# Patient Record
Sex: MALE | Race: BLACK OR AFRICAN AMERICAN | NOT HISPANIC OR LATINO | Employment: OTHER | URBAN - NONMETROPOLITAN AREA
[De-identification: names, ages, dates, MRNs, and addresses within clinical notes are randomized per-mention and may not be internally consistent; named-entity substitution may affect disease eponyms.]

---

## 2017-11-20 ENCOUNTER — HOSPITAL ENCOUNTER (OUTPATIENT)
Dept: CT IMAGING | Facility: HOSPITAL | Age: 67
Discharge: HOME | End: 2017-11-20
Admitting: INTERNAL MEDICINE

## 2017-11-20 ENCOUNTER — TRANSCRIBE ORDERS (OUTPATIENT)
Dept: ADMINISTRATIVE | Facility: HOSPITAL | Age: 67
End: 2017-11-20

## 2017-11-20 DIAGNOSIS — Z13.9 SCREENING PROCEDURE: Primary | ICD-10-CM

## 2017-11-20 DIAGNOSIS — Z13.9 SCREENING PROCEDURE: ICD-10-CM

## 2017-11-20 PROCEDURE — G0297 LDCT FOR LUNG CA SCREEN: HCPCS

## 2018-11-19 ENCOUNTER — HOSPITAL ENCOUNTER (OUTPATIENT)
Dept: CT IMAGING | Facility: HOSPITAL | Age: 68
Discharge: HOME | End: 2018-11-19

## 2018-11-19 ENCOUNTER — TRANSCRIBE ORDERS (OUTPATIENT)
Dept: ADMINISTRATIVE | Facility: HOSPITAL | Age: 68
End: 2018-11-19

## 2018-11-19 DIAGNOSIS — Z12.9 CANCER SCREENING: ICD-10-CM

## 2018-11-19 DIAGNOSIS — Z12.9 CANCER SCREENING: Primary | ICD-10-CM

## 2018-11-19 PROCEDURE — G0297 LDCT FOR LUNG CA SCREEN: HCPCS

## 2019-05-12 ENCOUNTER — OFFICE VISIT (OUTPATIENT)
Dept: URGENT CARE | Age: 69
End: 2019-05-12
Payer: COMMERCIAL

## 2019-05-12 VITALS
DIASTOLIC BLOOD PRESSURE: 72 MMHG | TEMPERATURE: 98.4 F | RESPIRATION RATE: 18 BRPM | OXYGEN SATURATION: 98 % | HEART RATE: 80 BPM | SYSTOLIC BLOOD PRESSURE: 124 MMHG | WEIGHT: 172 LBS

## 2019-05-12 DIAGNOSIS — L08.9 INFECTION OF FINGER: Primary | ICD-10-CM

## 2019-05-12 DIAGNOSIS — L03.011 PARONYCHIA OF FINGER, RIGHT: ICD-10-CM

## 2019-05-12 PROCEDURE — 99203 OFFICE O/P NEW LOW 30 MIN: CPT | Performed by: FAMILY MEDICINE

## 2019-05-12 RX ORDER — GLIMEPIRIDE 2 MG/1
2 TABLET ORAL
COMMUNITY
End: 2020-11-24

## 2019-05-12 RX ORDER — VALSARTAN AND HYDROCHLOROTHIAZIDE 320; 25 MG/1; MG/1
1 TABLET, FILM COATED ORAL DAILY
COMMUNITY
End: 2021-04-16 | Stop reason: SDUPTHER

## 2019-05-12 RX ORDER — ATORVASTATIN CALCIUM 20 MG/1
20 TABLET, FILM COATED ORAL DAILY
COMMUNITY
End: 2021-07-07 | Stop reason: SDUPTHER

## 2019-05-12 RX ORDER — AMOXICILLIN AND CLAVULANATE POTASSIUM 875; 125 MG/1; MG/1
1 TABLET, FILM COATED ORAL 2 TIMES DAILY
Qty: 20 TABLET | Refills: 0 | Status: SHIPPED | OUTPATIENT
Start: 2019-05-12 | End: 2019-05-22

## 2019-05-12 RX ORDER — TAMSULOSIN HYDROCHLORIDE 0.4 MG/1
0.4 CAPSULE ORAL DAILY
COMMUNITY
End: 2021-02-25 | Stop reason: SDUPTHER

## 2019-05-12 RX ORDER — TRAZODONE HYDROCHLORIDE 50 MG/1
50 TABLET ORAL NIGHTLY
COMMUNITY
End: 2021-04-16 | Stop reason: SDUPTHER

## 2019-05-12 RX ORDER — GABAPENTIN 300 MG/1
300 CAPSULE ORAL NIGHTLY
COMMUNITY
End: 2021-01-07 | Stop reason: SDUPTHER

## 2019-05-12 RX ORDER — CARVEDILOL 3.12 MG/1
3.12 TABLET ORAL 2 TIMES DAILY WITH MEALS
COMMUNITY
End: 2020-11-24

## 2019-05-12 RX ORDER — AMLODIPINE BESYLATE 5 MG/1
5 TABLET ORAL DAILY
COMMUNITY
End: 2021-02-09 | Stop reason: SDUPTHER

## 2019-05-12 RX ORDER — BUPROPION HYDROCHLORIDE 150 MG/1
150 TABLET, EXTENDED RELEASE ORAL 2 TIMES DAILY
COMMUNITY
End: 2019-07-03

## 2019-05-12 ASSESSMENT — ENCOUNTER SYMPTOMS
SHORTNESS OF BREATH: 0
COUGH: 0
COLOR CHANGE: 0

## 2019-05-12 NOTE — PROGRESS NOTES
for 10 days 5/12/19 5/22/19 Yes Kalpana Lopez MD       Past Medical History:   Diagnosis Date    DM (diabetes mellitus) ([facility] [address]     HTN (hypertension)        No past surgical history on file. Social History     Socioeconomic History    Marital status:      Spouse name: Not on file    Number of children: Not on file    Years of education: Not on file    Highest education level: Not on file   Occupational History    Not on file   Social Needs    Financial resource strain: Not on file    Food insecurity:     Worry: Not on file     Inability: Not on file    Transportation needs:     Medical: Not on file     Non-medical: Not on file   Tobacco Use    Smoking status: Not on file   Substance and Sexual Activity    Alcohol use: Not on file    Drug use: Not on file    Sexual activity: Not on file   Lifestyle    Physical activity:     Days per week: Not on file     Minutes per session: Not on file    Stress: Not on file   Relationships    Social connections:     Talks on phone: Not on file     Gets together: Not on file     Attends Nondenominational service: Not on file     Active member of club or organization: Not on file     Attends meetings of clubs or organizations: Not on file     Relationship status: Not on file    Intimate partner violence:     Fear of current or ex partner: Not on file     Emotionally abused: Not on file     Physically abused: Not on file     Forced sexual activity: Not on file   Other Topics Concern    Not on file   Social History Narrative    Not on file       Physical Exam   Constitutional: He is oriented to person, place, and time. He appears well-developed and well-nourished. HENT:   Head: Normocephalic and atraumatic. Right Ear: External ear normal.   Left Ear: External ear normal.   Eyes: Conjunctivae are normal. Right eye exhibits no discharge. Left eye exhibits no discharge. Neck: No JVD present. Carotid bruit is not present.    Cardiovascular: Normal rate, also a good idea to know your test results and keep a list of the medicines you take. How can you care for yourself at home? · If your doctor told you how to care for your infected nail, follow the doctor's instructions. If you did not get instructions, follow this general advice:  ? Wash the area with clean water 2 times a day. Don't use hydrogen peroxide or alcohol, which can slow healing. ? You may cover the area with a thin layer of petroleum jelly, such as Vaseline, and a nonstick bandage. ? Apply more petroleum jelly and replace the bandage as needed. · If your doctor prescribed antibiotics, take them as directed. Do not stop taking them just because you feel better. You need to take the full course of antibiotics. · Take an over-the-counter pain medicine, such as acetaminophen (Tylenol), ibuprofen (Advil, Motrin), or naproxen (Aleve). Read and follow all instructions on the label. · Do not take two or more pain medicines at the same time unless the doctor told you to. Many pain medicines have acetaminophen, which is Tylenol. Too much acetaminophen (Tylenol) can be harmful. · Prop up the toe or finger so that it is higher than the level of your heart. This will help with pain and swelling. · Apply heat. Put a warm water bottle, heating pad set on low, or warm cloth on your finger or toe. Do not go to sleep with a heating pad on your skin. · Soak the area in warm water twice a day for 15 minutes each time. After soaking, dry the area well and apply a thin layer of petroleum jelly, such as Vaseline. Put on a new bandage. When should you call for help? Call your doctor now or seek immediate medical care if:    · You have signs of new or worsening infection, such as:  ? Increased pain, swelling, warmth, or redness. ? Red streaks leading from the infected skin. ? Pus draining from the area.   ? A fever.    Watch closely for changes in your health, and be sure to contact your doctor if:    · You do not

## 2019-05-15 LAB
GRAM STAIN RESULT: ABNORMAL
ORGANISM: ABNORMAL
ORGANISM: ABNORMAL
WOUND/ABSCESS: ABNORMAL

## 2019-05-15 NOTE — RESULT ENCOUNTER NOTE
Please inform patient of abnormal test results. Antibiotics should treat the infection. How is he feeling?

## 2019-07-01 RX ORDER — ALBUTEROL SULFATE 90 UG/1
2 AEROSOL, METERED RESPIRATORY (INHALATION) EVERY 6 HOURS PRN
COMMUNITY
End: 2022-10-17 | Stop reason: SDUPTHER

## 2019-07-01 RX ORDER — CHOLECALCIFEROL (VITAMIN D3) 1250 MCG
1 CAPSULE ORAL WEEKLY
COMMUNITY
End: 2022-01-28

## 2019-07-01 RX ORDER — TESTOSTERONE CYPIONATE 200 MG/ML
2.5 VIAL (ML) INTRAMUSCULAR
COMMUNITY
End: 2022-01-28

## 2019-07-01 RX ORDER — SILDENAFIL 50 MG/1
50 TABLET, FILM COATED ORAL PRN
COMMUNITY
End: 2020-11-24

## 2019-07-01 RX ORDER — FLUTICASONE PROPIONATE 50 MCG
2 SPRAY, SUSPENSION (ML) NASAL DAILY
COMMUNITY
End: 2019-07-03

## 2019-07-03 ENCOUNTER — OFFICE VISIT (OUTPATIENT)
Dept: GASTROENTEROLOGY | Age: 69
End: 2019-07-03
Payer: COMMERCIAL

## 2019-07-03 VITALS
DIASTOLIC BLOOD PRESSURE: 62 MMHG | BODY MASS INDEX: 23.38 KG/M2 | HEIGHT: 71 IN | OXYGEN SATURATION: 99 % | HEART RATE: 97 BPM | WEIGHT: 167 LBS | SYSTOLIC BLOOD PRESSURE: 118 MMHG

## 2019-07-03 DIAGNOSIS — R13.19 ESOPHAGEAL DYSPHAGIA: Primary | ICD-10-CM

## 2019-07-03 DIAGNOSIS — Z80.0 FAMILY HISTORY OF GASTRIC CANCER: ICD-10-CM

## 2019-07-03 DIAGNOSIS — Z86.010 HISTORY OF COLON POLYPS: ICD-10-CM

## 2019-07-03 DIAGNOSIS — Z80.0 FAMILY HISTORY OF COLON CANCER IN MOTHER: ICD-10-CM

## 2019-07-03 DIAGNOSIS — R63.4 UNEXPLAINED WEIGHT LOSS: ICD-10-CM

## 2019-07-03 PROCEDURE — 99204 OFFICE O/P NEW MOD 45 MIN: CPT | Performed by: INTERNAL MEDICINE

## 2019-07-03 ASSESSMENT — ENCOUNTER SYMPTOMS
SHORTNESS OF BREATH: 1
CHOKING: 1
EYES NEGATIVE: 1
COUGH: 1

## 2019-07-09 ENCOUNTER — TELEPHONE (OUTPATIENT)
Dept: GASTROENTEROLOGY | Age: 69
End: 2019-07-09

## 2019-07-09 NOTE — TELEPHONE ENCOUNTER
Called the patient back and had to leave a message. Advised him to check his last page of his paperwork for his RX. If he does not have it then call me back and I will call it in.

## 2019-07-11 ENCOUNTER — ANESTHESIA EVENT (OUTPATIENT)
Dept: OPERATING ROOM | Age: 69
End: 2019-07-11

## 2019-07-12 ENCOUNTER — APPOINTMENT (OUTPATIENT)
Dept: OPERATING ROOM | Age: 69
End: 2019-07-12

## 2019-07-12 ENCOUNTER — HOSPITAL ENCOUNTER (OUTPATIENT)
Age: 69
Setting detail: OUTPATIENT SURGERY
Discharge: HOME | End: 2019-07-12
Attending: INTERNAL MEDICINE | Admitting: INTERNAL MEDICINE
Payer: MEDICARE

## 2019-07-12 ENCOUNTER — HOSPITAL ENCOUNTER (OUTPATIENT)
Age: 69
Setting detail: SPECIMEN
Discharge: HOME | End: 2019-07-12
Payer: MEDICARE

## 2019-07-12 ENCOUNTER — ANESTHESIA (OUTPATIENT)
Dept: OPERATING ROOM | Age: 69
End: 2019-07-12

## 2019-07-12 VITALS — DIASTOLIC BLOOD PRESSURE: 71 MMHG | SYSTOLIC BLOOD PRESSURE: 128 MMHG | OXYGEN SATURATION: 98 %

## 2019-07-12 VITALS
OXYGEN SATURATION: 98 % | SYSTOLIC BLOOD PRESSURE: 128 MMHG | HEART RATE: 71 BPM | DIASTOLIC BLOOD PRESSURE: 74 MMHG | RESPIRATION RATE: 18 BRPM

## 2019-07-12 PROCEDURE — 45385 COLONOSCOPY W/LESION REMOVAL: CPT | Performed by: INTERNAL MEDICINE

## 2019-07-12 PROCEDURE — G8907 PT DOC NO EVENTS ON DISCHARG: HCPCS

## 2019-07-12 PROCEDURE — 45388 COLONOSCOPY W/ABLATION: CPT | Performed by: INTERNAL MEDICINE

## 2019-07-12 PROCEDURE — 45384 COLONOSCOPY W/LESION REMOVAL: CPT

## 2019-07-12 PROCEDURE — 43239 EGD BIOPSY SINGLE/MULTIPLE: CPT | Performed by: INTERNAL MEDICINE

## 2019-07-12 PROCEDURE — 88312 SPECIAL STAINS GROUP 1: CPT

## 2019-07-12 PROCEDURE — 88305 TISSUE EXAM BY PATHOLOGIST: CPT

## 2019-07-12 PROCEDURE — G8918 PT W/O PREOP ORDER IV AB PRO: HCPCS

## 2019-07-12 PROCEDURE — 45385 COLONOSCOPY W/LESION REMOVAL: CPT

## 2019-07-12 PROCEDURE — 43239 EGD BIOPSY SINGLE/MULTIPLE: CPT

## 2019-07-12 RX ORDER — LIDOCAINE HYDROCHLORIDE 10 MG/ML
INJECTION, SOLUTION INFILTRATION; PERINEURAL PRN
Status: DISCONTINUED | OUTPATIENT
Start: 2019-07-12 | End: 2019-07-12 | Stop reason: SDUPTHER

## 2019-07-12 RX ORDER — GLYCOPYRROLATE 0.6MG/3ML
SYRINGE (ML) INTRAVENOUS PRN
Status: DISCONTINUED | OUTPATIENT
Start: 2019-07-12 | End: 2019-07-12 | Stop reason: SDUPTHER

## 2019-07-12 RX ORDER — SODIUM CHLORIDE 9 MG/ML
INJECTION, SOLUTION INTRAVENOUS CONTINUOUS
Status: DISCONTINUED | OUTPATIENT
Start: 2019-07-12 | End: 2019-07-12 | Stop reason: HOSPADM

## 2019-07-12 RX ORDER — SODIUM CHLORIDE 9 MG/ML
INJECTION, SOLUTION INTRAVENOUS CONTINUOUS PRN
Status: DISCONTINUED | OUTPATIENT
Start: 2019-07-12 | End: 2019-07-12 | Stop reason: SDUPTHER

## 2019-07-12 RX ORDER — PROPOFOL 10 MG/ML
INJECTION, EMULSION INTRAVENOUS PRN
Status: DISCONTINUED | OUTPATIENT
Start: 2019-07-12 | End: 2019-07-12 | Stop reason: SDUPTHER

## 2019-07-12 RX ADMIN — PROPOFOL 400 MG: 10 INJECTION, EMULSION INTRAVENOUS at 09:55

## 2019-07-12 RX ADMIN — LIDOCAINE HYDROCHLORIDE 40 MG: 10 INJECTION, SOLUTION INFILTRATION; PERINEURAL at 09:55

## 2019-07-12 RX ADMIN — Medication 0.2 MG: at 09:54

## 2019-07-12 RX ADMIN — SODIUM CHLORIDE: 9 INJECTION, SOLUTION INTRAVENOUS at 09:51

## 2019-07-12 ASSESSMENT — LIFESTYLE VARIABLES: SMOKING_STATUS: 1

## 2019-07-12 NOTE — OP NOTE
Endoscopic Procedure Note    Patient: Jett Choi :   [facility] Rec#: [id number] Acc#: [id number]     Primary Care Provider No primary care provider on file. Endoscopist: Lori Dejesus MD    Date of Procedure:  2019    Procedure:   1. EGD with cold biopsies    Indications:   1. Intermittent dysphagia to liquids and pills mostly  2. Unexplained weight loss until recently  3. Abnormal UGI x-rays    Anesthesia:  Sedation was administered by anesthesia who monitored the patient during the procedure. Estimated Blood Loss: minimal.    Procedure:   After reviewing the patient's chart and obtaining informed consent, the patient was placed in the left lateral decubitus position. A forward-viewing Olympus endoscope was lubricated and inserted through the mouth into the oropharynx. Under direct visualization, the upper esophagus was intubated. The scope was advanced to the level of the third portion of duodenum. Scope was slowly withdrawn with careful inspection of the mucosal surfaces. The scope was retroflexed for inspection of the gastric fundus and incisura. Findings and maneuvers are listed in impression below. The patient tolerated the procedure well. The scope was removed. There were no immediate complications. Findings:   Esophagus: normal; no obvious masses or strictures or erosions or ulcers or extrinsic compression  There is NO hiatal hernia present. Stomach:  abnormal:  mucosal changes with linear patchy erythema and small erosions suggestive of gastritis noted in the distal body and antrum of stomach- Gastric biopsies were taken from the antrum and body to rule out Helicobacter pylori infection. Duodenum: abnormal: small erosions and patchy erythema in the bulb-biopsies were taken; the Major papilla appeared somewhat prominent but could not be visualized adequately with this end-on scope.  Distal to the papilla, a 1.5 cm in diameter submucosal appearing lesion with a

## 2019-07-12 NOTE — ANESTHESIA PRE PROCEDURE
medications for this encounter. Allergies:  No Known Allergies    Problem List:  There is no problem list on file for this patient. Past Medical History:        Diagnosis Date    BPH (benign prostatic hyperplasia)     Dizziness     DM (diabetes mellitus) ([address]     type 2    Dysphagia     HTN (hypertension)     Hyperlipidemia     Hypogonadism male     Peripheral neuropathy     Tobacco dependence        Past Surgical History:        Procedure Laterality Date    BACK SURGERY      COLON SURGERY  2014    Done in [facility], hx of colon polyps with a 5 yr recall    EYE SURGERY Right 07/2017       Social History:    Social History     Tobacco Use    Smoking status: Current Every Day Smoker     Packs/day: 1.00    Smokeless tobacco: Never Used   Substance Use Topics    Alcohol use: Yes     Comment: rare                                Ready to quit: Not Answered  Counseling given: Not Answered      Vital Signs (Current): There were no vitals filed for this visit. BP Readings from Last 3 Encounters:   07/03/19 118/62   05/12/19 124/72       NPO Status:                                                                                 BMI:   Wt Readings from Last 3 Encounters:   07/03/19 167 lb (75.8 kg)   05/12/19 172 lb (78 kg)     There is no height or weight on file to calculate BMI.    CBC: No results found for: WBC, RBC, HGB, HCT, MCV, RDW, PLT    CMP: No results found for: NA, K, CL, CO2, BUN, CREATININE, GFRAA, AGRATIO, LABGLOM, GLUCOSE, PROT, CALCIUM, BILITOT, ALKPHOS, AST, ALT    POC Tests: No results for input(s): POCGLU, POCNA, POCK, POCCL, POCBUN, POCHEMO, POCHCT in the last 72 hours.     Coags: No results found for: PROTIME, INR, APTT    HCG (If Applicable): No results found for: PREGTESTUR, PREGSERUM, HCG, HCGQUANT     ABGs: No results found for: PHART, PO2ART, NWF1PZH, TGD1JZN, BEART, L6TTHTBO     Type & Screen (If Applicable):  No results found

## 2019-07-12 NOTE — H&P
units CAPS Take 1 capsule by mouth once a week    Historical Provider, MD   atorvastatin (LIPITOR) 20 MG tablet Take 20 mg by mouth daily    Historical Provider, MD   traZODone (DESYREL) 50 MG tablet Take 50 mg by mouth nightly    Historical Provider, MD   gabapentin (NEURONTIN) 300 MG capsule Take 300 mg by mouth nightly. Historical Provider, MD   carvedilol (COREG) 3.125 MG tablet Take 3.125 mg by mouth 2 times daily (with meals)    Historical Provider, MD   glimepiride (AMARYL) 2 MG tablet Take 2 mg by mouth every morning (before breakfast)    Historical Provider, MD   amLODIPine (NORVASC) 5 MG tablet Take 5 mg by mouth daily     Historical Provider, MD   tamsulosin (FLOMAX) 0.4 MG capsule Take 0.4 mg by mouth daily    Historical Provider, MD   valsartan-hydrochlorothiazide (DIOVAN-HCT) 320-25 MG per tablet Take 1 tablet by mouth daily     Historical Provider, MD   Dulaglutide (TRULICITY) 6.31 ZK/7.3FM SOPN Inject into the skin once a week     Historical Provider, MD       Past Medical History:  Past Medical History:   Diagnosis Date    BPH (benign prostatic hyperplasia)     Dizziness     DM (diabetes mellitus) ([facility] [address]     type 2    Dysphagia     HTN (hypertension)     Hyperlipidemia     Hypogonadism male     Peripheral neuropathy     Tobacco dependence        Past Surgical History:  Past Surgical History:   Procedure Laterality Date    BACK SURGERY      COLON SURGERY  2014    Done in [facility] of colon polyps with a 5 yr recall    EYE SURGERY Right 07/2017       Social History:  Social History     Tobacco Use    Smoking status: Current Every Day Smoker     Packs/day: 1.00    Smokeless tobacco: Never Used   Substance Use Topics    Alcohol use: Yes     Comment: rare    Drug use: Yes     Types: Marijuana     Comment: occ       Vital Signs: There were no vitals filed for this visit.      Physical Exam:  Cardiac:  [x]WNL  []Comments:  Pulmonary:  [x]WNL   []Comments:  Neuro/Mental Status:

## 2019-07-12 NOTE — ANESTHESIA POSTPROCEDURE EVALUATION
Department of Anesthesiology  Postprocedure Note    Patient: Bruce Hopkins  MRN: [id number]  YOB: 1950  Date of evaluation: 7/12/2019  Time:  10:40 AM     Procedure Summary     Date:  07/12/19 Room / Location:  [facility] ENDO 01 / [facility] OR    Anesthesia Start:  8694 Anesthesia Stop:      Procedures:       COLONOSCOPY DIAGNOSTIC (N/A Abdomen)      EGD ESOPHAGOGASTRODUODENOSCOPY (N/A Esophagus) Diagnosis:  (DYSPHAGIA, WEIGHT LOSS, HX POLYPS, FH COLON CA,)    Surgeon:  Abhishek Pinedo MD Responsible Provider:  ADI Santos CRNA    Anesthesia Type:  general, TIVA ASA Status:  3          Anesthesia Type: general, TIVA    Lenore Phase I:      Lenore Phase II:      Last vitals: Reviewed and per EMR flowsheets.        Anesthesia Post Evaluation    Patient location during evaluation: bedside  Patient participation: complete - patient participated  Level of consciousness: sleepy but conscious  Pain score: 0  Airway patency: patent  Nausea & Vomiting: no nausea and no vomiting  Complications: no  Cardiovascular status: hemodynamically stable and blood pressure returned to baseline  Respiratory status: acceptable and nasal cannula  Hydration status: stable

## 2019-07-15 ENCOUNTER — TELEPHONE (OUTPATIENT)
Dept: GASTROENTEROLOGY | Age: 69
End: 2019-07-15

## 2019-07-30 DIAGNOSIS — A04.8 H. PYLORI INFECTION: Primary | ICD-10-CM

## 2019-07-30 RX ORDER — AMOXICILLIN 500 MG/1
CAPSULE ORAL
Qty: 56 CAPSULE | Refills: 0 | Status: SHIPPED | OUTPATIENT
Start: 2019-07-30 | End: 2020-11-24

## 2019-07-30 RX ORDER — METRONIDAZOLE 500 MG/1
500 TABLET ORAL 2 TIMES DAILY
Qty: 28 TABLET | Refills: 0 | Status: SHIPPED | OUTPATIENT
Start: 2019-07-30 | End: 2019-08-13

## 2019-07-30 RX ORDER — OMEPRAZOLE 20 MG/1
CAPSULE, DELAYED RELEASE ORAL
Qty: 28 CAPSULE | Refills: 0 | Status: SHIPPED | OUTPATIENT
Start: 2019-07-30

## 2019-08-12 ENCOUNTER — TELEPHONE (OUTPATIENT)
Dept: GASTROENTEROLOGY | Age: 69
End: 2019-08-12

## 2019-11-05 NOTE — TELEPHONE ENCOUNTER
DK,     Just verifying you got my message back in July for EUS. Please let me know of any changes for the apt.  Thanks

## 2019-11-15 ENCOUNTER — HOSPITAL ENCOUNTER (OUTPATIENT)
Dept: CT IMAGING | Facility: HOSPITAL | Age: 69
Discharge: HOME | End: 2019-11-15

## 2019-11-15 ENCOUNTER — TRANSCRIBE ORDERS (OUTPATIENT)
Dept: ADMINISTRATIVE | Facility: HOSPITAL | Age: 69
End: 2019-11-15

## 2019-11-15 DIAGNOSIS — Z13.9 SCREENING PROCEDURE: ICD-10-CM

## 2019-11-15 DIAGNOSIS — Z13.9 SCREENING PROCEDURE: Primary | ICD-10-CM

## 2019-11-15 PROCEDURE — G0297 LDCT FOR LUNG CA SCREEN: HCPCS

## 2019-12-04 ENCOUNTER — TRANSCRIBE ORDERS (OUTPATIENT)
Dept: ADMINISTRATIVE | Facility: HOSPITAL | Age: 69
End: 2019-12-04

## 2019-12-04 ENCOUNTER — APPOINTMENT (OUTPATIENT)
Dept: LAB | Facility: HOSPITAL | Age: 69
End: 2019-12-04

## 2019-12-04 DIAGNOSIS — N40.0 BENIGN LOCALIZED PROSTATIC HYPERPLASIA WITHOUT LOWER URINARY TRACT SYMPTOMS (LUTS): ICD-10-CM

## 2019-12-04 DIAGNOSIS — R06.09 OTHER FORMS OF DYSPNEA: Primary | ICD-10-CM

## 2019-12-04 DIAGNOSIS — I10 ESSENTIAL HYPERTENSION: ICD-10-CM

## 2019-12-04 DIAGNOSIS — E11.40 TYPE 2 DIABETES MELLITUS WITH DIABETIC NEUROPATHY, UNSPECIFIED WHETHER LONG TERM INSULIN USE (HCC): Primary | ICD-10-CM

## 2019-12-04 DIAGNOSIS — E78.00 HYPERCHOLESTEREMIA: ICD-10-CM

## 2019-12-04 LAB
ALBUMIN SERPL-MCNC: 4.1 G/DL (ref 3.5–5)
ALBUMIN/GLOB SERPL: 1.1 G/DL (ref 1.1–2.5)
ALP SERPL-CCNC: 48 U/L (ref 24–120)
ALT SERPL W P-5'-P-CCNC: 30 U/L (ref 0–54)
ANION GAP SERPL CALCULATED.3IONS-SCNC: 7 MMOL/L (ref 4–13)
AST SERPL-CCNC: 24 U/L (ref 7–45)
AUTO MIXED CELLS #: 0.9 10*3/MM3 (ref 0.1–2.6)
AUTO MIXED CELLS %: 10.1 % (ref 0.1–24)
BILIRUB SERPL-MCNC: 0.6 MG/DL (ref 0.1–1)
BILIRUB UR QL STRIP: NEGATIVE
BUN BLD-MCNC: 18 MG/DL (ref 5–21)
BUN/CREAT SERPL: 18.6
CALCIUM SPEC-SCNC: 9.5 MG/DL (ref 8.4–10.4)
CHLORIDE SERPL-SCNC: 100 MMOL/L (ref 98–110)
CHOLEST SERPL-MCNC: 154 MG/DL (ref 130–200)
CLARITY UR: CLEAR
CO2 SERPL-SCNC: 34 MMOL/L (ref 24–31)
COLOR UR: YELLOW
CREAT BLD-MCNC: 0.97 MG/DL (ref 0.5–1.4)
ERYTHROCYTE [DISTWIDTH] IN BLOOD BY AUTOMATED COUNT: 13.1 % (ref 12.3–15.4)
GFR SERPL CREATININE-BSD FRML MDRD: 92 ML/MIN/1.73
GLOBULIN UR ELPH-MCNC: 3.9 GM/DL
GLUCOSE BLD-MCNC: 113 MG/DL (ref 70–100)
GLUCOSE UR STRIP-MCNC: ABNORMAL MG/DL
HBA1C MFR BLD: 6.1 % (ref 4.8–5.9)
HCT VFR BLD AUTO: 42 % (ref 37.5–51)
HDLC SERPL-MCNC: 46 MG/DL
HGB BLD-MCNC: 13.7 G/DL (ref 13–17.7)
HGB UR QL STRIP.AUTO: NEGATIVE
KETONES UR QL STRIP: NEGATIVE
LDLC SERPL CALC-MCNC: 96 MG/DL (ref 0–99)
LDLC/HDLC SERPL: 2.08 {RATIO}
LEUKOCYTE ESTERASE UR QL STRIP.AUTO: NEGATIVE
LYMPHOCYTES # BLD AUTO: 2.7 10*3/MM3 (ref 0.7–3.1)
LYMPHOCYTES NFR BLD AUTO: 31.2 % (ref 19.6–45.3)
MCH RBC QN AUTO: 29.7 PG (ref 26.6–33)
MCHC RBC AUTO-ENTMCNC: 32.6 G/DL (ref 31.5–35.7)
MCV RBC AUTO: 91.1 FL (ref 79–97)
NEUTROPHILS # BLD AUTO: 5.2 10*3/MM3 (ref 1.7–7)
NEUTROPHILS NFR BLD AUTO: 58.7 % (ref 42.7–76)
NITRITE UR QL STRIP: NEGATIVE
PH UR STRIP.AUTO: 6 [PH] (ref 5–8)
PLATELET # BLD AUTO: 410 10*3/MM3 (ref 140–450)
PMV BLD AUTO: 8 FL (ref 6–12)
POTASSIUM BLD-SCNC: 4.4 MMOL/L (ref 3.5–5.3)
PROT SERPL-MCNC: 8 G/DL (ref 6.3–8.7)
PROT UR QL STRIP: NEGATIVE
PSA SERPL-MCNC: 1.5 NG/ML (ref 0–4)
RBC # BLD AUTO: 4.61 10*6/MM3 (ref 4.14–5.8)
SODIUM BLD-SCNC: 141 MMOL/L (ref 135–145)
SP GR UR STRIP: 1.02 (ref 1–1.03)
TRIGL SERPL-MCNC: 62 MG/DL (ref 0–149)
UROBILINOGEN UR QL STRIP: ABNORMAL
VLDLC SERPL-MCNC: 12.4 MG/DL
WBC NRBC COR # BLD: 8.8 10*3/MM3 (ref 3.4–10.8)

## 2019-12-04 PROCEDURE — 84153 ASSAY OF PSA TOTAL: CPT | Performed by: PEDIATRICS

## 2019-12-04 PROCEDURE — 83036 HEMOGLOBIN GLYCOSYLATED A1C: CPT | Performed by: PEDIATRICS

## 2019-12-04 PROCEDURE — 80053 COMPREHEN METABOLIC PANEL: CPT | Performed by: PEDIATRICS

## 2019-12-04 PROCEDURE — 80061 LIPID PANEL: CPT | Performed by: PEDIATRICS

## 2019-12-04 PROCEDURE — 36415 COLL VENOUS BLD VENIPUNCTURE: CPT | Performed by: PEDIATRICS

## 2019-12-04 PROCEDURE — 81003 URINALYSIS AUTO W/O SCOPE: CPT | Performed by: PEDIATRICS

## 2019-12-04 PROCEDURE — 85025 COMPLETE CBC W/AUTO DIFF WBC: CPT | Performed by: PEDIATRICS

## 2020-04-18 ENCOUNTER — NURSE TRIAGE (OUTPATIENT)
Dept: CALL CENTER | Facility: HOSPITAL | Age: 70
End: 2020-04-18

## 2020-04-18 NOTE — TELEPHONE ENCOUNTER
States he called earlier about his medication. Accessed chart and see that he spoke with Josephine. Appears that she checked with pharmacy and it was at the pharmacy and ready but caller did not answer when she tried to call him back. Notified him that medication is at the pharmacy. He verbalized understanding. Denies any further questions/concerns at this time.

## 2020-04-18 NOTE — TELEPHONE ENCOUNTER
"Called the pharmacy, spoke with Miriam, medication is at the pharm. Attempted to call the caller back and no answer, message left to called the nurse call line.  Reason for Disposition  • Caller has medication question only, adult not sick, and triager answers question    Additional Information  • Negative: Drug overdose and nurse unable to answer question  • Negative: Caller requesting information not related to medicine  • Negative: Caller requesting a prescription for Strep throat and has a positive culture result  • Negative: Rash while taking a medication or within 3 days of stopping it  • Negative: Immunization reaction suspected  • Negative: [1] Asthma and [2] having symptoms of asthma (cough, wheezing, etc)  • Negative: MORE THAN A DOUBLE DOSE of a prescription or over-the-counter (OTC) drug  • Negative: [1] DOUBLE DOSE (an extra dose or lesser amount) of over-the-counter (OTC) drug AND [2] any symptoms (e.g., dizziness, nausea, pain, sleepiness)  • Negative: [1] DOUBLE DOSE (an extra dose or lesser amount) of prescription drug AND [2] any symptoms (e.g., dizziness, nausea, pain, sleepiness)  • Negative: Took another person's prescription drug  • Negative: [1] DOUBLE DOSE (an extra dose or lesser amount) of prescription drug AND [2] NO symptoms (Exception: a double dose of antibiotics)  • Negative: Diabetes drug error or overdose (e.g., insulin or extra dose)  • Negative: [1] Request for URGENT new prescription or refill of \"essential\" medication (i.e., likelihood of harm to patient if not taken) AND [2] triager unable to fill per unit policy  • Negative: [1] Prescription not at pharmacy AND [2] was prescribed today by PCP  • Negative: Pharmacy calling with prescription questions and triager unable to answer question  • Negative: Caller has URGENT medication question about med that PCP prescribed and triager unable to answer question  • Negative: Caller has NON-URGENT medication question about med that PCP " "prescribed and triager unable to answer question  • Negative: Caller requesting a NON-URGENT new prescription or refill and triager unable to refill per unit policy  • Negative: Caller has medication question about med not prescribed by PCP and triager unable to answer question (e.g., compatibility with other med, storage)  • Negative: [1] DOUBLE DOSE (an extra dose or lesser amount) of over-the-counter (OTC) drug AND [2] NO symptoms  • Negative: [1] DOUBLE DOSE (an extra dose or lesser amount) of antibiotic drug AND [2] NO symptoms    Answer Assessment - Initial Assessment Questions  1. SYMPTOMS: \"Do you have any symptoms?\"      Upper resp  2. SEVERITY: If symptoms are present, ask \"Are they mild, moderate or severe?\"      moderate    Protocols used: MEDICATION QUESTION CALL-ADULT-    "

## 2020-04-24 ENCOUNTER — NURSE TRIAGE (OUTPATIENT)
Dept: CALL CENTER | Facility: HOSPITAL | Age: 70
End: 2020-04-24

## 2020-04-24 NOTE — TELEPHONE ENCOUNTER
"States he ate peanut butter/jelly and milk and states it came up to 69. Now is 112.     States Dr. Anderson is his PCP. States \"my blood sugar keeps dropping\". States he called yesterday AM because it was 67, then 61 after lunch.     States today it is 60 now. States he had some coffee with sugar and then had vegetable soup. States he takes trulicity weekly but didn't take it yesterday b/c of low blood sugar.    Also takes trazodone, gabapentin, atorvastatin, tamulosin, carvedilol, valsartan, and amlodipine.     Encouraged him to eat something with protein and carbs (peanut butter sandwich, peanut butter and crackers, etc), drink cup of milk, recheck blood sugar in 15 minutes then again in 1 hour. If you become symptomatic you have to be seen in ER. Requested that someone be with him.     Reason for Disposition  • [1] Low blood glucose (< 70 mg/dL  or 3.9 mmol/L) with no other adult present AND [2] hasn't tried Care Advice    Additional Information  • Negative: Unconscious or difficult to awaken  • Negative: Seizure occurs  • Negative: Acting confused (e.g., disoriented, slurred speech)  • Negative: Very weak (e.g., can't stand)  • Negative: Sounds like a life-threatening emergency to the triager  • Negative: [1] Vomiting AND [2] signs of dehydration (e.g., very dry mouth, lightheaded, dark urine)  • Negative: [1] Low blood sugar symptoms persist > 30 minutes AND [2] using low blood sugar Care Advice  • Negative: [1] Low blood glucose (< 70 mg/dL  or 3.9 mmol/L) persists > 30 minutes AND [2] using low blood sugar Care Advice  • Negative: Patient sounds very sick or weak to the triager  • Negative: [1] Low blood sugar symptoms with no other adult present AND [2] hasn't tried Care Advice  • Negative: Diabetes drug error or overdose (e.g., insulin error or extra dose)  • Negative: [1] Caller has URGENT medication or insulin pump question AND [2] triager unable to answer question  • Negative: [1] Blood glucose < 70  mg/dL " "(3.9 mmol/L) or symptomatic, now improved with Care Advice AND [2] cause unknown  • Negative: [1] Caller has NON-URGENT medication or insulin pump question AND [2] triager unable to answer question  • Negative: [1] Morning (before breakfast) blood glucose < 80 mg/dL (4.4 mmol/L) AND [2] more than once in past week  • Negative: [1] Evening (after bedtime snack) blood glucose < 100 mg/dL (5.6 mmol/L) AND [2] more than once in past week    Answer Assessment - Initial Assessment Questions  1. SYMPTOMS: \"What symptoms are you concerned about?\"      Low blood sugar  2. ONSET:  \"When did the symptoms start?\"      n/a  3. BLOOD GLUCOSE: \"What is your blood glucose level?\"       60  4. USUAL RANGE: \"What is your blood glucose level usually?\" (e.g., usual fasting morning value, usual evening value)        5. TYPE 1 or 2:  \"Do you know what type of diabetes you have?\"  (e.g., Type 1, Type 2, Gestational; doesn't know)       2  6. INSULIN: \"Do you take insulin?\" \"What type of insulin(s) do you use? What is the mode of delivery? (syringe, pen (e.g., injection or  pump)       n/a  7. DIABETES PILLS: \"Do you take any pills for your diabetes?\"      no  8. OTHER SYMPTOMS: \"Do you have any symptoms?\" (e.g., fever, frequent urination, difficulty breathing, vomiting)      no  9. LOW BLOOD GLUCOSE TREATMENT: \"What have you done so far to treat the low blood glucose level?\"      nothing  10. FOOD: \"When did you last eat or drink?\"        lunch  11. ALONE: \"Are you alone right now or is someone with you?\"         alone  12. PREGNANCY: \"Is there any chance you are pregnant?\" \"When was your last menstrual period?\"        n/a    Protocols used: DIABETES - LOW BLOOD SUGAR-ADULT-AH      "

## 2020-04-25 NOTE — TELEPHONE ENCOUNTER
"States BS was 112, he ate dinner and it was 96. He checked it again and he is down to 64 now.     He has eaten dinner and two snacks and it just keeps dropping back down. He has eaten peanut butter and jelly with milk, chicken pot pie, and tuna and crackers in the last 5 hours and it will come up for a bit and then drops back down to 60s.     Now blood sugar is 78. Patient is afraid to go to sleep since it keeps dropping. Lives alone. Discussed going to ER for evaluation, if so take glucometer with him to check that it is the same as ER to ensure it is working correctly.    Reason for Disposition  • Patient sounds very sick or weak to the triager    Additional Information  • Negative: Unconscious or difficult to awaken  • Negative: Seizure occurs  • Negative: Acting confused (e.g., disoriented, slurred speech)  • Negative: Very weak (e.g., can't stand)  • Negative: Sounds like a life-threatening emergency to the triager  • Negative: [1] Vomiting AND [2] signs of dehydration (e.g., very dry mouth, lightheaded, dark urine)  • Negative: [1] Low blood sugar symptoms persist > 30 minutes AND [2] using low blood sugar Care Advice  • Negative: [1] Low blood glucose (< 70 mg/dL  or 3.9 mmol/L) persists > 30 minutes AND [2] using low blood sugar Care Advice    Answer Assessment - Initial Assessment Questions  1. SYMPTOMS: \"What symptoms are you concerned about?\"      hypoglycemia  2. ONSET:  \"When did the symptoms start?\"      n/a  3. BLOOD GLUCOSE: \"What is your blood glucose level?\"       See note  4. USUAL RANGE: \"What is your blood glucose level usually?\" (e.g., usual fasting morning value, usual evening value)      n/a  5. TYPE 1 or 2:  \"Do you know what type of diabetes you have?\"  (e.g., Type 1, Type 2, Gestational; doesn't know)       n/a  6. INSULIN: \"Do you take insulin?\" \"What type of insulin(s) do you use? What is the mode of delivery? (syringe, pen (e.g., injection or  pump)       n/a  7. DIABETES PILLS: \"Do you " "take any pills for your diabetes?\"      n/a  8. OTHER SYMPTOMS: \"Do you have any symptoms?\" (e.g., fever, frequent urination, difficulty breathing, vomiting)      n/a  9. LOW BLOOD GLUCOSE TREATMENT: \"What have you done so far to treat the low blood glucose level?\"      n/a  10. FOOD: \"When did you last eat or drink?\"        n/a  11. ALONE: \"Are you alone right now or is someone with you?\"         n/a  12. PREGNANCY: \"Is there any chance you are pregnant?\" \"When was your last menstrual period?\"        N/a    Protocols used: DIABETES - LOW BLOOD SUGAR-ADULT-AH      "

## 2020-04-26 ENCOUNTER — NURSE TRIAGE (OUTPATIENT)
Dept: CALL CENTER | Facility: HOSPITAL | Age: 70
End: 2020-04-26

## 2020-04-26 NOTE — TELEPHONE ENCOUNTER
"States he doesn't think his blood sugar machine is working. States he compared it to his sisters today and hers read in the 150s and then his said it was 90. States when he got home a few minutes later it was showing 52. States he is not having any symptoms. Thinks his machine is the cause. States he can tell when his blood sugar gets low as he gets shaky, clammy, weak, etc.      Discussed if concerned it is low, can always be checked in ER. Instructed to call his PCP in the AM regardless. Need to get a meter that is working. He verbalized understanding and agreement.     Reason for Disposition  • [1] Caller requesting NON-URGENT health information AND [2] PCP's office is the best resource    Additional Information  • Negative: [1] Caller is not with the adult (patient) AND [2] reporting urgent symptoms  • Negative: Lab result questions  • Negative: Medication questions  • Negative: Caller can't be reached by phone  • Negative: Caller has already spoken to PCP or another triager  • Negative: RN needs further essential information from caller in order to complete triage  • Negative: Requesting regular office appointment    Answer Assessment - Initial Assessment Questions  1. REASON FOR CALL or QUESTION: \"What is your reason for calling today?\" or \"How can I best help you?\" or \"What question do you have that I can help answer?\"      See note    Protocols used: INFORMATION ONLY CALL-ADULT-      "

## 2020-05-05 ENCOUNTER — NURSE TRIAGE (OUTPATIENT)
Dept: CALL CENTER | Facility: HOSPITAL | Age: 70
End: 2020-05-05

## 2020-05-05 NOTE — TELEPHONE ENCOUNTER
"Symptoms for 3 weeks, runny nose, productive cough of clear phelm, and afebrile. Suggested cough drops, humdiifeir and  Warm liquids. Has taken antibiotics with an inhlaer    Reason for Disposition  • [1] Nasal discharge AND [2] present > 10 days    Additional Information  • Negative: Difficulty breathing, severe  • Negative: [1] Wheezing (high pitched whistling sound) AND [2] previous asthma attacks or use of asthma medicines  • Negative: Earache  • Sore throat  • Negative: Severe difficulty breathing (e.g., struggling for each breath, speaks in single words, stridor)  • Negative: Sounds like a life-threatening emergency to the triager  • Negative: [1] Diagnosed strep throat AND [2] taking antibiotic AND [3] symptoms continue  • Negative: Throat culture results, call about  • Negative: Productive cough is main symptom  • Negative: Non-productive cough is main symptom  • Negative: Hoarseness is main symptom  • Negative: Runny nose is main symptom  • Negative: [1] Drooling or spitting out saliva (because can't swallow) AND [2] normal breathing  • Negative: Unable to open mouth completely  • Negative: [1] Difficulty breathing AND [2] not severe  • Negative: Fever > 104 F (40 C)  • Negative: [1] Refuses to drink anything AND [2] for > 12 hours  • Negative: [1] Drinking very little AND [2] dehydration suspected (e.g., no urine > 12 hours, very dry mouth, very lightheaded)  • Negative: Patient sounds very sick or weak to the triager  • Negative: SEVERE (e.g., excruciating) throat pain  • Negative: [1] Pus on tonsils (back of throat) AND [2]  fever AND [3] swollen neck lymph nodes (\"glands\")  • Negative: [1] Rash AND [2] widespread (especially chest and abdomen)  • Negative: Earache also present  • Negative: Fever present > 3 days (72 hours)  • Negative: Diabetes mellitus or weak immune system (e.g., HIV positive, cancer chemo, splenectomy, organ transplant, chronic steroids)  • Negative: History of rheumatic fever  • " Negative: [1] Adult is leaving on a trip AND [2] requests an antibiotic NOW  • Negative: [1] Positive throat culture or rapid strep test (according to lab, PCP, caller, etc.) AND [2] NO  standing order to call in prescription for antibiotic  • Negative: [1] Exposure to family member (or spouse or boyfriend/girlfriend) with test-proven strep AND [2] within last 10 days  • Negative: [1] Sore throat is the only symptom AND [2] present > 48 hours  • Sinus pain or pressure  • Negative: Severe difficulty breathing (e.g., struggling for each breath, speaks in single words)  • Negative: Sounds like a life-threatening emergency to the triager  • Negative: [1] Sinus infection AND [2] taking an antibiotic AND [3] symptoms continue  • Negative: [1] Difficulty breathing AND [2] not from stuffy nose (e.g., not relieved by cleaning out the nose)  • Negative: [1] SEVERE headache AND [2] fever  • Negative: [1] Redness or swelling on the cheek, forehead or around the eye AND [2] fever  • Negative: Fever > 104 F (40 C)  • Negative: Patient sounds very sick or weak to the triager  • Negative: [1] SEVERE pain AND [2] not improved 2 hours after pain medicine  • Negative: [1] Redness or swelling on the cheek, forehead or around the eye AND [2] no fever  • Negative: [1] Fever > 101 F (38.3 C) AND [2] age > 60  • Negative: [1] Fever > 100.0 F (37.8 C) AND [2] bedridden (e.g., nursing home patient, CVA, chronic illness, recovering from surgery)  • Negative: [1] Fever > 100.0 F (37.8 C) AND [2] diabetes mellitus or weak immune system (e.g., HIV positive, cancer chemo, splenectomy, organ transplant, chronic steroids)  • Negative: Fever present > 3 days (72 hours)  • Negative: [1] Fever returns after gone for over 24 hours AND [2] symptoms worse or not improved  • Negative: [1] Sinus pain (not just congestion) AND [2] fever  • Negative: Earache  • Negative: [1] Sinus congestion (pressure, fullness) AND [2] present > 10 days    Answer Assessment  "- Initial Assessment Questions  1. ONSET: \"When did the throat start hurting?\" (Hours or days ago)       Has been sick for 3 weeks  2. SEVERITY: \"How bad is the sore throat?\" (Scale 1-10; mild, moderate or severe)    - MILD (1-3):  doesn't interfere with eating or normal activities    - MODERATE (4-7): interferes with eating some solids and normal activities    - SEVERE (8-10):  excruciating pain, interferes with most normal activities    - SEVERE DYSPHAGIA: can't swallow liquids, drooling      moderate  3. STREP EXPOSURE: \"Has there been any exposure to strep within the past week?\" If so, ask: \"What type of contact occurred?\"       no  4.  VIRAL SYMPTOMS: \"Are there any symptoms of a cold, such as a runny nose, cough, hoarse voice or red eyes?\"       Runny nose,   5. FEVER: \"Do you have a fever?\" If so, ask: \"What is your temperature, how was it measured, and when did it start?\"      no  6. PUS ON THE TONSILS: \"Is there pus on the tonsils in the back of your throat?\"      no  7. OTHER SYMPTOMS: \"Do you have any other symptoms?\" (e.g., difficulty breathing, headache, rash)      Cough, feels congested  8. PREGNANCY: \"Is there any chance you are pregnant?\" \"When was your last menstrual period?\"      no    Answer Assessment - Initial Assessment Questions  1. LOCATION: \"Where does it hurt?\"       none  2. ONSET: \"When did the sinus pain start?\"  (e.g., hours, days)       3 weeks  3. SEVERITY: \"How bad is the pain?\"   (Scale 1-10; mild, moderate or severe)    - MILD (1-3): doesn't interfere with normal activities     - MODERATE (4-7): interferes with normal activities (e.g., work or school) or awakens from sleep    - SEVERE (8-10): excruciating pain and patient unable to do any normal activities         moderate  4. RECURRENT SYMPTOM: \"Have you ever had sinus problems before?\" If so, ask: \"When was the last time?\" and \"What happened that time?\"       Yes but has not lasted this long  5. NASAL CONGESTION: \"Is the nose " "blocked?\" If so, ask, \"Can you open it or must you breathe through the mouth?\"      yes  6. NASAL DISCHARGE: \"Do you have discharge from your nose?\" If so ask, \"What color?\"      clear  7. FEVER: \"Do you have a fever?\" If so, ask: \"What is it, how was it measured, and when did it start?\"       no  8. OTHER SYMPTOMS: \"Do you have any other symptoms?\" (e.g., sore throat, cough, earache, difficulty breathing)      cough  9. PREGNANCY: \"Is there any chance you are pregnant?\" \"When was your last menstrual period?\"      na    Protocols used: SINUS PAIN OR CONGESTION-ADULT-AH, RESPIRATORY MULTIPLE SYMPTOMS - GUIDELINE SELECTION-ADULT-AH, SORE THROAT-ADULT-AH      "

## 2020-05-07 ENCOUNTER — HOSPITAL ENCOUNTER (EMERGENCY)
Facility: HOSPITAL | Age: 70
Discharge: HOME | End: 2020-05-07
Admitting: EMERGENCY MEDICINE

## 2020-05-07 ENCOUNTER — APPOINTMENT (OUTPATIENT)
Dept: GENERAL RADIOLOGY | Facility: HOSPITAL | Age: 70
End: 2020-05-07

## 2020-05-07 VITALS
RESPIRATION RATE: 16 BRPM | WEIGHT: 174 LBS | OXYGEN SATURATION: 96 % | HEIGHT: 71 IN | TEMPERATURE: 97.9 F | BODY MASS INDEX: 24.36 KG/M2 | DIASTOLIC BLOOD PRESSURE: 84 MMHG | HEART RATE: 71 BPM | SYSTOLIC BLOOD PRESSURE: 156 MMHG

## 2020-05-07 DIAGNOSIS — J44.1 COPD EXACERBATION (HCC): Primary | ICD-10-CM

## 2020-05-07 DIAGNOSIS — Z72.0 TOBACCO USE: ICD-10-CM

## 2020-05-07 DIAGNOSIS — R73.9 HYPERGLYCEMIA: ICD-10-CM

## 2020-05-07 LAB
ALBUMIN SERPL-MCNC: 4.4 G/DL (ref 3.5–5.2)
ALBUMIN/GLOB SERPL: 1.3 G/DL
ALP SERPL-CCNC: 52 U/L (ref 39–117)
ALT SERPL W P-5'-P-CCNC: 33 U/L (ref 1–41)
ANION GAP SERPL CALCULATED.3IONS-SCNC: 10 MMOL/L (ref 5–15)
AST SERPL-CCNC: 19 U/L (ref 1–40)
B PARAPERT DNA SPEC QL NAA+PROBE: NOT DETECTED
B PERT DNA SPEC QL NAA+PROBE: NOT DETECTED
BASOPHILS # BLD AUTO: 0.08 10*3/MM3 (ref 0–0.2)
BASOPHILS NFR BLD AUTO: 1 % (ref 0–1.5)
BILIRUB SERPL-MCNC: 0.4 MG/DL (ref 0.2–1.2)
BUN BLD-MCNC: 15 MG/DL (ref 8–23)
BUN/CREAT SERPL: 15.3 (ref 7–25)
C PNEUM DNA NPH QL NAA+NON-PROBE: NOT DETECTED
CALCIUM SPEC-SCNC: 9.6 MG/DL (ref 8.6–10.5)
CHLORIDE SERPL-SCNC: 91 MMOL/L (ref 98–107)
CO2 SERPL-SCNC: 33 MMOL/L (ref 22–29)
CREAT BLD-MCNC: 0.98 MG/DL (ref 0.76–1.27)
DEPRECATED RDW RBC AUTO: 39.2 FL (ref 37–54)
EOSINOPHIL # BLD AUTO: 0.69 10*3/MM3 (ref 0–0.4)
EOSINOPHIL NFR BLD AUTO: 8.7 % (ref 0.3–6.2)
ERYTHROCYTE [DISTWIDTH] IN BLOOD BY AUTOMATED COUNT: 11.9 % (ref 12.3–15.4)
FLUAV H1 2009 PAND RNA NPH QL NAA+PROBE: NOT DETECTED
FLUAV H1 HA GENE NPH QL NAA+PROBE: NOT DETECTED
FLUAV H3 RNA NPH QL NAA+PROBE: NOT DETECTED
FLUAV SUBTYP SPEC NAA+PROBE: NOT DETECTED
FLUBV RNA ISLT QL NAA+PROBE: NOT DETECTED
GFR SERPL CREATININE-BSD FRML MDRD: 91 ML/MIN/1.73
GLOBULIN UR ELPH-MCNC: 3.3 GM/DL
GLUCOSE BLD-MCNC: 268 MG/DL (ref 65–99)
HADV DNA SPEC NAA+PROBE: NOT DETECTED
HCOV 229E RNA SPEC QL NAA+PROBE: NOT DETECTED
HCOV HKU1 RNA SPEC QL NAA+PROBE: NOT DETECTED
HCOV NL63 RNA SPEC QL NAA+PROBE: NOT DETECTED
HCOV OC43 RNA SPEC QL NAA+PROBE: NOT DETECTED
HCT VFR BLD AUTO: 42.1 % (ref 37.5–51)
HGB BLD-MCNC: 14 G/DL (ref 13–17.7)
HMPV RNA NPH QL NAA+NON-PROBE: NOT DETECTED
HOLD SPECIMEN: NORMAL
HOLD SPECIMEN: NORMAL
HPIV1 RNA SPEC QL NAA+PROBE: NOT DETECTED
HPIV2 RNA SPEC QL NAA+PROBE: NOT DETECTED
HPIV3 RNA NPH QL NAA+PROBE: NOT DETECTED
HPIV4 P GENE NPH QL NAA+PROBE: NOT DETECTED
IMM GRANULOCYTES # BLD AUTO: 0.02 10*3/MM3 (ref 0–0.05)
IMM GRANULOCYTES NFR BLD AUTO: 0.3 % (ref 0–0.5)
LYMPHOCYTES # BLD AUTO: 2.36 10*3/MM3 (ref 0.7–3.1)
LYMPHOCYTES NFR BLD AUTO: 29.7 % (ref 19.6–45.3)
M PNEUMO IGG SER IA-ACNC: NOT DETECTED
MCH RBC QN AUTO: 29.9 PG (ref 26.6–33)
MCHC RBC AUTO-ENTMCNC: 33.3 G/DL (ref 31.5–35.7)
MCV RBC AUTO: 90 FL (ref 79–97)
MONOCYTES # BLD AUTO: 0.66 10*3/MM3 (ref 0.1–0.9)
MONOCYTES NFR BLD AUTO: 8.3 % (ref 5–12)
NEUTROPHILS # BLD AUTO: 4.13 10*3/MM3 (ref 1.7–7)
NEUTROPHILS NFR BLD AUTO: 52 % (ref 42.7–76)
NRBC BLD AUTO-RTO: 0 /100 WBC (ref 0–0.2)
NT-PROBNP SERPL-MCNC: 51 PG/ML (ref 5–900)
PLATELET # BLD AUTO: 406 10*3/MM3 (ref 140–450)
PMV BLD AUTO: 9 FL (ref 6–12)
POTASSIUM BLD-SCNC: 4.3 MMOL/L (ref 3.5–5.2)
PROT SERPL-MCNC: 7.7 G/DL (ref 6–8.5)
RBC # BLD AUTO: 4.68 10*6/MM3 (ref 4.14–5.8)
RHINOVIRUS RNA SPEC NAA+PROBE: NOT DETECTED
RSV RNA NPH QL NAA+NON-PROBE: NOT DETECTED
SODIUM BLD-SCNC: 134 MMOL/L (ref 136–145)
WBC NRBC COR # BLD: 7.94 10*3/MM3 (ref 3.4–10.8)
WHOLE BLOOD HOLD SPECIMEN: NORMAL
WHOLE BLOOD HOLD SPECIMEN: NORMAL

## 2020-05-07 PROCEDURE — 83880 ASSAY OF NATRIURETIC PEPTIDE: CPT | Performed by: NURSE PRACTITIONER

## 2020-05-07 PROCEDURE — 25010000002 METHYLPREDNISOLONE PER 125 MG: Performed by: NURSE PRACTITIONER

## 2020-05-07 PROCEDURE — 85025 COMPLETE CBC W/AUTO DIFF WBC: CPT | Performed by: NURSE PRACTITIONER

## 2020-05-07 PROCEDURE — 96374 THER/PROPH/DIAG INJ IV PUSH: CPT

## 2020-05-07 PROCEDURE — 80053 COMPREHEN METABOLIC PANEL: CPT | Performed by: NURSE PRACTITIONER

## 2020-05-07 PROCEDURE — 0100U HC BIOFIRE FILMARRAY RESP PANEL 2: CPT | Performed by: NURSE PRACTITIONER

## 2020-05-07 PROCEDURE — 99284 EMERGENCY DEPT VISIT MOD MDM: CPT

## 2020-05-07 PROCEDURE — 71045 X-RAY EXAM CHEST 1 VIEW: CPT

## 2020-05-07 PROCEDURE — 94640 AIRWAY INHALATION TREATMENT: CPT

## 2020-05-07 RX ORDER — MONTELUKAST SODIUM 10 MG/1
10 TABLET ORAL NIGHTLY
COMMUNITY
End: 2020-06-16

## 2020-05-07 RX ORDER — GABAPENTIN 300 MG/1
300 CAPSULE ORAL 3 TIMES DAILY
COMMUNITY
End: 2022-10-12 | Stop reason: SDUPTHER

## 2020-05-07 RX ORDER — TAMSULOSIN HYDROCHLORIDE 0.4 MG/1
1 CAPSULE ORAL DAILY
COMMUNITY

## 2020-05-07 RX ORDER — GLIMEPIRIDE 2 MG/1
2 TABLET ORAL
Status: ON HOLD | COMMUNITY
End: 2020-07-07

## 2020-05-07 RX ORDER — ALBUTEROL SULFATE 2.5 MG/3ML
2.5 SOLUTION RESPIRATORY (INHALATION) EVERY 4 HOURS PRN
Qty: 25 VIAL | Refills: 0 | Status: SHIPPED | OUTPATIENT
Start: 2020-05-07 | End: 2020-09-15 | Stop reason: HOSPADM

## 2020-05-07 RX ORDER — AMLODIPINE BESYLATE 5 MG/1
5 TABLET ORAL DAILY
COMMUNITY

## 2020-05-07 RX ORDER — METHYLPREDNISOLONE 4 MG/1
TABLET ORAL
Qty: 21 TABLET | Refills: 0 | Status: SHIPPED | OUTPATIENT
Start: 2020-05-07 | End: 2020-06-16

## 2020-05-07 RX ORDER — AZITHROMYCIN 250 MG/1
TABLET, FILM COATED ORAL
Qty: 6 TABLET | Refills: 0 | Status: SHIPPED | OUTPATIENT
Start: 2020-05-07 | End: 2020-06-16

## 2020-05-07 RX ORDER — IPRATROPIUM BROMIDE AND ALBUTEROL SULFATE 2.5; .5 MG/3ML; MG/3ML
3 SOLUTION RESPIRATORY (INHALATION) ONCE
Status: COMPLETED | OUTPATIENT
Start: 2020-05-07 | End: 2020-05-07

## 2020-05-07 RX ORDER — METHYLPREDNISOLONE SODIUM SUCCINATE 125 MG/2ML
125 INJECTION, POWDER, LYOPHILIZED, FOR SOLUTION INTRAMUSCULAR; INTRAVENOUS ONCE
Status: COMPLETED | OUTPATIENT
Start: 2020-05-07 | End: 2020-05-07

## 2020-05-07 RX ORDER — ATORVASTATIN CALCIUM 20 MG/1
20 TABLET, FILM COATED ORAL DAILY
COMMUNITY

## 2020-05-07 RX ORDER — SODIUM CHLORIDE 0.9 % (FLUSH) 0.9 %
10 SYRINGE (ML) INJECTION AS NEEDED
Status: DISCONTINUED | OUTPATIENT
Start: 2020-05-07 | End: 2020-05-07 | Stop reason: HOSPADM

## 2020-05-07 RX ORDER — CARVEDILOL 3.12 MG/1
3.12 TABLET ORAL 2 TIMES DAILY WITH MEALS
Status: ON HOLD | COMMUNITY
End: 2023-02-20

## 2020-05-07 RX ORDER — BENZONATATE 100 MG/1
100 CAPSULE ORAL 3 TIMES DAILY PRN
COMMUNITY
End: 2020-06-16

## 2020-05-07 RX ADMIN — IPRATROPIUM BROMIDE AND ALBUTEROL SULFATE 3 ML: 2.5; .5 SOLUTION RESPIRATORY (INHALATION) at 10:39

## 2020-05-07 RX ADMIN — METHYLPREDNISOLONE SODIUM SUCCINATE 125 MG: 125 INJECTION, POWDER, FOR SOLUTION INTRAMUSCULAR; INTRAVENOUS at 10:17

## 2020-05-07 NOTE — ED PROVIDER NOTES
Subjective   Patient is a 69-year-old male presents emergency department with cough, congestion, wheezing, shortness of breath for the past 3 to 4 weeks.  Patient states that he had a negative covid-19 test about a week and a half ago.  He states he has been on antibiotics about a week ago and inhaler without relief of symptoms.  Patient does smoke about a pack to a pack and half a day.  He does have a history of COPD.  He denies any fever.  No nausea, vomiting, or diarrhea.  He denies any chest pain.  He states he does become short of breath when coughing.  He states his cough has been nonproductive.  He states that he called the nurse line today and they advised him to come the emergency department for further evaluation and treatment.      History provided by:  Patient   used: No        Review of Systems   Constitutional: Negative.    HENT: Negative.    Eyes: Negative.    Respiratory:        Patient is a 69-year-old male presents emergency department with cough, congestion, wheezing, shortness of breath for the past 3 to 4 weeks.  Patient states that he had a negative covid-19 test about a week and a half ago.  He states he has been on antibiotics about a week ago and inhaler without relief of symptoms.  Patient does smoke about a pack to a pack and half a day.  He does have a history of COPD.  He denies any fever.  No nausea, vomiting, or diarrhea.  He denies any chest pain.  He states he does become short of breath when coughing.  He states his cough has been nonproductive.  He states that he called the nurse line today and they advised him to come the emergency department for further evaluation and treatment.     Cardiovascular: Negative.    Gastrointestinal: Negative.    Endocrine: Negative.    Genitourinary: Negative.    Musculoskeletal: Negative.    Skin: Negative.    Allergic/Immunologic: Negative.    Neurological: Negative.    Hematological: Negative.    Psychiatric/Behavioral:  "Negative.    All other systems reviewed and are negative.      Past Medical History:   Diagnosis Date   • Diabetes mellitus (CMS/HCC)    • Hx of colonic polyp    • Hx of TIA (transient ischemic attack) and stroke    • Hyperlipidemia    • Hypertension        No Known Allergies    Past Surgical History:   Procedure Laterality Date   • BACK SURGERY     • COLONOSCOPY  08/15/2012    Poor prep repeat exam in 3 years   • COLONOSCOPY W/ POLYPECTOMY  07/16/2009    Tubular adenoma at 60 cm, Hyperplastic polyp rectum suboptimal prep repeat in 3 months   • SHOULDER SURGERY     • VERTEBROPLASTY      neck       History reviewed. No pertinent family history.    Social History     Socioeconomic History   • Marital status: Legally      Spouse name: Not on file   • Number of children: Not on file   • Years of education: Not on file   • Highest education level: Not on file   Tobacco Use   • Smoking status: Current Every Day Smoker     Packs/day: 1.00     Years: 50.00     Pack years: 50.00   Substance and Sexual Activity   • Alcohol use: No   • Drug use: No       Prior to Admission medications    Not on File       /84   Pulse 71   Temp 97.9 °F (36.6 °C) (Oral)   Resp 16   Ht 180.3 cm (71\")   Wt 78.9 kg (174 lb)   SpO2 96%   BMI 24.27 kg/m²     Objective   Physical Exam   Constitutional: He is oriented to person, place, and time. He appears well-developed and well-nourished.   Nontoxic-appearing   HENT:   Head: Normocephalic and atraumatic.   Eyes: Pupils are equal, round, and reactive to light. Conjunctivae and EOM are normal.   Neck: Normal range of motion. Neck supple.   Cardiovascular: Normal rate, regular rhythm, normal heart sounds and intact distal pulses.   Pulmonary/Chest: Effort normal.   Expiratory wheezing noted throughout.  Slightly diminished sounds in the lower lobes bilaterally.   Abdominal: Soft. Bowel sounds are normal.   Musculoskeletal: Normal range of motion.   Neurological: He is alert and " oriented to person, place, and time. He has normal reflexes.   Skin: Skin is warm and dry.   Psychiatric: He has a normal mood and affect. His behavior is normal. Judgment and thought content normal.   Nursing note and vitals reviewed.      Procedures         Lab Results (last 24 hours)     Procedure Component Value Units Date/Time    CBC & Differential [[id number]] Collected:  05/07/20 1016    Specimen:  Blood Updated:  05/07/20 1034    Narrative:       The following orders were created for panel order CBC & Differential.  Procedure                               Abnormality         Status                     ---------                               -----------         ------                     CBC Auto Differential[[id number]]        Abnormal            Final result                 Please view results for these tests on the individual orders.    Comprehensive Metabolic Panel [[id number]]  (Abnormal) Collected:  05/07/20 1016    Specimen:  Blood Updated:  05/07/20 1053     Glucose 268 mg/dL      BUN 15 mg/dL      Creatinine 0.98 mg/dL      Sodium 134 mmol/L      Potassium 4.3 mmol/L      Chloride 91 mmol/L      CO2 33.0 mmol/L      Calcium 9.6 mg/dL      Total Protein 7.7 g/dL      Albumin 4.40 g/dL      ALT (SGPT) 33 U/L      AST (SGOT) 19 U/L      Alkaline Phosphatase 52 U/L      Total Bilirubin 0.4 mg/dL      eGFR  African Amer 91 mL/min/1.73      Globulin 3.3 gm/dL      A/G Ratio 1.3 g/dL      BUN/Creatinine Ratio 15.3     Anion Gap 10.0 mmol/L     Narrative:       GFR Normal >60  Chronic Kidney Disease <60  Kidney Failure <15      BNP [[id number]]  (Normal) Collected:  05/07/20 1016    Specimen:  Blood Updated:  05/07/20 1050     proBNP 51.0 pg/mL     Narrative:       Among patients with dyspnea, NT-proBNP is highly sensitive for the detection of acute congestive heart failure. In addition NT-proBNP of <300 pg/ml effectively rules out acute congestive heart failure with 99% negative predictive value.    Results may be  falsely decreased if patient taking Biotin.      CBC Auto Differential [[id number]]  (Abnormal) Collected:  05/07/20 1016    Specimen:  Blood Updated:  05/07/20 1034     WBC 7.94 10*3/mm3      RBC 4.68 10*6/mm3      Hemoglobin 14.0 g/dL      Hematocrit 42.1 %      MCV 90.0 fL      MCH 29.9 pg      MCHC 33.3 g/dL      RDW 11.9 %      RDW-SD 39.2 fl      MPV 9.0 fL      Platelets 406 10*3/mm3      Neutrophil % 52.0 %      Lymphocyte % 29.7 %      Monocyte % 8.3 %      Eosinophil % 8.7 %      Basophil % 1.0 %      Immature Grans % 0.3 %      Neutrophils, Absolute 4.13 10*3/mm3      Lymphocytes, Absolute 2.36 10*3/mm3      Monocytes, Absolute 0.66 10*3/mm3      Eosinophils, Absolute 0.69 10*3/mm3      Basophils, Absolute 0.08 10*3/mm3      Immature Grans, Absolute 0.02 10*3/mm3      nRBC 0.0 /100 WBC     Respiratory Panel, PCR - Swab, Nasopharynx [[id number]]  (Normal) Collected:  05/07/20 1020    Specimen:  Swab from Nasopharynx Updated:  05/07/20 1147     ADENOVIRUS, PCR Not Detected     Coronavirus 229E Not Detected     Coronavirus HKU1 Not Detected     Coronavirus NL63 Not Detected     Coronavirus OC43 Not Detected     Human Metapneumovirus Not Detected     Human Rhinovirus/Enterovirus Not Detected     Influenza B PCR Not Detected     Parainfluenza Virus 1 Not Detected     Parainfluenza Virus 2 Not Detected     Parainfluenza Virus 3 Not Detected     Parainfluenza Virus 4 Not Detected     Bordetella pertussis pcr Not Detected     Influenza A H1 2009 PCR Not Detected     Chlamydophila pneumoniae PCR Not Detected     Mycoplasma pneumo by PCR Not Detected     Influenza A PCR Not Detected     Influenza A H3 Not Detected     Influenza A H1 Not Detected     RSV, PCR Not Detected     Bordetella parapertussis PCR Not Detected    Narrative:       The coronavirus on the RVP is NOT COVID-19 and is NOT indicative of infection with COVID-19.           XR Chest 1 View   Final Result   1. No acute radiographic cardiopulmonary process.    This report was finalized on 05/07/2020 10:44 by Dr. Syeda Herrera MD.          ED Course  ED Course as of May 07 1341   Thu May 07, 2020   1033 Although pt has tested negative for covid 19 about 1.5 weeks ago, he continues to be on droplet precautions     [CW]   1145 Pending resp panel at this time     [CW]   1229 Reeval pt- states that he is feeling better at this time. Again pt has tested negative for covid19 about 1 week ago. Feel that symptoms are most likely from copd exacerbation. Pt blood sugar sl elevated- 268- he states that he has not used trulicity within the last 2 weeks because his glucometer was reading his blood sugar in the 60s. He is getting a new monitor.  Advised patient would placed on utilizer treatments every 4-6 hours as needed.  Advised we will also prescribe steroid Dosepak.  Advised while he is on the steroid pack to monitor his blood sugar closely.  Will prescribe azithromycin as well.  Advised to follow-up with his primary care doctor this week.  Advised to return before symptoms worsen.    [CW]      ED Course User Index  [CW] Vee Gaines, TAMAR          [facility]  Number of Diagnoses or Management Options  COPD exacerbation (CMS/HCC): minor  Hyperglycemia: minor  Tobacco use: minor     Amount and/or Complexity of Data Reviewed  Clinical lab tests: ordered and reviewed  Tests in the radiology section of CPT®: ordered and reviewed  Decide to obtain previous medical records or to obtain history from someone other than the patient: yes    Patient Progress  Patient progress: stable      Final diagnoses:   COPD exacerbation (CMS/HCC)   Tobacco use   Hyperglycemia          Vee Gaines, TAMAR  05/07/20 1341

## 2020-05-07 NOTE — DISCHARGE INSTRUCTIONS
Return to ER if symptoms worsen   Nebulizer treatments every 4-6 hours as needed  Increase oral fluids     Chronic Obstructive Pulmonary Disease    Chronic obstructive pulmonary disease (COPD) is a long-term (chronic) condition that affects the lungs. COPD is a general term that can be used to describe many different lung problems that cause lung swelling (inflammation) and limit airflow, including chronic bronchitis and emphysema. If you have COPD, your lung function will probably never return to normal. In most cases, it gets worse over time. However, there are steps you can take to slow the progression of the disease and improve your quality of life.  What are the causes?  This condition may be caused by:  · Smoking. This is the most common cause.  · Certain genes passed down through families.  What increases the risk?  The following factors may make you more likely to develop this condition:  · Secondhand smoke from cigarettes, pipes, or cigars.  · Exposure to chemicals and other irritants such as fumes and dust in the work environment.  · Chronic lung conditions or infections.  What are the signs or symptoms?  Symptoms of this condition include:  · Shortness of breath, especially during physical activity.  · Chronic cough with a large amount of thick mucus. Sometimes the cough may not have any mucus (dry cough).  · Wheezing.  · Rapid breaths.  · Gray or bluish discoloration (cyanosis) of the skin, especially in your fingers, toes, or lips.  · Feeling tired (fatigue).  · Weight loss.  · Chest tightness.  · Frequent infections.  · Episodes when breathing symptoms become much worse (exacerbations).  · Swelling in the ankles, feet, or legs. This may occur in later stages of the disease.  How is this diagnosed?  This condition is diagnosed based on:  · Your medical history.  · A physical exam.  You may also have tests, including:  · Lung (pulmonary) function tests. This may include a spirometry test, which measures  your ability to exhale properly.  · Chest X-ray.  · CT scan.  · Blood tests.  How is this treated?  This condition may be treated with:  · Medicines. These may include inhaled rescue medicines to treat acute exacerbations as well as long-term, or maintenance, medicines to prevent flare-ups of COPD.  ? Bronchodilators help treat COPD by dilating the airways to allow increased airflow and make your breathing more comfortable.  ? Steroids can reduce airway inflammation and help prevent exacerbations.  · Smoking cessation. If you smoke, your health care provider may ask you to quit, and may also recommend therapy or replacement products to help you quit.  · Pulmonary rehabilitation. This may involve working with a team of health care providers and specialists, such as respiratory, occupational, and physical therapists.  · Exercise and physical activity. These are beneficial for nearly all people with COPD.  · Nutrition therapy to gain weight, if you are underweight.  · Oxygen. Supplemental oxygen therapy is only helpful if you have a low oxygen level in your blood (hypoxemia).  · Lung surgery or transplant.  · Palliative care. This is to help people with COPD feel comfortable when treatment is no longer working.  Follow these instructions at home:  Medicines  · Take over-the-counter and prescription medicines (inhaled or pills) only as told by your health care provider.  · Talk to your health care provider before taking any cough or allergy medicines. You may need to avoid certain medicines that dry out your airways.  Lifestyle  · If you are a smoker, the most important thing that you can do is to stop smoking. Do not use any products that contain nicotine or tobacco, such as cigarettes and e-cigarettes. If you need help quitting, ask your health care provider. Continuing to smoke will cause the disease to progress faster.  · Avoid exposure to things that irritate your lungs, such as smoke, chemicals, and fumes.  · Stay  active, but balance activity with periods of rest. Exercise and physical activity will help you maintain your ability to do things you want to do.  · Learn and use relaxation techniques to manage stress and to control your breathing.  · Get the right amount of sleep and get quality sleep. Most adults need 7 or more hours per night.  · Eat healthy foods. Eating smaller, more frequent meals and resting before meals may help you maintain your strength.  Controlled breathing  Learn and use controlled breathing techniques as directed by your health care provider. Controlled breathing techniques include:  · Pursed lip breathing. Start by breathing in (inhaling) through your nose for 1 second. Then, purse your lips as if you were going to whistle and breathe out (exhale) through the pursed lips for 2 seconds.  · Diaphragmatic breathing. Start by putting one hand on your abdomen just above your waist. Inhale slowly through your nose. The hand on your abdomen should move out. Then purse your lips and exhale slowly. You should be able to feel the hand on your abdomen moving in as you exhale.  Controlled coughing  Learn and use controlled coughing to clear mucus from your lungs. Controlled coughing is a series of short, progressive coughs. The steps of controlled coughing are:  1. Lean your head slightly forward.  2. Breathe in deeply using diaphragmatic breathing.  3. Try to hold your breath for 3 seconds.  4. Keep your mouth slightly open while coughing twice.  5. Spit any mucus out into a tissue.  6. Rest and repeat the steps once or twice as needed.  General instructions  · Make sure you receive all the vaccines that your health care provider recommends, especially the pneumococcal and influenza vaccines. Preventing infection and hospitalization is very important when you have COPD.  · Use oxygen therapy and pulmonary rehabilitation if directed to by your health care provider. If you require home oxygen therapy, ask your  health care provider whether you should purchase a pulse oximeter to measure your oxygen level at home.  · Work with your health care provider to develop a COPD action plan. This will help you know what steps to take if your condition gets worse.  · Keep other chronic health conditions under control as told by your health care provider.  · Avoid extreme temperature and humidity changes.  · Avoid contact with people who have an illness that spreads from person to person (is contagious), such as viral infections or pneumonia.  · Keep all follow-up visits as told by your health care provider. This is important.  Contact a health care provider if:  · You are coughing up more mucus than usual.  · There is a change in the color or thickness of your mucus.  · Your breathing is more labored than usual.  · Your breathing is faster than usual.  · You have difficulty sleeping.  · You need to use your rescue medicines or inhalers more often than expected.  · You have trouble doing routine activities such as getting dressed or walking around the house.  Get help right away if:  · You have shortness of breath while you are resting.  · You have shortness of breath that prevents you from:  ? Being able to talk.  ? Performing your usual physical activities.  · You have chest pain lasting longer than 5 minutes.  · Your skin color is more blue (cyanotic) than usual.  · You measure low oxygen saturations for longer than 5 minutes with a pulse oximeter.  · You have a fever.  · You feel too tired to breathe normally.  Summary  · Chronic obstructive pulmonary disease (COPD) is a long-term (chronic) condition that affects the lungs.  · Your lung function will probably never return to normal. In most cases, it gets worse over time. However, there are steps you can take to slow the progression of the disease and improve your quality of life.  · Treatment for COPD may include taking medicines, quitting smoking, pulmonary rehabilitation, and  changes to diet and exercise. As the disease progresses, you may need oxygen therapy, a lung transplant, or palliative care.  · To help manage your condition, do not smoke, avoid exposure to things that irritate your lungs, stay up to date on all vaccines, and follow your health care provider's instructions for taking medicines.  This information is not intended to replace advice given to you by your health care provider. Make sure you discuss any questions you have with your health care provider.  Document Released: 09/27/2006 Document Revised: 06/13/2018 Document Reviewed: 01/22/2018  [employer] Interactive Patient Education © 2020 [employer]      Chronic Obstructive Pulmonary Disease Exacerbation    Chronic obstructive pulmonary disease (COPD) is a long-term (chronic) condition that affects the lungs. COPD is a general term that can be used to describe many different lung problems that cause lung swelling (inflammation) and limit airflow, including chronic bronchitis and emphysema. COPD exacerbations are episodes when breathing symptoms become much worse and require extra treatment.  COPD exacerbations are usually caused by infections. Without treatment, COPD exacerbations can be severe and even life threatening. Frequent COPD exacerbations can cause further damage to the lungs.  What are the causes?  This condition may be caused by:  · Respiratory infections, including viral and bacterial infections.  · Exposure to smoke.  · Exposure to air pollution, chemical fumes, or dust.  · Things that give you an allergic reaction (allergens).  · Not taking your usual COPD medicines as directed.  · Underlying medical problems, such as congestive heart failure or infections not involving the lungs.  In many cases, the cause (trigger) of this condition is not known.  What increases the risk?  The following factors may make you more likely to develop this condition:  · Smoking cigarettes.  · Old age.  · Frequent prior COPD  exacerbations.  What are the signs or symptoms?  Symptoms of this condition include:  · Increased coughing.  · Increased production of mucus from your lungs (sputum).  · Increased wheezing.  · Increased shortness of breath.  · Rapid or labored breathing.  · Chest tightness.  · Less energy than usual.  · Sleep disruption from symptoms.  · Confusion or increased sleepiness.  Often these symptoms happen or get worse even with the use of medicines.  How is this diagnosed?  This condition is diagnosed based on:  · Your medical history.  · A physical exam.  You may also have tests, including:  · A chest X-ray.  · Blood tests.  · Lung (pulmonary) function tests.  How is this treated?  Treatment for this condition depends on the severity and cause of the symptoms. You may need to be admitted to a hospital for treatment. Some of the treatments commonly used to treat COPD exacerbations are:  · Antibiotic medicines. These may be used for severe exacerbations caused by a lung infection, such as pneumonia.  · Bronchodilators. These are inhaled medicines that expand the air passages and allow increased airflow.  · Steroid medicines. These act to reduce inflammation in the airways. They may be given with an inhaler, taken by mouth, or given through an IV tube inserted into one of your veins.  · Supplemental oxygen therapy.  · Airway clearing techniques, such as noninvasive ventilation (NIV) and positive expiratory pressure (PEP). These provide respiratory support through a mask or other noninvasive device. An example of this would be using a continuous positive airway pressure (CPAP) machine to improve delivery of oxygen into your lungs.  Follow these instructions at home:  Medicines  · Take over-the-counter and prescription medicines only as told by your health care provider. It is important to use correct technique with inhaled medicines.  · If you were prescribed an antibiotic medicine or oral steroid, take it as told by your  health care provider. Do not stop taking the medicine even if you start to feel better.  Lifestyle  · Eat a healthy diet.  · Exercise regularly.  · Get plenty of sleep.  · Avoid exposure to all substances that irritate the airway, especially to tobacco smoke.  · Wash your hands often with soap and water to reduce the risk of infection. If soap and water are not available, use hand .  · During flu season, avoid enclosed spaces that are crowded with people.  General instructions  · Drink enough fluid to keep your urine clear or pale yellow (unless you have a medical condition that requires fluid restriction).  · Use a cool mist vaporizer. This humidifies the air and makes it easier for you to clear your chest when you cough.  · If you have a home nebulizer and oxygen, continue to use them as told by your health care provider.  · Keep all follow-up visits as told by your health care provider. This is important.  How is this prevented?  · Stay up-to-date on pneumococcal and influenza (flu) vaccines. A flu shot is recommended every year to help prevent exacerbations.  · Do not use any products that contain nicotine or tobacco, such as cigarettes and e-cigarettes. Quitting smoking is very important in preventing COPD from getting worse and in preventing exacerbations from happening as often. If you need help quitting, ask your health care provider.  · Follow all instructions for pulmonary rehabilitation after a recent exacerbation. This can help prevent future exacerbations.  · Work with your health care provider to develop and follow an action plan. This tells you what steps to take when you experience certain symptoms.  Contact a health care provider if:  · You have a worsening of your regular COPD symptoms.  Get help right away if:  · You have worsening shortness of breath, even when resting.  · You have trouble talking.  · You have severe chest pain.  · You cough up blood.  · You have a fever.  · You have  weakness, vomit repeatedly, or faint.  · You feel confused.  · You are not able to sleep because of your symptoms.  · You have trouble doing daily activities.  Summary  · COPD exacerbations are episodes when breathing symptoms become much worse and require extra treatment above your normal treatment.  · Exacerbations can be severe and even life threatening. Frequent COPD exacerbations can cause further damage to your lungs.  · COPD exacerbations are usually triggered by infections such as the flu, colds, and even pneumonia.  · Treatment for this condition depends on the severity and cause of the symptoms. You may need to be admitted to a hospital for treatment.  · Quitting smoking is very important to prevent COPD from getting worse and to prevent exacerbations from happening as often.  This information is not intended to replace advice given to you by your health care provider. Make sure you discuss any questions you have with your health care provider.  Document Released: 10/14/2008 Document Revised: 06/13/2018 Document Reviewed: 01/22/2018  [employer] Interactive Patient Education © 2020 [employer]      Health Risks of Smoking  Smoking cigarettes is very bad for your health. Tobacco smoke has over 200 known poisons in it. It contains the poisonous gases nitrogen oxide and carbon monoxide. There are over 60 chemicals in tobacco smoke that cause cancer.  Smoking is difficult to quit because a chemical in tobacco, called nicotine, causes addiction or dependence. When you smoke and inhale, nicotine is absorbed rapidly into the bloodstream through your lungs. Both inhaled and non-inhaled nicotine may be addictive.  What are the risks of cigarette smoke?  Cigarette smokers have an increased risk of many serious medical problems, including:  · Lung cancer.  · Lung disease, such as pneumonia, bronchitis, and emphysema.  · Chest pain (angina) and heart attack because the heart is not getting enough oxygen.  · Heart  disease and peripheral blood vessel disease.  · High blood pressure (hypertension).  · Stroke.  · Oral cancer, including cancer of the lip, mouth, or voice box.  · Bladder cancer.  · Pancreatic cancer.  · Cervical cancer.  · Pregnancy complications, including premature birth.  · Stillbirths and smaller  babies, birth defects, and genetic damage to sperm.  · Early menopause.  · Lower estrogen level for women.  · Infertility.  · Facial wrinkles.  · Blindness.  · Increased risk of broken bones (fractures).  · Senile dementia.  · Stomach ulcers and internal bleeding.  · Delayed wound healing and increased risk of complications during surgery.  · Even smoking lightly shortens your life expectancy by several years.  Because of secondhand smoke exposure, children of smokers have an increased risk of the following:  · Sudden infant death syndrome (SIDS).  · Respiratory infections.  · Lung cancer.  · Heart disease.  · Ear infections.  What are the benefits of quitting?  There are many health benefits of quitting smoking. Here are some of them:  · Within days of quitting smoking, your risk of having a heart attack decreases, your blood flow improves, and your lung capacity improves. Blood pressure, pulse rate, and breathing patterns start returning to normal soon after quitting.  · Within months, your lungs may clear up completely.  · Quitting for 10 years reduces your risk of developing lung cancer and heart disease to almost that of a nonsmoker.  · People who quit may see an improvement in their overall quality of life.  How do I quit smoking?         Smoking is an addiction with both physical and psychological effects, and longtime habits can be hard to change. Your health care provider can recommend:  · Programs and community resources, which may include group support, education, or talk therapy.  · Prescription medicines to help reduce cravings.  · Nicotine replacement products, such as patches, gum, and nasal  sprays. Use these products only as directed. Do not replace cigarette smoking with electronic cigarettes, which are commonly called e-cigarettes. The safety of e-cigarettes is not known, and some may contain harmful chemicals.  · A combination of two or more of these methods.  Where to find more information  · American Lung Association: www.lung.org  · American Cancer Society: www.cancer.org  Summary  · Smoking cigarettes is very bad for your health. Cigarette smokers have an increased risk of many serious medical problems, including several cancers, heart disease, and stroke.  · Smoking is an addiction with both physical and psychological effects, and longtime habits can be hard to change.  · By stopping right away, you can greatly reduce the risk of medical problems for you and your family.  · To help you quit smoking, your health care provider can recommend programs, community resources, prescription medicines, and nicotine replacement products such as patches, gum, and nasal sprays.  This information is not intended to replace advice given to you by your health care provider. Make sure you discuss any questions you have with your health care provider.  Document Released: 01/25/2006 Document Revised: 03/21/2019 Document Reviewed: 12/22/2017  [employer] Interactive Patient Education © 2020 [employer]      How to Use a Nebulizer, Adult    A nebulizer is a device that turns liquid medicine into a mist (vapor) that you can breathe in (inhale). You may need to use a nebulizer if you have a breathing illness, such as asthma or pneumonia.  There are different kinds of nebulizers. With some, you breathe in through a mouthpiece. With others, a mask fits over your nose and mouth.  Risks and complications  Using a nebulizer that does not fit right or is not cleaned right can lead to the following complications:  · Infection.  · Eye irritation.  · Delivery of too much medicine or not enough medicine.  · Mouth  irritation.  How to prepare before using a nebulizer  Take these steps before using your nebulizer:  1. Check your medicine. Make sure it has not  and is not damaged in any way.  2. Wash your hands with soap and water.  3. Put all of the parts of your nebulizer on a sturdy, flat surface. Make sure all of the tubing is connected.  4. Measure the liquid medicine according to instructions from your health care provider. Pour the liquid into the part of the nebulizer that holds the medicine (reservoir).  5. Attach the mouthpiece or mask.  6. Test the nebulizer by turning it on to make sure that a spray comes out. Then, turn it off.  How to use a nebulizer         1. Sit down and relax.  2. If your nebulizer has a mask, put it over your nose and mouth. It should fit somewhat snugly, with no gaps around the nose or cheeks where medicine could escape. If you use a mouthpiece, put it in your mouth. Press your lips firmly around the mouthpiece.  3. Turn on the nebulizer.  4. Breathe out (exhale).  5. Some nebulizers have a finger valve. If yours does, cover up the air hole so the air gets to the nebulizer.  6. Once the medicine begins to mist out, take slow, deep breaths. If there is a finger valve, release it at the end of your breath.  7. Continue taking slow, deep breaths until the medicine in the nebulizer is gone and no vapor appears.  Be sure to stop the machine at any time if you start coughing or if the medicine foams or bubbles.  How to clean a nebulizer  The nebulizer and all of its parts must be kept very clean. If the nebulizer and its parts are not cleaned properly, bacteria can grow inside of them. If you inhale the bacteria, you can get sick. Follow the 's instructions for cleaning your nebulizer. For most nebulizers, you should follow these guidelines:  · Clean the mouthpiece or mask and the medicine cup by:  ? Rinsing them after each use. Use sterile or distilled water.  ? Washing them 1-2  times a week using soap and warm water.  · Do not wash the tubing.  · After you rinse or wash them, place the parts on a clean towel and let them dry completely. After they dry, reconnect the pieces and turn the nebulizer on without any medicine in it. Doing this will blow air through the equipment to help dry it out.  · Store the nebulizer in a clean and dust-free place.  · Check the filter at least one time every week. Replace it if it looks dirty.  Contact a health care provider if:  · You continue to have trouble breathing.  · You have trouble using the nebulizer.  · Your breathing gets worse during a nebulizer treatment.  · Your nebulizer stops working, foams, or does not create a mist after you add medicine and turn it on.  Summary  · A nebulizer is a device that turns liquid medicine into a mist (vapor) that you can breathe in (inhale).  · Measure the liquid medicine according to instructions from your health care provider. Pour the liquid into the part of the nebulizer that holds the medicine (reservoir).  · Once the medicine begins to mist out, take slow, deep breaths.  · Rinse or wash the mouthpiece and the medicine cup after each use, and allow them to dry completely.  This information is not intended to replace advice given to you by your health care provider. Make sure you discuss any questions you have with your health care provider.  Document Released: 12/06/2010 Document Revised: 11/04/2019 Document Reviewed: 06/24/2017  [employer] Interactive Patient Education © 2020 [employer]

## 2020-06-16 ENCOUNTER — APPOINTMENT (OUTPATIENT)
Dept: GENERAL RADIOLOGY | Facility: HOSPITAL | Age: 70
End: 2020-06-16

## 2020-06-16 ENCOUNTER — HOSPITAL ENCOUNTER (OUTPATIENT)
Facility: HOSPITAL | Age: 70
Setting detail: OBSERVATION
Discharge: HOME | End: 2020-06-18
Attending: EMERGENCY MEDICINE | Admitting: INTERNAL MEDICINE

## 2020-06-16 ENCOUNTER — APPOINTMENT (OUTPATIENT)
Dept: CT IMAGING | Facility: HOSPITAL | Age: 70
End: 2020-06-16

## 2020-06-16 DIAGNOSIS — J44.1 COPD EXACERBATION (HCC): Primary | ICD-10-CM

## 2020-06-16 PROBLEM — F17.201 TOBACCO ABUSE, IN REMISSION: Status: ACTIVE | Noted: 2020-06-16

## 2020-06-16 PROBLEM — E11.9 DIABETES MELLITUS: Status: ACTIVE | Noted: 2020-06-16

## 2020-06-16 PROBLEM — I10 HYPERTENSION: Status: ACTIVE | Noted: 2020-06-16

## 2020-06-16 PROBLEM — E78.5 HYPERLIPIDEMIA: Status: ACTIVE | Noted: 2020-06-16

## 2020-06-16 PROBLEM — R09.02 HYPOXIA: Status: ACTIVE | Noted: 2020-06-16

## 2020-06-16 LAB
ALBUMIN SERPL-MCNC: 4.2 G/DL (ref 3.5–5.2)
ALBUMIN/GLOB SERPL: 1.3 G/DL
ALP SERPL-CCNC: 46 U/L (ref 39–117)
ALT SERPL W P-5'-P-CCNC: 28 U/L (ref 1–41)
ANION GAP SERPL CALCULATED.3IONS-SCNC: 12 MMOL/L (ref 5–15)
APTT PPP: 34.9 SECONDS (ref 24.1–35)
AST SERPL-CCNC: 19 U/L (ref 1–40)
BASOPHILS # BLD AUTO: 0.08 10*3/MM3 (ref 0–0.2)
BASOPHILS NFR BLD AUTO: 0.7 % (ref 0–1.5)
BILIRUB SERPL-MCNC: 0.4 MG/DL (ref 0.2–1.2)
BUN BLD-MCNC: 17 MG/DL (ref 8–23)
BUN/CREAT SERPL: 18.3 (ref 7–25)
CALCIUM SPEC-SCNC: 9 MG/DL (ref 8.6–10.5)
CHLORIDE SERPL-SCNC: 101 MMOL/L (ref 98–107)
CO2 SERPL-SCNC: 26 MMOL/L (ref 22–29)
CREAT BLD-MCNC: 0.93 MG/DL (ref 0.76–1.27)
D DIMER PPP FEU-MCNC: 0.44 MG/L (FEU) (ref 0–0.5)
D-LACTATE SERPL-SCNC: 0.7 MMOL/L (ref 0.5–2)
DEPRECATED RDW RBC AUTO: 42 FL (ref 37–54)
EOSINOPHIL # BLD AUTO: 0.76 10*3/MM3 (ref 0–0.4)
EOSINOPHIL NFR BLD AUTO: 6.6 % (ref 0.3–6.2)
ERYTHROCYTE [DISTWIDTH] IN BLOOD BY AUTOMATED COUNT: 12.7 % (ref 12.3–15.4)
GFR SERPL CREATININE-BSD FRML MDRD: 97 ML/MIN/1.73
GLOBULIN UR ELPH-MCNC: 3.2 GM/DL
GLUCOSE BLD-MCNC: 146 MG/DL (ref 65–99)
GLUCOSE BLDC GLUCOMTR-MCNC: 175 MG/DL (ref 70–130)
GLUCOSE BLDC GLUCOMTR-MCNC: 209 MG/DL (ref 70–130)
GLUCOSE BLDC GLUCOMTR-MCNC: 250 MG/DL (ref 70–130)
HCT VFR BLD AUTO: 41 % (ref 37.5–51)
HGB BLD-MCNC: 13.2 G/DL (ref 13–17.7)
HOLD SPECIMEN: NORMAL
HOLD SPECIMEN: NORMAL
IMM GRANULOCYTES # BLD AUTO: 0.04 10*3/MM3 (ref 0–0.05)
IMM GRANULOCYTES NFR BLD AUTO: 0.3 % (ref 0–0.5)
INR PPP: 0.92 (ref 0.91–1.09)
LYMPHOCYTES # BLD AUTO: 3.69 10*3/MM3 (ref 0.7–3.1)
LYMPHOCYTES NFR BLD AUTO: 31.9 % (ref 19.6–45.3)
MAGNESIUM SERPL-MCNC: 2.1 MG/DL (ref 1.6–2.4)
MCH RBC QN AUTO: 29.4 PG (ref 26.6–33)
MCHC RBC AUTO-ENTMCNC: 32.2 G/DL (ref 31.5–35.7)
MCV RBC AUTO: 91.3 FL (ref 79–97)
MONOCYTES # BLD AUTO: 0.95 10*3/MM3 (ref 0.1–0.9)
MONOCYTES NFR BLD AUTO: 8.2 % (ref 5–12)
NEUTROPHILS # BLD AUTO: 6.03 10*3/MM3 (ref 1.7–7)
NEUTROPHILS NFR BLD AUTO: 52.3 % (ref 42.7–76)
NRBC BLD AUTO-RTO: 0 /100 WBC (ref 0–0.2)
PLATELET # BLD AUTO: 392 10*3/MM3 (ref 140–450)
PMV BLD AUTO: 8.7 FL (ref 6–12)
POTASSIUM BLD-SCNC: 4 MMOL/L (ref 3.5–5.2)
PROT SERPL-MCNC: 7.4 G/DL (ref 6–8.5)
PROTHROMBIN TIME: 12 SECONDS (ref 11.9–14.6)
RBC # BLD AUTO: 4.49 10*6/MM3 (ref 4.14–5.8)
SARS-COV-2 RNA RESP QL NAA+PROBE: NOT DETECTED
SODIUM BLD-SCNC: 139 MMOL/L (ref 136–145)
TROPONIN T SERPL-MCNC: <0.01 NG/ML (ref 0–0.03)
WBC NRBC COR # BLD: 11.55 10*3/MM3 (ref 3.4–10.8)
WHOLE BLOOD HOLD SPECIMEN: NORMAL
WHOLE BLOOD HOLD SPECIMEN: NORMAL

## 2020-06-16 PROCEDURE — 85730 THROMBOPLASTIN TIME PARTIAL: CPT | Performed by: EMERGENCY MEDICINE

## 2020-06-16 PROCEDURE — 96375 TX/PRO/DX INJ NEW DRUG ADDON: CPT

## 2020-06-16 PROCEDURE — 63710000001 INSULIN LISPRO (HUMAN) PER 5 UNITS: Performed by: NURSE PRACTITIONER

## 2020-06-16 PROCEDURE — 71045 X-RAY EXAM CHEST 1 VIEW: CPT

## 2020-06-16 PROCEDURE — 87040 BLOOD CULTURE FOR BACTERIA: CPT | Performed by: EMERGENCY MEDICINE

## 2020-06-16 PROCEDURE — 87635 SARS-COV-2 COVID-19 AMP PRB: CPT | Performed by: EMERGENCY MEDICINE

## 2020-06-16 PROCEDURE — 71270 CT THORAX DX C-/C+: CPT

## 2020-06-16 PROCEDURE — 93005 ELECTROCARDIOGRAM TRACING: CPT | Performed by: EMERGENCY MEDICINE

## 2020-06-16 PROCEDURE — 80053 COMPREHEN METABOLIC PANEL: CPT | Performed by: EMERGENCY MEDICINE

## 2020-06-16 PROCEDURE — 85610 PROTHROMBIN TIME: CPT | Performed by: EMERGENCY MEDICINE

## 2020-06-16 PROCEDURE — 94640 AIRWAY INHALATION TREATMENT: CPT

## 2020-06-16 PROCEDURE — 25010000002 METHYLPREDNISOLONE PER 125 MG: Performed by: EMERGENCY MEDICINE

## 2020-06-16 PROCEDURE — 93010 ELECTROCARDIOGRAM REPORT: CPT | Performed by: INTERNAL MEDICINE

## 2020-06-16 PROCEDURE — 96365 THER/PROPH/DIAG IV INF INIT: CPT

## 2020-06-16 PROCEDURE — 99285 EMERGENCY DEPT VISIT HI MDM: CPT

## 2020-06-16 PROCEDURE — 85379 FIBRIN DEGRADATION QUANT: CPT | Performed by: EMERGENCY MEDICINE

## 2020-06-16 PROCEDURE — G0378 HOSPITAL OBSERVATION PER HR: HCPCS

## 2020-06-16 PROCEDURE — 94799 UNLISTED PULMONARY SVC/PX: CPT

## 2020-06-16 PROCEDURE — 96376 TX/PRO/DX INJ SAME DRUG ADON: CPT

## 2020-06-16 PROCEDURE — 96372 THER/PROPH/DIAG INJ SC/IM: CPT

## 2020-06-16 PROCEDURE — 25010000002 ENOXAPARIN PER 10 MG: Performed by: NURSE PRACTITIONER

## 2020-06-16 PROCEDURE — 25010000002 TERBUTALINE PER 1 MG: Performed by: EMERGENCY MEDICINE

## 2020-06-16 PROCEDURE — 25010000002 MAGNESIUM SULFATE 2 GM/50ML SOLUTION: Performed by: EMERGENCY MEDICINE

## 2020-06-16 PROCEDURE — 25010000002 METHYLPREDNISOLONE PER 40 MG: Performed by: NURSE PRACTITIONER

## 2020-06-16 PROCEDURE — 96366 THER/PROPH/DIAG IV INF ADDON: CPT

## 2020-06-16 PROCEDURE — 83735 ASSAY OF MAGNESIUM: CPT | Performed by: EMERGENCY MEDICINE

## 2020-06-16 PROCEDURE — 82962 GLUCOSE BLOOD TEST: CPT

## 2020-06-16 PROCEDURE — 84484 ASSAY OF TROPONIN QUANT: CPT | Performed by: EMERGENCY MEDICINE

## 2020-06-16 PROCEDURE — 83605 ASSAY OF LACTIC ACID: CPT | Performed by: EMERGENCY MEDICINE

## 2020-06-16 PROCEDURE — 85025 COMPLETE CBC W/AUTO DIFF WBC: CPT | Performed by: EMERGENCY MEDICINE

## 2020-06-16 PROCEDURE — 25010000002 IOPAMIDOL 61 % SOLUTION: Performed by: INTERNAL MEDICINE

## 2020-06-16 RX ORDER — METHYLPREDNISOLONE SODIUM SUCCINATE 125 MG/2ML
125 INJECTION, POWDER, LYOPHILIZED, FOR SOLUTION INTRAMUSCULAR; INTRAVENOUS ONCE
Status: COMPLETED | OUTPATIENT
Start: 2020-06-16 | End: 2020-06-16

## 2020-06-16 RX ORDER — IPRATROPIUM BROMIDE AND ALBUTEROL SULFATE 2.5; .5 MG/3ML; MG/3ML
3 SOLUTION RESPIRATORY (INHALATION)
Status: DISCONTINUED | OUTPATIENT
Start: 2020-06-16 | End: 2020-06-18 | Stop reason: HOSPADM

## 2020-06-16 RX ORDER — MONTELUKAST SODIUM 10 MG/1
10 TABLET ORAL NIGHTLY
COMMUNITY
End: 2020-08-13 | Stop reason: SDUPTHER

## 2020-06-16 RX ORDER — IPRATROPIUM BROMIDE AND ALBUTEROL SULFATE 2.5; .5 MG/3ML; MG/3ML
3 SOLUTION RESPIRATORY (INHALATION) ONCE
Status: COMPLETED | OUTPATIENT
Start: 2020-06-16 | End: 2020-06-16

## 2020-06-16 RX ORDER — GUAIFENESIN 600 MG/1
1200 TABLET, EXTENDED RELEASE ORAL EVERY 12 HOURS SCHEDULED
Status: DISCONTINUED | OUTPATIENT
Start: 2020-06-16 | End: 2020-06-18 | Stop reason: HOSPADM

## 2020-06-16 RX ORDER — CARVEDILOL 3.12 MG/1
3.12 TABLET ORAL 2 TIMES DAILY WITH MEALS
Status: DISCONTINUED | OUTPATIENT
Start: 2020-06-16 | End: 2020-06-18 | Stop reason: HOSPADM

## 2020-06-16 RX ORDER — ATORVASTATIN CALCIUM 10 MG/1
20 TABLET, FILM COATED ORAL NIGHTLY
Status: DISCONTINUED | OUTPATIENT
Start: 2020-06-16 | End: 2020-06-18 | Stop reason: HOSPADM

## 2020-06-16 RX ORDER — SODIUM CHLORIDE 0.9 % (FLUSH) 0.9 %
10 SYRINGE (ML) INJECTION AS NEEDED
Status: DISCONTINUED | OUTPATIENT
Start: 2020-06-16 | End: 2020-06-18 | Stop reason: HOSPADM

## 2020-06-16 RX ORDER — AMLODIPINE BESYLATE 5 MG/1
5 TABLET ORAL DAILY
Status: DISCONTINUED | OUTPATIENT
Start: 2020-06-16 | End: 2020-06-18 | Stop reason: HOSPADM

## 2020-06-16 RX ORDER — BUDESONIDE AND FORMOTEROL FUMARATE DIHYDRATE 160; 4.5 UG/1; UG/1
2 AEROSOL RESPIRATORY (INHALATION)
Status: DISCONTINUED | OUTPATIENT
Start: 2020-06-16 | End: 2020-06-18 | Stop reason: HOSPADM

## 2020-06-16 RX ORDER — ONDANSETRON 2 MG/ML
4 INJECTION INTRAMUSCULAR; INTRAVENOUS EVERY 6 HOURS PRN
Status: DISCONTINUED | OUTPATIENT
Start: 2020-06-16 | End: 2020-06-18 | Stop reason: HOSPADM

## 2020-06-16 RX ORDER — MONTELUKAST SODIUM 10 MG/1
10 TABLET ORAL NIGHTLY
Status: DISCONTINUED | OUTPATIENT
Start: 2020-06-16 | End: 2020-06-18 | Stop reason: HOSPADM

## 2020-06-16 RX ORDER — TRAZODONE HYDROCHLORIDE 50 MG/1
50 TABLET ORAL NIGHTLY
Status: DISCONTINUED | OUTPATIENT
Start: 2020-06-16 | End: 2020-06-18 | Stop reason: HOSPADM

## 2020-06-16 RX ORDER — TAMSULOSIN HYDROCHLORIDE 0.4 MG/1
0.4 CAPSULE ORAL DAILY
Status: DISCONTINUED | OUTPATIENT
Start: 2020-06-16 | End: 2020-06-18 | Stop reason: HOSPADM

## 2020-06-16 RX ORDER — DEXTROMETHORPHAN POLISTIREX 30 MG/5ML
60 SUSPENSION ORAL EVERY 12 HOURS PRN
Status: DISCONTINUED | OUTPATIENT
Start: 2020-06-16 | End: 2020-06-18 | Stop reason: HOSPADM

## 2020-06-16 RX ORDER — TERBUTALINE SULFATE 1 MG/ML
0.25 INJECTION, SOLUTION SUBCUTANEOUS ONCE
Status: COMPLETED | OUTPATIENT
Start: 2020-06-16 | End: 2020-06-16

## 2020-06-16 RX ORDER — DEXTROSE MONOHYDRATE 25 G/50ML
25 INJECTION, SOLUTION INTRAVENOUS
Status: DISCONTINUED | OUTPATIENT
Start: 2020-06-16 | End: 2020-06-18 | Stop reason: HOSPADM

## 2020-06-16 RX ORDER — ACETAMINOPHEN 325 MG/1
650 TABLET ORAL EVERY 4 HOURS PRN
Status: DISCONTINUED | OUTPATIENT
Start: 2020-06-16 | End: 2020-06-18 | Stop reason: HOSPADM

## 2020-06-16 RX ORDER — SODIUM CHLORIDE 0.9 % (FLUSH) 0.9 %
10 SYRINGE (ML) INJECTION EVERY 12 HOURS SCHEDULED
Status: DISCONTINUED | OUTPATIENT
Start: 2020-06-16 | End: 2020-06-18 | Stop reason: HOSPADM

## 2020-06-16 RX ORDER — BENZONATATE 100 MG/1
200 CAPSULE ORAL 3 TIMES DAILY PRN
Status: DISCONTINUED | OUTPATIENT
Start: 2020-06-16 | End: 2020-06-18 | Stop reason: HOSPADM

## 2020-06-16 RX ORDER — DOXYCYCLINE 100 MG/1
100 TABLET ORAL EVERY 12 HOURS SCHEDULED
Status: DISCONTINUED | OUTPATIENT
Start: 2020-06-16 | End: 2020-06-18 | Stop reason: HOSPADM

## 2020-06-16 RX ORDER — GLIPIZIDE 5 MG/1
5 TABLET ORAL
Status: DISCONTINUED | OUTPATIENT
Start: 2020-06-16 | End: 2020-06-18 | Stop reason: HOSPADM

## 2020-06-16 RX ORDER — MAGNESIUM SULFATE HEPTAHYDRATE 40 MG/ML
2 INJECTION, SOLUTION INTRAVENOUS ONCE
Status: COMPLETED | OUTPATIENT
Start: 2020-06-16 | End: 2020-06-16

## 2020-06-16 RX ORDER — NICOTINE POLACRILEX 4 MG
15 LOZENGE BUCCAL
Status: DISCONTINUED | OUTPATIENT
Start: 2020-06-16 | End: 2020-06-18 | Stop reason: HOSPADM

## 2020-06-16 RX ORDER — GABAPENTIN 300 MG/1
300 CAPSULE ORAL 2 TIMES DAILY
Status: DISCONTINUED | OUTPATIENT
Start: 2020-06-16 | End: 2020-06-18 | Stop reason: HOSPADM

## 2020-06-16 RX ORDER — METHYLPREDNISOLONE SODIUM SUCCINATE 40 MG/ML
40 INJECTION, POWDER, LYOPHILIZED, FOR SOLUTION INTRAMUSCULAR; INTRAVENOUS EVERY 8 HOURS
Status: COMPLETED | OUTPATIENT
Start: 2020-06-16 | End: 2020-06-17

## 2020-06-16 RX ADMIN — METHYLPREDNISOLONE SODIUM SUCCINATE 125 MG: 125 INJECTION, POWDER, FOR SOLUTION INTRAMUSCULAR; INTRAVENOUS at 04:18

## 2020-06-16 RX ADMIN — DOXYCYCLINE 100 MG: 100 TABLET, FILM COATED ORAL at 21:12

## 2020-06-16 RX ADMIN — GLIPIZIDE 5 MG: 5 TABLET ORAL at 13:02

## 2020-06-16 RX ADMIN — INSULIN LISPRO 6 UNITS: 100 INJECTION, SOLUTION INTRAVENOUS; SUBCUTANEOUS at 13:01

## 2020-06-16 RX ADMIN — CARVEDILOL 3.12 MG: 3.12 TABLET, FILM COATED ORAL at 13:02

## 2020-06-16 RX ADMIN — MAGNESIUM SULFATE HEPTAHYDRATE 2 G: 40 INJECTION, SOLUTION INTRAVENOUS at 04:20

## 2020-06-16 RX ADMIN — DOXYCYCLINE 100 MG: 100 TABLET, FILM COATED ORAL at 13:02

## 2020-06-16 RX ADMIN — GUAIFENESIN 1200 MG: 600 TABLET, EXTENDED RELEASE ORAL at 13:02

## 2020-06-16 RX ADMIN — SODIUM CHLORIDE, PRESERVATIVE FREE 10 ML: 5 INJECTION INTRAVENOUS at 13:20

## 2020-06-16 RX ADMIN — ATORVASTATIN CALCIUM 20 MG: 10 TABLET, FILM COATED ORAL at 21:12

## 2020-06-16 RX ADMIN — IPRATROPIUM BROMIDE AND ALBUTEROL SULFATE 3 ML: 2.5; .5 SOLUTION RESPIRATORY (INHALATION) at 18:57

## 2020-06-16 RX ADMIN — GABAPENTIN 300 MG: 300 CAPSULE ORAL at 13:02

## 2020-06-16 RX ADMIN — METHYLPREDNISOLONE SODIUM SUCCINATE 40 MG: 40 INJECTION, POWDER, FOR SOLUTION INTRAMUSCULAR; INTRAVENOUS at 13:01

## 2020-06-16 RX ADMIN — IPRATROPIUM BROMIDE AND ALBUTEROL SULFATE 3 ML: 2.5; .5 SOLUTION RESPIRATORY (INHALATION) at 05:51

## 2020-06-16 RX ADMIN — MONTELUKAST 10 MG: 10 TABLET, FILM COATED ORAL at 21:12

## 2020-06-16 RX ADMIN — TERBUTALINE SULFATE 0.25 MG: 1 INJECTION, SOLUTION SUBCUTANEOUS at 04:18

## 2020-06-16 RX ADMIN — AMLODIPINE BESYLATE 5 MG: 5 TABLET ORAL at 13:02

## 2020-06-16 RX ADMIN — BUDESONIDE AND FORMOTEROL FUMARATE DIHYDRATE 2 PUFF: 160; 4.5 AEROSOL RESPIRATORY (INHALATION) at 18:57

## 2020-06-16 RX ADMIN — IPRATROPIUM BROMIDE AND ALBUTEROL SULFATE 3 ML: 2.5; .5 SOLUTION RESPIRATORY (INHALATION) at 10:09

## 2020-06-16 RX ADMIN — METHYLPREDNISOLONE SODIUM SUCCINATE 40 MG: 40 INJECTION, POWDER, FOR SOLUTION INTRAMUSCULAR; INTRAVENOUS at 21:13

## 2020-06-16 RX ADMIN — GUAIFENESIN 1200 MG: 600 TABLET, EXTENDED RELEASE ORAL at 21:12

## 2020-06-16 RX ADMIN — ENOXAPARIN SODIUM 40 MG: 40 INJECTION SUBCUTANEOUS at 13:01

## 2020-06-16 RX ADMIN — ACETAMINOPHEN 650 MG: 325 TABLET, FILM COATED ORAL at 21:13

## 2020-06-16 RX ADMIN — SODIUM CHLORIDE, PRESERVATIVE FREE 10 ML: 5 INJECTION INTRAVENOUS at 21:15

## 2020-06-16 RX ADMIN — TAMSULOSIN HYDROCHLORIDE 0.4 MG: 0.4 CAPSULE ORAL at 13:02

## 2020-06-16 RX ADMIN — IOPAMIDOL 100 ML: 612 INJECTION, SOLUTION INTRAVENOUS at 12:00

## 2020-06-16 RX ADMIN — IPRATROPIUM BROMIDE AND ALBUTEROL SULFATE 3 ML: 2.5; .5 SOLUTION RESPIRATORY (INHALATION) at 14:16

## 2020-06-16 RX ADMIN — CARVEDILOL 3.12 MG: 3.12 TABLET, FILM COATED ORAL at 18:03

## 2020-06-16 RX ADMIN — TUBERCULIN PURIFIED PROTEIN DERIVATIVE 5 UNITS: 5 INJECTION, SOLUTION INTRADERMAL at 18:03

## 2020-06-16 RX ADMIN — GABAPENTIN 300 MG: 300 CAPSULE ORAL at 21:12

## 2020-06-16 RX ADMIN — INSULIN LISPRO 2 UNITS: 100 INJECTION, SOLUTION INTRAVENOUS; SUBCUTANEOUS at 18:02

## 2020-06-16 RX ADMIN — IPRATROPIUM BROMIDE AND ALBUTEROL SULFATE 3 ML: 2.5; .5 SOLUTION RESPIRATORY (INHALATION) at 05:38

## 2020-06-16 RX ADMIN — SODIUM CHLORIDE 500 ML: 9 INJECTION, SOLUTION INTRAVENOUS at 04:20

## 2020-06-16 RX ADMIN — HYDROCHLOROTHIAZIDE: 25 TABLET ORAL at 13:01

## 2020-06-16 NOTE — ED NOTES
After the neb rx, this patient has finally gotten some relief from his SOA     Mira Wrne, RN  06/16/20 [id number]

## 2020-06-16 NOTE — ED PROVIDER NOTES
Subjective   69-year-old male patient complains of shortness of breath which came on about 11:00 today.  He denies fever or chills denies actually having chest pain.  Denies abdominal pain or back pain. He has some pain from all of the coughing.           Review of Systems   Constitutional: Negative for chills and fever.   HENT: Negative for congestion, rhinorrhea and sore throat.    Respiratory: Positive for cough, chest tightness and shortness of breath.    Cardiovascular: Negative for chest pain and palpitations.   Gastrointestinal: Negative for abdominal pain, diarrhea, nausea and vomiting.   Genitourinary: Negative for difficulty urinating, dysuria and flank pain.   Musculoskeletal: Negative for back pain and myalgias.   Skin: Negative.    Neurological: Negative.    Hematological: Negative.    Psychiatric/Behavioral: Negative.        Past Medical History:   Diagnosis Date   • Diabetes mellitus (CMS/HCC)    • Hx of colonic polyp    • Hx of TIA (transient ischemic attack) and stroke    • Hyperlipidemia    • Hypertension        No Known Allergies    Past Surgical History:   Procedure Laterality Date   • BACK SURGERY     • COLONOSCOPY  08/15/2012    Poor prep repeat exam in 3 years   • COLONOSCOPY W/ POLYPECTOMY  07/16/2009    Tubular adenoma at 60 cm, Hyperplastic polyp rectum suboptimal prep repeat in 3 months   • SHOULDER SURGERY     • VERTEBROPLASTY      neck       History reviewed. No pertinent family history.    Social History     Socioeconomic History   • Marital status: Legally      Spouse name: Not on file   • Number of children: Not on file   • Years of education: Not on file   • Highest education level: Not on file   Tobacco Use   • Smoking status: Current Every Day Smoker     Packs/day: 1.00     Years: 50.00     Pack years: 50.00   • Tobacco comment: STATES 'NONE NOW, I CAN'T.'   Substance and Sexual Activity   • Alcohol use: No   • Drug use: No           Objective   Physical Exam    Constitutional: He is oriented to person, place, and time. He appears well-developed and well-nourished.   HENT:   Head: Normocephalic and atraumatic.   Mouth/Throat: Oropharynx is clear and moist.   Eyes: Pupils are equal, round, and reactive to light. EOM are normal.   Neck: Normal range of motion. Neck supple.   Cardiovascular: Normal rate, regular rhythm and normal heart sounds.   Pulmonary/Chest: Effort normal. He has wheezes in the right upper field, the right middle field, the left upper field and the left middle field. He has no rhonchi. He has no rales.   Abdominal: Soft. Bowel sounds are normal. There is no tenderness.   Musculoskeletal:        Right lower leg: Normal.        Left lower leg: Normal.   Neurological: He is alert and oriented to person, place, and time.   Skin: Skin is warm and dry.   Psychiatric: He has a normal mood and affect. His behavior is normal.   Nursing note and vitals reviewed.      Procedures           ED Course                                           MDM  Number of Diagnoses or Management Options  Diagnosis management comments: Patient continues to feel very rough even after breathing treatment he got Brethine and steroids initially and is also been given an IV dose of magnesium sulfate.  His chest x-ray looks good his cardiac labs are normal he just still feels bad so I am going to get him admitted I spoke to Dr. Osullivan who is asking that the keep him in the emergency room but accepts the patient for admission.      Final diagnoses:   COPD exacerbation (CMS/[facility])            Dorian Lomeli,   06/16/20 0531       Dorian Lomeli,   06/16/20 0532

## 2020-06-16 NOTE — H&P
"    [facility] Medicine Services  HISTORY AND PHYSICAL    Date of Admission: 6/16/2020  Primary Care Physician: Salbador Anderson MD    Subjective     Chief Complaint: Shortness of breath    History of Present Illness  Mr. Nick is a very pleasant 69 year old -American male who follows Dr. Salbador Anderson for primary care.  He has a medical history significant for diabetes mellitus type 2, hypertension, hyperlipidemia, and TIA.  The patient presented to the [facility] emergency department this morning with a chief complaint of shortness of breath.  He states this has been ongoing for approximately 3 months, but has gotten progressively worse in the last 3 to 4 days.  He states he thought he was going to die last night because he just \"couldn't get air\".  He has had a cough productive of clear phlegm at times.  He does not usually wear oxygen at home.  He is currently on 4 L with oxygen saturation of 96%.  He denies any fever or chills.  He denies any recent sick contacts.  He has been trying to stay at home with the recent pandemic.  He denies abdominal pain, nausea, vomiting, or diarrhea.    The patient has never had pulmonary function testing or formal diagnosis of chronic obstructive pulmonary disease.  He does have a longstanding history of tobacco abuse, and states he has stopped smoking within the last 3 months as he has felt unwell.  He has an appointment to establish care with pulmonology in August.    Review of Systems   Constitutional: Positive for activity change. Negative for chills, fatigue and fever.   HENT: Negative.    Respiratory: Positive for cough, shortness of breath and wheezing.    Cardiovascular: Negative for chest pain, palpitations and leg swelling.   Gastrointestinal: Negative for abdominal pain, diarrhea, nausea and vomiting.   Genitourinary: Negative.    Musculoskeletal: Negative.    Skin: Negative.    Neurological: Negative.       Otherwise " complete ROS reviewed and negative except as mentioned in the HPI.    Past Medical History:   Past Medical History:   Diagnosis Date   • Diabetes mellitus (CMS/HCC)    • Hx of colonic polyp    • Hx of TIA (transient ischemic attack) and stroke    • Hyperlipidemia    • Hypertension      Past Surgical History:  Past Surgical History:   Procedure Laterality Date   • BACK SURGERY     • COLONOSCOPY  08/15/2012    Poor prep repeat exam in 3 years   • COLONOSCOPY W/ POLYPECTOMY  07/16/2009    Tubular adenoma at 60 cm, Hyperplastic polyp rectum suboptimal prep repeat in 3 months   • SHOULDER SURGERY     • VERTEBROPLASTY      neck     Social History:  reports that he has quit smoking. He has a 55.00 pack-year smoking history. He does not have any smokeless tobacco history on file. He reports that he does not drink alcohol or use drugs.    Family History: family history includes Diabetes in his brother, father, and sister; Heart disease in his brother, father, and sister.       Allergies:  No Known Allergies  Medications:  Prior to Admission medications    Medication Sig Start Date End Date Taking? Authorizing Provider   albuterol (PROVENTIL) (2.5 MG/3ML) 0.083% nebulizer solution Take 2.5 mg by nebulization Every 4 (Four) Hours As Needed for Wheezing. 5/7/20  Yes Vee Gaines APRN   amLODIPine (NORVASC) 5 MG tablet Take 5 mg by mouth Daily.   Yes Greg Tavares MD   atorvastatin (LIPITOR) 20 MG tablet Take 20 mg by mouth Daily.   Yes Greg Tavares MD   carvedilol (COREG) 3.125 MG tablet Take 3.125 mg by mouth 2 (Two) Times a Day With Meals.   Yes Greg Tavares MD   Dulaglutide (TRULICITY SC) Inject  under the skin into the appropriate area as directed 1 (One) Time Per Week.   Yes Greg Tavares MD   gabapentin (NEURONTIN) 300 MG capsule Take 300 mg by mouth 2 (Two) Times a Day.   Yes Greg Tavares MD   glimepiride (AMARYL) 2 MG tablet Take 2 mg by mouth Every Morning Before  "Breakfast.   Yes Greg Tavares MD   montelukast (SINGULAIR) 10 MG tablet Take 10 mg by mouth Every Night.   Yes Greg Tavares MD   tamsulosin (FLOMAX) 0.4 MG capsule 24 hr capsule Take 1 capsule by mouth Daily.   Yes Greg Tavares MD   TRAZODONE HCL PO Take  by mouth Every Night.   Yes Greg Tavares MD   valsartan 80 MG tablet 320 mg, hydroCHLOROthiazide 25 MG tablet 25 mg Take 1 dose by mouth Daily.   Yes Greg Tavares MD   azithromycin (Zithromax Z-Margret) 250 MG tablet Take 2 tablets the first day, then 1 tablet daily for 4 days. 5/7/20 6/16/20  Vee Gaines APRN   benzonatate (TESSALON) 100 MG capsule Take 100 mg by mouth 3 (Three) Times a Day As Needed for Cough.  6/16/20  Greg Tavares MD   methylPREDNISolone (MEDROL, MARGRET,) 4 MG tablet Take as directed on package instructions. 5/7/20 6/16/20  Vee Gaines APRN   montelukast (SINGULAIR) 10 MG tablet Take 10 mg by mouth Every Night.  6/16/20  Greg Tavares MD     Objective     Vital Signs: /73   Pulse 95   Temp 97.4 °F (36.3 °C)   Resp 20   Ht 177.8 cm (70\")   Wt 79.5 kg (175 lb 4.8 oz)   SpO2 97%   BMI 25.15 kg/m²   Physical Exam   Constitutional: He is oriented to person, place, and time. He appears well-developed and well-nourished. No distress.   HENT:   Head: Normocephalic and atraumatic.   Neck: Normal range of motion. Neck supple. No JVD present. No tracheal deviation present.   Cardiovascular: Normal rate, regular rhythm and intact distal pulses. Exam reveals no gallop and no friction rub.   Pulmonary/Chest: Effort normal. He has wheezes (Expiratory wheezes throughout). He has no rales.   Coarse breath sounds   Abdominal: Soft. Bowel sounds are normal. He exhibits no distension. There is no tenderness. There is no guarding.   Musculoskeletal: Normal range of motion. He exhibits no edema or tenderness.   Neurological: He is alert and oriented to person, place, and time. " No cranial nerve deficit.   Skin: Skin is warm and dry. No rash noted. No erythema.   Psychiatric: He has a normal mood and affect. His behavior is normal. Judgment and thought content normal.   Vitals reviewed.    Results Reviewed:  Lab Results (last 24 hours)     Procedure Component Value Units Date/Time    COVID-19,CEPHEID,COR/JOVANNY/PAD IN-HOUSE(OR EMERGENT/ADD-ON),NP SWAB IN TRANSPORT MEDIA 3-4 HR TAT - Swab, Nasopharynx [[id number]]  (Normal) Collected:  06/16/20 0650    Specimen:  Swab from Nasopharynx Updated:  06/16/20 0801     COVID19 Not Detected    [place] Draw [[id number]] Collected:  06/16/20 0220    Specimen:  Blood from Arm, Right Updated:  06/16/20 0330    Narrative:       The following orders were created for panel order [place] Draw.  Procedure                               Abnormality         Status                     ---------                               -----------         ------                     Light Blue Top[[id number]]                                   Final result               Green Top (Gel)[[id number]]                                  Final result               Lavender Top[[id number]]                                     Final result               Red Top[[id number]]                                          Final result                 Please view results for these tests on the individual orders.    Light Blue Top [[id number]] Collected:  06/16/20 0220    Specimen:  Blood from Arm, Right Updated:  06/16/20 0330     Extra Tube hold for add-on     Comment: Auto resulted       Green Top (Gel) [[id number]] Collected:  06/16/20 0220    Specimen:  Blood from Arm, Right Updated:  06/16/20 0330     Extra Tube Hold for add-ons.     Comment: Auto resulted.       Lavender Top [[id number]] Collected:  06/16/20 0220    Specimen:  Blood from Arm, Right Updated:  06/16/20 0330     Extra Tube hold for add-on     Comment: Auto resulted       Red Top [[id number]] Collected:  06/16/20 0220    Specimen:  Blood  from Arm, Right Updated:  06/16/20 0330     Extra Tube Hold for add-ons.     Comment: Auto resulted.       Troponin [[id number]]  (Normal) Collected:  06/16/20 0220    Specimen:  Blood from Arm, Right Updated:  06/16/20 0315     Troponin T <0.010 ng/mL     Narrative:       Troponin T Reference Range:  <= 0.03 ng/mL-   Negative for AMI  >0.03 ng/mL-     Abnormal for myocardial necrosis.  Clinicians would have to utilize clinical acumen, EKG, Troponin and serial changes to determine if it is an Acute Myocardial Infarction or myocardial injury due to an underlying chronic condition.       Results may be falsely decreased if patient taking Biotin.      Protime-INR [[id number]]  (Normal) Collected:  06/16/20 0220    Specimen:  Blood from Arm, Right Updated:  06/16/20 0312     Protime 12.0 Seconds      INR 0.92    aPTT [[id number]]  (Normal) Collected:  06/16/20 0220    Specimen:  Blood from Arm, Right Updated:  06/16/20 0312     PTT 34.9 seconds     D-dimer, Quantitative [[id number]]  (Normal) Collected:  06/16/20 0220    Specimen:  Blood from Arm, Right Updated:  06/16/20 0312     D-Dimer, Quantitative 0.44 mg/L (FEU)     Narrative:       Reference Range is 0-0.50 mg/L FEU. However, results <0.50 mg/L FEU tends to rule out DVT or PE. Results >0.50 mg/L FEU are not useful in predicting absence or presence of DVT or PE.      Magnesium [[id number]]  (Normal) Collected:  06/16/20 0220    Specimen:  Blood from Arm, Right Updated:  06/16/20 0310     Magnesium 2.1 mg/dL     Comprehensive Metabolic Panel [[id number]]  (Abnormal) Collected:  06/16/20 0220    Specimen:  Blood from Arm, Right Updated:  06/16/20 0249     Glucose 146 mg/dL      BUN 17 mg/dL      Creatinine 0.93 mg/dL      Sodium 139 mmol/L      Potassium 4.0 mmol/L      Chloride 101 mmol/L      CO2 26.0 mmol/L      Calcium 9.0 mg/dL      Total Protein 7.4 g/dL      Albumin 4.20 g/dL      ALT (SGPT) 28 U/L      AST (SGOT) 19 U/L      Alkaline Phosphatase 46 U/L      Total  Bilirubin 0.4 mg/dL      eGFR  Zoran Urrutia 97 mL/min/1.73      Globulin 3.2 gm/dL      A/G Ratio 1.3 g/dL      BUN/Creatinine Ratio 18.3     Anion Gap 12.0 mmol/L     Narrative:       GFR Normal >60  Chronic Kidney Disease <60  Kidney Failure <15      Lactic Acid, Plasma [[id number]]  (Normal) Collected:  06/16/20 0220    Specimen:  Blood from Arm, Right Updated:  06/16/20 0245     Lactate 0.7 mmol/L     Blood Culture With KAI - Blood, Arm, Right [[id number]] Collected:  06/16/20 0220    Specimen:  Blood from Arm, Right Updated:  06/16/20 0239    Blood Culture With KAI - Blood, Arm, Right [[id number]] Collected:  06/16/20 0212    Specimen:  Blood from Arm, Right Updated:  06/16/20 0238    CBC & Differential [[id number]] Collected:  06/16/20 0220    Specimen:  Blood from Arm, Right Updated:  06/16/20 0233    Narrative:       The following orders were created for panel order CBC & Differential.  Procedure                               Abnormality         Status                     ---------                               -----------         ------                     CBC Auto Differential[[id number]]        Abnormal            Final result                 Please view results for these tests on the individual orders.    CBC Auto Differential [[id number]]  (Abnormal) Collected:  06/16/20 0220    Specimen:  Blood from Arm, Right Updated:  06/16/20 0233     WBC 11.55 10*3/mm3      RBC 4.49 10*6/mm3      Hemoglobin 13.2 g/dL      Hematocrit 41.0 %      MCV 91.3 fL      MCH 29.4 pg      MCHC 32.2 g/dL      RDW 12.7 %      RDW-SD 42.0 fl      MPV 8.7 fL      Platelets 392 10*3/mm3      Neutrophil % 52.3 %      Lymphocyte % 31.9 %      Monocyte % 8.2 %      Eosinophil % 6.6 %      Basophil % 0.7 %      Immature Grans % 0.3 %      Neutrophils, Absolute 6.03 10*3/mm3      Lymphocytes, Absolute 3.69 10*3/mm3      Monocytes, Absolute 0.95 10*3/mm3      Eosinophils, Absolute 0.76 10*3/mm3      Basophils, Absolute 0.08 10*3/mm3       Immature Grans, Absolute 0.04 10*3/mm3      nRBC 0.0 /100 WBC         Imaging Results (Last 24 Hours)     Procedure Component Value Units Date/Time    XR Chest 1 View [[id number]] Collected:  06/16/20 0730     Updated:  06/16/20 0734    Narrative:       EXAMINATION: XR CHEST 1 VW-     6/16/2020 3:10 AM CDT     HISTORY: Acute shortness of breath with apparent COPD flare but also  hypertension     1 view chest x-ray compared with 05/07/2020.     Heart size is normal.  The mediastinum is within normal limits.      The lungs are normally expanded with no pneumonia or pneumothorax.  Mild chronic appearing interstitial disease with a few calcified  granulomas.  No congestive failure changes.                                                                       Impression:       1. No acute disease.        This report was finalized on 06/16/2020 07:31 by Dr. Abbe Wilson MD.        I have personally reviewed and interpreted the radiology studies and ECG obtained at time of admission.     Assessment / Plan     Assessment:   Active Hospital Problems    Diagnosis   • **COPD exacerbation (CMS/[facility])   • Hypoxia   • Tobacco abuse, in remission   • Type 2 diabetes mellitus, without long-term current use of insulin (CMS/[facility])   • Hyperlipidemia   • Hypertension     Plan:   1.  Admit inpatient to the hospitalist service  2.  Duo nebs 4 times per day.  Start Symbicort.  Mucinex, antitussives with Tessalon and Delsym as needed.  Flutter valve.  3.  Solu-Medrol 40 mg every 8 hours  4.  Wean supplemental oxygen as tolerated  5.  Doxycycline 100 mg every 12 hours x7 days  6.  Resume home medications as appropriate.  Antihypertensives have been resumed, will monitor blood pressure closely and titrate if needed.  7.  Accu-Cheks and sliding scale insulin.  Monitor blood glucoses closely while on IV steroid therapy.  Resume glimepiride-will be substituted with glipizide.   8.  Patient has upcoming pulmonology appointment to establish care in  August.  Will consider consult for any worsening, hold off for now.  9.  Labs in a.m.  10.  We will follow-up on results of chest x-ray-personal reviewed does not show any acute abnormalities.  We will also follow-up on results of COVID-19 testing.  11.  Lovenox for DVT prophylaxis    Code Status: Full.  In the event he is unable to speak for himself he designates either his daughter [place] or ex-wife Maria L as his healthcare surrogates.     I discussed the patients findings and my recommendations with the patient and Dr. Salbador Jacobs.    Estimated length of stay: 2-3 days.    TAMAR Nguyen   06/16/20   07:27    I personally evaluated and examined the patient in conjunction with TAMAR Jasso and agree with the assessment, treatment plan, and disposition of the patient as recorded by her. My history, exam, and further recommendations are:     Discussed with his nurse, Callie.    Admitted earlier this morning through the emergency department by Caty.      He feels better since coming to the floor and receiving breathing treatments.    He has been trying to get into see the pulmonology group.  He has an appointment and August with Nid[facility] Sosa.  He tried to call their office yesterday to see if he could move this up and was unable to do so.    It is presumed that he has chronic obstructive pulmonary disease.  He was a pack-a-day smoker for 55 years.    Continue inhaled bronchodilators, Mucinex, and incentive spirometry.  Continue IV Solu-Medrol.  Oral doxycycline.    IV steroids will most certainly exacerbate hyperglycemia.    It has also come to light that he has a family member who has previously had tuberculosis.  We will place a PPD and obtain a CT scan of the chest.    Novel coronavirus testing negative.    Salbador Jacobs DO  06/16/20  11:28

## 2020-06-17 LAB
ANION GAP SERPL CALCULATED.3IONS-SCNC: 13 MMOL/L (ref 5–15)
BUN BLD-MCNC: 17 MG/DL (ref 8–23)
BUN/CREAT SERPL: 20.7 (ref 7–25)
CALCIUM SPEC-SCNC: 9.5 MG/DL (ref 8.6–10.5)
CHLORIDE SERPL-SCNC: 96 MMOL/L (ref 98–107)
CO2 SERPL-SCNC: 28 MMOL/L (ref 22–29)
CREAT BLD-MCNC: 0.82 MG/DL (ref 0.76–1.27)
DEPRECATED RDW RBC AUTO: 41.4 FL (ref 37–54)
ERYTHROCYTE [DISTWIDTH] IN BLOOD BY AUTOMATED COUNT: 12.4 % (ref 12.3–15.4)
GFR SERPL CREATININE-BSD FRML MDRD: 112 ML/MIN/1.73
GLUCOSE BLD-MCNC: 184 MG/DL (ref 65–99)
GLUCOSE BLDC GLUCOMTR-MCNC: 160 MG/DL (ref 70–130)
GLUCOSE BLDC GLUCOMTR-MCNC: 240 MG/DL (ref 70–130)
HCT VFR BLD AUTO: 39.7 % (ref 37.5–51)
HGB BLD-MCNC: 12.9 G/DL (ref 13–17.7)
MCH RBC QN AUTO: 29.6 PG (ref 26.6–33)
MCHC RBC AUTO-ENTMCNC: 32.5 G/DL (ref 31.5–35.7)
MCV RBC AUTO: 91.1 FL (ref 79–97)
PLATELET # BLD AUTO: 388 10*3/MM3 (ref 140–450)
PMV BLD AUTO: 9.3 FL (ref 6–12)
POTASSIUM BLD-SCNC: 4.2 MMOL/L (ref 3.5–5.2)
RBC # BLD AUTO: 4.36 10*6/MM3 (ref 4.14–5.8)
SODIUM BLD-SCNC: 137 MMOL/L (ref 136–145)
WBC NRBC COR # BLD: 16.03 10*3/MM3 (ref 3.4–10.8)

## 2020-06-17 PROCEDURE — 94799 UNLISTED PULMONARY SVC/PX: CPT

## 2020-06-17 PROCEDURE — 99406 BEHAV CHNG SMOKING 3-10 MIN: CPT

## 2020-06-17 PROCEDURE — 96372 THER/PROPH/DIAG INJ SC/IM: CPT

## 2020-06-17 PROCEDURE — 25010000002 ENOXAPARIN PER 10 MG: Performed by: NURSE PRACTITIONER

## 2020-06-17 PROCEDURE — 94664 DEMO&/EVAL PT USE INHALER: CPT

## 2020-06-17 PROCEDURE — 80048 BASIC METABOLIC PNL TOTAL CA: CPT | Performed by: NURSE PRACTITIONER

## 2020-06-17 PROCEDURE — 96376 TX/PRO/DX INJ SAME DRUG ADON: CPT

## 2020-06-17 PROCEDURE — G0378 HOSPITAL OBSERVATION PER HR: HCPCS

## 2020-06-17 PROCEDURE — 82962 GLUCOSE BLOOD TEST: CPT

## 2020-06-17 PROCEDURE — 63710000001 INSULIN LISPRO (HUMAN) PER 5 UNITS: Performed by: NURSE PRACTITIONER

## 2020-06-17 PROCEDURE — 25010000002 METHYLPREDNISOLONE PER 40 MG: Performed by: NURSE PRACTITIONER

## 2020-06-17 PROCEDURE — 85027 COMPLETE CBC AUTOMATED: CPT | Performed by: NURSE PRACTITIONER

## 2020-06-17 RX ORDER — FLUTICASONE PROPIONATE 50 MCG
2 SPRAY, SUSPENSION (ML) NASAL DAILY
Status: DISCONTINUED | OUTPATIENT
Start: 2020-06-17 | End: 2020-06-18 | Stop reason: HOSPADM

## 2020-06-17 RX ORDER — PREDNISONE 20 MG/1
40 TABLET ORAL
Status: DISCONTINUED | OUTPATIENT
Start: 2020-06-18 | End: 2020-06-18 | Stop reason: HOSPADM

## 2020-06-17 RX ADMIN — SODIUM CHLORIDE, PRESERVATIVE FREE 10 ML: 5 INJECTION INTRAVENOUS at 20:55

## 2020-06-17 RX ADMIN — GLIPIZIDE 5 MG: 5 TABLET ORAL at 08:47

## 2020-06-17 RX ADMIN — DEXTROMETHORPHAN 60 MG: 30 SUSPENSION, EXTENDED RELEASE ORAL at 06:51

## 2020-06-17 RX ADMIN — ATORVASTATIN CALCIUM 20 MG: 10 TABLET, FILM COATED ORAL at 21:32

## 2020-06-17 RX ADMIN — METHYLPREDNISOLONE SODIUM SUCCINATE 40 MG: 40 INJECTION, POWDER, FOR SOLUTION INTRAMUSCULAR; INTRAVENOUS at 13:29

## 2020-06-17 RX ADMIN — DOXYCYCLINE 100 MG: 100 TABLET, FILM COATED ORAL at 21:32

## 2020-06-17 RX ADMIN — MONTELUKAST 10 MG: 10 TABLET, FILM COATED ORAL at 21:32

## 2020-06-17 RX ADMIN — AMLODIPINE BESYLATE 5 MG: 5 TABLET ORAL at 08:46

## 2020-06-17 RX ADMIN — TAMSULOSIN HYDROCHLORIDE 0.4 MG: 0.4 CAPSULE ORAL at 08:46

## 2020-06-17 RX ADMIN — SODIUM CHLORIDE, PRESERVATIVE FREE 10 ML: 5 INJECTION INTRAVENOUS at 08:49

## 2020-06-17 RX ADMIN — GABAPENTIN 300 MG: 300 CAPSULE ORAL at 21:32

## 2020-06-17 RX ADMIN — HYDROCHLOROTHIAZIDE: 25 TABLET ORAL at 08:46

## 2020-06-17 RX ADMIN — ENOXAPARIN SODIUM 40 MG: 40 INJECTION SUBCUTANEOUS at 10:35

## 2020-06-17 RX ADMIN — IPRATROPIUM BROMIDE AND ALBUTEROL SULFATE 3 ML: 2.5; .5 SOLUTION RESPIRATORY (INHALATION) at 10:21

## 2020-06-17 RX ADMIN — CARVEDILOL 3.12 MG: 3.12 TABLET, FILM COATED ORAL at 08:47

## 2020-06-17 RX ADMIN — METHYLPREDNISOLONE SODIUM SUCCINATE 40 MG: 40 INJECTION, POWDER, FOR SOLUTION INTRAMUSCULAR; INTRAVENOUS at 20:55

## 2020-06-17 RX ADMIN — GUAIFENESIN 1200 MG: 600 TABLET, EXTENDED RELEASE ORAL at 21:32

## 2020-06-17 RX ADMIN — BUDESONIDE AND FORMOTEROL FUMARATE DIHYDRATE 2 PUFF: 160; 4.5 AEROSOL RESPIRATORY (INHALATION) at 06:58

## 2020-06-17 RX ADMIN — CARVEDILOL 3.12 MG: 3.12 TABLET, FILM COATED ORAL at 18:09

## 2020-06-17 RX ADMIN — INSULIN LISPRO 2 UNITS: 100 INJECTION, SOLUTION INTRAVENOUS; SUBCUTANEOUS at 18:09

## 2020-06-17 RX ADMIN — TRAZODONE HYDROCHLORIDE 50 MG: 50 TABLET ORAL at 21:32

## 2020-06-17 RX ADMIN — METHYLPREDNISOLONE SODIUM SUCCINATE 40 MG: 40 INJECTION, POWDER, FOR SOLUTION INTRAMUSCULAR; INTRAVENOUS at 04:41

## 2020-06-17 RX ADMIN — IPRATROPIUM BROMIDE AND ALBUTEROL SULFATE 3 ML: 2.5; .5 SOLUTION RESPIRATORY (INHALATION) at 14:40

## 2020-06-17 RX ADMIN — GUAIFENESIN 1200 MG: 600 TABLET, EXTENDED RELEASE ORAL at 08:47

## 2020-06-17 RX ADMIN — INSULIN LISPRO 2 UNITS: 100 INJECTION, SOLUTION INTRAVENOUS; SUBCUTANEOUS at 08:47

## 2020-06-17 RX ADMIN — INSULIN LISPRO 4 UNITS: 100 INJECTION, SOLUTION INTRAVENOUS; SUBCUTANEOUS at 13:28

## 2020-06-17 RX ADMIN — GABAPENTIN 300 MG: 300 CAPSULE ORAL at 10:35

## 2020-06-17 RX ADMIN — TRAZODONE HYDROCHLORIDE 50 MG: 50 TABLET ORAL at 00:04

## 2020-06-17 RX ADMIN — DOXYCYCLINE 100 MG: 100 TABLET, FILM COATED ORAL at 10:35

## 2020-06-17 RX ADMIN — FLUTICASONE PROPIONATE 2 SPRAY: 50 SPRAY, METERED NASAL at 13:29

## 2020-06-17 RX ADMIN — BUDESONIDE AND FORMOTEROL FUMARATE DIHYDRATE 2 PUFF: 160; 4.5 AEROSOL RESPIRATORY (INHALATION) at 19:21

## 2020-06-17 RX ADMIN — IPRATROPIUM BROMIDE AND ALBUTEROL SULFATE 3 ML: 2.5; .5 SOLUTION RESPIRATORY (INHALATION) at 19:21

## 2020-06-17 RX ADMIN — IPRATROPIUM BROMIDE AND ALBUTEROL SULFATE 3 ML: 2.5; .5 SOLUTION RESPIRATORY (INHALATION) at 06:58

## 2020-06-17 NOTE — PLAN OF CARE
Problem: Patient Care Overview  Goal: Plan of Care Review  Outcome: Ongoing (interventions implemented as appropriate)  Flowsheets (Taken 6/17/2020 3647)  Progress: improving  Plan of Care Reviewed With: patient  Outcome Summary: Pt A&Ox4, VSS safety maintained. Can tolerate periods on room air, applies NC after activity. Ambulating in [place], IV steroids and po abx given, flonase added. SSI given for elevated BG. Will continue to monitor

## 2020-06-17 NOTE — PROGRESS NOTES
[facility] Medicine Services  INPATIENT PROGRESS NOTE    Length of Stay: 1  Date of Admission: 6/16/2020  Primary Care Physician: Salbador Anderson MD    Subjective   Chief Complaint: Follow-up shortness of breath  HPI   Patient resting in bed with family at bedside.  He reports feeling a little better in comparison to yesterday.  He was on room air this morning, however felt like he needed oxygen after showering this morning.  He continues to cough, and states he is starting to cough up more clear phlegm today.  He denies chest pain.  He denies abdominal pain, nausea, vomiting, or diarrhea.  He feels weak.  Patient feels as though high pollen counts contributed to his current condition.    Review of Systems   All pertinent negatives and positives are as above. All other systems have been reviewed and are negative unless otherwise stated.     Objective    Temp:  [97.7 °F (36.5 °C)-98.3 °F (36.8 °C)] 97.9 °F (36.6 °C)  Heart Rate:  [] 105  Resp:  [16-20] 16  BP: (135-157)/(55-80) 157/58  Physical Exam   Constitutional: He is oriented to person, place, and time. He appears well-developed and well-nourished. No distress.   HENT:   Head: Normocephalic and atraumatic.   Neck: Normal range of motion. Neck supple. No JVD present. No tracheal deviation present.   Cardiovascular: Normal rate, regular rhythm and intact distal pulses. Exam reveals no gallop and no friction rub.   Pulmonary/Chest: Effort normal. He has wheezes (Expiratory wheezes more prominent in lower lobes). He has no rales.   Coarse breath sounds- better today  Abdominal: Soft. Bowel sounds are normal. He exhibits no distension. There is no tenderness. There is no guarding.   Musculoskeletal: Normal range of motion. He exhibits no edema or tenderness.   Neurological: He is alert and oriented to person, place, and time. No cranial nerve deficit.   Skin: Skin is warm and dry. No rash noted. No erythema. PPD injection  to left forearm marked- no erythema or induration  Psychiatric: He has a normal mood and affect. His behavior is normal. Judgment and thought content normal.   Vitals reviewed.    Results Review:  I have reviewed the labs, radiology results, and diagnostic studies.    Laboratory Data:   Results from last 7 days   Lab Units 06/17/20  0501 06/16/20  0220   WBC 10*3/mm3 16.03* 11.55*   HEMOGLOBIN g/dL 12.9* 13.2   HEMATOCRIT % 39.7 41.0   PLATELETS 10*3/mm3 388 392     Results from last 7 days   Lab Units 06/17/20  0501 06/16/20  0220   SODIUM mmol/L 137 139   POTASSIUM mmol/L 4.2 4.0   CHLORIDE mmol/L 96* 101   CO2 mmol/L 28.0 26.0   BUN mg/dL 17 17   CREATININE mg/dL 0.82 0.93   CALCIUM mg/dL 9.5 9.0   BILIRUBIN mg/dL  --  0.4   ALK PHOS U/L  --  46   ALT (SGPT) U/L  --  28   AST (SGOT) U/L  --  19   GLUCOSE mg/dL 184* 146*     Radiology Data:   Imaging Results (Last 24 Hours)     Procedure Component Value Units Date/Time    CT Chest With & Without Contrast [[id number]] Collected:  06/16/20 1247     Updated:  06/16/20 1303    Narrative:       EXAMINATION: CT CHEST W WO CONTRAST- 6/16/2020 12:47 PM CDT     HISTORY: COPD, questionable tuberculosis exposure     COMPARISON: Low-dose CT chest 11/15/2019, chest x-ray 6/16/2020     DOSE: 733 mGy-cm     TECHNIQUE: Sequential imaging was performed from the thoracic inlet  through the upper abdomen before and after the administration of IV  contrast.  Sagittal and coronal reformations were made from the original  source data and reviewed. Automated exposure control was also utilized  to decrease patient radiation dose.     FINDINGS:   The visualized thyroid gland is grossly normal in appearance. The  trachea and main bronchi appear widely patent and in normal anatomic  position. The esophagus is grossly normal in appearance.     No pathologically enlarged axillary, mediastinal, or hilar lymph nodes  are identified. Calcified mediastinal and hilar lymph nodes indicate  prior  granulomatous exposure.     The heart appears normal in size. There is no pericardial or pleural  effusion. Atherosclerotic calcifications are seen within the aorta and  its branch vessels. The ascending thoracic aorta appears normal in  caliber. The main pulmonary artery appears normal in caliber.     There is a tiny nodule in the left lung apex which measures  approximately 5 mm in size (axial image 26). This has increased slightly  in size compared to the previous exam. There is a tiny pleural-based  nodule in the posterior right lower lobe which measures 5 mm in size  (axial image 139), stable compared to the most recent exam but new  compared to the exam from 2018. The lungs otherwise appear clear.     Review of the visualized portion of the upper abdomen demonstrates  diffuse fatty infiltration of the liver. No acute abnormalities are  identified.     Review of the visualized osseous structures demonstrates no acute or  aggressive lesions. There has been previous fusion of the lower cervical  spine.       Impression:       1. No CT evidence of reactivating tuberculosis.     2. Small pulmonary nodules bilaterally for which follow-up CT is  recommended in 12 months.     3. Atherosclerosis of the aorta and coronary arteries.     This report was finalized on 06/16/2020 13:00 by Dr. Diego Freeman MD.        I have reviewed the patient current medications.     Assessment/Plan     Active Hospital Problems    Diagnosis   • **COPD exacerbation (CMS/HCC)   • Type 2 diabetes mellitus, without long-term current use of insulin (CMS/HCC)   • Hyperlipidemia   • Hypertension   • Tobacco abuse, in remission   • Hypoxia     Plan:  1.  Patient presented to the emergency department in 6/16/2020 with complaints of shortness of breath.  Chest x-ray showed no acute process.  Suspected COPD with longstanding history of tobacco abuse, although no formal diagnosis has ever been made.  2.  Continue Symbicort, duo nebs, Mucinex.   Encourage use of flutter valve.  3.  Continue IV Solu-Medrol today at present dosage.  Transition to oral prednisone to start tomorrow.  4.  Continue doxycycline, day 2  5.  Continue to wean supplemental oxygen as tolerated.  If unable to wean prior to discharge, will need formal home oxygen evaluation.    6.  Have asked patient to slowly increase his activity today.  Up to chair with meals.  Have asked nursing to assist with ambulation in the hallway.  Can consult therapy if needed, will hold as he was able to perform ADLs independently this morning.  May benefit from home health at time of discharge.  7.  CT of the chest yesterday and PPD injection as patient does have a family member who recently passed away with tuberculosis.  PPD shows no signs of erythema or induration.  CT of the chest showed small bilateral pulmonary nodules.  Nodule in the left lung apex increase slightly from previous exam.  Right lower lobe nodule stable from previous exam.  Patient was aware of these nodules, and states he has been getting yearly chest x-rays.  Pulmonology can follow-up with this as well.  8.  Negative COVID-19 testing  9.  Blood pressure trend reviewed.  This is much better in comparison to admission.  We will continue current medications, can titrate as needed.  10.  Patient has an upcoming appointment with pulmonology in August to establish care.  We will certainly see if this appointment can be moved up at time of discharge from the hospital.  11.  Lab holiday in AM.  Worsening leukocytosis today likely secondary to steroid therapy.  12.  Continue Singulair.  Add Flonase.    Discharge Planning: I expect the patient to be discharged to home with home health in 1-2 days.    TAMAR Nguyen   06/17/20   11:20     I personally evaluated and examined the patient in conjunction with TAMAR Jasso and agree with the assessment, treatment plan, and disposition of the patient as recorded by her. My history, exam,  and further recommendations are:     Discussed with his nurse, Callie.    PPD with no reaction thus far.  Official read tomorrow.  CT of the chest with and without contrast shows no CT evidence of tuberculosis.  Small pulmonary nodules will warrant CT follow-up in 12 months.    He feels better this afternoon than he did earlier this morning.    We will towards the possibility of discharging him tomorrow.    Salbador Jacobs,   06/17/20  15:51

## 2020-06-17 NOTE — PLAN OF CARE
Problem: Patient Care Overview  Goal: Plan of Care Review  Outcome: Ongoing (interventions implemented as appropriate)  Flow sheets (Taken 6/17/2020 4450)  Progress: no change  Plan of Care Reviewed With: patient  Outcome Summary: Patient on O 2 at 2 NC with saturations in the mid 90's. Reports frequent non-productive cough and SOB with exertion. IV steroids and PO antibiotic given this shift. C/O neck pain 3/10. PRN Tylenol given with good results. Blood glucose at . Safety maintained. Will continue to monitor.

## 2020-06-18 ENCOUNTER — TRANSCRIBE ORDERS (OUTPATIENT)
Dept: ADMINISTRATIVE | Facility: HOSPITAL | Age: 70
End: 2020-06-18

## 2020-06-18 VITALS
BODY MASS INDEX: 25.09 KG/M2 | HEIGHT: 70 IN | TEMPERATURE: 97.8 F | WEIGHT: 175.27 LBS | OXYGEN SATURATION: 97 % | DIASTOLIC BLOOD PRESSURE: 76 MMHG | SYSTOLIC BLOOD PRESSURE: 145 MMHG | RESPIRATION RATE: 16 BRPM | HEART RATE: 97 BPM

## 2020-06-18 DIAGNOSIS — Z01.818 PREOP EXAMINATION: Primary | ICD-10-CM

## 2020-06-18 LAB — GLUCOSE BLDC GLUCOMTR-MCNC: 236 MG/DL (ref 70–130)

## 2020-06-18 PROCEDURE — 82962 GLUCOSE BLOOD TEST: CPT

## 2020-06-18 PROCEDURE — 94799 UNLISTED PULMONARY SVC/PX: CPT

## 2020-06-18 PROCEDURE — 63710000001 INSULIN LISPRO (HUMAN) PER 5 UNITS: Performed by: NURSE PRACTITIONER

## 2020-06-18 PROCEDURE — 25010000002 ENOXAPARIN PER 10 MG: Performed by: NURSE PRACTITIONER

## 2020-06-18 PROCEDURE — 99406 BEHAV CHNG SMOKING 3-10 MIN: CPT

## 2020-06-18 PROCEDURE — 94618 PULMONARY STRESS TESTING: CPT

## 2020-06-18 PROCEDURE — 94664 DEMO&/EVAL PT USE INHALER: CPT

## 2020-06-18 PROCEDURE — G0378 HOSPITAL OBSERVATION PER HR: HCPCS

## 2020-06-18 PROCEDURE — 96372 THER/PROPH/DIAG INJ SC/IM: CPT

## 2020-06-18 PROCEDURE — 63710000001 PREDNISONE PER 1 MG: Performed by: NURSE PRACTITIONER

## 2020-06-18 RX ORDER — GUAIFENESIN 600 MG/1
1200 TABLET, EXTENDED RELEASE ORAL EVERY 12 HOURS SCHEDULED
Qty: 20 TABLET | Refills: 0 | Status: SHIPPED | OUTPATIENT
Start: 2020-06-18 | End: 2020-08-13 | Stop reason: ALTCHOICE

## 2020-06-18 RX ORDER — DOXYCYCLINE 100 MG/1
100 TABLET ORAL EVERY 12 HOURS SCHEDULED
Qty: 9 TABLET | Refills: 0 | Status: SHIPPED | OUTPATIENT
Start: 2020-06-18 | End: 2020-06-23

## 2020-06-18 RX ORDER — PREDNISONE 10 MG/1
TABLET ORAL
Qty: 10 TABLET | Refills: 0 | Status: ON HOLD | OUTPATIENT
Start: 2020-06-18 | End: 2020-07-07

## 2020-06-18 RX ORDER — BENZONATATE 200 MG/1
200 CAPSULE ORAL 3 TIMES DAILY PRN
Qty: 9 CAPSULE | Refills: 0 | Status: ON HOLD | OUTPATIENT
Start: 2020-06-18 | End: 2020-09-11

## 2020-06-18 RX ORDER — BUDESONIDE AND FORMOTEROL FUMARATE DIHYDRATE 160; 4.5 UG/1; UG/1
2 AEROSOL RESPIRATORY (INHALATION)
Qty: 1 INHALER | Refills: 1 | Status: ON HOLD | OUTPATIENT
Start: 2020-06-18 | End: 2020-07-07

## 2020-06-18 RX ADMIN — ENOXAPARIN SODIUM 40 MG: 40 INJECTION SUBCUTANEOUS at 08:50

## 2020-06-18 RX ADMIN — HYDROCHLOROTHIAZIDE: 25 TABLET ORAL at 08:49

## 2020-06-18 RX ADMIN — GLIPIZIDE 5 MG: 5 TABLET ORAL at 08:50

## 2020-06-18 RX ADMIN — DOXYCYCLINE 100 MG: 100 TABLET, FILM COATED ORAL at 08:50

## 2020-06-18 RX ADMIN — GUAIFENESIN 1200 MG: 600 TABLET, EXTENDED RELEASE ORAL at 08:50

## 2020-06-18 RX ADMIN — PREDNISONE 40 MG: 20 TABLET ORAL at 08:50

## 2020-06-18 RX ADMIN — INSULIN LISPRO 4 UNITS: 100 INJECTION, SOLUTION INTRAVENOUS; SUBCUTANEOUS at 08:49

## 2020-06-18 RX ADMIN — SODIUM CHLORIDE, PRESERVATIVE FREE 10 ML: 5 INJECTION INTRAVENOUS at 08:51

## 2020-06-18 RX ADMIN — CARVEDILOL 3.12 MG: 3.12 TABLET, FILM COATED ORAL at 08:50

## 2020-06-18 RX ADMIN — IPRATROPIUM BROMIDE AND ALBUTEROL SULFATE 3 ML: 2.5; .5 SOLUTION RESPIRATORY (INHALATION) at 06:08

## 2020-06-18 RX ADMIN — GABAPENTIN 300 MG: 300 CAPSULE ORAL at 08:49

## 2020-06-18 RX ADMIN — IPRATROPIUM BROMIDE AND ALBUTEROL SULFATE 3 ML: 2.5; .5 SOLUTION RESPIRATORY (INHALATION) at 10:03

## 2020-06-18 RX ADMIN — BUDESONIDE AND FORMOTEROL FUMARATE DIHYDRATE 2 PUFF: 160; 4.5 AEROSOL RESPIRATORY (INHALATION) at 06:09

## 2020-06-18 RX ADMIN — TAMSULOSIN HYDROCHLORIDE 0.4 MG: 0.4 CAPSULE ORAL at 08:50

## 2020-06-18 RX ADMIN — FLUTICASONE PROPIONATE 2 SPRAY: 50 SPRAY, METERED NASAL at 08:48

## 2020-06-18 RX ADMIN — AMLODIPINE BESYLATE 5 MG: 5 TABLET ORAL at 08:50

## 2020-06-18 NOTE — PLAN OF CARE
Pt a& o x4. Up ad celsa. Denies any SOA. On room air. Has occasional congested, nonproductive cough. VSS. Reports pneumonia vaccine is UTD had approx. 4 months ago. Given discharge instructions. Voices understanding. Will cont to monitor.   Problem: Patient Care Overview  Goal: Plan of Care Review  Outcome: Ongoing (interventions implemented as appropriate)  Flowsheets (Taken 6/18/2020 9185)  Progress: improving  Plan of Care Reviewed With: patient

## 2020-06-18 NOTE — DISCHARGE SUMMARY
[facility]  DISCHARGE SUMMARY     Date of Admission: 6/16/2020  Date of Discharge:  6/18/2020  Primary Care Physician: Salbador Anderson MD    Presenting Problem/History of Present Illness:  Presented with worsening shortness of breath     Discharge Diagnoses:  Active Hospital Problems    Diagnosis   • **Suspected COPD exacerbation (CMS/[facility])   • Type 2 diabetes mellitus, without long-term current use of insulin (CMS/[facility])   • Hyperlipidemia   • Hypertension   • Tobacco abuse, in remission   • Hypoxia     Chief Complaint on Day of Discharge: Feels better.  No complaints.  Wants to go home.    History of Present Illness on Day of Discharge:   Sitting up in bed.  Wife in room.  No oxygen in use.  He feels better and wants to go home today.  No sputum production.  Denies nausea, vomiting or abdominal pain.  Denies chest pain or palpitations.  Ambulated in the hallway multiple times yesterday.  Walking oximetry today.    Consults: None    Procedures Performed:                                                                                                    ROOM AIR BASELINE   SpO2%      97   Heart Rate  96   Blood Pressure       EXERCISE ON ROOM AIR SpO2% EXERCISE ON O2 @  LPM SpO2%   1 MINUTE     96 1 MINUTE     2 MINUTES     94 2 MINUTES     3 MINUTES     95 3 MINUTES     4 MINUTES   4 MINUTES     5 MINUTES   5 MINUTES     6 MINUTES   6 MINUTES                                                                                                                   Distance Walked            420 feet Distance Walked   Dyspnea (Frandy Scale)   Dyspnea (Frandy Scale)   Fatigue (Frandy Scale)   Fatigue (Frandy Scale)   SpO2% Post Exercise   SpO2% Post Exercise   HR Post Exercise   HR Post Exercise   Time to Recovery   Time to Recovery      Comments:  Pulse ox room air at rest 97%                       Pulse ox room air while walking low 94% high 96%                       Patient denies  any dyspnea while walking                        Total distance walked 420 feet.    Pertinent Test Results:   Laboratory Data:    Results from last 7 days   Lab Units 06/17/20  0501 06/16/20  0220   WBC 10*3/mm3 16.03* 11.55*   HEMOGLOBIN g/dL 12.9* 13.2   HEMATOCRIT % 39.7 41.0   PLATELETS 10*3/mm3 388 392        Results from last 7 days   Lab Units 06/17/20  0501 06/16/20  0220   SODIUM mmol/L 137 139   POTASSIUM mmol/L 4.2 4.0   CHLORIDE mmol/L 96* 101   CO2 mmol/L 28.0 26.0   BUN mg/dL 17 17   CREATININE mg/dL 0.82 0.93   CALCIUM mg/dL 9.5 9.0   BILIRUBIN mg/dL  --  0.4   ALK PHOS U/L  --  46   ALT (SGPT) U/L  --  28   AST (SGOT) U/L  --  19   GLUCOSE mg/dL 184* 146*     Lab Results (all)     Procedure Component Value Units Date/Time    POC Glucose Once [[id number]]  (Abnormal) Collected:  06/18/20 0803    Specimen:  Blood Updated:  06/18/20 0814     Glucose 236 mg/dL      Comment: : [id number] Bal NicoleMeter ID: [id number]       Blood Culture With KAI - Blood, Arm, Right [[id number]] Collected:  06/16/20 0212    Specimen:  Blood from Arm, Right Updated:  06/18/20 0245     Blood Culture No growth at 2 days    Blood Culture With KAI - Blood, Arm, Right [[id number]] Collected:  06/16/20 0220    Specimen:  Blood from Arm, Right Updated:  06/18/20 0245     Blood Culture No growth at 2 days    POC Glucose Once [[id number]]  (Abnormal) Collected:  06/17/20 1706    Specimen:  Blood Updated:  06/17/20 1719     Glucose 160 mg/dL      Comment: : [id number] Sherif De LeónelaMeter ID: [id number]       POC Glucose Once [[id number]]  (Abnormal) Collected:  06/17/20 1133    Specimen:  Blood Updated:  06/17/20 1155     Glucose 240 mg/dL      Comment: : [id number] Chandler Goff ID: [id number]       Basic Metabolic Panel [[id number]]  (Abnormal) Collected:  06/17/20 0501    Specimen:  Blood Updated:  06/17/20 0617     Glucose 184 mg/dL      BUN 17 mg/dL      Creatinine 0.82 mg/dL      Sodium 137 mmol/L       Potassium 4.2 mmol/L      Chloride 96 mmol/L      CO2 28.0 mmol/L      Calcium 9.5 mg/dL      eGFR  African Amer 112 mL/min/1.73      BUN/Creatinine Ratio 20.7     Anion Gap 13.0 mmol/L     Narrative:       GFR Normal >60  Chronic Kidney Disease <60  Kidney Failure <15      CBC (No Diff) [[id number]]  (Abnormal) Collected:  06/17/20 0501    Specimen:  Blood Updated:  06/17/20 0603     WBC 16.03 10*3/mm3      RBC 4.36 10*6/mm3      Hemoglobin 12.9 g/dL      Hematocrit 39.7 %      MCV 91.1 fL      MCH 29.6 pg      MCHC 32.5 g/dL      RDW 12.4 %      RDW-SD 41.4 fl      MPV 9.3 fL      Platelets 388 10*3/mm3     POC Glucose Once [[id number]]  (Abnormal) Collected:  06/16/20 2121    Specimen:  Blood Updated:  06/16/20 2132     Glucose 209 mg/dL      Comment: : [id number] Yenny Dupree ID: [id number]       POC Glucose Once [[id number]]  (Abnormal) Collected:  06/16/20 1715    Specimen:  Blood Updated:  06/16/20 1726     Glucose 175 mg/dL      Comment: : [id number] Latasha [facility] ID: [id number]       POC Glucose Once [[id number]]  (Abnormal) Collected:  06/16/20 1117    Specimen:  Blood Updated:  06/16/20 1147     Glucose 250 mg/dL      Comment: : [id number] Alex Caal ID: [id number]       COVID-19,CEPHEID,COR/JOVANNY/PAD IN-HOUSE(OR EMERGENT/ADD-ON),NP SWAB IN TRANSPORT MEDIA 3-4 HR TAT - Swab, Nasopharynx [[id number]]  (Normal) Collected:  06/16/20 0650    Specimen:  Swab from Nasopharynx Updated:  06/16/20 0801     COVID19 Not Detected    Troponin [[id number]]  (Normal) Collected:  06/16/20 0220    Specimen:  Blood from Arm, Right Updated:  06/16/20 0315     Troponin T <0.010 ng/mL     Narrative:       Troponin T Reference Range:  <= 0.03 ng/mL-   Negative for AMI  >0.03 ng/mL-     Abnormal for myocardial necrosis.  Clinicians would have to utilize clinical acumen, EKG, Troponin and serial changes to determine if it is an Acute Myocardial Infarction or myocardial injury due to an  underlying chronic condition.       Results may be falsely decreased if patient taking Biotin.      Protime-INR [[id number]]  (Normal) Collected:  06/16/20 0220    Specimen:  Blood from Arm, Right Updated:  06/16/20 0312     Protime 12.0 Seconds      INR 0.92    aPTT [[id number]]  (Normal) Collected:  06/16/20 0220    Specimen:  Blood from Arm, Right Updated:  06/16/20 0312     PTT 34.9 seconds     D-dimer, Quantitative [[id number]]  (Normal) Collected:  06/16/20 0220    Specimen:  Blood from Arm, Right Updated:  06/16/20 0312     D-Dimer, Quantitative 0.44 mg/L (FEU)     Narrative:       Reference Range is 0-0.50 mg/L FEU. However, results <0.50 mg/L FEU tends to rule out DVT or PE. Results >0.50 mg/L FEU are not useful in predicting absence or presence of DVT or PE.      Magnesium [[id number]]  (Normal) Collected:  06/16/20 0220    Specimen:  Blood from Arm, Right Updated:  06/16/20 0310     Magnesium 2.1 mg/dL     Comprehensive Metabolic Panel [[id number]]  (Abnormal) Collected:  06/16/20 0220    Specimen:  Blood from Arm, Right Updated:  06/16/20 0249     Glucose 146 mg/dL      BUN 17 mg/dL      Creatinine 0.93 mg/dL      Sodium 139 mmol/L      Potassium 4.0 mmol/L      Chloride 101 mmol/L      CO2 26.0 mmol/L      Calcium 9.0 mg/dL      Total Protein 7.4 g/dL      Albumin 4.20 g/dL      ALT (SGPT) 28 U/L      AST (SGOT) 19 U/L      Alkaline Phosphatase 46 U/L      Total Bilirubin 0.4 mg/dL      eGFR  African Amer 97 mL/min/1.73      Globulin 3.2 gm/dL      A/G Ratio 1.3 g/dL      BUN/Creatinine Ratio 18.3     Anion Gap 12.0 mmol/L     Narrative:       GFR Normal >60  Chronic Kidney Disease <60  Kidney Failure <15      Lactic Acid, Plasma [[id number]]  (Normal) Collected:  06/16/20 0220    Specimen:  Blood from Arm, Right Updated:  06/16/20 0245     Lactate 0.7 mmol/L     CBC Auto Differential [[id number]]  (Abnormal) Collected:  06/16/20 0220    Specimen:  Blood from Arm, Right Updated:  06/16/20 0233     WBC 11.55  10*3/mm3      RBC 4.49 10*6/mm3      Hemoglobin 13.2 g/dL      Hematocrit 41.0 %      MCV 91.3 fL      MCH 29.4 pg      MCHC 32.2 g/dL      RDW 12.7 %      RDW-SD 42.0 fl      MPV 8.7 fL      Platelets 392 10*3/mm3      Neutrophil % 52.3 %      Lymphocyte % 31.9 %      Monocyte % 8.2 %      Eosinophil % 6.6 %      Basophil % 0.7 %      Immature Grans % 0.3 %      Neutrophils, Absolute 6.03 10*3/mm3      Lymphocytes, Absolute 3.69 10*3/mm3      Monocytes, Absolute 0.95 10*3/mm3      Eosinophils, Absolute 0.76 10*3/mm3      Basophils, Absolute 0.08 10*3/mm3      Immature Grans, Absolute 0.04 10*3/mm3      nRBC 0.0 /100 WBC         Culture Data:   Blood Culture   Date Value Ref Range Status   06/16/2020 No growth at 2 days  Preliminary   06/16/2020 No growth at 2 days  Preliminary     Imaging Results (All)     Procedure Component Value Units Date/Time    CT Chest With & Without Contrast [[id number]] Collected:  06/16/20 1247     Updated:  06/16/20 1303    Narrative:       EXAMINATION: CT CHEST W WO CONTRAST- 6/16/2020 12:47 PM CDT     HISTORY: COPD, questionable tuberculosis exposure     COMPARISON: Low-dose CT chest 11/15/2019, chest x-ray 6/16/2020     DOSE: 733 mGy-cm     TECHNIQUE: Sequential imaging was performed from the thoracic inlet  through the upper abdomen before and after the administration of IV  contrast.  Sagittal and coronal reformations were made from the original  source data and reviewed. Automated exposure control was also utilized  to decrease patient radiation dose.     FINDINGS:   The visualized thyroid gland is grossly normal in appearance. The  trachea and main bronchi appear widely patent and in normal anatomic  position. The esophagus is grossly normal in appearance.     No pathologically enlarged axillary, mediastinal, or hilar lymph nodes  are identified. Calcified mediastinal and hilar lymph nodes indicate  prior granulomatous exposure.     The heart appears normal in size. There is no  pericardial or pleural  effusion. Atherosclerotic calcifications are seen within the aorta and  its branch vessels. The ascending thoracic aorta appears normal in  caliber. The main pulmonary artery appears normal in caliber.     There is a tiny nodule in the left lung apex which measures  approximately 5 mm in size (axial image 26). This has increased slightly  in size compared to the previous exam. There is a tiny pleural-based  nodule in the posterior right lower lobe which measures 5 mm in size  (axial image 139), stable compared to the most recent exam but new  compared to the exam from 2018. The lungs otherwise appear clear.     Review of the visualized portion of the upper abdomen demonstrates  diffuse fatty infiltration of the liver. No acute abnormalities are  identified.     Review of the visualized osseous structures demonstrates no acute or  aggressive lesions. There has been previous fusion of the lower cervical  spine.       Impression:       1. No CT evidence of reactivating tuberculosis.     2. Small pulmonary nodules bilaterally for which follow-up CT is  recommended in 12 months.     3. Atherosclerosis of the aorta and coronary arteries.     This report was finalized on 06/16/2020 13:00 by Dr. Diego Freeman MD.    XR Chest 1 View [[id number]] Collected:  06/16/20 0730     Updated:  06/16/20 0734    Narrative:       EXAMINATION: XR CHEST 1 VW-     6/16/2020 3:10 AM CDT     HISTORY: Acute shortness of breath with apparent COPD flare but also  hypertension     1 view chest x-ray compared with 05/07/2020.     Heart size is normal.  The mediastinum is within normal limits.      The lungs are normally expanded with no pneumonia or pneumothorax.  Mild chronic appearing interstitial disease with a few calcified  granulomas.  No congestive failure changes.                                                                       Impression:       1. No acute disease.        This report was finalized on  "06/16/2020 07:31 by Dr. Abbe Wilson MD.        Hospital Course  Patient is a 69 y.o. male presented to [facility] emergency room 6/16/2020 with complaints of shortness of breath ongoing for the last 3 months but worsening over the last 3 to 4 days.  Patient reported he felt as though he was going to die because he \"could not get air\".  Patient reported productive cough of clear sputum.  He did not wear oxygen at home but was requiring oxygen at 4 L in the emergency room.  He denied fever, chills, recent sick contact.  Patient been trying to stay home with recent pandemic.  He denied abdominal pain, nausea, vomiting or diarrhea.  Patient reports he has never had pulmonary function testing or formal diagnosis of COPD.  He reports longstanding history of tobacco use but recently quit within the last 3 months as he has not felt well.  Patient had an appointment scheduled for August to establish care with pulmonology.  WBC 11.55, chest x-ray no active disease.  He received Solu-Medrol IV, magnesium, normal saline fluid bolus, DuoNeb treatment x2 in the emergency room.    He was admitted to the medical floor with suspected COPD exacerbation as patient again has no formal diagnosis of COPD and has had no formal pulmonary function test.  Duo nebs every 4 hours, Symbicort initiated.  Mucinex, Tessalon and flutter valve added.  Doxycycline and Solu-Medrol initiated.  Home medications reviewed and resumed.  Patient was placed on sliding scale insulin coverage.  Glimepiride resumed.  COVID-19 testing negative.  Patient required supplemental oxygen at 4 L initially was able to be weaned prior to discharge.  Walking oximetry on 6/18/2020 noted saturation 94 to 96% on room air.  Patient ambulated 420 feet without complaints of dyspnea with walking.  Solu-Medrol transitioned to oral prednisone tapering dose and he will complete doxycycline after discharge.    CT scan of the chest with and without contrast 6/16/20 " "noted no CT evidence of reactivating tuberculosis.  Small pulmonary nodules bilaterally for which CT is recommended in 12 months.  Patient reported he was aware of the nodules and states he has been getting yearly chest x-ray.  Patient had PPD skin testing as he reported family member who recently passed away with tuberculosis.  PPD skin test negative on 6/18/2020.     Patient had upcoming appointment scheduled with pulmonology in August.  However, due to recent hospitalization and suspected COPD exacerbation have arranged follow-up appointment with pulmonology on 7/20/2020.  Patient will have pulmonary function test on 7/7/2020 as requested by pulmonary office.    On 6/18/2020 he is ready for discharge home.  Oxygen has weaned off and he has maintained saturation greater than 94% ambulating 450 feet on room air with physical therapy.  He denies chest pain, palpitations, or shortness of breath.  No sputum production.  Solu-Medrol transitioned to oral prednisone tapering dose and he will complete doxycycline after discharge.  Have arranged follow-up with Dr. Anderson on 6/25/2016 and pulmonary medicine on 7/20/2020 to establish care as new patient.    Physical Exam on Discharge:  /76 (BP Location: Right arm, Patient Position: Lying)   Pulse 97   Temp 97.8 °F (36.6 °C) (Oral)   Resp 16   Ht 177.8 cm (70\")   Wt 79.5 kg (175 lb 4.3 oz)   SpO2 97%   BMI 25.15 kg/m²   Physical Exam   Constitutional: He is oriented to person, place, and time.   No distress.  Sitting up in bed.  No oxygen in use.  Wife in room.   HENT:   Head: Normocephalic and atraumatic.   Eyes: Pupils are equal, round, and reactive to light. EOM are normal.   Neck: Normal range of motion. Neck supple.   Cardiovascular: Normal rate, regular rhythm, normal heart sounds and intact distal pulses. Exam reveals no friction rub.   No murmur heard.  Pulmonary/Chest: He has no wheezes. He has no rales.   Oxygen off.  No wheezing heard today.  No sputum " production.   Abdominal: Soft. Bowel sounds are normal. He exhibits no distension. There is no tenderness.   Genitourinary:   Genitourinary Comments: Voiding.   Musculoskeletal: Normal range of motion. He exhibits no edema, tenderness or deformity.   Neurological: He is alert and oriented to person, place, and time.   Skin: Skin is warm and dry. No rash noted. No erythema. No pallor.   Psychiatric: He has a normal mood and affect. His behavior is normal. Judgment and thought content normal.     Condition on Discharge: Stable    Discharge Disposition: Home    Discharge Diet:   Diet Instructions     Diet: Consistent Carbohydrate      Discharge Diet:  Consistent Carbohydrate    Diet: Consistent Carbohydrate      Discharge Diet:  Consistent Carbohydrate      Diabetic diet    Activity at Discharge:   Activity Instructions     Activity as Tolerated      Activity as Tolerated        Tolerated    Discharge Care Plan / Instructions:   1.  For worsening shortness of breath seek medical attention  2.  Tapering dose of prednisone at discharge  3.  Complete doxycycline after discharge.  4.  Outpatient pulmonary function test 7/7/2020  5.  Follow-up with Dr. Anderson 6/5/2020  6.  Follow-up with pulmonary 7/20/2020.     Discharge Medications:     Discharge Medications      New Medications      Instructions Start Date   benzonatate 200 MG capsule  Commonly known as:  TESSALON   200 mg, Oral, 3 Times Daily PRN      budesonide-formoterol 160-4.5 MCG/ACT inhaler  Commonly known as:  SYMBICORT   2 puffs, Inhalation, 2 Times Daily - RT      doxycycline 100 MG tablet  Commonly known as:  ADOXA   100 mg, Oral, Every 12 Hours Scheduled      guaiFENesin 600 MG 12 hr tablet  Commonly known as:  MUCINEX   1,200 mg, Oral, Every 12 Hours Scheduled      predniSONE 10 MG tablet  Commonly known as:  DELTASONE  Notes to patient:  As prescribed   Take 4 tablets on 6/19/2020, then 2 tablets for 2 days, then 1 tablet for 2 days.         Continue These  Medications      Instructions Start Date   albuterol (2.5 MG/3ML) 0.083% nebulizer solution  Commonly known as:  PROVENTIL   2.5 mg, Nebulization, Every 4 Hours PRN      amLODIPine 5 MG tablet  Commonly known as:  NORVASC   5 mg, Oral, Daily      atorvastatin 20 MG tablet  Commonly known as:  LIPITOR   20 mg, Oral, Daily      carvedilol 3.125 MG tablet  Commonly known as:  COREG   3.125 mg, Oral, 2 Times Daily With Meals      gabapentin 300 MG capsule  Commonly known as:  NEURONTIN   300 mg, Oral, 2 Times Daily      glimepiride 2 MG tablet  Commonly known as:  AMARYL   2 mg, Oral, Every Morning Before Breakfast      montelukast 10 MG tablet  Commonly known as:  SINGULAIR   10 mg, Oral, Nightly      tamsulosin 0.4 MG capsule 24 hr capsule  Commonly known as:  FLOMAX   1 capsule, Oral, Daily      TRAZODONE HCL PO   Oral, Nightly      TRULICITY SC   Subcutaneous, Weekly      valsartan 80 MG tablet 320 mg, hydroCHLOROthiazide 25 MG tablet 25 mg   1 dose, Oral, Daily           Follow-up Appointments:   Follow-up Information     [facility] PULMONARY [facility] Follow up.    Specialty:  Pulmonology  Why:  PFT Scheduled for July 7 @ 9:30am   Contact information:  [address] [place] [id number] [phone]           Raquel Sosa APRN Follow up.    Specialties:  Nurse Practitioner, Pulmonary Disease  Why:  July 20 @ 2:30 pm   Contact information:  [address] [facility] [address]  [phone]             Salbador Anderson MD Follow up.    Specialties:  Pediatrics, Internal Medicine  Why:  June 25 @ 10:15 am   Contact information:  [address] [facility] [address]  [phone]                 Future Appointments:  Future Appointments   Date Time Provider Department Center   7/4/2020  7:00 AM COVID LAB 1 [facility] PAD [id number]   7/7/2020 10:00 AM [facility] PULM LAB ROOM 2 [facility] PFT [facility]   7/20/2020  2:30 PM Raquel Sosa APRN [facility] PAD None     Test Results Pending at  Discharge: None    The above documentation resulted from a face-to-face encounter by me Natasha BOYLE, [facility].    TAMAR Barahona  06/18/20  10:27    Time: This discharge process required 40 minutes for completion to arrange outpatient pulmonary functions, follow-up with pulmonary medicine, discussed with respiratory therapy.    Plan discussed with Dr. Jacobs, patient, and wife.    Time spent in face-to-face evaluation, chart review, planning and education 40 minutes.    I personally evaluated and examined the patient in conjunction with TAMAR Ramirez and agree with the assessment, treatment plan, and disposition of the patient as recorded by her. My history, exam, and further recommendations are:     Discussed with his nurse, Anai.    He is ready for discharge today.  He does not require oxygen.  Natasha was able to move his pulmonary function testing and outpatient pulmonary consult.    Symbicort.  Continue prednisone taper.  Oral doxycycline.      He needs to follow with Dr. Anderson as well.    Salbador Jacobs DO  06/18/20  14:32

## 2020-06-18 NOTE — PROGRESS NOTES
Exercise Oximetry    Patient Name:Naif Nick   MRN: [id number]   Date: 06/18/20             ROOM AIR BASELINE   SpO2%      97   Heart Rate  96   Blood Pressure      EXERCISE ON ROOM AIR SpO2% EXERCISE ON O2 @  LPM SpO2%   1 MINUTE     96 1 MINUTE    2 MINUTES     94 2 MINUTES    3 MINUTES     95 3 MINUTES    4 MINUTES  4 MINUTES    5 MINUTES  5 MINUTES    6 MINUTES  6 MINUTES               Distance Walked            420 feet Distance Walked   Dyspnea (Frandy Scale)   Dyspnea (Frandy Scale)   Fatigue (Frandy Scale)   Fatigue (Frandy Scale)   SpO2% Post Exercise   SpO2% Post Exercise   HR Post Exercise   HR Post Exercise   Time to Recovery   Time to Recovery     Comments:  Pulse ox room air at rest 97%            Pulse ox room air while walking low 94% high 96%                       Patient denies any dyspnea while walking                        Total distance walked 420 feet.

## 2020-06-18 NOTE — PLAN OF CARE
Problem: Patient Care Overview  Goal: Plan of Care Review  Note:   Pt alert and oriented x4. No c/o pain so far this shift. Still tolerating periods of time on room air. IV steroid and po abx given as ordered. VSS. Safety maintained. Will continue to monitor.

## 2020-06-21 LAB
BACTERIA SPEC AEROBE CULT: NORMAL
BACTERIA SPEC AEROBE CULT: NORMAL

## 2020-06-25 ENCOUNTER — LAB (OUTPATIENT)
Dept: LAB | Facility: HOSPITAL | Age: 70
End: 2020-06-25

## 2020-06-25 ENCOUNTER — TRANSCRIBE ORDERS (OUTPATIENT)
Dept: ADMINISTRATIVE | Facility: HOSPITAL | Age: 70
End: 2020-06-25

## 2020-06-25 DIAGNOSIS — E13.8 OTHER SPECIFIED DIABETES MELLITUS WITH UNSPECIFIED COMPLICATIONS (HCC): Primary | ICD-10-CM

## 2020-06-25 LAB
CHOLEST SERPL-MCNC: 150 MG/DL (ref 130–200)
HBA1C MFR BLD: 7.5 % (ref 4.8–5.9)
HDLC SERPL-MCNC: 67 MG/DL
LDLC SERPL CALC-MCNC: 63 MG/DL (ref 0–99)
LDLC/HDLC SERPL: 0.93 {RATIO}
TRIGL SERPL-MCNC: 102 MG/DL (ref 0–149)
VLDLC SERPL-MCNC: 20.4 MG/DL

## 2020-06-25 PROCEDURE — 83036 HEMOGLOBIN GLYCOSYLATED A1C: CPT | Performed by: NURSE PRACTITIONER

## 2020-06-25 PROCEDURE — 36415 COLL VENOUS BLD VENIPUNCTURE: CPT | Performed by: NURSE PRACTITIONER

## 2020-06-25 PROCEDURE — 80061 LIPID PANEL: CPT | Performed by: NURSE PRACTITIONER

## 2020-07-04 ENCOUNTER — LAB (OUTPATIENT)
Dept: LAB | Facility: HOSPITAL | Age: 70
End: 2020-07-04

## 2020-07-04 PROCEDURE — U0003 INFECTIOUS AGENT DETECTION BY NUCLEIC ACID (DNA OR RNA); SEVERE ACUTE RESPIRATORY SYNDROME CORONAVIRUS 2 (SARS-COV-2) (CORONAVIRUS DISEASE [COVID-19]), AMPLIFIED PROBE TECHNIQUE, MAKING USE OF HIGH THROUGHPUT TECHNOLOGIES AS DESCRIBED BY CMS-2020-01-R: HCPCS | Performed by: NURSE PRACTITIONER

## 2020-07-04 PROCEDURE — C9803 HOPD COVID-19 SPEC COLLECT: HCPCS | Performed by: NURSE PRACTITIONER

## 2020-07-06 ENCOUNTER — APPOINTMENT (OUTPATIENT)
Dept: CT IMAGING | Facility: HOSPITAL | Age: 70
End: 2020-07-06

## 2020-07-06 ENCOUNTER — APPOINTMENT (OUTPATIENT)
Dept: GENERAL RADIOLOGY | Facility: HOSPITAL | Age: 70
End: 2020-07-06

## 2020-07-06 ENCOUNTER — HOSPITAL ENCOUNTER (OUTPATIENT)
Facility: HOSPITAL | Age: 70
Setting detail: OBSERVATION
Discharge: HOME | End: 2020-07-09
Attending: FAMILY MEDICINE | Admitting: INTERNAL MEDICINE

## 2020-07-06 DIAGNOSIS — R06.02 SHORTNESS OF BREATH: Primary | ICD-10-CM

## 2020-07-06 DIAGNOSIS — J44.1 COPD EXACERBATION (HCC): ICD-10-CM

## 2020-07-06 LAB
ALBUMIN SERPL-MCNC: 4.1 G/DL (ref 3.5–5.2)
ALBUMIN/GLOB SERPL: 1.2 G/DL
ALP SERPL-CCNC: 52 U/L (ref 39–117)
ALT SERPL W P-5'-P-CCNC: 25 U/L (ref 1–41)
ANION GAP SERPL CALCULATED.3IONS-SCNC: 10 MMOL/L (ref 5–15)
ARTERIAL PATENCY WRIST A: POSITIVE
AST SERPL-CCNC: 14 U/L (ref 1–40)
ATMOSPHERIC PRESS: 750 MMHG
BASE EXCESS BLDA CALC-SCNC: 5.5 MMOL/L (ref 0–2)
BASOPHILS # BLD AUTO: 0.08 10*3/MM3 (ref 0–0.2)
BASOPHILS NFR BLD AUTO: 0.8 % (ref 0–1.5)
BDY SITE: ABNORMAL
BILIRUB SERPL-MCNC: 0.3 MG/DL (ref 0–1.2)
BODY TEMPERATURE: 37 C
BUN SERPL-MCNC: 22 MG/DL (ref 8–23)
BUN/CREAT SERPL: 19.3 (ref 7–25)
CALCIUM SPEC-SCNC: 9.6 MG/DL (ref 8.6–10.5)
CHLORIDE SERPL-SCNC: 94 MMOL/L (ref 98–107)
CO2 SERPL-SCNC: 30 MMOL/L (ref 22–29)
COVID LABCORP PRIORITY: NORMAL
CREAT SERPL-MCNC: 1.14 MG/DL (ref 0.76–1.27)
D DIMER PPP FEU-MCNC: 0.52 MG/L (FEU) (ref 0–0.5)
DEPRECATED RDW RBC AUTO: 41.8 FL (ref 37–54)
EOSINOPHIL # BLD AUTO: 0.8 10*3/MM3 (ref 0–0.4)
EOSINOPHIL NFR BLD AUTO: 7.6 % (ref 0.3–6.2)
ERYTHROCYTE [DISTWIDTH] IN BLOOD BY AUTOMATED COUNT: 12.9 % (ref 12.3–15.4)
GFR SERPL CREATININE-BSD FRML MDRD: 77 ML/MIN/1.73
GLOBULIN UR ELPH-MCNC: 3.4 GM/DL
GLUCOSE SERPL-MCNC: 229 MG/DL (ref 65–99)
HCO3 BLDA-SCNC: 30.9 MMOL/L (ref 20–26)
HCT VFR BLD AUTO: 41.2 % (ref 37.5–51)
HGB BLD-MCNC: 13.6 G/DL (ref 13–17.7)
IMM GRANULOCYTES # BLD AUTO: 0.03 10*3/MM3 (ref 0–0.05)
IMM GRANULOCYTES NFR BLD AUTO: 0.3 % (ref 0–0.5)
LYMPHOCYTES # BLD AUTO: 2.6 10*3/MM3 (ref 0.7–3.1)
LYMPHOCYTES NFR BLD AUTO: 24.8 % (ref 19.6–45.3)
Lab: ABNORMAL
MCH RBC QN AUTO: 29.8 PG (ref 26.6–33)
MCHC RBC AUTO-ENTMCNC: 33 G/DL (ref 31.5–35.7)
MCV RBC AUTO: 90.2 FL (ref 79–97)
MODALITY: ABNORMAL
MONOCYTES # BLD AUTO: 0.98 10*3/MM3 (ref 0.1–0.9)
MONOCYTES NFR BLD AUTO: 9.4 % (ref 5–12)
NEUTROPHILS NFR BLD AUTO: 5.98 10*3/MM3 (ref 1.7–7)
NEUTROPHILS NFR BLD AUTO: 57.1 % (ref 42.7–76)
NRBC BLD AUTO-RTO: 0 /100 WBC (ref 0–0.2)
NT-PROBNP SERPL-MCNC: 38.8 PG/ML (ref 0–900)
PCO2 BLDA: 47.2 MM HG (ref 35–45)
PCO2 TEMP ADJ BLD: 47.2 MM HG (ref 35–45)
PH BLDA: 7.42 PH UNITS (ref 7.35–7.45)
PH, TEMP CORRECTED: 7.42 PH UNITS (ref 7.35–7.45)
PLATELET # BLD AUTO: 404 10*3/MM3 (ref 140–450)
PMV BLD AUTO: 8.6 FL (ref 6–12)
PO2 BLDA: 62 MM HG (ref 83–108)
PO2 TEMP ADJ BLD: 62 MM HG (ref 83–108)
POTASSIUM SERPL-SCNC: 4 MMOL/L (ref 3.5–5.2)
PROT SERPL-MCNC: 7.5 G/DL (ref 6–8.5)
RBC # BLD AUTO: 4.57 10*6/MM3 (ref 4.14–5.8)
SAO2 % BLDCOA: 92.5 % (ref 94–99)
SARS-COV-2 RDRP RESP QL NAA+PROBE: NOT DETECTED
SARS-COV-2 RNA RESP QL NAA+PROBE: NOT DETECTED
SODIUM SERPL-SCNC: 134 MMOL/L (ref 136–145)
TROPONIN T SERPL-MCNC: <0.01 NG/ML (ref 0–0.03)
VENTILATOR MODE: ABNORMAL
WBC # BLD AUTO: 10.47 10*3/MM3 (ref 3.4–10.8)

## 2020-07-06 PROCEDURE — 96374 THER/PROPH/DIAG INJ IV PUSH: CPT

## 2020-07-06 PROCEDURE — 82803 BLOOD GASES ANY COMBINATION: CPT

## 2020-07-06 PROCEDURE — 93005 ELECTROCARDIOGRAM TRACING: CPT | Performed by: INTERNAL MEDICINE

## 2020-07-06 PROCEDURE — 25010000002 METHYLPREDNISOLONE PER 125 MG: Performed by: PHYSICIAN ASSISTANT

## 2020-07-06 PROCEDURE — 87635 SARS-COV-2 COVID-19 AMP PRB: CPT | Performed by: PHYSICIAN ASSISTANT

## 2020-07-06 PROCEDURE — 80053 COMPREHEN METABOLIC PANEL: CPT | Performed by: PHYSICIAN ASSISTANT

## 2020-07-06 PROCEDURE — 85025 COMPLETE CBC W/AUTO DIFF WBC: CPT | Performed by: PHYSICIAN ASSISTANT

## 2020-07-06 PROCEDURE — 85379 FIBRIN DEGRADATION QUANT: CPT | Performed by: PHYSICIAN ASSISTANT

## 2020-07-06 PROCEDURE — 71275 CT ANGIOGRAPHY CHEST: CPT

## 2020-07-06 PROCEDURE — 84484 ASSAY OF TROPONIN QUANT: CPT | Performed by: PHYSICIAN ASSISTANT

## 2020-07-06 PROCEDURE — 36600 WITHDRAWAL OF ARTERIAL BLOOD: CPT

## 2020-07-06 PROCEDURE — 99284 EMERGENCY DEPT VISIT MOD MDM: CPT

## 2020-07-06 PROCEDURE — 83880 ASSAY OF NATRIURETIC PEPTIDE: CPT | Performed by: PHYSICIAN ASSISTANT

## 2020-07-06 PROCEDURE — 71045 X-RAY EXAM CHEST 1 VIEW: CPT

## 2020-07-06 PROCEDURE — 93010 ELECTROCARDIOGRAM REPORT: CPT | Performed by: INTERNAL MEDICINE

## 2020-07-06 RX ORDER — METHYLPREDNISOLONE SODIUM SUCCINATE 125 MG/2ML
125 INJECTION, POWDER, LYOPHILIZED, FOR SOLUTION INTRAMUSCULAR; INTRAVENOUS ONCE
Status: COMPLETED | OUTPATIENT
Start: 2020-07-06 | End: 2020-07-06

## 2020-07-06 RX ADMIN — IOPAMIDOL 134 ML: 755 INJECTION, SOLUTION INTRAVENOUS at 23:52

## 2020-07-06 RX ADMIN — METHYLPREDNISOLONE SODIUM SUCCINATE 125 MG: 125 INJECTION, POWDER, FOR SOLUTION INTRAMUSCULAR; INTRAVENOUS at 23:09

## 2020-07-07 ENCOUNTER — APPOINTMENT (OUTPATIENT)
Dept: PULMONOLOGY | Facility: HOSPITAL | Age: 70
End: 2020-07-07

## 2020-07-07 PROBLEM — R06.02 SHORTNESS OF BREATH: Status: ACTIVE | Noted: 2020-07-07

## 2020-07-07 PROBLEM — J44.1 ACUTE EXACERBATION OF CHRONIC OBSTRUCTIVE PULMONARY DISEASE (COPD): Status: ACTIVE | Noted: 2020-07-07

## 2020-07-07 LAB
GLUCOSE BLDC GLUCOMTR-MCNC: 216 MG/DL (ref 70–130)
GLUCOSE BLDC GLUCOMTR-MCNC: 227 MG/DL (ref 70–130)
GLUCOSE BLDC GLUCOMTR-MCNC: 270 MG/DL (ref 70–130)
GLUCOSE BLDC GLUCOMTR-MCNC: 312 MG/DL (ref 70–130)
HOLD SPECIMEN: NORMAL
TROPONIN T SERPL-MCNC: <0.01 NG/ML (ref 0–0.03)
WHOLE BLOOD HOLD SPECIMEN: NORMAL
WHOLE BLOOD HOLD SPECIMEN: NORMAL

## 2020-07-07 PROCEDURE — 94799 UNLISTED PULMONARY SVC/PX: CPT

## 2020-07-07 PROCEDURE — 84484 ASSAY OF TROPONIN QUANT: CPT | Performed by: PHYSICIAN ASSISTANT

## 2020-07-07 PROCEDURE — 94640 AIRWAY INHALATION TREATMENT: CPT

## 2020-07-07 PROCEDURE — G0378 HOSPITAL OBSERVATION PER HR: HCPCS

## 2020-07-07 PROCEDURE — 25010000002 METHYLPREDNISOLONE PER 40 MG: Performed by: FAMILY MEDICINE

## 2020-07-07 PROCEDURE — 0 IOPAMIDOL PER 1 ML: Performed by: PHYSICIAN ASSISTANT

## 2020-07-07 PROCEDURE — 93005 ELECTROCARDIOGRAM TRACING: CPT | Performed by: INTERNAL MEDICINE

## 2020-07-07 PROCEDURE — 63710000001 INSULIN LISPRO (HUMAN) PER 5 UNITS: Performed by: NURSE PRACTITIONER

## 2020-07-07 PROCEDURE — 96372 THER/PROPH/DIAG INJ SC/IM: CPT

## 2020-07-07 PROCEDURE — 82962 GLUCOSE BLOOD TEST: CPT

## 2020-07-07 PROCEDURE — 63710000001 INSULIN LISPRO (HUMAN) PER 5 UNITS: Performed by: FAMILY MEDICINE

## 2020-07-07 PROCEDURE — 96376 TX/PRO/DX INJ SAME DRUG ADON: CPT

## 2020-07-07 PROCEDURE — 25010000002 ENOXAPARIN PER 10 MG: Performed by: FAMILY MEDICINE

## 2020-07-07 PROCEDURE — 93010 ELECTROCARDIOGRAM REPORT: CPT | Performed by: INTERNAL MEDICINE

## 2020-07-07 RX ORDER — IPRATROPIUM BROMIDE AND ALBUTEROL SULFATE 2.5; .5 MG/3ML; MG/3ML
3 SOLUTION RESPIRATORY (INHALATION)
Status: DISCONTINUED | OUTPATIENT
Start: 2020-07-07 | End: 2020-07-07

## 2020-07-07 RX ORDER — BENZONATATE 100 MG/1
200 CAPSULE ORAL 3 TIMES DAILY PRN
Status: DISCONTINUED | OUTPATIENT
Start: 2020-07-07 | End: 2020-07-09 | Stop reason: HOSPADM

## 2020-07-07 RX ORDER — MONTELUKAST SODIUM 10 MG/1
10 TABLET ORAL NIGHTLY
Status: DISCONTINUED | OUTPATIENT
Start: 2020-07-07 | End: 2020-07-09 | Stop reason: HOSPADM

## 2020-07-07 RX ORDER — NICOTINE POLACRILEX 4 MG
15 LOZENGE BUCCAL
Status: DISCONTINUED | OUTPATIENT
Start: 2020-07-07 | End: 2020-07-09 | Stop reason: HOSPADM

## 2020-07-07 RX ORDER — FAMOTIDINE 20 MG/1
40 TABLET, FILM COATED ORAL DAILY
Status: DISCONTINUED | OUTPATIENT
Start: 2020-07-07 | End: 2020-07-09 | Stop reason: HOSPADM

## 2020-07-07 RX ORDER — IPRATROPIUM BROMIDE AND ALBUTEROL SULFATE 2.5; .5 MG/3ML; MG/3ML
3 SOLUTION RESPIRATORY (INHALATION) ONCE
Status: COMPLETED | OUTPATIENT
Start: 2020-07-07 | End: 2020-07-07

## 2020-07-07 RX ORDER — GABAPENTIN 300 MG/1
300 CAPSULE ORAL 2 TIMES DAILY
Status: DISCONTINUED | OUTPATIENT
Start: 2020-07-07 | End: 2020-07-09 | Stop reason: HOSPADM

## 2020-07-07 RX ORDER — TRAZODONE HYDROCHLORIDE 50 MG/1
50 TABLET ORAL NIGHTLY
Status: DISCONTINUED | OUTPATIENT
Start: 2020-07-07 | End: 2020-07-09 | Stop reason: HOSPADM

## 2020-07-07 RX ORDER — DEXTROSE MONOHYDRATE 25 G/50ML
25 INJECTION, SOLUTION INTRAVENOUS
Status: DISCONTINUED | OUTPATIENT
Start: 2020-07-07 | End: 2020-07-09 | Stop reason: HOSPADM

## 2020-07-07 RX ORDER — CARVEDILOL 3.12 MG/1
3.12 TABLET ORAL 2 TIMES DAILY WITH MEALS
Status: DISCONTINUED | OUTPATIENT
Start: 2020-07-07 | End: 2020-07-09 | Stop reason: HOSPADM

## 2020-07-07 RX ORDER — VALSARTAN AND HYDROCHLOROTHIAZIDE 320; 25 MG/1; MG/1
1 TABLET, FILM COATED ORAL DAILY
COMMUNITY

## 2020-07-07 RX ORDER — ATORVASTATIN CALCIUM 10 MG/1
20 TABLET, FILM COATED ORAL DAILY
Status: DISCONTINUED | OUTPATIENT
Start: 2020-07-07 | End: 2020-07-09 | Stop reason: HOSPADM

## 2020-07-07 RX ORDER — ACETAMINOPHEN 325 MG/1
650 TABLET ORAL EVERY 4 HOURS PRN
Status: DISCONTINUED | OUTPATIENT
Start: 2020-07-07 | End: 2020-07-09 | Stop reason: HOSPADM

## 2020-07-07 RX ORDER — GUAIFENESIN 600 MG/1
1200 TABLET, EXTENDED RELEASE ORAL EVERY 12 HOURS SCHEDULED
Status: DISCONTINUED | OUTPATIENT
Start: 2020-07-07 | End: 2020-07-09 | Stop reason: HOSPADM

## 2020-07-07 RX ORDER — AMLODIPINE BESYLATE 5 MG/1
5 TABLET ORAL DAILY
Status: DISCONTINUED | OUTPATIENT
Start: 2020-07-07 | End: 2020-07-09 | Stop reason: HOSPADM

## 2020-07-07 RX ORDER — TAMSULOSIN HYDROCHLORIDE 0.4 MG/1
0.4 CAPSULE ORAL DAILY
Status: DISCONTINUED | OUTPATIENT
Start: 2020-07-07 | End: 2020-07-09 | Stop reason: HOSPADM

## 2020-07-07 RX ORDER — IPRATROPIUM BROMIDE AND ALBUTEROL SULFATE 2.5; .5 MG/3ML; MG/3ML
3 SOLUTION RESPIRATORY (INHALATION)
Status: DISCONTINUED | OUTPATIENT
Start: 2020-07-07 | End: 2020-07-09 | Stop reason: HOSPADM

## 2020-07-07 RX ORDER — METHYLPREDNISOLONE SODIUM SUCCINATE 40 MG/ML
40 INJECTION, POWDER, LYOPHILIZED, FOR SOLUTION INTRAMUSCULAR; INTRAVENOUS EVERY 12 HOURS
Status: DISCONTINUED | OUTPATIENT
Start: 2020-07-07 | End: 2020-07-08

## 2020-07-07 RX ORDER — TRAZODONE HYDROCHLORIDE 50 MG/1
50 TABLET ORAL NIGHTLY
COMMUNITY

## 2020-07-07 RX ORDER — ALBUTEROL SULFATE 2.5 MG/3ML
2.5 SOLUTION RESPIRATORY (INHALATION) EVERY 4 HOURS PRN
Status: DISCONTINUED | OUTPATIENT
Start: 2020-07-07 | End: 2020-07-09 | Stop reason: HOSPADM

## 2020-07-07 RX ORDER — SODIUM CHLORIDE 0.9 % (FLUSH) 0.9 %
10 SYRINGE (ML) INJECTION AS NEEDED
Status: DISCONTINUED | OUTPATIENT
Start: 2020-07-07 | End: 2020-07-09 | Stop reason: HOSPADM

## 2020-07-07 RX ORDER — SODIUM CHLORIDE 0.9 % (FLUSH) 0.9 %
10 SYRINGE (ML) INJECTION EVERY 12 HOURS SCHEDULED
Status: DISCONTINUED | OUTPATIENT
Start: 2020-07-07 | End: 2020-07-09 | Stop reason: HOSPADM

## 2020-07-07 RX ADMIN — GABAPENTIN 300 MG: 300 CAPSULE ORAL at 08:15

## 2020-07-07 RX ADMIN — INSULIN LISPRO 5 UNITS: 100 INJECTION, SOLUTION INTRAVENOUS; SUBCUTANEOUS at 12:10

## 2020-07-07 RX ADMIN — GABAPENTIN 300 MG: 300 CAPSULE ORAL at 21:10

## 2020-07-07 RX ADMIN — IPRATROPIUM BROMIDE AND ALBUTEROL SULFATE 3 ML: 2.5; .5 SOLUTION RESPIRATORY (INHALATION) at 00:27

## 2020-07-07 RX ADMIN — BENZONATATE 200 MG: 100 CAPSULE ORAL at 11:02

## 2020-07-07 RX ADMIN — ENOXAPARIN SODIUM 40 MG: 40 INJECTION SUBCUTANEOUS at 08:15

## 2020-07-07 RX ADMIN — IPRATROPIUM BROMIDE AND ALBUTEROL SULFATE 3 ML: 2.5; .5 SOLUTION RESPIRATORY (INHALATION) at 06:29

## 2020-07-07 RX ADMIN — BENZONATATE 200 MG: 100 CAPSULE ORAL at 21:12

## 2020-07-07 RX ADMIN — CARVEDILOL 3.12 MG: 3.12 TABLET, FILM COATED ORAL at 08:16

## 2020-07-07 RX ADMIN — ATORVASTATIN CALCIUM 20 MG: 10 TABLET, FILM COATED ORAL at 08:15

## 2020-07-07 RX ADMIN — AMLODIPINE BESYLATE 5 MG: 5 TABLET ORAL at 08:16

## 2020-07-07 RX ADMIN — GUAIFENESIN 1200 MG: 600 TABLET, EXTENDED RELEASE ORAL at 08:15

## 2020-07-07 RX ADMIN — METHYLPREDNISOLONE SODIUM SUCCINATE 40 MG: 40 INJECTION, POWDER, FOR SOLUTION INTRAMUSCULAR; INTRAVENOUS at 06:04

## 2020-07-07 RX ADMIN — IPRATROPIUM BROMIDE AND ALBUTEROL SULFATE 3 ML: 2.5; .5 SOLUTION RESPIRATORY (INHALATION) at 14:20

## 2020-07-07 RX ADMIN — CARVEDILOL 3.12 MG: 3.12 TABLET, FILM COATED ORAL at 17:28

## 2020-07-07 RX ADMIN — TRAZODONE HYDROCHLORIDE 50 MG: 50 TABLET ORAL at 21:10

## 2020-07-07 RX ADMIN — INSULIN LISPRO 4 UNITS: 100 INJECTION, SOLUTION INTRAVENOUS; SUBCUTANEOUS at 17:28

## 2020-07-07 RX ADMIN — METHYLPREDNISOLONE SODIUM SUCCINATE 40 MG: 40 INJECTION, POWDER, FOR SOLUTION INTRAMUSCULAR; INTRAVENOUS at 18:10

## 2020-07-07 RX ADMIN — TAMSULOSIN HYDROCHLORIDE 0.4 MG: 0.4 CAPSULE ORAL at 08:16

## 2020-07-07 RX ADMIN — INSULIN LISPRO 3 UNITS: 100 INJECTION, SOLUTION INTRAVENOUS; SUBCUTANEOUS at 08:14

## 2020-07-07 RX ADMIN — SODIUM CHLORIDE, PRESERVATIVE FREE 10 ML: 5 INJECTION INTRAVENOUS at 21:10

## 2020-07-07 RX ADMIN — SODIUM CHLORIDE, PRESERVATIVE FREE 10 ML: 5 INJECTION INTRAVENOUS at 10:14

## 2020-07-07 RX ADMIN — ALBUTEROL SULFATE 2.5 MG: 2.5 SOLUTION RESPIRATORY (INHALATION) at 10:15

## 2020-07-07 RX ADMIN — FAMOTIDINE 40 MG: 20 TABLET, FILM COATED ORAL at 08:16

## 2020-07-07 RX ADMIN — GUAIFENESIN 1200 MG: 600 TABLET, EXTENDED RELEASE ORAL at 21:10

## 2020-07-07 RX ADMIN — MONTELUKAST SODIUM 10 MG: 10 TABLET, FILM COATED ORAL at 21:10

## 2020-07-07 NOTE — ED PROVIDER NOTES
Subjective   History of Present Illness    Patient is a pleasant 69-year-old male with chief complaint of worsening shortness of breath.  The patient describes a history of similar episodes that occurred frequently in the past several months.  His last hospitalization was about 2 and half weeks ago where he was diagnosed with probable COPD exacerbation.  The patient describes that he felt okay upon discharge.   In the past 1 week, the patient has felt worsening shortness of breath worst with exertion.  He used to sleep lying flat but now has been laying on his side.  He denies being propped up more than usual with pillows.  He denies associated chest pain, pressure, tightness.  Denies any fever.  He does complain of persistent dry cough.  The patient denies any fever.  He reports feels similar to the time he has been hospitalized this past 2 visits.  He does have an appointment with pulmonologist in a month.  He is frustrated because he does not know what know what is going on and he feels like is not getting any answers.  He denies any leg pain or swelling.  He has not had a coronavirus nor exposure to that he is aware of.    Review of Systems   Constitutional: Positive for activity change and fatigue. Negative for fever.   HENT: Negative.    Respiratory: Positive for cough and shortness of breath. Negative for chest tightness.    Cardiovascular: Negative for chest pain.   Genitourinary: Negative.    Musculoskeletal: Negative.    Neurological: Negative.    Psychiatric/Behavioral: Negative.        Past Medical History:   Diagnosis Date   • Diabetes mellitus (CMS/HCC)    • Hx of colonic polyp    • Hx of TIA (transient ischemic attack) and stroke    • Hyperlipidemia    • Hypertension        No Known Allergies    Past Surgical History:   Procedure Laterality Date   • BACK SURGERY     • COLONOSCOPY  08/15/2012    Poor prep repeat exam in 3 years   • COLONOSCOPY W/ POLYPECTOMY  07/16/2009    Tubular adenoma at 60 cm,  Hyperplastic polyp rectum suboptimal prep repeat in 3 months   • SHOULDER SURGERY     • VERTEBROPLASTY      neck       Family History   Problem Relation Age of Onset   • Diabetes Father    • Heart disease Father    • Heart disease Sister    • Diabetes Sister    • Diabetes Brother    • Heart disease Brother        Social History     Socioeconomic History   • Marital status: Legally      Spouse name: Not on file   • Number of children: Not on file   • Years of education: Not on file   • Highest education level: Not on file   Tobacco Use   • Smoking status: Former Smoker     Packs/day: 1.00     Years: 55.00     Pack years: 55.00   • Smokeless tobacco: Never Used   • Tobacco comment: STATES 'NONE NOW, I CAN'T.'   Substance and Sexual Activity   • Alcohol use: No   • Drug use: No   • Sexual activity: Defer       Prior to Admission medications    Medication Sig Start Date End Date Taking? Authorizing Provider   albuterol (PROVENTIL) (2.5 MG/3ML) 0.083% nebulizer solution Take 2.5 mg by nebulization Every 4 (Four) Hours As Needed for Wheezing. 5/7/20   Vee Gaines APRN   amLODIPine (NORVASC) 5 MG tablet Take 5 mg by mouth Daily.    ProviderGreg MD   atorvastatin (LIPITOR) 20 MG tablet Take 20 mg by mouth Daily.    ProviderGreg MD   benzonatate (TESSALON) 200 MG capsule Take 1 capsule by mouth 3 (Three) Times a Day As Needed for Cough. 6/18/20   Natasha Mckeon APRN   budesonide-formoterol (SYMBICORT) 160-4.5 MCG/ACT inhaler Inhale 2 puffs 2 (Two) Times a Day. 6/18/20   Natasha Mckeon APRN   carvedilol (COREG) 3.125 MG tablet Take 3.125 mg by mouth 2 (Two) Times a Day With Meals.    ProviderGreg MD   Dulaglutide (TRULICITY SC) Inject  under the skin into the appropriate area as directed 1 (One) Time Per Week.    ProviderGreg MD   gabapentin (NEURONTIN) 300 MG capsule Take 300 mg by mouth 2 (Two) Times a Day.    Greg Tavares MD   glimepiride (AMARYL) 2  MG tablet Take 2 mg by mouth Every Morning Before Breakfast.    Greg Tavares MD   guaiFENesin (MUCINEX) 600 MG 12 hr tablet Take 2 tablets by mouth Every 12 (Twelve) Hours. 6/18/20   Natasha Mckeon APRN   montelukast (SINGULAIR) 10 MG tablet Take 10 mg by mouth Every Night.    Greg Tavares MD   predniSONE (DELTASONE) 10 MG tablet Take 4 tablets on 6/19/2020, then 2 tablets for 2 days, then 1 tablet for 2 days. 6/18/20   Natasha Mckeon APRN   tamsulosin (FLOMAX) 0.4 MG capsule 24 hr capsule Take 1 capsule by mouth Daily.    Greg Tavares MD   TRAZODONE HCL PO Take  by mouth Every Night.    Greg Tavares MD   valsartan 80 MG tablet 320 mg, hydroCHLOROthiazide 25 MG tablet 25 mg Take 1 dose by mouth Daily.    Greg Tavares MD       Medications   sodium chloride 0.9 % flush 10 mL (10 mL Intravenous Given 7/7/20 1014)   sodium chloride 0.9 % flush 10 mL (has no administration in time range)   enoxaparin (LOVENOX) syringe 40 mg (40 mg Subcutaneous Given 7/7/20 0815)   acetaminophen (TYLENOL) tablet 650 mg (has no administration in time range)   famotidine (PEPCID) tablet 40 mg (40 mg Oral Given 7/7/20 0816)   dextrose (GLUTOSE) oral gel 15 g (has no administration in time range)   dextrose (D50W) 25 g/ 50mL Intravenous Solution 25 g (has no administration in time range)   glucagon (human recombinant) (GLUCAGEN DIAGNOSTIC) injection 1 mg (has no administration in time range)   methylPREDNISolone sodium succinate (SOLU-Medrol) injection 40 mg (40 mg Intravenous Given 7/7/20 0604)   albuterol (PROVENTIL) nebulizer solution 0.083% 2.5 mg/3mL (2.5 mg Nebulization Given 7/7/20 1015)   atorvastatin (LIPITOR) tablet 20 mg (20 mg Oral Given 7/7/20 0815)   amLODIPine (NORVASC) tablet 5 mg (5 mg Oral Given 7/7/20 0816)   benzonatate (TESSALON) capsule 200 mg (200 mg Oral Given 7/7/20 1102)   carvedilol (COREG) tablet 3.125 mg (3.125 mg Oral Given 7/7/20 5242)   gabapentin (NEURONTIN)  "capsule 300 mg (300 mg Oral Given 7/7/20 0815)   guaiFENesin (MUCINEX) 12 hr tablet 1,200 mg (1,200 mg Oral Given 7/7/20 0815)   montelukast (SINGULAIR) tablet 10 mg (has no administration in time range)   tamsulosin (FLOMAX) 24 hr capsule 0.4 mg (0.4 mg Oral Given 7/7/20 0816)   traZODone (DESYREL) tablet 50 mg (has no administration in time range)   insulin lispro (humaLOG) injection 2-9 Units (has no administration in time range)   ipratropium-albuterol (DUO-NEB) nebulizer solution 3 mL ( Nebulization Canceled Entry 7/7/20 1630)   methylPREDNISolone sodium succinate (SOLU-Medrol) injection 125 mg (125 mg Intravenous Given 7/6/20 2309)   ipratropium-albuterol (DUO-NEB) nebulizer solution 3 mL (3 mL Nebulization Given 7/7/20 0027)   iopamidol (ISOVUE-370) 76 % injection 150 mL (134 mL Intravenous Given 7/6/20 2352)       /64 (BP Location: Right arm, Patient Position: Lying)   Pulse 87   Temp 98.2 °F (36.8 °C) (Oral)   Resp 18   Ht 180.3 cm (71\")   Wt 76.1 kg (167 lb 12.8 oz)   SpO2 90%   BMI 23.40 kg/m²       Objective   Physical Exam   Constitutional: He is oriented to person, place, and time. He appears well-developed and well-nourished.   HENT:   Head: Normocephalic and atraumatic.   Right Ear: External ear normal.   Left Ear: External ear normal.   Nose: Nose normal.   Mouth/Throat: Oropharynx is clear and moist.   Eyes: Pupils are equal, round, and reactive to light. Conjunctivae and EOM are normal.   Neck: Normal range of motion. Neck supple. No tracheal deviation present.   Cardiovascular: Normal rate, regular rhythm, normal heart sounds and intact distal pulses. Exam reveals no gallop and no friction rub.   No murmur heard.  Pulmonary/Chest: Effort normal. No respiratory distress. He has decreased breath sounds in the right upper field, the right middle field, the left upper field, the left middle field and the left lower field. He has no wheezes. He has no rales. He exhibits no tenderness. "   Abdominal: Soft. Bowel sounds are normal. He exhibits no distension and no mass. There is no tenderness. There is no rebound and no guarding.   Musculoskeletal: Normal range of motion. He exhibits no edema, tenderness or deformity.        Right lower leg: Normal. He exhibits no tenderness and no edema.        Left lower leg: Normal. He exhibits no tenderness and no edema.   Neurological: He is alert and oriented to person, place, and time. He has normal reflexes.   Skin: Skin is warm and dry. Capillary refill takes less than 2 seconds. No rash noted. No erythema. No pallor.   Psychiatric: He has a normal mood and affect. His behavior is normal. Judgment and thought content normal.       Procedures         Lab Results (last 24 hours)     Procedure Component Value Units Date/Time    [facility] Blood Culture Bottle Set [[id number]] Collected:  07/06/20 2247    Specimen:  Blood from Arm, Left Updated:  07/07/20 0000     Extra Tube Hold for add-ons.     Comment: Auto resulted.       CBC & Differential [[id number]] Collected:  07/06/20 2250    Specimen:  Blood Updated:  07/06/20 2258    Narrative:       The following orders were created for panel order CBC & Differential.  Procedure                               Abnormality         Status                     ---------                               -----------         ------                     CBC Auto Differential[[id number]]        Abnormal            Final result                 Please view results for these tests on the individual orders.    Comprehensive Metabolic Panel [[id number]]  (Abnormal) Collected:  07/06/20 2250    Specimen:  Blood Updated:  07/06/20 2316     Glucose 229 mg/dL      BUN 22 mg/dL      Creatinine 1.14 mg/dL      Sodium 134 mmol/L      Potassium 4.0 mmol/L      Chloride 94 mmol/L      CO2 30.0 mmol/L      Calcium 9.6 mg/dL      Total Protein 7.5 g/dL      Albumin 4.10 g/dL      ALT (SGPT) 25 U/L      AST (SGOT) 14 U/L      Alkaline Phosphatase 52 U/L       Total Bilirubin 0.3 mg/dL      eGFR  Zoran Urrutia 77 mL/min/1.73      Globulin 3.4 gm/dL      A/G Ratio 1.2 g/dL      BUN/Creatinine Ratio 19.3     Anion Gap 10.0 mmol/L     Narrative:       GFR Normal >60  Chronic Kidney Disease <60  Kidney Failure <15      BNP [[id number]]  (Normal) Collected:  07/06/20 2250    Specimen:  Blood Updated:  07/06/20 2314     proBNP 38.8 pg/mL     Narrative:       Among patients with dyspnea, NT-proBNP is highly sensitive for the detection of acute congestive heart failure. In addition NT-proBNP of <300 pg/ml effectively rules out acute congestive heart failure with 99% negative predictive value.    Results may be falsely decreased if patient taking Biotin.      D-dimer, Quantitative [[id number]]  (Abnormal) Collected:  07/06/20 2250    Specimen:  Blood Updated:  07/06/20 2308     D-Dimer, Quantitative 0.52 mg/L (FEU)     Narrative:       Reference Range is 0-0.50 mg/L FEU. However, results <0.50 mg/L FEU tends to rule out DVT or PE. Results >0.50 mg/L FEU are not useful in predicting absence or presence of DVT or PE.      Troponin [[id number]]  (Normal) Collected:  07/06/20 2250    Specimen:  Blood Updated:  07/06/20 2314     Troponin T <0.010 ng/mL     Narrative:       Troponin T Reference Range:  <= 0.03 ng/mL-   Negative for AMI  >0.03 ng/mL-     Abnormal for myocardial necrosis.  Clinicians would have to utilize clinical acumen, EKG, Troponin and serial changes to determine if it is an Acute Myocardial Infarction or myocardial injury due to an underlying chronic condition.       Results may be falsely decreased if patient taking Biotin.      CBC Auto Differential [[id number]]  (Abnormal) Collected:  07/06/20 2250    Specimen:  Blood Updated:  07/06/20 2258     WBC 10.47 10*3/mm3      RBC 4.57 10*6/mm3      Hemoglobin 13.6 g/dL      Hematocrit 41.2 %      MCV 90.2 fL      MCH 29.8 pg      MCHC 33.0 g/dL      RDW 12.9 %      RDW-SD 41.8 fl      MPV 8.6 fL      Platelets 404  10*3/mm3      Neutrophil % 57.1 %      Lymphocyte % 24.8 %      Monocyte % 9.4 %      Eosinophil % 7.6 %      Basophil % 0.8 %      Immature Grans % 0.3 %      Neutrophils, Absolute 5.98 10*3/mm3      Lymphocytes, Absolute 2.60 10*3/mm3      Monocytes, Absolute 0.98 10*3/mm3      Eosinophils, Absolute 0.80 10*3/mm3      Basophils, Absolute 0.08 10*3/mm3      Immature Grans, Absolute 0.03 10*3/mm3      nRBC 0.0 /100 WBC     COVID PRE-OP / PRE-PROCEDURE SCREENING ORDER (NO ISOLATION) - Swab, Nasopharynx [[id number]] Collected:  07/06/20 2310    Specimen:  Swab from Nasopharynx Updated:  07/06/20 2333    Narrative:       The following orders were created for panel order COVID PRE-OP / PRE-PROCEDURE SCREENING ORDER (NO ISOLATION) - Swab, Nasopharynx.  Procedure                               Abnormality         Status                     ---------                               -----------         ------                     COVID-19, [facility] IN-HOUS...[[id number]]  Normal              Final result                 Please view results for these tests on the individual orders.    COVID-19, [facility] IN-HOUSE,NP Swab (NO TRANSPORT MEDIA) 2 HR TAT - Swab, Nasopharynx [[id number]]  (Normal) Collected:  07/06/20 2310    Specimen:  Swab from Nasopharynx Updated:  07/06/20 2333     COVID19 Not Detected    Narrative:       Fact sheet for providers: https://www.fda.gov/media/816192/download     Fact sheet for patients: https://www.fda.gov/media/845304/download    Blood Gas, Arterial [[id number]]  (Abnormal) Collected:  07/06/20 2311    Specimen:  Arterial Blood Updated:  07/06/20 2310     Site Right Radial     Nacho's Test Positive     pH, Arterial 7.424 pH units      pCO2, Arterial 47.2 mm Hg      Comment: 83 Value above reference range        pO2, Arterial 62.0 mm Hg      Comment: 84 Value below reference range        HCO3, Arterial 30.9 mmol/L      Comment: 83 Value above reference range        Base Excess, Arterial 5.5 mmol/L       Comment: 83 Value above reference range        O2 Saturation, Arterial 92.5 %      Comment: 84 Value below reference range        Temperature 37.0 C      Barometric Pressure for Blood Gas 750 mmHg      Modality Room Air     Ventilator Mode NA     Collected by [id number]     Comment: Meter: [id number]     :  [id number]        pCO2, Temperature Corrected 47.2 mm Hg      pH, Temp Corrected 7.424 pH Units      pO2, Temperature Corrected 62.0 mm Hg     Troponin [[id number]]  (Normal) Collected:  07/07/20 0057    Specimen:  Blood Updated:  07/07/20 0149     Troponin T <0.010 ng/mL     Narrative:       Troponin T Reference Range:  <= 0.03 ng/mL-   Negative for AMI  >0.03 ng/mL-     Abnormal for myocardial necrosis.  Clinicians would have to utilize clinical acumen, EKG, Troponin and serial changes to determine if it is an Acute Myocardial Infarction or myocardial injury due to an underlying chronic condition.       Results may be falsely decreased if patient taking Biotin.      POC Glucose Once [[id number]]  (Abnormal) Collected:  07/07/20 0722    Specimen:  Blood Updated:  07/07/20 0748     Glucose 216 mg/dL      Comment: : [id number] Samay[place] LadonnaMeter ID: [id number]       POC Glucose Once [[id number]]  (Abnormal) Collected:  07/07/20 1116    Specimen:  Blood Updated:  07/07/20 1150     Glucose 312 mg/dL      Comment: : [id number] Samayoa LadonnaMeter ID: [id number]             Ct Chest With & Without Contrast    Result Date: 6/16/2020  Narrative: EXAMINATION: CT CHEST W WO CONTRAST- 6/16/2020 12:47 PM CDT  HISTORY: COPD, questionable tuberculosis exposure  COMPARISON: Low-dose CT chest 11/15/2019, chest x-ray 6/16/2020  DOSE: 733 mGy-cm  TECHNIQUE: Sequential imaging was performed from the thoracic inlet through the upper abdomen before and after the administration of IV contrast.  Sagittal and coronal reformations were made from the original source data and reviewed. Automated exposure control was also  utilized to decrease patient radiation dose.  FINDINGS: The visualized thyroid gland is grossly normal in appearance. The trachea and main bronchi appear widely patent and in normal anatomic position. The esophagus is grossly normal in appearance.  No pathologically enlarged axillary, mediastinal, or hilar lymph nodes are identified. Calcified mediastinal and hilar lymph nodes indicate prior granulomatous exposure.  The heart appears normal in size. There is no pericardial or pleural effusion. Atherosclerotic calcifications are seen within the aorta and its branch vessels. The ascending thoracic aorta appears normal in caliber. The main pulmonary artery appears normal in caliber.  There is a tiny nodule in the left lung apex which measures approximately 5 mm in size (axial image 26). This has increased slightly in size compared to the previous exam. There is a tiny pleural-based nodule in the posterior right lower lobe which measures 5 mm in size (axial image 139), stable compared to the most recent exam but new compared to the exam from 2018. The lungs otherwise appear clear.  Review of the visualized portion of the upper abdomen demonstrates diffuse fatty infiltration of the liver. No acute abnormalities are identified.  Review of the visualized osseous structures demonstrates no acute or aggressive lesions. There has been previous fusion of the lower cervical spine.      Impression: 1. No CT evidence of reactivating tuberculosis.  2. Small pulmonary nodules bilaterally for which follow-up CT is recommended in 12 months.  3. Atherosclerosis of the aorta and coronary arteries.  This report was finalized on 06/16/2020 13:00 by Dr. Diego Freeman MD.    Xr Chest 1 View    Result Date: 7/7/2020  Narrative: Frontal upright radiograph of the chest 7/6/2020 11:35 PM CDT  COMPARISON: June 16, 2020  HISTORY: Short of breath.  FINDINGS: The lungs are clear. No infiltrates are identified.. The cardiomediastinal silhouette  and pulmonary vascularity are within normal limits.  The osseous structures and surrounding soft tissues demonstrate no acute abnormality.      Impression: 1. No radiographic evidence of acute cardiopulmonary process.   This report was finalized on 07/07/2020 06:54 by Dr. Ceasar Smyth MD.    Xr Chest 1 View    Result Date: 6/16/2020  Narrative: EXAMINATION: XR CHEST 1 VW-  6/16/2020 3:10 AM CDT  HISTORY: Acute shortness of breath with apparent COPD flare but also hypertension  1 view chest x-ray compared with 05/07/2020.  Heart size is normal. The mediastinum is within normal limits.  The lungs are normally expanded with no pneumonia or pneumothorax. Mild chronic appearing interstitial disease with a few calcified granulomas. No congestive failure changes.                                                                      Impression: 1. No acute disease.   This report was finalized on 06/16/2020 07:31 by Dr. Abbe Wilson MD.    Ct Angiogram Chest    Result Date: 7/7/2020  Narrative: CT chest angiogram dated 7/6/2020 11:43 PM CDT  HISTORY: Shortness of air. Positive d-dimer.  COMPARISON: Low-dose CT chest 11/15/2019.  DLP: 223 mGy cm. Automated exposure control was utilized to diminish patient radiation dose.  TECHNIQUE: Helical tomographic images of the chest were obtained after the administration of intravenous contrast following angiogram protocol. Additionally, 3D MIP reconstructions in the coronal and sagittal planes were provided.    FINDINGS:  The imaged portion of the base of the neck appears unremarkable.  There is adequate enhancement of the pulmonary arteries to evaluate for central and segmental pulmonary emboli. There are no filling defects within the main, lobar, segmental or visualized subsegmental pulmonary arteries. The pulmonary vessels are within normal limits.  There are changes of centrilobular emphysema. A 5 mm right lower lobe groundglass nodule is stable from previous studies. Lungs are  otherwise clear. No evidence of consolidative pneumonia or effusion.. Bronchial wall thickening is present suggesting chronic bronchitis..  The heart and great vessels appear unremarkable. There is no pericardial effusion.  No enlarged axillary or mediastinal lymph nodes are present.  The osseous structures of the thorax and surrounding soft tissues demonstrate no acute process.  The imaged portion of the upper abdomen appears unremarkable.      Impression: 1. No evidence of pulmonary embolus or other acute cardiopulmonary process. 2. Stable 5 mm groundglass nodule in the right lower lobe from previous studies. Lungs are otherwise clear. 3. No enlarged mediastinal or axillary lymphadenopathy.   This report was finalized on 07/07/2020 07:43 by Dr. Johnnie Arzola MD.      ED Course  ED Course as of Jul 07 1601   Mon Jul 06, 2020   [id number] I reviewed this case with Dr. Plascencia who will be assuming the patient's care.     [TK]      ED Course User Index  [TK] Denis Farr PA          MDM  Number of Diagnoses or Management Options  Diagnosis management comments: Shortness of breath differential includes but not limited to:  Pneumonia, COPD, CHF, bronchitis, pericardial effusion with or without tamponade, pulmonary embolism, myocardial infarction, pulmonary edema, pulmonary effusion, asthma, allergic reaction, anxiety reaction, etc...        Working diagnosis is:    Final diagnoses:   Shortness of breath   Suspected COPD exacerbation (CMS/[facility])          Denis Farr PA  07/07/20 0222

## 2020-07-07 NOTE — PROGRESS NOTES
[facility] Medicine Services  INPATIENT PROGRESS NOTE    Patient Name: Naif Nick  Date of Admission: 7/6/2020  Today's Date: 07/07/20  Length of Stay: 0  Primary Care Physician: Salbador Anderson MD    Subjective   Chief Complaint: Follow-up shortness of breath  HPI   Patient seen and examined at bedside.  She reports his breathing felt good when he was initially discharged.  However, he has had worsening shortness of breath and weakness over the last week.  He tells me that his breathing has already improved.  He is having difficulty producing phlegm.  He is on 2 L nasal cannula with a continuous pulse ox of 92%.  He denies chest pain. He denies nausea, vomiting, an diarrhea.  He denies urinary and bowel complaints.    Review of Systems   All pertinent negatives and positives are as above. All other systems have been reviewed and are negative unless otherwise stated.     Objective    Temp:  [97.9 °F (36.6 °C)-98.3 °F (36.8 °C)] 98.1 °F (36.7 °C)  Heart Rate:  [71-99] 93  Resp:  [16-20] 18  BP: (123-166)/(50-99) 123/50  Physical Exam   Constitutional: He is oriented to person, place, and time. He appears well-developed and well-nourished. No distress. Nasal cannula in place.   Sitting up in bed.  2 L nasal cannula.  No distress.   HENT:   Head: Normocephalic and atraumatic.   Mouth/Throat: Oropharynx is clear and moist.   Eyes: EOM are normal.   Neck: Neck supple. No thyromegaly present.   Cardiovascular: Normal rate, regular rhythm, normal heart sounds and intact distal pulses.   No murmur heard.  Pulmonary/Chest: Effort normal. No stridor. No respiratory distress. He has wheezes (expiratory).   Coarse breath sounds throughout   Abdominal: Soft. Bowel sounds are normal. He exhibits no distension. There is no tenderness. There is no guarding.   Musculoskeletal: Normal range of motion. He exhibits no edema or deformity.   Neurological: He is alert and oriented to person, place, and  time. No cranial nerve deficit.   Skin: Skin is warm. He is not diaphoretic. No erythema.   Psychiatric: He has a normal mood and affect. His behavior is normal.   Nursing note and vitals reviewed.    Results Review:  I have reviewed the labs, radiology results, and diagnostic studies.    Laboratory Data:   Results from last 7 days   Lab Units 07/06/20  2250   WBC 10*3/mm3 10.47   HEMOGLOBIN g/dL 13.6   HEMATOCRIT % 41.2   PLATELETS 10*3/mm3 404        Results from last 7 days   Lab Units 07/06/20  2250   SODIUM mmol/L 134*   POTASSIUM mmol/L 4.0   CHLORIDE mmol/L 94*   CO2 mmol/L 30.0*   BUN mg/dL 22   CREATININE mg/dL 1.14   CALCIUM mg/dL 9.6   BILIRUBIN mg/dL 0.3   ALK PHOS U/L 52   ALT (SGPT) U/L 25   AST (SGOT) U/L 14   GLUCOSE mg/dL 229*       Radiology Data:   Imaging Results (Last 24 Hours)     Procedure Component Value Units Date/Time    CT Angiogram Chest [[id number]] Collected:  07/07/20 0738     Updated:  07/07/20 0746    Narrative:       CT chest angiogram dated 7/6/2020 11:43 PM CDT      HISTORY: Shortness of air. Positive d-dimer.     COMPARISON: Low-dose CT chest 11/15/2019.      DLP: 223 mGy cm. Automated exposure control was utilized to diminish  patient radiation dose.     TECHNIQUE: Helical tomographic images of the chest were obtained after  the administration of intravenous contrast following angiogram protocol.  Additionally, 3D MIP reconstructions in the coronal and sagittal planes  were provided.        FINDINGS:    The imaged portion of the base of the neck appears unremarkable.      There is adequate enhancement of the pulmonary arteries to evaluate for  central and segmental pulmonary emboli. There are no filling defects  within the main, lobar, segmental or visualized subsegmental pulmonary  arteries. The pulmonary vessels are within normal limits.      There are changes of centrilobular emphysema. A 5 mm right lower lobe  groundglass nodule is stable from previous studies. Lungs are  otherwise  clear. No evidence of consolidative pneumonia or effusion.. Bronchial  wall thickening is present suggesting chronic bronchitis..      The heart and great vessels appear unremarkable. There is no pericardial  effusion.      No enlarged axillary or mediastinal lymph nodes are present.      The osseous structures of the thorax and surrounding soft tissues  demonstrate no acute process.      The imaged portion of the upper abdomen appears unremarkable.        Impression:       1. No evidence of pulmonary embolus or other acute cardiopulmonary  process.  2. Stable 5 mm groundglass nodule in the right lower lobe from previous  studies. Lungs are otherwise clear.  3. No enlarged mediastinal or axillary lymphadenopathy.        This report was finalized on 07/07/2020 07:43 by Dr. Johnnie Arzola MD.    XR Chest 1 View [[id number]] Collected:  07/07/20 0654     Updated:  07/07/20 0657    Narrative:       Frontal upright radiograph of the chest 7/6/2020 11:35 PM CDT     COMPARISON: June 16, 2020     HISTORY: Short of breath.     FINDINGS:   The lungs are clear. No infiltrates are identified.. The  cardiomediastinal silhouette and pulmonary vascularity are within normal  limits.      The osseous structures and surrounding soft tissues demonstrate no acute  abnormality.       Impression:       1. No radiographic evidence of acute cardiopulmonary process.        This report was finalized on 07/07/2020 06:54 by Dr. Ceasar Smyth MD.          I have reviewed the patient's current medications.     Assessment/Plan     Active Hospital Problems    Diagnosis   • **Suspected COPD exacerbation (CMS/HCC)   • Shortness of breath   • Type 2 diabetes mellitus, without long-term current use of insulin (CMS/HCC)   • Hypertension   • Tobacco abuse, in remission   • Hypoxia       Plan:  1.  On 7/6 patient was admitted for worsening shortness of breath.  Patient was recently hospitalized at our facility on 6/16 to 6/18 by Dr. Jacobs  for suspected COPD exacerbation as patient has had no formal diagnosis of COPD and has had no formal pulmonary function test.  Patient does report a longstanding history of tobacco use but recently quit within the last 3 months.  He was initially placed on 4 L nasal cannula and he was weaned to room air.  He was discharged with instructions to complete doxycycline and prednisone taper. In addition he was started on Symbicort.  Although, patient tells me he was unable to afford it so he did not pick it up at the pharmacy.  2.  He was scheduled for outpatient pulmonary function test on 7/7/2020 and appointment with pulmonology was made for 7/20/2020.    3.  CTA of the chest negative for pulmonary embolism or other acute cardiopulmonary process.  4.  Chest x-ray negative for acute cardiopulmonary process.  5.  He was given 125 mg IV Solu-Medrol x1. IV Solu-Medrol 40 mg has been continued every 12 hours.  6.  Duo nebs and Mucinex.  He is on 2 L nasal cannula.  Wean supplemental oxygen as tolerated as he does not wear oxygen at home.  Encouraged him to use the incentive spirometer and flutter valve.  7.  On 6/25 hemoglobin A1c 7.5%.  Last blood glucose 229, 216, 312.  Sliding scale insulin increased to moderate dose.  He takes Trulicity at home.  8.  Lovenox for DVT prophylaxis.  9.  Home medications reviewed and restarted as appropriate.  10.  Work up ongoing.    Discharge Planning: I expect the patient to be discharged to be determined.    TAMAR Monahan   07/07/20   15:21     I personally evaluated and examined the patient in conjunction with Patience BOYLE and agree with the assessment, treatment plan, and disposition of the patient as recorded by her. My history, exam, and further recommendations are:     Patient presented with shortness of breath.  He was recently hospitalized for suspected chronic obstructive pulmonary disease but had not had a formal diagnosis nor had pulmonary function test.  I believe he was  scheduled to have it done however ended up in the hospital.  Stress concerned that whenever he gets to go home 2 weeks later he would come back because of shortness of breath.  He asked about pulmonary function test during this hospitalization.  I told him that given that he appears to have been exacerbated today the test may not represent the actual condition.  I recommend doing this in an outpatient setting.  Vitals:    07/07/20 1500   BP: 150/64   Pulse: 87   Resp: 18   Temp: 98.2 °F (36.8 °C)   SpO2: 90%   Lung sounds are clear, no crackles or wheezes  He appears to be doing better  He is on supplemental oxygen with O2 saturation of 94%  He is hemodynamically stable  Continue present management  Joaquin Jason MD  07/07/20  19:05

## 2020-07-07 NOTE — H&P
[facility] Medicine Services  HISTORY AND PHYSICAL    Date of Admission: 7/6/2020  Primary Care Physician: Salbador Anderson MD    Subjective     Chief Complaint: Shortness of breath    History of Present Illness  69 year old male with PMH of DM, HTN, CVD, COPD exacerbation admission that presents with complaints of shortness of breath with cough, no sputum or fever. COVID negative. No chest pain, nausea, vomiting or reflux.    Review of Systems   Otherwise complete ROS reviewed and negative except as mentioned in the HPI.    Past Medical History:   Past Medical History:   Diagnosis Date   • Diabetes mellitus (CMS/HCC)    • Hx of colonic polyp    • Hx of TIA (transient ischemic attack) and stroke    • Hyperlipidemia    • Hypertension      Past Surgical History:  Past Surgical History:   Procedure Laterality Date   • BACK SURGERY     • COLONOSCOPY  08/15/2012    Poor prep repeat exam in 3 years   • COLONOSCOPY W/ POLYPECTOMY  07/16/2009    Tubular adenoma at 60 cm, Hyperplastic polyp rectum suboptimal prep repeat in 3 months   • SHOULDER SURGERY     • VERTEBROPLASTY      neck     Social History:  reports that he has quit smoking. He has a 55.00 pack-year smoking history. He has never used smokeless tobacco. He reports that he does not drink alcohol or use drugs.    Family History: family history includes Diabetes in his brother, father, and sister; Heart disease in his brother, father, and sister.       Allergies:  No Known Allergies     Medications:  Prior to Admission medications    Medication Sig Start Date End Date Taking? Authorizing Provider   albuterol (PROVENTIL) (2.5 MG/3ML) 0.083% nebulizer solution Take 2.5 mg by nebulization Every 4 (Four) Hours As Needed for Wheezing. 5/7/20  Yes Vee Gaines APRN   amLODIPine (NORVASC) 5 MG tablet Take 5 mg by mouth Daily.   Yes Provider, MD Greg   atorvastatin (LIPITOR) 20 MG tablet Take 20 mg by mouth Daily.   Yes  "ProviderGreg MD   benzonatate (TESSALON) 200 MG capsule Take 1 capsule by mouth 3 (Three) Times a Day As Needed for Cough. 6/18/20  Yes Natasha Mckeon APRN   budesonide-formoterol (SYMBICORT) 160-4.5 MCG/ACT inhaler Inhale 2 puffs 2 (Two) Times a Day. 6/18/20  Yes Natasha Mckeon APRN   carvedilol (COREG) 3.125 MG tablet Take 3.125 mg by mouth 2 (Two) Times a Day With Meals.   Yes Greg Tavares MD   Dulaglutide (TRULICITY SC) Inject  under the skin into the appropriate area as directed 1 (One) Time Per Week.   Yes Greg Tavares MD   gabapentin (NEURONTIN) 300 MG capsule Take 300 mg by mouth 2 (Two) Times a Day.   Yes Greg Tavares MD   glimepiride (AMARYL) 2 MG tablet Take 2 mg by mouth Every Morning Before Breakfast.   Yes Greg Tavares MD   guaiFENesin (MUCINEX) 600 MG 12 hr tablet Take 2 tablets by mouth Every 12 (Twelve) Hours. 6/18/20  Yes Natasha Mckeon APRN   montelukast (SINGULAIR) 10 MG tablet Take 10 mg by mouth Every Night.   Yes Greg Tavares MD   tamsulosin (FLOMAX) 0.4 MG capsule 24 hr capsule Take 1 capsule by mouth Daily.   Yes Greg Tavares MD   TRAZODONE HCL PO Take 50 mg by mouth Every Night.   Yes Greg Tavares MD   valsartan 80 MG tablet 320 mg, hydroCHLOROthiazide 25 MG tablet 25 mg Take 1 dose by mouth Daily.   Yes Greg Tavares MD   predniSONE (DELTASONE) 10 MG tablet Take 4 tablets on 6/19/2020, then 2 tablets for 2 days, then 1 tablet for 2 days. 6/18/20   Natasha Mckeon APRN     Objective     Vital Signs: /72 (BP Location: Right arm, Patient Position: Sitting)   Pulse 83   Temp 98.3 °F (36.8 °C) (Oral)   Resp 20   Ht 180.3 cm (71\")   Wt 76.1 kg (167 lb 12.8 oz)   SpO2 91%   BMI 23.40 kg/m²   Physical Exam   Constitutional: He is oriented to person, place, and time. He appears well-developed and well-nourished.   HENT:   Head: Normocephalic and atraumatic.   Right Ear: External ear normal. "   Left Ear: External ear normal.   Eyes: Pupils are equal, round, and reactive to light. EOM are normal.   Neck: Normal range of motion. Neck supple. No JVD present. No tracheal deviation present. No thyromegaly present.   Cardiovascular: Normal rate, regular rhythm and normal heart sounds.   No murmur heard.  Pulmonary/Chest: Effort normal. No stridor. No respiratory distress. He has wheezes. He has no rales. He exhibits no tenderness.   Abdominal: Soft. Bowel sounds are normal. He exhibits no distension and no mass. There is no tenderness. There is no guarding.   Musculoskeletal: Normal range of motion. He exhibits no edema.   Neurological: He is alert and oriented to person, place, and time. He displays normal reflexes. No cranial nerve deficit. He exhibits normal muscle tone. Coordination normal.   Skin: Skin is warm. Capillary refill takes less than 2 seconds. He is not diaphoretic. No erythema.   Psychiatric: He has a normal mood and affect.     Results Reviewed:  Lab Results (last 24 hours)     Procedure Component Value Units Date/Time    Troponin [[id number]]  (Normal) Collected:  07/07/20 0057    Specimen:  Blood Updated:  07/07/20 0149     Troponin T <0.010 ng/mL     Narrative:       Troponin T Reference Range:  <= 0.03 ng/mL-   Negative for AMI  >0.03 ng/mL-     Abnormal for myocardial necrosis.  Clinicians would have to utilize clinical acumen, EKG, Troponin and serial changes to determine if it is an Acute Myocardial Infarction or myocardial injury due to an underlying chronic condition.       Results may be falsely decreased if patient taking Biotin.      [place] Draw [[id number]] Collected:  07/06/20 2247    Specimen:  Blood Updated:  07/07/20 0000    Narrative:       The following orders were created for panel order [place] Draw.  Procedure                               Abnormality         Status                     ---------                               -----------         ------                      Light Blue Top[[id number]]                                   Final result               Green Top (Gel)[[id number]]                                  Final result               Lavender Top[[id number]]                                     Final result               Red Top[[id number]]                                          Final result               [place] Blood Culture Cesar...[[id number]]                      Final result               Gray Top - Ice[[id number]]                                   Final result                 Please view results for these tests on the individual orders.    Green Top (Gel) [[id number]] Collected:  07/06/20 2250    Specimen:  Blood Updated:  07/07/20 0000     Extra Tube Hold for add-ons.     Comment: Auto resulted.       Lavender Top [[id number]] Collected:  07/06/20 2250    Specimen:  Blood Updated:  07/07/20 0000     Extra Tube hold for add-on     Comment: Auto resulted       Red Top [[id number]] Collected:  07/06/20 2250    Specimen:  Blood Updated:  07/07/20 0000     Extra Tube Hold for add-ons.     Comment: Auto resulted.       Gray Top - Ice [[id number]] Collected:  07/06/20 2250    Specimen:  Blood Updated:  07/07/20 0000     Extra Tube Hold for add-ons.     Comment: Auto resulted.       Light Blue Top [[id number]] Collected:  07/06/20 2250    Specimen:  Blood Updated:  07/07/20 0000     Extra Tube hold for add-on     Comment: Auto resulted       [place] Blood Culture Bottle Set [[id number]] Collected:  07/06/20 2247    Specimen:  Blood from Arm, Left Updated:  07/07/20 0000     Extra Tube Hold for add-ons.     Comment: Auto resulted.       COVID PRE-OP / PRE-PROCEDURE SCREENING ORDER (NO ISOLATION) - Swab, Nasopharynx [[id number]] Collected:  07/06/20 2310    Specimen:  Swab from Nasopharynx Updated:  07/06/20 2333    Narrative:       The following orders were created for panel order COVID PRE-OP / PRE-PROCEDURE SCREENING ORDER (NO ISOLATION) - Swab, Nasopharynx.  Procedure                                Abnormality         Status                     ---------                               -----------         ------                     COVID-19, [facility] IN-HOUS...[[id number]]  Normal              Final result                 Please view results for these tests on the individual orders.    COVID-19, [facility]BBOTT IN-HOUSE,NP Swab (NO TRANSPORT MEDIA) 2 HR TAT - Swab, Nasopharynx [[id number]]  (Normal) Collected:  07/06/20 2310    Specimen:  Swab from Nasopharynx Updated:  07/06/20 2333     COVID19 Not Detected    Narrative:       Fact sheet for providers: https://www.fda.gov/media/681135/download     Fact sheet for patients: https://www.fda.gov/media/059731/download    Comprehensive Metabolic Panel [[id number]]  (Abnormal) Collected:  07/06/20 2250    Specimen:  Blood Updated:  07/06/20 2316     Glucose 229 mg/dL      BUN 22 mg/dL      Creatinine 1.14 mg/dL      Sodium 134 mmol/L      Potassium 4.0 mmol/L      Chloride 94 mmol/L      CO2 30.0 mmol/L      Calcium 9.6 mg/dL      Total Protein 7.5 g/dL      Albumin 4.10 g/dL      ALT (SGPT) 25 U/L      AST (SGOT) 14 U/L      Alkaline Phosphatase 52 U/L      Total Bilirubin 0.3 mg/dL      eGFR   Amer 77 mL/min/1.73      Globulin 3.4 gm/dL      A/G Ratio 1.2 g/dL      BUN/Creatinine Ratio 19.3     Anion Gap 10.0 mmol/L     Narrative:       GFR Normal >60  Chronic Kidney Disease <60  Kidney Failure <15      Troponin [[id number]]  (Normal) Collected:  07/06/20 2250    Specimen:  Blood Updated:  07/06/20 2314     Troponin T <0.010 ng/mL     Narrative:       Troponin T Reference Range:  <= 0.03 ng/mL-   Negative for AMI  >0.03 ng/mL-     Abnormal for myocardial necrosis.  Clinicians would have to utilize clinical acumen, EKG, Troponin and serial changes to determine if it is an Acute Myocardial Infarction or myocardial injury due to an underlying chronic condition.       Results may be falsely decreased if patient taking Biotin.      BNP [[id number]]  (Normal)  Collected:  07/06/20 2250    Specimen:  Blood Updated:  07/06/20 2314     proBNP 38.8 pg/mL     Narrative:       Among patients with dyspnea, NT-proBNP is highly sensitive for the detection of acute congestive heart failure. In addition NT-proBNP of <300 pg/ml effectively rules out acute congestive heart failure with 99% negative predictive value.    Results may be falsely decreased if patient taking Biotin.      Blood Gas, Arterial [[id number]]  (Abnormal) Collected:  07/06/20 2311    Specimen:  Arterial Blood Updated:  07/06/20 2310     Site Right Radial     Nacho's Test Positive     pH, Arterial 7.424 pH units      pCO2, Arterial 47.2 mm Hg      Comment: 83 Value above reference range        pO2, Arterial 62.0 mm Hg      Comment: 84 Value below reference range        HCO3, Arterial 30.9 mmol/L      Comment: 83 Value above reference range        Base Excess, Arterial 5.5 mmol/L      Comment: 83 Value above reference range        O2 Saturation, Arterial 92.5 %      Comment: 84 Value below reference range        Temperature 37.0 C      Barometric Pressure for Blood Gas 750 mmHg      Modality Room Air     Ventilator Mode NA     Collected by [id number]     Comment: Meter: [id number]     :  [id number]        pCO2, Temperature Corrected 47.2 mm Hg      pH, Temp Corrected 7.424 pH Units      pO2, Temperature Corrected 62.0 mm Hg     D-dimer, Quantitative [[id number]]  (Abnormal) Collected:  07/06/20 2250    Specimen:  Blood Updated:  07/06/20 2308     D-Dimer, Quantitative 0.52 mg/L (FEU)     Narrative:       Reference Range is 0-0.50 mg/L FEU. However, results <0.50 mg/L FEU tends to rule out DVT or PE. Results >0.50 mg/L FEU are not useful in predicting absence or presence of DVT or PE.      CBC & Differential [[id number]] Collected:  07/06/20 2250    Specimen:  Blood Updated:  07/06/20 2258    Narrative:       The following orders were created for panel order CBC & Differential.  Procedure                                Abnormality         Status                     ---------                               -----------         ------                     CBC Auto Differential[[id number]]        Abnormal            Final result                 Please view results for these tests on the individual orders.    CBC Auto Differential [[id number]]  (Abnormal) Collected:  07/06/20 2250    Specimen:  Blood Updated:  07/06/20 2258     WBC 10.47 10*3/mm3      RBC 4.57 10*6/mm3      Hemoglobin 13.6 g/dL      Hematocrit 41.2 %      MCV 90.2 fL      MCH 29.8 pg      MCHC 33.0 g/dL      RDW 12.9 %      RDW-SD 41.8 fl      MPV 8.6 fL      Platelets 404 10*3/mm3      Neutrophil % 57.1 %      Lymphocyte % 24.8 %      Monocyte % 9.4 %      Eosinophil % 7.6 %      Basophil % 0.8 %      Immature Grans % 0.3 %      Neutrophils, Absolute 5.98 10*3/mm3      Lymphocytes, Absolute 2.60 10*3/mm3      Monocytes, Absolute 0.98 10*3/mm3      Eosinophils, Absolute 0.80 10*3/mm3      Basophils, Absolute 0.08 10*3/mm3      Immature Grans, Absolute 0.03 10*3/mm3      nRBC 0.0 /100 WBC         Imaging Results (Last 24 Hours)     Procedure Component Value Units Date/Time    CT Angiogram Chest [[id number]] Resulted:  07/06/20 2347     Updated:  07/07/20 0011    XR Chest 1 View [[id number]] Resulted:  07/06/20 2335     Updated:  07/06/20 2340        I have personally reviewed and interpreted the radiology studies and ECG obtained at time of admission.     Assessment / Plan     Assessment:   Active Hospital Problems    Diagnosis   • **Acute exacerbation of chronic obstructive pulmonary disease (COPD) (CMS/[facility])   • Type 2 diabetes mellitus, without long-term current use of insulin (CMS/[facility])   • Hypertension   • Tobacco abuse, in remission     Plan:   Admit to medical floor, vitals routine  Continue Solumedrol/Douneb/Albuterol  Upcoming pulmonology visit on 7/22/2020  Home medications reviewed  Humalog sliding scale      Code Status: Full     I discussed the patient's  findings and my recommendations with the patient    Estimated length of stay over 2 midnights    Patient seen and examined by me on see below.    Arnaldo Freeman MD   07/07/20   03:12

## 2020-07-07 NOTE — PLAN OF CARE
Problem: Patient Care Overview  Goal: Plan of Care Review  Outcome: Ongoing (interventions implemented as appropriate)  Flowsheets (Taken 7/7/2020 2792)  Progress: no change  Plan of Care Reviewed With: patient  Outcome Summary: New admit to 460 from ED.  Pt c/o increasing SOA over past couple of days.  Was recently hospitalized with COPD exacerbation.  Pt c/o CONDE and frequent, productive cough that he states in pink tinged.  O2 at 2L NC; pt does not wear O2 at home. Some wheezing noted.   No c/o pain.  VSS.  Awaiting orders from physician.

## 2020-07-07 NOTE — PROGRESS NOTES
Continued Stay Note  [facility]     Patient Name: Naif Nick  MRN: [id number]  Today's Date: 7/7/2020    Admit Date: 7/6/2020    Discharge Plan     Row Name 07/07/20 1120       Plan    Plan  Attempted to screen patient and he was sleeping.  Did not disturb.  Will attempt to screen patient at another time.        Discharge Codes    No documentation.             CRISTI Nelson

## 2020-07-07 NOTE — PLAN OF CARE
Problem: Patient Care Overview  Goal: Plan of Care Review  Outcome: Ongoing (interventions implemented as appropriate)  Flowsheets (Taken 7/7/2020 1658)  Progress: no change  Plan of Care Reviewed With: patient  Outcome Summary: Patient still complaining of SOA. O2 sats WNL on 2L NC. Unable to wean today. IV steriods and breathing txs ordered. PRN tessalon pearls given x 1 with some relief. S 80-99 on tele. No c/o pain. Continue to monitor.     Problem: Patient Care Overview  Goal: Individualization and Mutuality  Outcome: Ongoing (interventions implemented as appropriate)  Flowsheets (Taken 7/7/2020 1658)  Patient Specific Preferences: Pt likes milk to drink.     Problem: Chronic Obstructive Pulmonary Disease (Adult)  Goal: Signs and Symptoms of Listed Potential Problems Will be Absent, Minimized or Managed (Chronic Obstructive Pulmonary Disease)  Outcome: Ongoing (interventions implemented as appropriate)  Flowsheets (Taken 7/7/2020 1658)  Problems Assessed (Chronic Obstructive Pulmonary Disease (COPD)): all  Problems Present (COPD, Bronch/Emphy): respiratory compromise

## 2020-07-08 LAB
GLUCOSE BLDC GLUCOMTR-MCNC: 205 MG/DL (ref 70–130)
GLUCOSE BLDC GLUCOMTR-MCNC: 212 MG/DL (ref 70–130)
GLUCOSE BLDC GLUCOMTR-MCNC: 234 MG/DL (ref 70–130)
GLUCOSE BLDC GLUCOMTR-MCNC: 305 MG/DL (ref 70–130)

## 2020-07-08 PROCEDURE — 99406 BEHAV CHNG SMOKING 3-10 MIN: CPT

## 2020-07-08 PROCEDURE — G0378 HOSPITAL OBSERVATION PER HR: HCPCS

## 2020-07-08 PROCEDURE — 96372 THER/PROPH/DIAG INJ SC/IM: CPT

## 2020-07-08 PROCEDURE — 82962 GLUCOSE BLOOD TEST: CPT

## 2020-07-08 PROCEDURE — 25010000002 METHYLPREDNISOLONE PER 40 MG: Performed by: FAMILY MEDICINE

## 2020-07-08 PROCEDURE — 94799 UNLISTED PULMONARY SVC/PX: CPT

## 2020-07-08 PROCEDURE — 96376 TX/PRO/DX INJ SAME DRUG ADON: CPT

## 2020-07-08 PROCEDURE — 94664 DEMO&/EVAL PT USE INHALER: CPT

## 2020-07-08 PROCEDURE — 25010000002 ENOXAPARIN PER 10 MG: Performed by: FAMILY MEDICINE

## 2020-07-08 PROCEDURE — 63710000001 INSULIN LISPRO (HUMAN) PER 5 UNITS: Performed by: NURSE PRACTITIONER

## 2020-07-08 PROCEDURE — 94640 AIRWAY INHALATION TREATMENT: CPT

## 2020-07-08 RX ORDER — METHYLPREDNISOLONE SODIUM SUCCINATE 40 MG/ML
40 INJECTION, POWDER, LYOPHILIZED, FOR SOLUTION INTRAMUSCULAR; INTRAVENOUS EVERY 24 HOURS
Status: DISCONTINUED | OUTPATIENT
Start: 2020-07-09 | End: 2020-07-09 | Stop reason: HOSPADM

## 2020-07-08 RX ORDER — BUDESONIDE AND FORMOTEROL FUMARATE DIHYDRATE 80; 4.5 UG/1; UG/1
2 AEROSOL RESPIRATORY (INHALATION)
Status: DISCONTINUED | OUTPATIENT
Start: 2020-07-08 | End: 2020-07-09 | Stop reason: HOSPADM

## 2020-07-08 RX ADMIN — SODIUM CHLORIDE, PRESERVATIVE FREE 10 ML: 5 INJECTION INTRAVENOUS at 20:48

## 2020-07-08 RX ADMIN — IPRATROPIUM BROMIDE AND ALBUTEROL SULFATE 3 ML: 2.5; .5 SOLUTION RESPIRATORY (INHALATION) at 06:39

## 2020-07-08 RX ADMIN — IPRATROPIUM BROMIDE AND ALBUTEROL SULFATE 3 ML: 2.5; .5 SOLUTION RESPIRATORY (INHALATION) at 14:25

## 2020-07-08 RX ADMIN — GABAPENTIN 300 MG: 300 CAPSULE ORAL at 08:14

## 2020-07-08 RX ADMIN — GUAIFENESIN 1200 MG: 600 TABLET, EXTENDED RELEASE ORAL at 20:47

## 2020-07-08 RX ADMIN — METHYLPREDNISOLONE SODIUM SUCCINATE 40 MG: 40 INJECTION, POWDER, FOR SOLUTION INTRAMUSCULAR; INTRAVENOUS at 06:32

## 2020-07-08 RX ADMIN — CARVEDILOL 3.12 MG: 3.12 TABLET, FILM COATED ORAL at 17:34

## 2020-07-08 RX ADMIN — ALBUTEROL SULFATE 2.5 MG: 2.5 SOLUTION RESPIRATORY (INHALATION) at 04:14

## 2020-07-08 RX ADMIN — INSULIN LISPRO 4 UNITS: 100 INJECTION, SOLUTION INTRAVENOUS; SUBCUTANEOUS at 08:15

## 2020-07-08 RX ADMIN — TAMSULOSIN HYDROCHLORIDE 0.4 MG: 0.4 CAPSULE ORAL at 08:15

## 2020-07-08 RX ADMIN — INSULIN LISPRO 5 UNITS: 100 INJECTION, SOLUTION INTRAVENOUS; SUBCUTANEOUS at 12:09

## 2020-07-08 RX ADMIN — IPRATROPIUM BROMIDE AND ALBUTEROL SULFATE 3 ML: 2.5; .5 SOLUTION RESPIRATORY (INHALATION) at 20:14

## 2020-07-08 RX ADMIN — GABAPENTIN 300 MG: 300 CAPSULE ORAL at 20:47

## 2020-07-08 RX ADMIN — ENOXAPARIN SODIUM 40 MG: 40 INJECTION SUBCUTANEOUS at 08:14

## 2020-07-08 RX ADMIN — IPRATROPIUM BROMIDE AND ALBUTEROL SULFATE 3 ML: 2.5; .5 SOLUTION RESPIRATORY (INHALATION) at 10:13

## 2020-07-08 RX ADMIN — TRAZODONE HYDROCHLORIDE 50 MG: 50 TABLET ORAL at 20:47

## 2020-07-08 RX ADMIN — MONTELUKAST SODIUM 10 MG: 10 TABLET, FILM COATED ORAL at 20:47

## 2020-07-08 RX ADMIN — GUAIFENESIN 1200 MG: 600 TABLET, EXTENDED RELEASE ORAL at 08:14

## 2020-07-08 RX ADMIN — BUDESONIDE AND FORMOTEROL FUMARATE DIHYDRATE 2 PUFF: 80; 4.5 AEROSOL RESPIRATORY (INHALATION) at 20:14

## 2020-07-08 RX ADMIN — ATORVASTATIN CALCIUM 20 MG: 10 TABLET, FILM COATED ORAL at 08:14

## 2020-07-08 RX ADMIN — INSULIN LISPRO 10 UNITS: 100 INJECTION, SOLUTION INTRAVENOUS; SUBCUTANEOUS at 17:34

## 2020-07-08 RX ADMIN — FAMOTIDINE 40 MG: 20 TABLET, FILM COATED ORAL at 08:15

## 2020-07-08 RX ADMIN — CARVEDILOL 3.12 MG: 3.12 TABLET, FILM COATED ORAL at 08:14

## 2020-07-08 RX ADMIN — AMLODIPINE BESYLATE 5 MG: 5 TABLET ORAL at 08:14

## 2020-07-08 NOTE — NURSING NOTE
Walked with patient in [place]. O2 sat stayed above 90% on room air. Sat 96% when returned to chair in his room.

## 2020-07-08 NOTE — PROGRESS NOTES
[facility] Medicine Services  INPATIENT PROGRESS NOTE    Patient Name: Naif Nick  Date of Admission: 7/6/2020  Today's Date: 07/08/20  Length of Stay: 0  Primary Care Physician: Salbador Anderson MD    Subjective   Chief Complaint: Follow-up shortness of breath  HPI   Patient seen and examined at bedside.  Patient reports he has just been weaned from 2 L nasal cannula to room air.  Continuous pulse oximeter 89 to 91% while I was in the room.  He feels that his breathing is about 50% back to baseline.  Patient states he has a nonproductive cough.  He tells me that his chest feels tight when he coughs.  He does complain of shortness of breath on exertion.  However, his breathing overall continues to improve.  Encouraged to increase activity today as tolerated.  He is tolerating a diet.  He denies chest pain or pressure.  He denies nausea, vomiting, and abdominal pain.    Review of Systems   All pertinent negatives and positives are as above. All other systems have been reviewed and are negative unless otherwise stated.     Objective    Temp:  [97.8 °F (36.6 °C)-98.3 °F (36.8 °C)] 98.3 °F (36.8 °C)  Heart Rate:  [74-95] 78  Resp:  [16-20] 18  BP: (120-162)/(50-74) 140/70  Physical Exam   Constitutional: He is oriented to person, place, and time. He appears well-developed and well-nourished. No distress.   Sitting up in bed.  No distress.  No oxygen use.   HENT:   Head: Normocephalic and atraumatic.   Mouth/Throat: No oropharyngeal exudate.   Eyes: EOM are normal. No scleral icterus.   Neck: Neck supple.   Cardiovascular: Normal rate, regular rhythm, normal heart sounds and intact distal pulses. Exam reveals no friction rub.   No murmur heard.  Pulmonary/Chest: Effort normal. No stridor. No respiratory distress. He has decreased breath sounds. He has no wheezes.   Abdominal: Soft. Bowel sounds are normal. He exhibits no distension. There is no tenderness. There is no guarding.    Musculoskeletal: Normal range of motion. He exhibits no edema or tenderness.   Lymphadenopathy:     He has no cervical adenopathy.   Neurological: He is alert and oriented to person, place, and time. No cranial nerve deficit.   Skin: Skin is warm. He is not diaphoretic. No erythema.   Psychiatric: He has a normal mood and affect. His behavior is normal.   Nursing note and vitals reviewed.      Results Review:  I have reviewed the labs, radiology results, and diagnostic studies.    Laboratory Data:   Results from last 7 days   Lab Units 07/06/20  2250   WBC 10*3/mm3 10.47   HEMOGLOBIN g/dL 13.6   HEMATOCRIT % 41.2   PLATELETS 10*3/mm3 404        Results from last 7 days   Lab Units 07/06/20  2250   SODIUM mmol/L 134*   POTASSIUM mmol/L 4.0   CHLORIDE mmol/L 94*   CO2 mmol/L 30.0*   BUN mg/dL 22   CREATININE mg/dL 1.14   CALCIUM mg/dL 9.6   BILIRUBIN mg/dL 0.3   ALK PHOS U/L 52   ALT (SGPT) U/L 25   AST (SGOT) U/L 14   GLUCOSE mg/dL 229*     Radiology Data:   Imaging Results (Last 24 Hours)     Procedure Component Value Units Date/Time    SCANNED - IMAGING [[id number]] Resulted:  07/06/20      Updated:  07/08/20 0657          I have reviewed the patient's current medications.     Assessment/Plan     Active Hospital Problems    Diagnosis   • **Suspected COPD exacerbation (CMS/[facility])   • Shortness of breath   • Type 2 diabetes mellitus, without long-term current use of insulin (CMS/[facility])   • Hypertension   • Tobacco abuse, in remission   • Hypoxia     Plan:  1.  On 7/6 patient was admitted for worsening shortness of breath.  Patient was recently hospitalized at our facility on 6/16 to 6/18 by Dr. Jacobs for suspected COPD exacerbation as patient has had no formal diagnosis of COPD and has had no formal pulmonary function test.  Patient does report a longstanding history of tobacco use but recently quit within the last 3 months.  He was initially placed on 4 L nasal cannula and he was weaned to room air.  He was  discharged with instructions to complete doxycycline and prednisone taper. In addition he was started on Symbicort.  Although, patient tells me he was unable to afford it so he did not pick it up at the pharmacy.  2.  He was scheduled for outpatient pulmonary function test on 7/7/2020 and appointment with pulmonology was made for 7/20/2020.  Discussed with his nurse Elena to try to get his PFT resheduled.  3.  CTA of the chest negative for pulmonary embolism or other acute cardiopulmonary process.  4.  Chest x-ray negative for acute cardiopulmonary process.  5.  He was given 125 mg IV Solu-Medrol x1. IV Solu-Medrol 40 mg every 24 hours with plans to transition to oral prednisone tomorrow.  6.  Duo nebs and Mucinex.  Supplemental oxygen has been weaned to room air.  He had a continuous pulse oximetry of 89 to 92% while I was in the room.  Consider walking oximetry in AM.  Encouraged him to use the incentive spirometer and flutter valve.  7.  On 6/25 hemoglobin A1c 7.5%.  Last blood glucose 227, 270, 212.  Moderate high dose sliding scale insulin.   He takes Trulicity at home.  8.  Lovenox for DVT prophylaxis.  9.  I have encouraged patient to sit up in chair with meals and to increase activity today as tolerated.  10.  I discussed with  insurance medication coverage as the patient told me Symbicort was not covered.  She informed me that Breo Ellipta is covered.  I will start the patient on Symbicort while in the hospital because we do not carry Breo with plans to transition him to Breo Ellipta upon discharge as his insurance will cover this - I discussed with Raphael respiratory therapist.    Discharge Planning: I expect the patient to be discharged to be determined.    TAMAR Monahan   07/08/20   09:23    I personally evaluated and examined the patient in conjunction with TAMAR Morin and agree with the assessment, treatment plan, and disposition of the patient as recorded by her. My history, exam, and  further recommendations are:   Patient states she is improving  Vitals:    07/08/20 1500   BP: 138/54   Pulse: 107   Resp: 20   Temp: 98.4 °F (36.9 °C)   SpO2: 92%   O2 room air was 92%  Appears to be doing better.  States he is seated on the chair.  His in room air  Is not in any distress  Continue current medication        Joaquin Jason MD  07/08/20  18:10

## 2020-07-08 NOTE — PLAN OF CARE
Problem: Patient Care Overview  Goal: Plan of Care Review  Outcome: Ongoing (interventions implemented as appropriate)  Flowsheets  Taken 7/8/2020 1534  Progress: improving  Outcome Summary: Patient ambulated in hallway with no issues. Weaned to RA. Possibly discharged home tomorrow. Up in chair most of day.  Taken 7/8/2020 0815  Plan of Care Reviewed With: patient

## 2020-07-08 NOTE — PLAN OF CARE
Problem: Patient Care Overview  Goal: Plan of Care Review  Outcome: Ongoing (interventions implemented as appropriate)  Flowsheets (Taken 7/8/2020 1151)  Progress: no change  Plan of Care Reviewed With: patient  Outcome Summary: c/o SOA with exertion and frequent cough.  PRN Tesslon Perles administered as ordered.  O2 at 2L NC continues; patient will need to be qualified for home O2 before discharge.  VSS.  NSR 72-92 with BBB on tele.  Will continue to monitor.

## 2020-07-08 NOTE — PROGRESS NOTES
Discharge Planning Assessment  [facility]     Patient Name: Naif Nick  MRN: [id number]  Today's Date: 7/8/2020    Admit Date: 7/6/2020    Discharge Needs Assessment     Row Name 07/08/20 1051       Living Environment    Lives With  alone    Current Living Arrangements  home/apartment/condo    Primary Care Provided by  self    Provides Primary Care For  no one    Family Caregiver if Needed  none    Quality of Family Relationships  unable to assess    Able to Return to Prior Arrangements  yes       Resource/Environmental Concerns    Resource/Environmental Concerns  none       Transition Planning    Patient/Family Anticipates Transition to  home    Patient/Family Anticipated Services at Transition  none    Transportation Anticipated  family or friend will provide       Discharge Needs Assessment    Readmission Within the Last 30 Days  no previous admission in last 30 days    Equipment Currently Used at Home  none    Anticipated Changes Related to Illness  none    Equipment Needed After Discharge  oxygen    Current Discharge Risk  chronically ill;lives alone    Discharge Coordination/Progress  SW spoke with patient regarding discharge plan/needs.  Patient states he resides alone and functions independently.  Patient may need home oxygen upon discharge.  Patient has a PCP and RX coverage.          Discharge Plan    No documentation.       Destination      Coordination has not been started for this encounter.      Durable Medical Equipment      Coordination has not been started for this encounter.      Dialysis/Infusion      Coordination has not been started for this encounter.      Home Medical Care      Coordination has not been started for this encounter.      Therapy      Coordination has not been started for this encounter.      Community Resources      Coordination has not been started for this encounter.          Demographic Summary    No documentation.       Functional Status    No documentation.        Psychosocial    No documentation.       Abuse/Neglect    No documentation.       Legal    No documentation.       Substance Abuse    No documentation.       Patient Forms    No documentation.           ABDIRIZAK NelsonW

## 2020-07-09 ENCOUNTER — APPOINTMENT (OUTPATIENT)
Dept: PULMONOLOGY | Facility: HOSPITAL | Age: 70
End: 2020-07-09

## 2020-07-09 ENCOUNTER — TRANSCRIBE ORDERS (OUTPATIENT)
Dept: ADMINISTRATIVE | Facility: HOSPITAL | Age: 70
End: 2020-07-09

## 2020-07-09 VITALS
TEMPERATURE: 98 F | HEART RATE: 87 BPM | OXYGEN SATURATION: 97 % | BODY MASS INDEX: 24.08 KG/M2 | DIASTOLIC BLOOD PRESSURE: 76 MMHG | RESPIRATION RATE: 18 BRPM | HEIGHT: 71 IN | SYSTOLIC BLOOD PRESSURE: 153 MMHG | WEIGHT: 172 LBS

## 2020-07-09 DIAGNOSIS — Z01.818 PREOP EXAMINATION: Primary | ICD-10-CM

## 2020-07-09 LAB
ANION GAP SERPL CALCULATED.3IONS-SCNC: 10 MMOL/L (ref 5–15)
BUN SERPL-MCNC: 28 MG/DL (ref 8–23)
BUN/CREAT SERPL: 29.5 (ref 7–25)
CALCIUM SPEC-SCNC: 9.3 MG/DL (ref 8.6–10.5)
CHLORIDE SERPL-SCNC: 96 MMOL/L (ref 98–107)
CO2 SERPL-SCNC: 31 MMOL/L (ref 22–29)
CREAT SERPL-MCNC: 0.95 MG/DL (ref 0.76–1.27)
DEPRECATED RDW RBC AUTO: 43 FL (ref 37–54)
ERYTHROCYTE [DISTWIDTH] IN BLOOD BY AUTOMATED COUNT: 13 % (ref 12.3–15.4)
GFR SERPL CREATININE-BSD FRML MDRD: 95 ML/MIN/1.73
GLUCOSE BLDC GLUCOMTR-MCNC: 192 MG/DL (ref 70–130)
GLUCOSE SERPL-MCNC: 174 MG/DL (ref 65–99)
HCT VFR BLD AUTO: 37.9 % (ref 37.5–51)
HGB BLD-MCNC: 12.3 G/DL (ref 13–17.7)
MCH RBC QN AUTO: 29.7 PG (ref 26.6–33)
MCHC RBC AUTO-ENTMCNC: 32.5 G/DL (ref 31.5–35.7)
MCV RBC AUTO: 91.5 FL (ref 79–97)
PLATELET # BLD AUTO: 419 10*3/MM3 (ref 140–450)
PMV BLD AUTO: 9.5 FL (ref 6–12)
POTASSIUM SERPL-SCNC: 4.3 MMOL/L (ref 3.5–5.2)
RBC # BLD AUTO: 4.14 10*6/MM3 (ref 4.14–5.8)
SODIUM SERPL-SCNC: 137 MMOL/L (ref 136–145)
WBC # BLD AUTO: 14 10*3/MM3 (ref 3.4–10.8)

## 2020-07-09 PROCEDURE — 94618 PULMONARY STRESS TESTING: CPT

## 2020-07-09 PROCEDURE — 25010000002 METHYLPREDNISOLONE PER 40 MG: Performed by: NURSE PRACTITIONER

## 2020-07-09 PROCEDURE — 85027 COMPLETE CBC AUTOMATED: CPT | Performed by: NURSE PRACTITIONER

## 2020-07-09 PROCEDURE — 94799 UNLISTED PULMONARY SVC/PX: CPT

## 2020-07-09 PROCEDURE — 80048 BASIC METABOLIC PNL TOTAL CA: CPT | Performed by: NURSE PRACTITIONER

## 2020-07-09 PROCEDURE — 96376 TX/PRO/DX INJ SAME DRUG ADON: CPT

## 2020-07-09 PROCEDURE — 96372 THER/PROPH/DIAG INJ SC/IM: CPT

## 2020-07-09 PROCEDURE — 82962 GLUCOSE BLOOD TEST: CPT

## 2020-07-09 PROCEDURE — 25010000002 ENOXAPARIN PER 10 MG: Performed by: FAMILY MEDICINE

## 2020-07-09 PROCEDURE — G0378 HOSPITAL OBSERVATION PER HR: HCPCS

## 2020-07-09 RX ORDER — PREDNISONE 10 MG/1
TABLET ORAL
Qty: 10 TABLET | Refills: 0 | Status: SHIPPED | OUTPATIENT
Start: 2020-07-10 | End: 2020-07-15

## 2020-07-09 RX ADMIN — SODIUM CHLORIDE, PRESERVATIVE FREE 10 ML: 5 INJECTION INTRAVENOUS at 08:29

## 2020-07-09 RX ADMIN — IPRATROPIUM BROMIDE AND ALBUTEROL SULFATE 3 ML: 2.5; .5 SOLUTION RESPIRATORY (INHALATION) at 10:51

## 2020-07-09 RX ADMIN — FAMOTIDINE 40 MG: 20 TABLET, FILM COATED ORAL at 08:30

## 2020-07-09 RX ADMIN — GABAPENTIN 300 MG: 300 CAPSULE ORAL at 08:30

## 2020-07-09 RX ADMIN — METHYLPREDNISOLONE SODIUM SUCCINATE 40 MG: 40 INJECTION, POWDER, FOR SOLUTION INTRAMUSCULAR; INTRAVENOUS at 05:59

## 2020-07-09 RX ADMIN — AMLODIPINE BESYLATE 5 MG: 5 TABLET ORAL at 08:30

## 2020-07-09 RX ADMIN — BUDESONIDE AND FORMOTEROL FUMARATE DIHYDRATE 2 PUFF: 80; 4.5 AEROSOL RESPIRATORY (INHALATION) at 06:32

## 2020-07-09 RX ADMIN — GUAIFENESIN 1200 MG: 600 TABLET, EXTENDED RELEASE ORAL at 08:30

## 2020-07-09 RX ADMIN — ENOXAPARIN SODIUM 40 MG: 40 INJECTION SUBCUTANEOUS at 08:29

## 2020-07-09 RX ADMIN — TAMSULOSIN HYDROCHLORIDE 0.4 MG: 0.4 CAPSULE ORAL at 08:30

## 2020-07-09 RX ADMIN — ATORVASTATIN CALCIUM 20 MG: 10 TABLET, FILM COATED ORAL at 08:30

## 2020-07-09 RX ADMIN — IPRATROPIUM BROMIDE AND ALBUTEROL SULFATE 3 ML: 2.5; .5 SOLUTION RESPIRATORY (INHALATION) at 06:31

## 2020-07-09 RX ADMIN — CARVEDILOL 3.12 MG: 3.12 TABLET, FILM COATED ORAL at 08:30

## 2020-07-09 NOTE — DISCHARGE SUMMARY
[facility] Medicine Services  DISCHARGE SUMMARY       Date of Admission: 7/6/2020  Date of Discharge:  7/9/2020  Primary Care Physician: Salbador Anderson MD    Presenting Problem/History of Present Illness:  Acute exacerbation of chronic obstructive pulmonary disease (COPD) (CMS/[facility]) [J44.1]  Shortness of breath [R06.02]     Final Discharge Diagnoses:  Active Hospital Problems    Diagnosis   • **Suspected COPD exacerbation (CMS/[facility])   • Shortness of breath   • Type 2 diabetes mellitus, without long-term current use of insulin (CMS/[facility])   • Hypertension   • Tobacco abuse, in remission   • Hypoxia       Consults: None.    Procedures Performed:   Imaging Results (Last 72 Hours)     Procedure Component Value Units Date/Time    SCANNED - IMAGING [[id number]] Resulted:  07/06/20      Updated:  07/08/20 0657    CT Angiogram Chest [[id number]] Collected:  07/07/20 0738     Updated:  07/07/20 0746    Narrative:       CT chest angiogram dated 7/6/2020 11:43 PM CDT      HISTORY: Shortness of air. Positive d-dimer.     COMPARISON: Low-dose CT chest 11/15/2019.      DLP: 223 mGy cm. Automated exposure control was utilized to diminish  patient radiation dose.     TECHNIQUE: Helical tomographic images of the chest were obtained after  the administration of intravenous contrast following angiogram protocol.  Additionally, 3D MIP reconstructions in the coronal and sagittal planes  were provided.        FINDINGS:    The imaged portion of the base of the neck appears unremarkable.      There is adequate enhancement of the pulmonary arteries to evaluate for  central and segmental pulmonary emboli. There are no filling defects  within the main, lobar, segmental or visualized subsegmental pulmonary  arteries. The pulmonary vessels are within normal limits.      There are changes of centrilobular emphysema. A 5 mm right lower lobe  groundglass nodule is stable from previous studies. Lungs are  otherwise  clear. No evidence of consolidative pneumonia or effusion.. Bronchial  wall thickening is present suggesting chronic bronchitis..      The heart and great vessels appear unremarkable. There is no pericardial  effusion.      No enlarged axillary or mediastinal lymph nodes are present.      The osseous structures of the thorax and surrounding soft tissues  demonstrate no acute process.      The imaged portion of the upper abdomen appears unremarkable.        Impression:       1. No evidence of pulmonary embolus or other acute cardiopulmonary  process.  2. Stable 5 mm groundglass nodule in the right lower lobe from previous  studies. Lungs are otherwise clear.  3. No enlarged mediastinal or axillary lymphadenopathy.        This report was finalized on 07/07/2020 07:43 by Dr. Johnnie Arzola MD.    XR Chest 1 View [[id number]] Collected:  07/07/20 0654     Updated:  07/07/20 0657    Narrative:       Frontal upright radiograph of the chest 7/6/2020 11:35 PM CDT     COMPARISON: June 16, 2020     HISTORY: Short of breath.     FINDINGS:   The lungs are clear. No infiltrates are identified.. The  cardiomediastinal silhouette and pulmonary vascularity are within normal  limits.      The osseous structures and surrounding soft tissues demonstrate no acute  abnormality.       Impression:       1. No radiographic evidence of acute cardiopulmonary process.        This report was finalized on 07/07/2020 06:54 by Dr. Ceasar Smyth MD.        Pertinent Test Results:   Lab Results (last 72 hours)     Procedure Component Value Units Date/Time    POC Glucose Once [[id number]]  (Abnormal) Collected:  07/09/20 0752    Specimen:  Blood Updated:  07/09/20 0804     Glucose 192 mg/dL      Comment: : [id number] Walker AprilMeter ID: [id number]       Basic Metabolic Panel [[id number]]  (Abnormal) Collected:  07/09/20 0502    Specimen:  Blood Updated:  07/09/20 0659     Glucose 174 mg/dL      BUN 28 mg/dL      Creatinine 0.95  mg/dL      Sodium 137 mmol/L      Potassium 4.3 mmol/L      Chloride 96 mmol/L      CO2 31.0 mmol/L      Calcium 9.3 mg/dL      eGFR  African Amer 95 mL/min/1.73      BUN/Creatinine Ratio 29.5     Anion Gap 10.0 mmol/L     Narrative:       GFR Normal >60  Chronic Kidney Disease <60  Kidney Failure <15      CBC (No Diff) [[id number]]  (Abnormal) Collected:  07/09/20 0502    Specimen:  Blood Updated:  07/09/20 0610     WBC 14.00 10*3/mm3      RBC 4.14 10*6/mm3      Hemoglobin 12.3 g/dL      Hematocrit 37.9 %      MCV 91.5 fL      MCH 29.7 pg      MCHC 32.5 g/dL      RDW 13.0 %      RDW-SD 43.0 fl      MPV 9.5 fL      Platelets 419 10*3/mm3     POC Glucose Once [[id number]]  (Abnormal) Collected:  07/08/20 2111    Specimen:  Blood Updated:  07/08/20 2138     Glucose 205 mg/dL      Comment: : [id number] Enrique (Luis Enrique) PhillyaMeter ID: [id number]       POC Glucose Once [[id number]]  (Abnormal) Collected:  07/08/20 1653    Specimen:  Blood Updated:  07/08/20 1710     Glucose 305 mg/dL      Comment: : [id number] Saroj CartyaMeter ID: [id number]       POC Glucose Once [[id number]]  (Abnormal) Collected:  07/08/20 1056    Specimen:  Blood Updated:  07/08/20 1130     Glucose 234 mg/dL      Comment: : [id number] Yao LiceaaMeter ID: [id number]       POC Glucose Once [[id number]]  (Abnormal) Collected:  07/08/20 0739    Specimen:  Blood Updated:  07/08/20 0810     Glucose 212 mg/dL      Comment: : [id number] Yao LiceaaMeter ID: [id number]       POC Glucose Once [[id number]]  (Abnormal) Collected:  07/07/20 1928    Specimen:  Blood Updated:  07/07/20 1940     Glucose 270 mg/dL      Comment: : [id number] Baljeet ZuletaeMeter ID: [id number]       POC Glucose Once [[id number]]  (Abnormal) Collected:  07/07/20 1627    Specimen:  Blood Updated:  07/07/20 1638     Glucose 227 mg/dL      Comment: : [id number] [facility]amy [facility]: [id number]       POC Glucose Once [[id number]]  (Abnormal) Collected:  07/07/20  1116    Specimen:  Blood Updated:  07/07/20 1150     Glucose 312 mg/dL      Comment: : [id number] Yao SawantonnaMeter ID: [id number]       POC Glucose Once [[id number]]  (Abnormal) Collected:  07/07/20 0722    Specimen:  Blood Updated:  07/07/20 0748     Glucose 216 mg/dL      Comment: : [id number] Yao LadonnaMeter ID: [id number]       Troponin [[id number]]  (Normal) Collected:  07/07/20 0057    Specimen:  Blood Updated:  07/07/20 0149     Troponin T <0.010 ng/mL     Narrative:       Troponin T Reference Range:  <= 0.03 ng/mL-   Negative for AMI  >0.03 ng/mL-     Abnormal for myocardial necrosis.  Clinicians would have to utilize clinical acumen, EKG, Troponin and serial changes to determine if it is an Acute Myocardial Infarction or myocardial injury due to an underlying chronic condition.       Results may be falsely decreased if patient taking Biotin.      [place] Draw [[id number]] Collected:  07/06/20 2247    Specimen:  Blood Updated:  07/07/20 0000    Narrative:       The following orders were created for panel order [place] Draw.  Procedure                               Abnormality         Status                     ---------                               -----------         ------                     Light Blue Top[[id number]]                                   Final result               Green Top (Gel)[[id number]]                                  Final result               Lavender Top[[id number]]                                     Final result               Red Top[[id number]]                                          Final result               [place] Blood Culture Cesar...[[id number]]                      Final result               Gray Top - Ice[[id number]]                                   Final result                 Please view results for these tests on the individual orders.    Green Top (Gel) [[id number]] Collected:  07/06/20 2250    Specimen:  Blood Updated:  07/07/20 0000     Extra Tube Hold for  add-ons.     Comment: Auto resulted.       Lavender Top [[id number]] Collected:  07/06/20 2250    Specimen:  Blood Updated:  07/07/20 0000     Extra Tube hold for add-on     Comment: Auto resulted       Red Top [[id number]] Collected:  07/06/20 2250    Specimen:  Blood Updated:  07/07/20 0000     Extra Tube Hold for add-ons.     Comment: Auto resulted.       Gray Top - Ice [[id number]] Collected:  07/06/20 2250    Specimen:  Blood Updated:  07/07/20 0000     Extra Tube Hold for add-ons.     Comment: Auto resulted.       Light Blue Top [[id number]] Collected:  07/06/20 2250    Specimen:  Blood Updated:  07/07/20 0000     Extra Tube hold for add-on     Comment: Auto resulted       Power Blood Culture Bottle Set [[id number]] Collected:  07/06/20 2247    Specimen:  Blood from Arm, Left Updated:  07/07/20 0000     Extra Tube Hold for add-ons.     Comment: Auto resulted.       COVID PRE-OP / PRE-PROCEDURE SCREENING ORDER (NO ISOLATION) - Swab, Nasopharynx [[id number]] Collected:  07/06/20 2310    Specimen:  Swab from Nasopharynx Updated:  07/06/20 2333    Narrative:       The following orders were created for panel order COVID PRE-OP / PRE-PROCEDURE SCREENING ORDER (NO ISOLATION) - Swab, Nasopharynx.  Procedure                               Abnormality         Status                     ---------                               -----------         ------                     COVID-19, ABBOTT IN-HOUS...[[id number]]  Normal              Final result                 Please view results for these tests on the individual orders.    COVID-19, ABBOTT IN-HOUSE,NP Swab (NO TRANSPORT MEDIA) 2 HR TAT - Swab, Nasopharynx [[id number]]  (Normal) Collected:  07/06/20 2310    Specimen:  Swab from Nasopharynx Updated:  07/06/20 2333     COVID19 Not Detected    Narrative:       Fact sheet for providers: https://www.fda.gov/media/215850/download     Fact sheet for patients: https://www.fda.gov/media/060642/download    Comprehensive Metabolic Panel  [[id number]]  (Abnormal) Collected:  07/06/20 2250    Specimen:  Blood Updated:  07/06/20 2316     Glucose 229 mg/dL      BUN 22 mg/dL      Creatinine 1.14 mg/dL      Sodium 134 mmol/L      Potassium 4.0 mmol/L      Chloride 94 mmol/L      CO2 30.0 mmol/L      Calcium 9.6 mg/dL      Total Protein 7.5 g/dL      Albumin 4.10 g/dL      ALT (SGPT) 25 U/L      AST (SGOT) 14 U/L      Alkaline Phosphatase 52 U/L      Total Bilirubin 0.3 mg/dL      eGFR   Amer 77 mL/min/1.73      Globulin 3.4 gm/dL      A/G Ratio 1.2 g/dL      BUN/Creatinine Ratio 19.3     Anion Gap 10.0 mmol/L     Narrative:       GFR Normal >60  Chronic Kidney Disease <60  Kidney Failure <15      Troponin [[id number]]  (Normal) Collected:  07/06/20 2250    Specimen:  Blood Updated:  07/06/20 2314     Troponin T <0.010 ng/mL     Narrative:       Troponin T Reference Range:  <= 0.03 ng/mL-   Negative for AMI  >0.03 ng/mL-     Abnormal for myocardial necrosis.  Clinicians would have to utilize clinical acumen, EKG, Troponin and serial changes to determine if it is an Acute Myocardial Infarction or myocardial injury due to an underlying chronic condition.       Results may be falsely decreased if patient taking Biotin.      BNP [[id number]]  (Normal) Collected:  07/06/20 2250    Specimen:  Blood Updated:  07/06/20 2314     proBNP 38.8 pg/mL     Narrative:       Among patients with dyspnea, NT-proBNP is highly sensitive for the detection of acute congestive heart failure. In addition NT-proBNP of <300 pg/ml effectively rules out acute congestive heart failure with 99% negative predictive value.    Results may be falsely decreased if patient taking Biotin.      Blood Gas, Arterial [[id number]]  (Abnormal) Collected:  07/06/20 2311    Specimen:  Arterial Blood Updated:  07/06/20 2310     Site Right Radial     Nacho's Test Positive     pH, Arterial 7.424 pH units      pCO2, Arterial 47.2 mm Hg      Comment: 83 Value above reference range        pO2, Arterial  62.0 mm Hg      Comment: 84 Value below reference range        HCO3, Arterial 30.9 mmol/L      Comment: 83 Value above reference range        Base Excess, Arterial 5.5 mmol/L      Comment: 83 Value above reference range        O2 Saturation, Arterial 92.5 %      Comment: 84 Value below reference range        Temperature 37.0 C      Barometric Pressure for Blood Gas 750 mmHg      Modality Room Air     Ventilator Mode NA     Collected by [id number]     Comment: Meter: [id number]     :  [id number]        pCO2, Temperature Corrected 47.2 mm Hg      pH, Temp Corrected 7.424 pH Units      pO2, Temperature Corrected 62.0 mm Hg     D-dimer, Quantitative [[id number]]  (Abnormal) Collected:  07/06/20 2250    Specimen:  Blood Updated:  07/06/20 2308     D-Dimer, Quantitative 0.52 mg/L (FEU)     Narrative:       Reference Range is 0-0.50 mg/L FEU. However, results <0.50 mg/L FEU tends to rule out DVT or PE. Results >0.50 mg/L FEU are not useful in predicting absence or presence of DVT or PE.      CBC & Differential [[id number]] Collected:  07/06/20 2250    Specimen:  Blood Updated:  07/06/20 2258    Narrative:       The following orders were created for panel order CBC & Differential.  Procedure                               Abnormality         Status                     ---------                               -----------         ------                     CBC Auto Differential[[id number]]        Abnormal            Final result                 Please view results for these tests on the individual orders.    CBC Auto Differential [[id number]]  (Abnormal) Collected:  07/06/20 2250    Specimen:  Blood Updated:  07/06/20 2258     WBC 10.47 10*3/mm3      RBC 4.57 10*6/mm3      Hemoglobin 13.6 g/dL      Hematocrit 41.2 %      MCV 90.2 fL      MCH 29.8 pg      MCHC 33.0 g/dL      RDW 12.9 %      RDW-SD 41.8 fl      MPV 8.6 fL      Platelets 404 10*3/mm3      Neutrophil % 57.1 %      Lymphocyte % 24.8 %      Monocyte % 9.4 %       "Eosinophil % 7.6 %      Basophil % 0.8 %      Immature Grans % 0.3 %      Neutrophils, Absolute 5.98 10*3/mm3      Lymphocytes, Absolute 2.60 10*3/mm3      Monocytes, Absolute 0.98 10*3/mm3      Eosinophils, Absolute 0.80 10*3/mm3      Basophils, Absolute 0.08 10*3/mm3      Immature Grans, Absolute 0.03 10*3/mm3      nRBC 0.0 /100 WBC           Chief Complaint on Day of Discharge: Patient reports his breathing has much improved and he is feeling much better.  He feels ready to return to home.    Hospital Course:  The patient is a 69 y.o. male who [facility] to [facility] with shortness of breath.  He has a past medical history significant for suspected COPD, type 2 diabetes without insulin use, hypertension, and former tobacco use.  Patient was recently hospitalized at our facility on 6/16 to 6/18 by Dr. Jacobs for suspected COPD exacerbation as patient has had no formal diagnosis of COPD and has had no formal pulmonary function test.  Patient does report a longstanding history of tobacco use but recently quit within the last 3 months.  He was initially placed on 4 L nasal cannula and was weaned to room air.  He was discharged with instructions to complete doxycycline and prednisone taper. In addition, he was started on Symbicort.  Although, patient tells me he was unable to afford it so he did not pick it up at the pharmacy.  The patient reported he began to decline shortly after he ran out of Symbicort.  He complained of shortness of breath worse with exertion.  Patient tells me \"he went downhill real quick.\"  He denied associated chest pain, pressure, tightness.  Denied fever or chills.  Upon evaluation in the emergency department, he was found to be hypoxic.  He was admitted to the hospital service for further evaluation and management.    COVID-19 negative.  He has been monitored off of antibiotics as he has just completed doxycycline.  He has remained afebrile.  CTA of the chest negative for " "pulmonary embolism or other acute cardiopulmonary process.  Chest x-ray negative for acute cardiopulmonary process.  Intravenous Solu-Medrol was initiated.  He will be transitioned to prednisone taper upon discharge.  Duo nebs, Mucinex, and Symbicort were initiated.  I discussed with  Joselin regarding insurance medication coverage.  She informed me that Breo Ellipta and is covered by his insurance.  Therefore, Symbicort was initiated while in the hospital as we do not carry Breo with plans to transition him to Breo Ellipta upon discharge.  He has been encouraged to use incentive spirometry and flutter valve.  Patient has increased activity as tolerated.  He tells me he was able to sit in the chair majority of the day yesterday and was able to ambulate in the hallway several times.  He was placed on supplemental oxygen 2 L nasal cannula.  He has now been weaned to room air.  Patient reports overall, his breathing has much improved.  He has a nonproductive cough.  He denies chest pain or pressure.  He is tolerating a diet.  Walking oximetry today was obtained, patient walked in hallway moderate pace with no desaturations.  His oxygen did not fall below 92%.  He tolerated well with minimal recovery.    He was originally scheduled for outpatient pulmonary function test on 7/7/2020 and had an appointment to establish care with pulmonology on 7/20/2020.  I have discussed with the respiratory therapist, nurse Chandler,  and  to assist with rescheduling his appointments.  He is now medically stable for discharge home.  He is to follow-up with his primary care provider within 1 week.  He has been discharged with instructions to start Breo Ellipta and continue prednisone taper.      Condition on Discharge:  Medically stable.    Physical Exam on Discharge:  /76 (BP Location: Left arm, Patient Position: Sitting)   Pulse 81   Temp 98 °F (36.7 °C) (Oral)   Resp 18   Ht 180.3 cm (71\")   Wt 78 kg " (172 lb)   SpO2 91%   BMI 23.99 kg/m²   Physical Exam  Constitutional: He is oriented to person, place, and time. He appears well-developed and well-nourished. No distress.   Sitting up in bed.  No distress.  No oxygen use.   HENT:   Head: Normocephalic and atraumatic.   Mouth/Throat: No oropharyngeal exudate.   Eyes: EOM are normal. No scleral icterus.   Neck: Neck supple.   Cardiovascular: Normal rate, regular rhythm, normal heart sounds and intact distal pulses. Exam reveals no friction rub.   Sinus 76-95  No murmur heard.  Pulmonary/Chest: Effort normal. No stridor. No respiratory distress. He has decreased breath sounds. He has no wheezes.   Abdominal: Soft. Bowel sounds are normal. He exhibits no distension. There is no tenderness. There is no guarding.   Musculoskeletal: Normal range of motion. He exhibits no edema or tenderness.   Lymphadenopathy:     He has no cervical adenopathy.   Neurological: He is alert and oriented to person, place, and time. No cranial nerve deficit.   Skin: Skin is warm. He is not diaphoretic. No erythema.   Psychiatric: He has a normal mood and affect. His behavior is normal.   Nursing note and vitals reviewed.    Discharge Disposition:  Home    Discharge Medications:     Discharge Medications      New Medications      Instructions Start Date   Fluticasone Furoate-Vilanterol 100-25 MCG/INH inhaler  Commonly known as:  BREO ELLIPTA   1 puff, Inhalation, Daily      predniSONE 10 MG tablet  Commonly known as:  DELTASONE   Take 4 tablets by mouth Daily for 1 day, THEN 2 tablets Daily for 2 days, THEN 1 tablet Daily for 2 days.   Start Date:  July 10, 2020        Continue These Medications      Instructions Start Date   albuterol (2.5 MG/3ML) 0.083% nebulizer solution  Commonly known as:  PROVENTIL   2.5 mg, Nebulization, Every 4 Hours PRN      amLODIPine 5 MG tablet  Commonly known as:  NORVASC   5 mg, Oral, Daily      atorvastatin 20 MG tablet  Commonly known as:  LIPITOR   20 mg,  Oral, Daily      benzonatate 200 MG capsule  Commonly known as:  TESSALON   200 mg, Oral, 3 Times Daily PRN      carvedilol 3.125 MG tablet  Commonly known as:  COREG   3.125 mg, Oral, 2 Times Daily With Meals      gabapentin 300 MG capsule  Commonly known as:  NEURONTIN   300 mg, Oral, 3 Times Daily      guaiFENesin 600 MG 12 hr tablet  Commonly known as:  MUCINEX   1,200 mg, Oral, Every 12 Hours Scheduled      montelukast 10 MG tablet  Commonly known as:  SINGULAIR   10 mg, Oral, Nightly      tamsulosin 0.4 MG capsule 24 hr capsule  Commonly known as:  FLOMAX   1 capsule, Oral, Daily      traZODone 50 MG tablet  Commonly known as:  DESYREL   50 mg, Oral, Nightly      Trulicity 0.75 MG/0.5ML solution pen-injector  Generic drug:  Dulaglutide   0.75 mg, Subcutaneous, Weekly, Thursday.       valsartan-hydrochlorothiazide 320-25 MG per tablet  Commonly known as:  DIOVAN-HCT   1 tablet, Oral, Daily           Discharge Diet:   Diet Instructions     Diet: Regular, Consistent Carbohydrate      Discharge Diet:   Regular  Consistent Carbohydrate           Activity at Discharge:   Activity Instructions     Activity as Tolerated          Discharge Care Plan/Instructions:   1.  For worsening shortness of breath seek medical attention.  2.  Start Breo Ellipta and continue tapering dose of prednisone at discharge.   4.  Outpatient pulmonary function test.  5.  Follow-up with Dr. Anderson within one week.  6.  Follow-up with Pulmonology.     Follow-up Appointments:   Future Appointments   Date Time Provider Department Center   7/20/2020  2:30 PM Raquel Sosa APRN [facility] None   1.  Follow-up with primary care provider Dr. Anderson within 1 week.  2.  Follow-up for outpatient pulmonary function test and establish care with pulmonology.    Test Results Pending at Discharge: None.    TAMAR Monahan  07/09/20  10:03    Time: 40 minutes.      I personally evaluated and examined the patient in conjunction with  TAMAR Morin  and agree with the assessment, treatment plan, and disposition of the patient as recorded by her. My history, exam, and further recommendations are:   Doing ok  All dressed up prepared to leave the hospital  Breathing much better  Hemodynamically stable.    Advised to keep appt for PFT      Joaquin Jason MD  07/10/20  16:50  This is a late entry from encounter dated July 9 about 9 AM.

## 2020-07-09 NOTE — PROGRESS NOTES
Exercise Oximetry    Patient Name:Naif Nick   MRN: [id number]   Date: 07/09/20             ROOM AIR BASELINE   SpO2% 93%   Heart Rate 93bpm   Blood Pressure      EXERCISE ON ROOM AIR SpO2% EXERCISE ON O2 @  LPM SpO2%   1 MINUTE 92% 1 MINUTE    2 MINUTES 93% 2 MINUTES    3 MINUTES  3 MINUTES    4 MINUTES  4 MINUTES    5 MINUTES  5 MINUTES    6 MINUTES  6 MINUTES               Distance Walked 242 Distance Walked   Dyspnea (Frandy Scale)  1 Dyspnea (Rfandy Scale)   Fatigue (Frandy Scale)  Fatigue (Frandy Scale)   SpO2% Post Exercise  93% SpO2% Post Exercise   HR Post Exercise  95bpm HR Post Exercise   Time to Recovery  Minimal Time to Recovery     Comments: pt walked in hallway tolerated well at moderate pace. No desat, and no complications.

## 2020-07-09 NOTE — PLAN OF CARE
Problem: Patient Care Overview  Goal: Plan of Care Review  7/9/2020 0444 by Ana Maria Nunez RN  Outcome: Ongoing (interventions implemented as appropriate)  Flowsheets  Taken 7/9/2020 0444  Progress: improving  Taken 7/9/2020 0439  Outcome Summary: pt had no c/o pain this shift; pt has rested well; BP elevated at times; pt has remained on room air with O2 sat staying above 90%;  blood glucose still elevated; safety maintained; will continue to monitor

## 2020-07-10 ENCOUNTER — READMISSION MANAGEMENT (OUTPATIENT)
Dept: CALL CENTER | Facility: HOSPITAL | Age: 70
End: 2020-07-10

## 2020-07-10 NOTE — OUTREACH NOTE
Prep Survey      Responses   Religious facility patient discharged from?  [place]   Is LACE score < 7 ?  No   Eligibility  Readm Mgmt   Discharge diagnosis  COPD   COVID-19 Test Status  Negative   Does the patient have one of the following disease processes/diagnoses(primary or secondary)?  COPD/Pneumonia   Does the patient have Home health ordered?  No   Is there a DME ordered?  No   Comments regarding appointments  see AVS for apmt   Prep survey completed?  Yes          Bharti Loya RN

## 2020-07-13 RX ORDER — BUDESONIDE 0.25 MG/2ML
0.25 INHALANT ORAL
Qty: 200 ML | Refills: 0 | Status: SHIPPED | OUTPATIENT
Start: 2020-07-13 | End: 2020-08-13 | Stop reason: ALTCHOICE

## 2020-07-15 ENCOUNTER — TRANSCRIBE ORDERS (OUTPATIENT)
Dept: ADMINISTRATIVE | Facility: HOSPITAL | Age: 70
End: 2020-07-15

## 2020-07-15 ENCOUNTER — LAB (OUTPATIENT)
Dept: LAB | Facility: HOSPITAL | Age: 70
End: 2020-07-15

## 2020-07-15 ENCOUNTER — READMISSION MANAGEMENT (OUTPATIENT)
Dept: CALL CENTER | Facility: HOSPITAL | Age: 70
End: 2020-07-15

## 2020-07-15 DIAGNOSIS — R06.02 SHORTNESS OF BREATH: Primary | ICD-10-CM

## 2020-07-15 LAB
ALBUMIN SERPL-MCNC: 3.8 G/DL (ref 3.5–5)
ALBUMIN/GLOB SERPL: 1.1 G/DL (ref 1.1–2.5)
ALP SERPL-CCNC: 41 U/L (ref 24–120)
ALT SERPL W P-5'-P-CCNC: 54 U/L (ref 0–50)
ANION GAP SERPL CALCULATED.3IONS-SCNC: 5 MMOL/L (ref 4–13)
AST SERPL-CCNC: 28 U/L (ref 7–45)
AUTO MIXED CELLS #: 1.5 10*3/MM3 (ref 0.1–2.6)
AUTO MIXED CELLS %: 11.4 % (ref 0.1–24)
BILIRUB SERPL-MCNC: 0.3 MG/DL (ref 0.1–1)
BUN SERPL-MCNC: 19 MG/DL (ref 5–21)
BUN/CREAT SERPL: 23.2
CALCIUM SPEC-SCNC: 9.2 MG/DL (ref 8.4–10.4)
CHLORIDE SERPL-SCNC: 97 MMOL/L (ref 98–110)
CO2 SERPL-SCNC: 32 MMOL/L (ref 24–31)
CREAT SERPL-MCNC: 0.82 MG/DL (ref 0.5–1.4)
ERYTHROCYTE [DISTWIDTH] IN BLOOD BY AUTOMATED COUNT: 13.1 % (ref 12.3–15.4)
GFR SERPL CREATININE-BSD FRML MDRD: 112 ML/MIN/1.73
GLOBULIN UR ELPH-MCNC: 3.4 GM/DL
GLUCOSE SERPL-MCNC: 208 MG/DL (ref 70–100)
HCT VFR BLD AUTO: 41 % (ref 37.5–51)
HGB BLD-MCNC: 13.3 G/DL (ref 13–17.7)
LYMPHOCYTES # BLD AUTO: 2 10*3/MM3 (ref 0.7–3.1)
LYMPHOCYTES NFR BLD AUTO: 15.5 % (ref 19.6–45.3)
MCH RBC QN AUTO: 30 PG (ref 26.6–33)
MCHC RBC AUTO-ENTMCNC: 32.4 G/DL (ref 31.5–35.7)
MCV RBC AUTO: 92.3 FL (ref 79–97)
NEUTROPHILS NFR BLD AUTO: 73.1 % (ref 42.7–76)
NEUTROPHILS NFR BLD AUTO: 9.5 10*3/MM3 (ref 1.7–7)
PLATELET # BLD AUTO: 434 10*3/MM3 (ref 140–450)
PMV BLD AUTO: 8 FL (ref 6–12)
POTASSIUM SERPL-SCNC: 4 MMOL/L (ref 3.5–5.3)
PROT SERPL-MCNC: 7.2 G/DL (ref 6.3–8.7)
RBC # BLD AUTO: 4.44 10*6/MM3 (ref 4.14–5.8)
SODIUM SERPL-SCNC: 134 MMOL/L (ref 135–145)
WBC # BLD AUTO: 13 10*3/MM3 (ref 3.4–10.8)

## 2020-07-15 PROCEDURE — 85025 COMPLETE CBC W/AUTO DIFF WBC: CPT | Performed by: NURSE PRACTITIONER

## 2020-07-15 PROCEDURE — 36415 COLL VENOUS BLD VENIPUNCTURE: CPT | Performed by: NURSE PRACTITIONER

## 2020-07-15 PROCEDURE — 80053 COMPREHEN METABOLIC PANEL: CPT | Performed by: NURSE PRACTITIONER

## 2020-07-15 NOTE — OUTREACH NOTE
COPD/PN Week 1 Survey      Responses   [facility] patient discharged from?  [place]   COVID-19 Test Status  Negative   Does the patient have one of the following disease processes/diagnoses(primary or secondary)?  COPD/Pneumonia   Is there a successful TCM telephone encounter documented?  No   Was the primary reason for admission:  COPD exacerbation   Week 1 attempt successful?  No   Unsuccessful attempts  Attempt 1          Danna Swan, [id number]

## 2020-07-17 ENCOUNTER — READMISSION MANAGEMENT (OUTPATIENT)
Dept: CALL CENTER | Facility: HOSPITAL | Age: 70
End: 2020-07-17

## 2020-07-17 NOTE — OUTREACH NOTE
COPD/PN Week 1 Survey      Responses   [facility] patient discharged from?  [place]   COVID-19 Test Status  Negative   Does the patient have one of the following disease processes/diagnoses(primary or secondary)?  COPD/Pneumonia   Is there a successful TCM telephone encounter documented?  No   Was the primary reason for admission:  COPD exacerbation   Week 1 attempt successful?  Yes   Call start time  1500   Call end time  1505   Discharge diagnosis  COPD   Meds reviewed with patient/caregiver?  Yes   Is the patient having any side effects they believe may be caused by any medication additions or changes?  No   Does the patient have all medications ordered at discharge?  Yes   Is the patient taking all medications as directed (includes completed medication regime)?  Yes   Does the patient have a primary care provider?   Yes   Does the patient have an appointment with their PCP or pulmonologist within 7 days of discharge?  Yes   Has the patient kept scheduled appointments due by today?  N/A   Has home health visited the patient within 72 hours of discharge?  N/A   Pulse Ox monitoring  None   Psychosocial issues?  No   Did the patient receive a copy of their discharge instructions?  Yes   Nursing interventions  Reviewed instructions with patient   What is the patient's perception of their health status since discharge?  Improving   Nursing Interventions  Nurse provided patient education   Are the patient's immunizations up to date?   Yes   Nursing interventions  Educated on importance of maintaining up to date immunizations as advised by provider   If the patient is a current smoker, are they able to teach back resources for cessation?  -- [Not smoking---trying not to smoke and not using anything even though he has a few patches he could use.]   Is the patient/caregiver able to teach back the hierarchy of who to call/visit for symptoms/problems? PCP, Specialist, Home health nurse, Urgent Care, ED, 911  Yes    Additional teach back comments  Feeling good and keeping all f/u appts   Is the patient able to teach back COPD zones?  Yes   Nursing interventions  Education provided on various zones   Patient reports what zone on this call?  Green Zone   Green Zone  Reports doing well, Breathing without shortness of breath, Usual activity and exercise level, Usual amount of phlegm/mucus without difficulty coughing up   Green Zone interventions:  Take daily medications, Do not smoke, Continue regular exercise/diet plan, Avoid indoor/outdoor triggers   Week 1 call completed?  Yes          Thalia Mcdonnell, RN

## 2020-07-24 ENCOUNTER — READMISSION MANAGEMENT (OUTPATIENT)
Dept: CALL CENTER | Facility: HOSPITAL | Age: 70
End: 2020-07-24

## 2020-07-24 NOTE — OUTREACH NOTE
COPD/PN Week 2 Survey      Responses   [facility] patient discharged from?  [place]   COVID-19 Test Status  Negative   Does the patient have one of the following disease processes/diagnoses(primary or secondary)?  COPD/Pneumonia   Was the primary reason for admission:  COPD exacerbation   Week 2 attempt successful?  No   Unsuccessful attempts  Attempt 1          Kwasi Barton RN

## 2020-07-27 ENCOUNTER — READMISSION MANAGEMENT (OUTPATIENT)
Dept: CALL CENTER | Facility: HOSPITAL | Age: 70
End: 2020-07-27

## 2020-07-27 NOTE — OUTREACH NOTE
COPD/PN Week 2 Survey      Responses   [facility] patient discharged from?  [place]   COVID-19 Test Status  Negative   Does the patient have one of the following disease processes/diagnoses(primary or secondary)?  COPD/Pneumonia   Was the primary reason for admission:  COPD exacerbation   Week 2 attempt successful?  Yes   Call start time  1444   Call end time  1447   Discharge diagnosis  COPD   Meds reviewed with patient/caregiver?  Yes   Is the patient having any side effects they believe may be caused by any medication additions or changes?  No   Does the patient have all medications ordered at discharge?  Yes   Is the patient taking all medications as directed (includes completed medication regime)?  Yes   Medication comments  Has finished antibiotics   Comments regarding appointments  see AVS for apmt   Does the patient have a primary care provider?   Yes   Does the patient have an appointment with their PCP or pulmonologist within 7 days of discharge?  Yes   Has the patient kept scheduled appointments due by today?  Yes   Has home health visited the patient within 72 hours of discharge?  N/A   Pulse Ox monitoring  None   Did the patient receive a copy of their discharge instructions?  Yes   Nursing interventions  Reviewed instructions with patient   What is the patient's perception of their health status since discharge?  Improving   Is the patient/caregiver able to teach back the hierarchy of who to call/visit for symptoms/problems? PCP, Specialist, Home health nurse, Urgent Care, ED, 911  Yes   Additional teach back comments  Patient states he is doing well.  States when he gets a little tight he uses his inhaler and that helps.  He is going to have a covid19 test done and then having testing done and follow up with the Pulmonary doctor.   Is the patient able to teach back COPD zones?  Yes   Nursing interventions  Education provided on various zones   Patient reports what zone on this call?  Green Zone    Green Zone  Reports doing well, Breathing without shortness of breath, Usual activity and exercise level, Usual amount of phlegm/mucus without difficulty coughing up, Sleeping well, Appetite is good   [facility] interventions:  Take daily medications, Avoid indoor/outdoor triggers, Continue regular exercise/diet plan   Week 2 call completed?  Yes   Revoked  No further contact(revokes)-requires comment   Graduated/Revoked comments  denies questions or needs at this time          Danna Swan LPN

## 2020-08-07 ENCOUNTER — LAB (OUTPATIENT)
Dept: LAB | Facility: HOSPITAL | Age: 70
End: 2020-08-07

## 2020-08-07 PROCEDURE — C9803 HOPD COVID-19 SPEC COLLECT: HCPCS | Performed by: NURSE PRACTITIONER

## 2020-08-07 PROCEDURE — U0003 INFECTIOUS AGENT DETECTION BY NUCLEIC ACID (DNA OR RNA); SEVERE ACUTE RESPIRATORY SYNDROME CORONAVIRUS 2 (SARS-COV-2) (CORONAVIRUS DISEASE [COVID-19]), AMPLIFIED PROBE TECHNIQUE, MAKING USE OF HIGH THROUGHPUT TECHNOLOGIES AS DESCRIBED BY CMS-2020-01-R: HCPCS | Performed by: NURSE PRACTITIONER

## 2020-08-08 LAB
COVID LABCORP PRIORITY: NORMAL
SARS-COV-2 RNA RESP QL NAA+PROBE: NOT DETECTED

## 2020-08-10 ENCOUNTER — HOSPITAL ENCOUNTER (OUTPATIENT)
Dept: PULMONOLOGY | Facility: HOSPITAL | Age: 70
Discharge: HOME | End: 2020-08-10
Admitting: NURSE PRACTITIONER

## 2020-08-10 DIAGNOSIS — J44.1 COPD EXACERBATION (HCC): ICD-10-CM

## 2020-08-10 PROCEDURE — 94726 PLETHYSMOGRAPHY LUNG VOLUMES: CPT | Performed by: INTERNAL MEDICINE

## 2020-08-10 PROCEDURE — 94060 EVALUATION OF WHEEZING: CPT

## 2020-08-10 PROCEDURE — 94729 DIFFUSING CAPACITY: CPT | Performed by: INTERNAL MEDICINE

## 2020-08-10 PROCEDURE — 94729 DIFFUSING CAPACITY: CPT

## 2020-08-10 PROCEDURE — 94726 PLETHYSMOGRAPHY LUNG VOLUMES: CPT

## 2020-08-10 PROCEDURE — 94060 EVALUATION OF WHEEZING: CPT | Performed by: INTERNAL MEDICINE

## 2020-08-10 RX ORDER — ALBUTEROL SULFATE 2.5 MG/3ML
2.5 SOLUTION RESPIRATORY (INHALATION) ONCE
Status: COMPLETED | OUTPATIENT
Start: 2020-08-10 | End: 2020-08-10

## 2020-08-10 RX ADMIN — ALBUTEROL SULFATE 2.5 MG: 2.5 SOLUTION RESPIRATORY (INHALATION) at 11:17

## 2020-08-12 NOTE — PROGRESS NOTES
TAMAR Sorenson  [facility]   Res[facility] Disease [facility]  [address] [place] [address]  Phone: [phone]  Fax: [phone]     Naif Nick is a 69 y.o. male.   CC:   Chief Complaint   Patient presents with   • COPD     No hospitalizations; no exposure; tesed on Friday- negative        HPI:  Mr. Nick is a pleasant 69 year old male referred by TAMAR Atkins for COPD. He has known COPD, DM2, HTN, HLD, TIA. He has smoked up until the last few months. Prior was 1-1.5 PPD. He has hx of TB exposure with family member that recently passed from TB. He has received yearly CXR. He had a negative PPD on 6/18/20.  He has had several er/ admits in the last 4 months for copd exacerbations. In June he was placed on symbicort, tessalon, doxycycline, mucinx, albuterol nebs, singulair, and prednisone with IS and flutter valve at discharge. He was treated with O2 supplementation, IV antibiotics, and IV steroids inpatient. He had a similar admission in July. In July he admitted he ran out of symbicort samples and due to cost he never picked up the script from the pharmacy. He was changed to Breo at discharge as this is covered on his Insurance. He had PFTs performed on 8/10 showing very severe COPD with minimal change in FEV1 or FVC post bronchodilator but by definition changed him to sever obstruction post bronchodilator. His mid flows were reduced and he has very significant gas trapping. CT in June showed no evidence of reactivating TB, small nodules with recommendations to repeat in 12 months. CTA on July 6th showed a 5 mm ground glass nodule of the right lower lobe, no lymphadenopathy, centrilobular emphysema. No acute findings. He has been using the nebulizer 5 times a day unless he feels good then he uses it 2 times a day. He was diagnosed with Asthma as a kid. He has a cough productive of thick mucous.     Patient denies fever, chills,  fatigue, muscle or body aches, new onset headache,  new loss of taste or smell, sore throat, congestion or runny nose, nausea or vomiting, diarrhea.  Patient denies any known exposure to a person under investigation or positive for COVID-19.  Patient is wearing mask today.      The following portions of the patient's history were reviewed and updated as appropriate: allergies, current medications, past family history, past medical history, past social history, past surgical history and problem list.    Past Medical History:   Diagnosis Date   • Diabetes mellitus (CMS/HCC)    • Hx of colonic polyp    • Hx of TIA (transient ischemic attack) and stroke    • Hyperlipidemia    • Hypertension        Family History   Problem Relation Age of Onset   • Diabetes Father    • Heart disease Father    • Heart disease Sister    • Diabetes Sister    • Diabetes Brother    • Heart disease Brother        Social History     Socioeconomic History   • Marital status: Legally      Spouse name: Not on file   • Number of children: Not on file   • Years of education: Not on file   • Highest education level: Not on file   Tobacco Use   • Smoking status: Former Smoker     Packs/day: 1.00     Years: 55.00     Pack years: 55.00     Last attempt to quit: 2020     Years since quittin.0   • Smokeless tobacco: Never Used   • Tobacco comment: STATES 'NONE NOW, I CAN'T.'   Substance and Sexual Activity   • Alcohol use: No   • Drug use: No   • Sexual activity: Defer       Review of Systems   Constitutional: Positive for fatigue. Negative for chills and fever.   HENT: Negative for congestion, postnasal drip and sore throat.    Eyes: Negative for blurred vision, double vision and visual disturbance.   Respiratory: Positive for cough (thick clear mucous ) and shortness of breath. Negative for wheezing.    Cardiovascular: Negative for chest pain, palpitations and leg swelling.   Gastrointestinal: Negative for diarrhea, nausea and vomiting.   Endocrine: Negative for cold intolerance, heat  "intolerance and polydipsia.   Musculoskeletal: Negative for back pain, gait problem and myalgias.   Skin: Negative for color change, pallor and rash.   Allergic/Immunologic: Negative for environmental allergies, food allergies and immunocompromised state.   Neurological: Negative for dizziness, syncope and confusion.   Hematological: Negative for adenopathy. Does not bruise/bleed easily.   Psychiatric/Behavioral: Negative for agitation, behavioral problems and sleep disturbance.       /80   Pulse 91   Temp 98.8 °F (37.1 °C)   Ht 180.3 cm (71\")   Wt 78 kg (172 lb)   SpO2 96% Comment: RA  BMI 23.99 kg/m²     Physical Exam   Constitutional: He is oriented to person, place, and time. He appears well-developed and well-nourished. No distress.   HENT:   Head: Normocephalic and atraumatic.   Eyes: Pupils are equal, round, and reactive to light. Conjunctivae are normal.   Neck: Normal range of motion. Neck supple.   Cardiovascular: Normal rate, regular rhythm and normal heart sounds. Exam reveals no gallop and no friction rub.   No murmur heard.  Pulmonary/Chest: Effort normal. He has wheezes (expiratory ).   Abdominal: Soft. Bowel sounds are normal.   Musculoskeletal: He exhibits no edema or deformity.   Lymphadenopathy:     He has no cervical adenopathy.   Neurological: He is oriented to person, place, and time.   Skin: Skin is warm and dry. He is not diaphoretic. Nails show clubbing.   Psychiatric: He has a normal mood and affect. His behavior is normal. Judgment and thought content normal.       Pulmonary Functions Testing Results:      Results for orders placed during the hospital encounter of 08/10/20   Full Pulmonary Function Test With Bronchodilator    Narrative [facility] - Pulmonary Function Test    [address] [place] [address]  [place]  [address]  [phone]    Patient : Naif Nick   MRN : [id number]  YOB: 1950   :  Nacho Vaughan MD   Date : " 8/10/2020    ______________________________________________________________________    Interpretation :  1. Spirometry shows very severe airflow obstruction prebronchodilator  2. Following bronchodilator there is minimal not significant improvement   in FEV1 and FVC, but postbronchodilator spirometry meet criteria for   severe rather than very severe obstruction.  3. Mid flow is reduced.  4. Lung volume measurements show reduction in inspiratory capacity and   elevations in expiratory reserve volume residual volume and total lung   capacity suggesting hyperinflation and very significant gas trapping.  5. Airway resistance is increased and conductance is decreased.      Nacho Vaughan MD                    My interpretation: as above     CXR: 7/6/20    IMPRESSION:  1. No radiographic evidence of acute cardiopulmonary process.        This report was finalized on 07/07/2020 06:54 by Dr. Ceasar Smyth MD.  CTA chest 7/6/20  FINDINGS:    The imaged portion of the base of the neck appears unremarkable.      There is adequate enhancement of the pulmonary arteries to evaluate for  central and segmental pulmonary emboli. There are no filling defects  within the main, lobar, segmental or visualized subsegmental pulmonary  arteries. The pulmonary vessels are within normal limits.      There are changes of centrilobular emphysema. A 5 mm right lower lobe  groundglass nodule is stable from previous studies. Lungs are otherwise  clear. No evidence of consolidative pneumonia or effusion.. Bronchial  wall thickening is present suggesting chronic bronchitis..      The heart and great vessels appear unremarkable. There is no pericardial  effusion.      No enlarged axillary or mediastinal lymph nodes are present.      The osseous structures of the thorax and surrounding soft tissues  demonstrate no acute process.      The imaged portion of the upper abdomen appears unremarkable.      IMPRESSION:  1. No evidence of pulmonary  embolus or other acute cardiopulmonary  process.  2. Stable 5 mm groundglass nodule in the right lower lobe from previous  studies. Lungs are otherwise clear.  3. No enlarged mediastinal or axillary lymphadenopathy.        This report was finalized on 07/07/2020 07:43 by Dr. Johnnie Arzola MD.    Problem List Items Addressed This Visit     None      Visit Diagnoses     Stage 4 very severe COPD by GOLD classification (CMS/[facility])    -  Primary    Relevant Medications    tiotropium (Spiriva HandiHaler) 18 MCG per inhalation capsule    guaiFENesin (Mucinex) 600 MG 12 hr tablet    montelukast (SINGULAIR) 10 MG tablet    albuterol sulfate  (90 Base) MCG/ACT inhaler    budesonide-formoterol (Symbicort) 160-4.5 MCG/ACT inhaler    Lung nodules        History of tuberculosis exposure        Asthma-COPD overlap syndrome (CMS/[facility])        Relevant Medications    tiotropium (Spiriva HandiHaler) 18 MCG per inhalation capsule    guaiFENesin (Mucinex) 600 MG 12 hr tablet    montelukast (SINGULAIR) 10 MG tablet    albuterol sulfate  (90 Base) MCG/ACT inhaler    budesonide-formoterol (Symbicort) 160-4.5 MCG/ACT inhaler        Patient's Body mass index is 23.99 kg/m². BMI is within normal parameters. No follow-up required..      Assessment/Plan     Gave COPD handout.   Start Mucinex 120 mg twice daily   Continue Singulair -refill sent   Continue the nebulizer as needed   Begin albuterol HFA as needed  Will discuss lung nodules at next visit and repeat imaging.   Follow up in one month      TAMAR Sorenson  8/13/2020  15:18    Return in about 4 weeks (around 9/10/2020).    This dictation was generated by voice recognition computer software. Although all attempts are made to edit dictation for accuracy, there may be errors in the transcription that are not intended.

## 2020-08-13 ENCOUNTER — OFFICE VISIT (OUTPATIENT)
Dept: PULMONOLOGY | Facility: CLINIC | Age: 70
End: 2020-08-13

## 2020-08-13 VITALS
OXYGEN SATURATION: 96 % | HEART RATE: 91 BPM | HEIGHT: 71 IN | BODY MASS INDEX: 24.08 KG/M2 | WEIGHT: 172 LBS | DIASTOLIC BLOOD PRESSURE: 80 MMHG | SYSTOLIC BLOOD PRESSURE: 160 MMHG | TEMPERATURE: 98.8 F

## 2020-08-13 DIAGNOSIS — J44.9 ASTHMA-COPD OVERLAP SYNDROME (HCC): ICD-10-CM

## 2020-08-13 DIAGNOSIS — J44.9 STAGE 4 VERY SEVERE COPD BY GOLD CLASSIFICATION (HCC): Primary | ICD-10-CM

## 2020-08-13 DIAGNOSIS — R91.8 LUNG NODULES: ICD-10-CM

## 2020-08-13 DIAGNOSIS — Z20.1 HISTORY OF TUBERCULOSIS EXPOSURE: ICD-10-CM

## 2020-08-13 PROCEDURE — 99203 OFFICE O/P NEW LOW 30 MIN: CPT | Performed by: NURSE PRACTITIONER

## 2020-08-13 RX ORDER — MONTELUKAST SODIUM 10 MG/1
10 TABLET ORAL NIGHTLY
Qty: 90 TABLET | Refills: 3 | Status: SHIPPED | OUTPATIENT
Start: 2020-08-13 | End: 2020-09-28 | Stop reason: ALTCHOICE

## 2020-08-13 RX ORDER — ALBUTEROL SULFATE 90 UG/1
2 AEROSOL, METERED RESPIRATORY (INHALATION) EVERY 4 HOURS PRN
Qty: 8 G | Refills: 3 | Status: SHIPPED | OUTPATIENT
Start: 2020-08-13 | End: 2020-12-28 | Stop reason: SDUPTHER

## 2020-08-13 RX ORDER — GLIMEPIRIDE 2 MG/1
2 TABLET ORAL
COMMUNITY
End: 2022-10-12 | Stop reason: ALTCHOICE

## 2020-08-13 RX ORDER — BUDESONIDE AND FORMOTEROL FUMARATE DIHYDRATE 160; 4.5 UG/1; UG/1
2 AEROSOL RESPIRATORY (INHALATION)
Qty: 2 INHALER | Refills: 0 | COMMUNITY
Start: 2020-08-13 | End: 2020-12-28 | Stop reason: SDUPTHER

## 2020-08-13 RX ORDER — GUAIFENESIN 600 MG/1
1200 TABLET, EXTENDED RELEASE ORAL 2 TIMES DAILY
Qty: 60 TABLET | Refills: 1 | Status: SHIPPED | OUTPATIENT
Start: 2020-08-13

## 2020-08-13 NOTE — PATIENT INSTRUCTIONS
Gave COPD handout.   Start Mucinex 120 mg twice daily   Continue Singulair -refill sent   Continue the  Albuterol nebulizer as needed (discontinue the Pulmicort nebulizer)   Begin albuterol HFA as needed    Budesonide; Formoterol Inhalation  What is this medicine?  BUDESONIDE; FORMOTEROL (byoo JORDEN hansen; for MATTHEW curiel) inhalation is a combination of 2 medicines that decrease inflammation and help to open up the airways in your lungs. It is used to treat asthma. It is also used to treat chronic obstructive pulmonary disease (COPD), including chronic bronchitis or emphysema. Do NOT use for an acute asthma or COPD attack.  This medicine may be used for other purposes; ask your health care provider or pharmacist if you have questions.  COMMON BRAND NAME(S): Symbicort  What should I tell my health care provider before I take this medicine?  They need to know if you have any of these conditions:  · bone problems  · diabetes  · heart disease or irregular heartbeat  · high blood pressure  · immune system problems  · infection  · liver disease  · worsening asthma  · an unusual or allergic reaction to budesonide, formoterol, medicines, foods, dyes, or preservatives  · pregnant or trying to get pregnant  · breast-feeding  How should I use this medicine?  This medicine is inhaled through the mouth. Rinse your mouth with water after use. Make sure not to swallow the water. Follow the directions on your prescription label. Do not use more often than directed. Do not stop taking except on your doctor's advice. Make sure that you are using your inhaler correctly. Ask your doctor or health care provider if you have any questions.  A special MedGuide will be given to you by the pharmacist with each prescription and refill. Be sure to read this information carefully each time.  Talk to your pediatrician regarding the use of this medicine in children. While this drug may be prescribed for children as young as 6 years of age for  selected conditions, precautions do apply.  Overdosage: If you think you have taken too much of this medicine contact a poison control center or emergency room at once.  NOTE: This medicine is only for you. Do not share this medicine with others.  What if I miss a dose?  If you miss a dose, use it as soon as you remember. If it is almost time for your next dose, use only that dose and continue with your regular schedule, spacing doses evenly. Do not use double or extra doses.  What may interact with this medicine?  Do not take this medicine with any of the following medications:  · MAOIs like Carbex, Eldepryl, Marplan, Nardil, and Parnate  · mifepristone  · probucol  · procarbazine  · some other medicines for asthma like formoterol, salmeterol  This medicine may also interact with the following medications:  · antibiotics like clarithromycin, erythromycin  · cimetidine  · diuretics  · grapefruit juice  · itraconazole  · ketoconazole  · medicines for depression, anxiety, or psychotic disturbances  · medicines for irregular heartbeat  · methadone  · some heart medicines like atenolol, metoprolol  · some other medicines for breathing problems  · some vaccines  This list may not describe all possible interactions. Give your health care provider a list of all the medicines, herbs, non-prescription drugs, or dietary supplements you use. Also tell them if you smoke, drink alcohol, or use illegal drugs. Some items may interact with your medicine.  What should I watch for while using this medicine?  Tell your doctor or health care professional if your symptoms do not improve or get worse. Do not use this medicine more than every 12 hours.  NEVER use this medicine for an acute asthma or COPD attack. You should use your short-acting rescue inhalers for this purpose. If your symptoms get worse or if you need your short-acting inhalers more often, call your doctor right away.  This medicine may increase your risk of getting an  infection. Tell your doctor or health care professional if you are around anyone with measles or chickenpox, or if you develop sores or blisters that do not heal properly.  What side effects may I notice from receiving this medicine?  Side effects that you should report to your doctor or health care professional as soon as possible:  · allergic reactions such as skin rash or itching, hives, swelling of the face, lips or tongue  · breathing problems  · changes in vision  · chest pain  · fast, irregular heartbeat  · feeling faint or lightheaded, falls  · fever  · high blood pressure  · nervousness  · tremors  · white patches or sores in mouth  Side effects that usually do not require medical attention (report to your doctor or health care professional if they continue or are bothersome):  · cough  · different taste in mouth  · headache  · sore throat  · stuffy nose  · stomach upset  This list may not describe all possible side effects. Call your doctor for medical advice about side effects. You may report side effects to FDA at 6-631-YKK-2544.  Where should I keep my medicine?  Keep out of the reach of children.  Store in a dry place at room temperature between 20 and 25 degrees C (68 and 77 degrees F). Do not get the inhaler wet. Keep track of the number of doses used. Throw away the inhaler after using the marked number of inhalations or after the expiration date, whichever comes first. Do not burn or puncture canister.  NOTE: This sheet is a summary. It may not cover all possible information. If you have questions about this medicine, talk to your doctor, pharmacist, or health care provider.  © 2020 [employer]vier/Gold Standard (2017-12-21 15:25:18)

## 2020-08-14 ENCOUNTER — TELEPHONE (OUTPATIENT)
Dept: PULMONOLOGY | Facility: CLINIC | Age: 70
End: 2020-08-14

## 2020-08-14 RX ORDER — CEFDINIR 300 MG/1
300 CAPSULE ORAL 2 TIMES DAILY
Qty: 14 CAPSULE | Refills: 0 | Status: SHIPPED | OUTPATIENT
Start: 2020-08-14 | End: 2020-08-21

## 2020-08-14 RX ORDER — PREDNISONE 10 MG/1
TABLET ORAL
Qty: 31 TABLET | Refills: 0 | Status: ON HOLD | OUTPATIENT
Start: 2020-08-14 | End: 2020-09-11

## 2020-08-14 NOTE — TELEPHONE ENCOUNTER
Please let patient know we will send in a script for Taper Prednisone and antibiotic. Sent to [facility] on [address].

## 2020-08-14 NOTE — TELEPHONE ENCOUNTER
Patient was here yesterday. He states today he started coughing up green sputum. Chest congestion.  No fever.

## 2020-09-10 ENCOUNTER — TELEPHONE (OUTPATIENT)
Dept: PULMONOLOGY | Facility: CLINIC | Age: 70
End: 2020-09-10

## 2020-09-10 NOTE — TELEPHONE ENCOUNTER
"Pt wife's left voicemail about the fact that the pt \"can't breath.\"  They are requesting steroids.  She also is requesting that it be on his insurance.   "

## 2020-09-10 NOTE — TELEPHONE ENCOUNTER
I spoke with pt.  He is having exertional SOA.  He is fine when sitting in chair.  He is taking his nebulizer every 4 hours.  He has run out of symbicort samples, which he said did help.  He can't afford the medication from [facility] ($95). He is going to call the [facility] to see if they are sending that. He recently had a  Payment, but not sure what meds he was paying for. I did tell him to call his PCP to see if they might have samples, we don't.  I also told him to go to urgent care/er if becomes worse.  He stated he understood.  He does have both upcoming appts with PCP and [facility].

## 2020-09-10 NOTE — TELEPHONE ENCOUNTER
If he is having an acute attack he may need to go to the ER for IV steroids and/or work up. We do not currently have the injection in the office. He should have just recently finished a round of antibiotics and steroids. He has known diabetes mellitus and would not want to keep giving steroids or prolonged steroids due to increase risk of raising his blood sugar. Does he have a nebulizer at home? If not we can prescribe him one, if he does make sure he is using it.

## 2020-09-11 ENCOUNTER — HOSPITAL ENCOUNTER (INPATIENT)
Facility: HOSPITAL | Age: 70
LOS: 4 days | Discharge: HOME | End: 2020-09-15
Attending: EMERGENCY MEDICINE | Admitting: FAMILY MEDICINE

## 2020-09-11 ENCOUNTER — APPOINTMENT (OUTPATIENT)
Dept: GENERAL RADIOLOGY | Facility: HOSPITAL | Age: 70
End: 2020-09-11

## 2020-09-11 DIAGNOSIS — E87.29 RESPIRATORY ACIDOSIS: ICD-10-CM

## 2020-09-11 DIAGNOSIS — R06.89 HYPERCARBIA: ICD-10-CM

## 2020-09-11 DIAGNOSIS — I10 HYPERTENSION, UNSPECIFIED TYPE: ICD-10-CM

## 2020-09-11 DIAGNOSIS — R09.02 HYPOXIA: ICD-10-CM

## 2020-09-11 DIAGNOSIS — J44.1 COPD EXACERBATION (HCC): ICD-10-CM

## 2020-09-11 DIAGNOSIS — J44.9 CHRONIC OBSTRUCTIVE PULMONARY DISEASE, UNSPECIFIED COPD TYPE (HCC): Primary | ICD-10-CM

## 2020-09-11 PROBLEM — J96.22 ACUTE ON CHRONIC RESPIRATORY FAILURE WITH HYPERCAPNIA: Status: ACTIVE | Noted: 2020-09-11

## 2020-09-11 PROBLEM — J96.01 ACUTE RESPIRATORY FAILURE WITH HYPOXIA AND HYPERCAPNIA: Status: ACTIVE | Noted: 2020-09-11

## 2020-09-11 PROBLEM — J96.02 ACUTE RESPIRATORY FAILURE WITH HYPOXIA AND HYPERCAPNIA (HCC): Status: ACTIVE | Noted: 2020-09-11

## 2020-09-11 LAB
ANION GAP SERPL CALCULATED.3IONS-SCNC: 8 MMOL/L (ref 5–15)
APTT PPP: 32.5 SECONDS (ref 24.1–35)
ARTERIAL PATENCY WRIST A: POSITIVE
ARTERIAL PATENCY WRIST A: POSITIVE
ATMOSPHERIC PRESS: 754 MMHG
ATMOSPHERIC PRESS: 754 MMHG
BASE EXCESS BLDA CALC-SCNC: 2.3 MMOL/L (ref 0–2)
BASE EXCESS BLDA CALC-SCNC: 4.1 MMOL/L (ref 0–2)
BASOPHILS # BLD AUTO: 0.1 10*3/MM3 (ref 0–0.2)
BASOPHILS NFR BLD AUTO: 0.7 % (ref 0–1.5)
BDY SITE: ABNORMAL
BDY SITE: ABNORMAL
BODY TEMPERATURE: 37 C
BODY TEMPERATURE: 37 C
BUN SERPL-MCNC: 14 MG/DL (ref 8–23)
BUN/CREAT SERPL: 16.3 (ref 7–25)
CALCIUM SPEC-SCNC: 9.3 MG/DL (ref 8.6–10.5)
CHLORIDE SERPL-SCNC: 101 MMOL/L (ref 98–107)
CO2 SERPL-SCNC: 30 MMOL/L (ref 22–29)
CREAT SERPL-MCNC: 0.86 MG/DL (ref 0.76–1.27)
D-LACTATE SERPL-SCNC: 1 MMOL/L (ref 0.5–2)
DEPRECATED RDW RBC AUTO: 42.3 FL (ref 37–54)
EOSINOPHIL # BLD AUTO: 1.1 10*3/MM3 (ref 0–0.4)
EOSINOPHIL NFR BLD AUTO: 8 % (ref 0.3–6.2)
EPAP: 6
ERYTHROCYTE [DISTWIDTH] IN BLOOD BY AUTOMATED COUNT: 12.7 % (ref 12.3–15.4)
GAS FLOW AIRWAY: 3.5 LPM
GFR SERPL CREATININE-BSD FRML MDRD: 106 ML/MIN/1.73
GLUCOSE BLDC GLUCOMTR-MCNC: 216 MG/DL (ref 70–130)
GLUCOSE BLDC GLUCOMTR-MCNC: 245 MG/DL (ref 70–130)
GLUCOSE BLDC GLUCOMTR-MCNC: 264 MG/DL (ref 70–130)
GLUCOSE BLDC GLUCOMTR-MCNC: 284 MG/DL (ref 70–130)
GLUCOSE SERPL-MCNC: 244 MG/DL (ref 65–99)
HCO3 BLDA-SCNC: 30.7 MMOL/L (ref 20–26)
HCO3 BLDA-SCNC: 30.8 MMOL/L (ref 20–26)
HCT VFR BLD AUTO: 43.1 % (ref 37.5–51)
HGB BLD-MCNC: 13.9 G/DL (ref 13–17.7)
HOLD SPECIMEN: NORMAL
IMM GRANULOCYTES # BLD AUTO: 0.03 10*3/MM3 (ref 0–0.05)
IMM GRANULOCYTES NFR BLD AUTO: 0.2 % (ref 0–0.5)
INHALED O2 CONCENTRATION: 30 %
INR PPP: 0.93 (ref 0.91–1.09)
IPAP: 16
LYMPHOCYTES # BLD AUTO: 3.46 10*3/MM3 (ref 0.7–3.1)
LYMPHOCYTES NFR BLD AUTO: 25.2 % (ref 19.6–45.3)
Lab: ABNORMAL
Lab: ABNORMAL
MCH RBC QN AUTO: 29.5 PG (ref 26.6–33)
MCHC RBC AUTO-ENTMCNC: 32.3 G/DL (ref 31.5–35.7)
MCV RBC AUTO: 91.5 FL (ref 79–97)
MODALITY: ABNORMAL
MODALITY: ABNORMAL
MONOCYTES # BLD AUTO: 0.92 10*3/MM3 (ref 0.1–0.9)
MONOCYTES NFR BLD AUTO: 6.7 % (ref 5–12)
NEUTROPHILS NFR BLD AUTO: 59.2 % (ref 42.7–76)
NEUTROPHILS NFR BLD AUTO: 8.13 10*3/MM3 (ref 1.7–7)
NRBC BLD AUTO-RTO: 0 /100 WBC (ref 0–0.2)
NT-PROBNP SERPL-MCNC: 118.8 PG/ML (ref 0–900)
PCO2 BLDA: 53.3 MM HG (ref 35–45)
PCO2 BLDA: 64.4 MM HG (ref 35–45)
PCO2 TEMP ADJ BLD: 53.3 MM HG (ref 35–45)
PCO2 TEMP ADJ BLD: 64.4 MM HG (ref 35–45)
PH BLDA: 7.29 PH UNITS (ref 7.35–7.45)
PH BLDA: 7.37 PH UNITS (ref 7.35–7.45)
PH, TEMP CORRECTED: 7.29 PH UNITS (ref 7.35–7.45)
PH, TEMP CORRECTED: 7.37 PH UNITS (ref 7.35–7.45)
PLATELET # BLD AUTO: 344 10*3/MM3 (ref 140–450)
PMV BLD AUTO: 9.1 FL (ref 6–12)
PO2 BLDA: 79.5 MM HG (ref 83–108)
PO2 BLDA: 80.9 MM HG (ref 83–108)
PO2 TEMP ADJ BLD: 79.5 MM HG (ref 83–108)
PO2 TEMP ADJ BLD: 80.9 MM HG (ref 83–108)
POTASSIUM SERPL-SCNC: 4 MMOL/L (ref 3.5–5.2)
PROCALCITONIN SERPL-MCNC: 0.02 NG/ML (ref 0–0.25)
PROTHROMBIN TIME: 12.1 SECONDS (ref 11.9–14.6)
RBC # BLD AUTO: 4.71 10*6/MM3 (ref 4.14–5.8)
SAO2 % BLDCOA: 94.7 % (ref 94–99)
SAO2 % BLDCOA: 96.4 % (ref 94–99)
SARS-COV-2 RNA PNL SPEC NAA+PROBE: NOT DETECTED
SET MECH RESP RATE: 12
SODIUM SERPL-SCNC: 139 MMOL/L (ref 136–145)
TROPONIN T SERPL-MCNC: <0.01 NG/ML (ref 0–0.03)
VENTILATOR MODE: ABNORMAL
VENTILATOR MODE: ABNORMAL
WBC # BLD AUTO: 13.74 10*3/MM3 (ref 3.4–10.8)
WHOLE BLOOD HOLD SPECIMEN: NORMAL
WHOLE BLOOD HOLD SPECIMEN: NORMAL

## 2020-09-11 PROCEDURE — 83880 ASSAY OF NATRIURETIC PEPTIDE: CPT | Performed by: EMERGENCY MEDICINE

## 2020-09-11 PROCEDURE — 94660 CPAP INITIATION&MGMT: CPT

## 2020-09-11 PROCEDURE — 25010000002 METHYLPREDNISOLONE PER 40 MG: Performed by: FAMILY MEDICINE

## 2020-09-11 PROCEDURE — 71045 X-RAY EXAM CHEST 1 VIEW: CPT

## 2020-09-11 PROCEDURE — 84145 PROCALCITONIN (PCT): CPT | Performed by: EMERGENCY MEDICINE

## 2020-09-11 PROCEDURE — 94799 UNLISTED PULMONARY SVC/PX: CPT

## 2020-09-11 PROCEDURE — 82803 BLOOD GASES ANY COMBINATION: CPT

## 2020-09-11 PROCEDURE — 25010000002 AZITHROMYCIN PER 500 MG: Performed by: EMERGENCY MEDICINE

## 2020-09-11 PROCEDURE — 93010 ELECTROCARDIOGRAM REPORT: CPT | Performed by: INTERNAL MEDICINE

## 2020-09-11 PROCEDURE — 36600 WITHDRAWAL OF ARTERIAL BLOOD: CPT

## 2020-09-11 PROCEDURE — 83605 ASSAY OF LACTIC ACID: CPT | Performed by: EMERGENCY MEDICINE

## 2020-09-11 PROCEDURE — 87040 BLOOD CULTURE FOR BACTERIA: CPT | Performed by: EMERGENCY MEDICINE

## 2020-09-11 PROCEDURE — 85730 THROMBOPLASTIN TIME PARTIAL: CPT | Performed by: EMERGENCY MEDICINE

## 2020-09-11 PROCEDURE — 25010000002 CEFTRIAXONE PER 250 MG: Performed by: EMERGENCY MEDICINE

## 2020-09-11 PROCEDURE — 85610 PROTHROMBIN TIME: CPT | Performed by: EMERGENCY MEDICINE

## 2020-09-11 PROCEDURE — 80048 BASIC METABOLIC PNL TOTAL CA: CPT | Performed by: EMERGENCY MEDICINE

## 2020-09-11 PROCEDURE — 99285 EMERGENCY DEPT VISIT HI MDM: CPT

## 2020-09-11 PROCEDURE — 25010000002 LORAZEPAM PER 2 MG: Performed by: EMERGENCY MEDICINE

## 2020-09-11 PROCEDURE — 87635 SARS-COV-2 COVID-19 AMP PRB: CPT | Performed by: EMERGENCY MEDICINE

## 2020-09-11 PROCEDURE — 25010000002 METHYLPREDNISOLONE PER 125 MG: Performed by: EMERGENCY MEDICINE

## 2020-09-11 PROCEDURE — 94640 AIRWAY INHALATION TREATMENT: CPT

## 2020-09-11 PROCEDURE — 84484 ASSAY OF TROPONIN QUANT: CPT | Performed by: EMERGENCY MEDICINE

## 2020-09-11 PROCEDURE — 63710000001 INSULIN LISPRO (HUMAN) PER 5 UNITS: Performed by: FAMILY MEDICINE

## 2020-09-11 PROCEDURE — 25010000002 ENOXAPARIN PER 10 MG: Performed by: FAMILY MEDICINE

## 2020-09-11 PROCEDURE — 82962 GLUCOSE BLOOD TEST: CPT

## 2020-09-11 PROCEDURE — 25010000002 MAGNESIUM SULFATE 2 GM/50ML SOLUTION: Performed by: EMERGENCY MEDICINE

## 2020-09-11 PROCEDURE — 85025 COMPLETE CBC W/AUTO DIFF WBC: CPT | Performed by: EMERGENCY MEDICINE

## 2020-09-11 PROCEDURE — 93005 ELECTROCARDIOGRAM TRACING: CPT | Performed by: EMERGENCY MEDICINE

## 2020-09-11 RX ORDER — KETAMINE HYDROCHLORIDE 50 MG/ML
0.3 INJECTION, SOLUTION, CONCENTRATE INTRAMUSCULAR; INTRAVENOUS ONCE
Status: COMPLETED | OUTPATIENT
Start: 2020-09-11 | End: 2020-09-11

## 2020-09-11 RX ORDER — GABAPENTIN 300 MG/1
300 CAPSULE ORAL 3 TIMES DAILY
Status: DISCONTINUED | OUTPATIENT
Start: 2020-09-11 | End: 2020-09-15 | Stop reason: HOSPADM

## 2020-09-11 RX ORDER — METHYLPREDNISOLONE SODIUM SUCCINATE 40 MG/ML
40 INJECTION, POWDER, LYOPHILIZED, FOR SOLUTION INTRAMUSCULAR; INTRAVENOUS 2 TIMES DAILY
Status: DISCONTINUED | OUTPATIENT
Start: 2020-09-11 | End: 2020-09-12

## 2020-09-11 RX ORDER — ALBUTEROL SULFATE 2.5 MG/3ML
10 SOLUTION RESPIRATORY (INHALATION)
Status: COMPLETED | OUTPATIENT
Start: 2020-09-11 | End: 2020-09-11

## 2020-09-11 RX ORDER — IPRATROPIUM BROMIDE AND ALBUTEROL SULFATE 2.5; .5 MG/3ML; MG/3ML
3 SOLUTION RESPIRATORY (INHALATION)
Status: DISCONTINUED | OUTPATIENT
Start: 2020-09-11 | End: 2020-09-15 | Stop reason: HOSPADM

## 2020-09-11 RX ORDER — TAMSULOSIN HYDROCHLORIDE 0.4 MG/1
0.4 CAPSULE ORAL DAILY
Status: DISCONTINUED | OUTPATIENT
Start: 2020-09-11 | End: 2020-09-15 | Stop reason: HOSPADM

## 2020-09-11 RX ORDER — LORAZEPAM 2 MG/ML
1 INJECTION INTRAMUSCULAR ONCE
Status: COMPLETED | OUTPATIENT
Start: 2020-09-11 | End: 2020-09-11

## 2020-09-11 RX ORDER — CARVEDILOL 3.12 MG/1
3.12 TABLET ORAL 2 TIMES DAILY WITH MEALS
Status: DISCONTINUED | OUTPATIENT
Start: 2020-09-11 | End: 2020-09-15 | Stop reason: HOSPADM

## 2020-09-11 RX ORDER — DEXTROSE MONOHYDRATE 25 G/50ML
25 INJECTION, SOLUTION INTRAVENOUS
Status: DISCONTINUED | OUTPATIENT
Start: 2020-09-11 | End: 2020-09-15 | Stop reason: HOSPADM

## 2020-09-11 RX ORDER — ALBUTEROL SULFATE 2.5 MG/3ML
2.5 SOLUTION RESPIRATORY (INHALATION) EVERY 4 HOURS PRN
Status: DISCONTINUED | OUTPATIENT
Start: 2020-09-11 | End: 2020-09-15 | Stop reason: HOSPADM

## 2020-09-11 RX ORDER — ONDANSETRON 2 MG/ML
4 INJECTION INTRAMUSCULAR; INTRAVENOUS EVERY 6 HOURS PRN
Status: DISCONTINUED | OUTPATIENT
Start: 2020-09-11 | End: 2020-09-15 | Stop reason: HOSPADM

## 2020-09-11 RX ORDER — MONTELUKAST SODIUM 10 MG/1
10 TABLET ORAL NIGHTLY
Status: DISCONTINUED | OUTPATIENT
Start: 2020-09-11 | End: 2020-09-15 | Stop reason: HOSPADM

## 2020-09-11 RX ORDER — TRAZODONE HYDROCHLORIDE 50 MG/1
50 TABLET ORAL NIGHTLY
Status: DISCONTINUED | OUTPATIENT
Start: 2020-09-11 | End: 2020-09-15 | Stop reason: HOSPADM

## 2020-09-11 RX ORDER — SODIUM CHLORIDE 0.9 % (FLUSH) 0.9 %
10 SYRINGE (ML) INJECTION AS NEEDED
Status: DISCONTINUED | OUTPATIENT
Start: 2020-09-11 | End: 2020-09-15 | Stop reason: HOSPADM

## 2020-09-11 RX ORDER — METHYLPREDNISOLONE SODIUM SUCCINATE 125 MG/2ML
125 INJECTION, POWDER, LYOPHILIZED, FOR SOLUTION INTRAMUSCULAR; INTRAVENOUS ONCE
Status: COMPLETED | OUTPATIENT
Start: 2020-09-11 | End: 2020-09-11

## 2020-09-11 RX ORDER — CLONIDINE HYDROCHLORIDE 0.1 MG/1
0.2 TABLET ORAL ONCE
Status: COMPLETED | OUTPATIENT
Start: 2020-09-11 | End: 2020-09-11

## 2020-09-11 RX ORDER — NICOTINE POLACRILEX 4 MG
15 LOZENGE BUCCAL
Status: DISCONTINUED | OUTPATIENT
Start: 2020-09-11 | End: 2020-09-15 | Stop reason: HOSPADM

## 2020-09-11 RX ORDER — ATORVASTATIN CALCIUM 10 MG/1
20 TABLET, FILM COATED ORAL DAILY
Status: DISCONTINUED | OUTPATIENT
Start: 2020-09-11 | End: 2020-09-15 | Stop reason: HOSPADM

## 2020-09-11 RX ORDER — MAGNESIUM SULFATE HEPTAHYDRATE 40 MG/ML
2 INJECTION, SOLUTION INTRAVENOUS ONCE
Status: COMPLETED | OUTPATIENT
Start: 2020-09-11 | End: 2020-09-11

## 2020-09-11 RX ORDER — SODIUM CHLORIDE 9 MG/ML
30 INJECTION, SOLUTION INTRAVENOUS CONTINUOUS
Status: DISCONTINUED | OUTPATIENT
Start: 2020-09-11 | End: 2020-09-11

## 2020-09-11 RX ORDER — LEVOFLOXACIN 750 MG/1
750 TABLET ORAL EVERY 24 HOURS
Status: DISCONTINUED | OUTPATIENT
Start: 2020-09-11 | End: 2020-09-14

## 2020-09-11 RX ORDER — GUAIFENESIN 600 MG/1
600 TABLET, EXTENDED RELEASE ORAL EVERY 12 HOURS SCHEDULED
Status: DISCONTINUED | OUTPATIENT
Start: 2020-09-11 | End: 2020-09-15 | Stop reason: HOSPADM

## 2020-09-11 RX ORDER — BUDESONIDE AND FORMOTEROL FUMARATE DIHYDRATE 160; 4.5 UG/1; UG/1
2 AEROSOL RESPIRATORY (INHALATION)
Status: DISCONTINUED | OUTPATIENT
Start: 2020-09-11 | End: 2020-09-15 | Stop reason: HOSPADM

## 2020-09-11 RX ORDER — AMLODIPINE BESYLATE 5 MG/1
5 TABLET ORAL DAILY
Status: DISCONTINUED | OUTPATIENT
Start: 2020-09-11 | End: 2020-09-15 | Stop reason: HOSPADM

## 2020-09-11 RX ADMIN — GABAPENTIN 300 MG: 300 CAPSULE ORAL at 21:10

## 2020-09-11 RX ADMIN — TAMSULOSIN HYDROCHLORIDE 0.4 MG: 0.4 CAPSULE ORAL at 08:13

## 2020-09-11 RX ADMIN — LORAZEPAM 1 MG: 2 INJECTION INTRAMUSCULAR; INTRAVENOUS at 04:45

## 2020-09-11 RX ADMIN — CARVEDILOL 3.12 MG: 3.12 TABLET, FILM COATED ORAL at 17:03

## 2020-09-11 RX ADMIN — KETAMINE HYDROCHLORIDE 24.5 MG: 50 INJECTION, SOLUTION INTRAMUSCULAR; INTRAVENOUS at 05:03

## 2020-09-11 RX ADMIN — SODIUM CHLORIDE 30 ML/HR: 9 INJECTION, SOLUTION INTRAVENOUS at 07:57

## 2020-09-11 RX ADMIN — AZITHROMYCIN MONOHYDRATE 500 MG: 500 INJECTION, POWDER, LYOPHILIZED, FOR SOLUTION INTRAVENOUS at 05:07

## 2020-09-11 RX ADMIN — MONTELUKAST SODIUM 10 MG: 10 TABLET, FILM COATED ORAL at 20:11

## 2020-09-11 RX ADMIN — SODIUM CHLORIDE, PRESERVATIVE FREE 10 ML: 5 INJECTION INTRAVENOUS at 20:12

## 2020-09-11 RX ADMIN — ENOXAPARIN SODIUM 40 MG: 40 INJECTION SUBCUTANEOUS at 08:13

## 2020-09-11 RX ADMIN — INSULIN LISPRO 4 UNITS: 100 INJECTION, SOLUTION INTRAVENOUS; SUBCUTANEOUS at 11:09

## 2020-09-11 RX ADMIN — INSULIN LISPRO 3 UNITS: 100 INJECTION, SOLUTION INTRAVENOUS; SUBCUTANEOUS at 17:02

## 2020-09-11 RX ADMIN — INSULIN LISPRO 4 UNITS: 100 INJECTION, SOLUTION INTRAVENOUS; SUBCUTANEOUS at 08:17

## 2020-09-11 RX ADMIN — LEVOFLOXACIN 750 MG: 750 TABLET, FILM COATED ORAL at 08:13

## 2020-09-11 RX ADMIN — METHYLPREDNISOLONE SODIUM SUCCINATE 125 MG: 125 INJECTION, POWDER, FOR SOLUTION INTRAMUSCULAR; INTRAVENOUS at 03:58

## 2020-09-11 RX ADMIN — CEFTRIAXONE SODIUM 2 G: 2 INJECTION, POWDER, FOR SOLUTION INTRAMUSCULAR; INTRAVENOUS at 04:10

## 2020-09-11 RX ADMIN — MAGNESIUM SULFATE HEPTAHYDRATE 2 G: 40 INJECTION, SOLUTION INTRAVENOUS at 04:02

## 2020-09-11 RX ADMIN — ALBUTEROL SULFATE 10 MG: 2.5 SOLUTION RESPIRATORY (INHALATION) at 04:12

## 2020-09-11 RX ADMIN — AMLODIPINE BESYLATE 5 MG: 5 TABLET ORAL at 08:13

## 2020-09-11 RX ADMIN — GABAPENTIN 300 MG: 300 CAPSULE ORAL at 08:13

## 2020-09-11 RX ADMIN — IPRATROPIUM BROMIDE AND ALBUTEROL SULFATE 3 ML: 2.5; .5 SOLUTION RESPIRATORY (INHALATION) at 21:02

## 2020-09-11 RX ADMIN — METHYLPREDNISOLONE SODIUM SUCCINATE 40 MG: 40 INJECTION, POWDER, FOR SOLUTION INTRAMUSCULAR; INTRAVENOUS at 20:12

## 2020-09-11 RX ADMIN — TRAZODONE HYDROCHLORIDE 50 MG: 50 TABLET ORAL at 20:11

## 2020-09-11 RX ADMIN — IPRATROPIUM BROMIDE AND ALBUTEROL SULFATE 3 ML: 2.5; .5 SOLUTION RESPIRATORY (INHALATION) at 14:00

## 2020-09-11 RX ADMIN — HYDROCHLOROTHIAZIDE: 25 TABLET ORAL at 08:13

## 2020-09-11 RX ADMIN — CLONIDINE HYDROCHLORIDE 0.2 MG: 0.1 TABLET ORAL at 04:00

## 2020-09-11 RX ADMIN — GUAIFENESIN 600 MG: 600 TABLET, EXTENDED RELEASE ORAL at 08:17

## 2020-09-11 RX ADMIN — IPRATROPIUM BROMIDE AND ALBUTEROL SULFATE 3 ML: 2.5; .5 SOLUTION RESPIRATORY (INHALATION) at 06:38

## 2020-09-11 RX ADMIN — BUDESONIDE AND FORMOTEROL FUMARATE DIHYDRATE 2 PUFF: 160; 4.5 AEROSOL RESPIRATORY (INHALATION) at 10:10

## 2020-09-11 RX ADMIN — BUDESONIDE AND FORMOTEROL FUMARATE DIHYDRATE 2 PUFF: 160; 4.5 AEROSOL RESPIRATORY (INHALATION) at 21:01

## 2020-09-11 RX ADMIN — CARVEDILOL 3.12 MG: 3.12 TABLET, FILM COATED ORAL at 08:13

## 2020-09-11 RX ADMIN — ATORVASTATIN CALCIUM 20 MG: 10 TABLET, FILM COATED ORAL at 08:13

## 2020-09-11 RX ADMIN — GUAIFENESIN 600 MG: 600 TABLET, EXTENDED RELEASE ORAL at 20:11

## 2020-09-11 RX ADMIN — GABAPENTIN 300 MG: 300 CAPSULE ORAL at 15:32

## 2020-09-11 NOTE — PLAN OF CARE
Problem: Patient Care Overview  Goal: Plan of Care Review  Outcome: Ongoing (interventions implemented as appropriate)  Flowsheets (Taken 9/11/2020 8786)  Progress: improving  Plan of Care Reviewed With: patient  Outcome Summary: Patient had no complaints of pain this shift.  Patient also is steady on feet so achieved fall risk care plan.  Complains of soa but doesn't like bipap.  Currently on 2.5L NC and maintaining sats above 90%. Wheezing noted throughout lung fields.

## 2020-09-11 NOTE — PROGRESS NOTES
Attempted to contact patient by phone for initial SW assessment; no answer. Will attempt to contact again later today [facility]

## 2020-09-11 NOTE — ED NOTES
PATIENT ADMITTED TO ROOM 445, REPORT TO BE GIVEN AT BEDSIDE.      Nadia Munguia RN  09/11/20 0514

## 2020-09-11 NOTE — PROGRESS NOTES
Discharge Planning Assessment  [facility]     Patient Name: Naif Nick  MRN: [id number]  Today's Date: 9/11/2020    Admit Date: 9/11/2020    Discharge Needs Assessment     Row Name 09/11/20 1439       Living Environment    Lives With  alone  (Pended)     Current Living Arrangements  home/apartment/condo  (Pended)     Primary Care Provided by  self  (Pended)     Provides Primary Care For  no one  (Pended)     Family Caregiver if Needed  other (see comments)  (Pended)  Ex-wife Maria L Nick can help PRN    Quality of Family Relationships  helpful;involved;supportive  (Pended)     Able to Return to Prior Arrangements  yes  (Pended)        Resource/Environmental Concerns    Resource/Environmental Concerns  none  (Pended)     Transportation Concerns  car, none  (Pended)        Transition Planning    Patient/Family Anticipates Transition to  home  (Pended)        Discharge Needs Assessment    Readmission Within the Last 30 Days  no previous admission in last 30 days  (Pended)     Concerns to be Addressed  no discharge needs identified;denies needs/concerns at this time  (Pended)     Row Name 09/11/20 1310       Living Environment    Lives With  alone  (Pended)     Current Living Arrangements  home/apartment/condo  (Pended)     Primary Care Provided by  self  (Pended)     Provides Primary Care For  no one  (Pended)     Family Caregiver if Needed  other (see comments)  (Pended)  Patient's ex-wife (Maria L Nick) helps him PRN    Quality of Family Relationships  helpful;involved;supportive  (Pended)     Able to Return to Prior Arrangements  yes  (Pended)        Resource/Environmental Concerns    Resource/Environmental Concerns  none  (Pended)     Transportation Concerns  car, none  (Pended)        Transition Planning    Patient/Family Anticipates Transition to  home  (Pended)     Patient/Family Anticipated Services at Transition  none  (Pended)     Transportation Anticipated  family or friend will provide  (Pended)         Discharge Needs Assessment    Readmission Within the Last 30 Days  no previous admission in last 30 days  (Pended)     Concerns to be Addressed  medication  (Pended)  Patient reports he has difficulty paying for Trulicity. Advised patient I will investigate cost-support options and follow up with him.    Equipment Currently Used at Home  nebulizer  (Pended)     Anticipated Changes Related to Illness  none  (Pended)     Equipment Needed After Discharge  none  (Pended)     Provided Post Acute Provider List?  N/A  (Pended)     Discharge Coordination/Progress  Pt lives home alone and plans to DC to [facility]. Pt has PCP and health insurance. Pt reports that he is able to afford most medications but has trouble paying for Trulicity (~$200-$300 per month) and would appreciate assistance options. Pt denies any other needs or concerns. SW to follow.  (Pended)         Discharge Plan    No documentation.       Destination      Coordination has not been started for this encounter.      Durable Medical Equipment      Coordination has not been started for this encounter.      Dialysis/Infusion      Coordination has not been started for this encounter.      Home Medical Care      Coordination has not been started for this encounter.      Therapy      Coordination has not been started for this encounter.      Community Resources      Coordination has not been started for this encounter.          Demographic Summary    No documentation.       Functional Status    No documentation.       Psychosocial    No documentation.       Abuse/Neglect    No documentation.       Legal    No documentation.       Substance Abuse    No documentation.       Patient Forms    No documentation.           Jamila Car

## 2020-09-11 NOTE — ED PROVIDER NOTES
"Subjective   71 y/o male with history of COPD arrives for evaluation of difficulty breathing that began today. He actually called his PMD beginning around 1300 on 9/10 and was advised to come to the Ed at that time. He states he did not do so as he was hoping \"someone would drive me.\" he has been having SOB and cough with noted wheezing since that time. He denies fevers, chills, ill contacts, sore throat, loss of taste or smell, falls or trauma, CP, smoking or other issues. He looks to have last been on abx and steroids at the end of last month but cannot recall the exact date he finished. He does not wear oxygen at home. EMS noted hypoxia on arrival in the 70s. He arrives in moderate acute distress but is able to fully converse.         Family, social and past history reviewed as below, prior documentation of H and Ps and other documentation are reviewed:    Past Medical History:  No date: COPD (chronic obstructive pulmonary disease) (CMS/[facility])  No date: Diabetes mellitus (CMS/[facility])  No date: Hx of colonic polyp  No date: Hx of TIA (transient ischemic attack) and stroke  No date: Hyperlipidemia  No date: Hypertension    Past Surgical History:  No date: BACK SURGERY  08/15/2012: COLONOSCOPY      Comment:  Poor prep repeat exam in 3 years  2009: COLONOSCOPY W/ POLYPECTOMY      Comment:  Tubular adenoma at 60 cm, Hyperplastic polyp rectum                suboptimal prep repeat in 3 months  No date: SHOULDER SURGERY  No date: VERTEBROPLASTY      Comment:  neck    Social History    Socioeconomic History      Marital status: Legally       Spouse name: Not on file      Number of children: Not on file      Years of education: Not on file      Highest education level: Not on file    Tobacco Use      Smoking status: Former Smoker        Packs/day: 1.00        Years: 55.00        Pack years: 55        Quit date: 2020        Years since quittin.1      Smokeless tobacco: Never Used      Tobacco comment: " STATES 'NONE NOW, I CAN'T.'    Substance and Sexual Activity      Alcohol use: Yes        Comment: OCC      Drug use: No      Sexual activity: Defer      Family history: reviewed and noncontributory           Review of Systems   All other systems reviewed and are negative.      Past Medical History:   Diagnosis Date   • COPD (chronic obstructive pulmonary disease) (CMS/HCC)    • Diabetes mellitus (CMS/HCC)    • Hx of colonic polyp    • Hx of TIA (transient ischemic attack) and stroke    • Hyperlipidemia    • Hypertension        No Known Allergies    Past Surgical History:   Procedure Laterality Date   • BACK SURGERY     • COLONOSCOPY  08/15/2012    Poor prep repeat exam in 3 years   • COLONOSCOPY W/ POLYPECTOMY  2009    Tubular adenoma at 60 cm, Hyperplastic polyp rectum suboptimal prep repeat in 3 months   • SHOULDER SURGERY     • VERTEBROPLASTY      neck       Family History   Problem Relation Age of Onset   • Diabetes Father    • Heart disease Father    • Heart disease Sister    • Diabetes Sister    • Diabetes Brother    • Heart disease Brother        Social History     Socioeconomic History   • Marital status: Legally      Spouse name: Not on file   • Number of children: Not on file   • Years of education: Not on file   • Highest education level: Not on file   Tobacco Use   • Smoking status: Former Smoker     Packs/day: 1.00     Years: 55.00     Pack years: 55.00     Last attempt to quit: 2020     Years since quittin.1   • Smokeless tobacco: Never Used   • Tobacco comment: STATES 'NONE NOW, I CAN'T.'   Substance and Sexual Activity   • Alcohol use: Yes     Comment: OCC   • Drug use: No   • Sexual activity: Defer           Objective   Physical Exam   Constitutional: He appears well-developed and well-nourished. He appears distressed.   HENT:   Head: Normocephalic.   Mouth/Throat: Oropharynx is clear and moist.   Eyes: Pupils are equal, round, and reactive to light.   Neck: Normal range of  "motion. Neck supple. No JVD present.   Cardiovascular: Regular rhythm. Tachycardia present.   Pulmonary/Chest: Accessory muscle usage present. Tachypnea noted. He is in respiratory distress. He has wheezes. He exhibits no mass and no tenderness.   Abdominal: Soft. Bowel sounds are normal.   Musculoskeletal: Normal range of motion.        Right lower leg: Normal. He exhibits no edema.        Left lower leg: Normal. He exhibits no edema.   Neurological: He is alert.   Skin: Skin is warm and dry. Capillary refill takes less than 2 seconds.   Psychiatric: He has a normal mood and affect. His behavior is normal.   Vitals reviewed.      ECG 12 Lead    Date/Time: 9/11/2020 3:36 AM  Performed by: Javier Reinoso MD  Authorized by: Javier Reinoso MD   Interpreted by physician  Rhythm: sinus tachycardia  Rate: tachycardic  BPM: 104  QRS axis: left  Conduction: right bundle branch block                 ED Course  ED Course as of Sep 11 0430   Fri Sep 11, 2020   0429 Patient with obvious hypercarbia and hypoxia with acidosis will admit    He did take off his bipap as he stated it was \"giving me a headache\" we will try ketamine and ativan to keep the mask on.     [JH]      ED Course User Index  [JH] Javier Reinoso MD            XR Chest 1 View   ED Interpretation   No acute cardiopulmonary process.         Labs Reviewed   BASIC METABOLIC PANEL - Abnormal; Notable for the following components:       Result Value    Glucose 244 (*)     CO2 30.0 (*)     All other components within normal limits    Narrative:     GFR Normal >60  Chronic Kidney Disease <60  Kidney Failure <15     CBC WITH AUTO DIFFERENTIAL - Abnormal; Notable for the following components:    WBC 13.74 (*)     Eosinophil % 8.0 (*)     Neutrophils, Absolute 8.13 (*)     Lymphocytes, Absolute 3.46 (*)     Monocytes, Absolute 0.92 (*)     Eosinophils, Absolute 1.10 (*)     All other components within normal limits   BLOOD GAS, ARTERIAL - Abnormal; " "Notable for the following components:    pH, Arterial 7.288 (*)     pCO2, Arterial 64.4 (*)     pO2, Arterial 79.5 (*)     HCO3, Arterial 30.8 (*)     Base Excess, Arterial 2.3 (*)     pCO2, Temperature Corrected 64.4 (*)     pH, Temp Corrected 7.288 (*)     pO2, Temperature Corrected 79.5 (*)     All other components within normal limits   PROTIME-INR - Normal   APTT - Normal   BNP (IN-HOUSE) - Normal    Narrative:     Among patients with dyspnea, NT-proBNP is highly sensitive for the detection of acute congestive heart failure. In addition NT-proBNP of <300 pg/ml effectively rules out acute congestive heart failure with 99% negative predictive value.    Results may be falsely decreased if patient taking Biotin.     TROPONIN (IN-HOUSE) - Normal    Narrative:     Troponin T Reference Range:  <= 0.03 ng/mL-   Negative for AMI  >0.03 ng/mL-     Abnormal for myocardial necrosis.  Clinicians would have to utilize clinical acumen, EKG, Troponin and serial changes to determine if it is an Acute Myocardial Infarction or myocardial injury due to an underlying chronic condition.       Results may be falsely decreased if patient taking Biotin.     PROCALCITONIN - Normal    Narrative:     As a Marker for Sepsis (Non-Neonates):   1. <0.5 ng/mL represents a low risk of severe sepsis and/or septic shock.  1. >2 ng/mL represents a high risk of severe sepsis and/or septic shock.    As a Marker for Lower Respiratory Tract Infections that require antibiotic therapy:  PCT on Admission     Antibiotic Therapy             6-12 Hrs later  > 0.5                Strongly Recommended            >0.25 - <0.5         Recommended  0.1 - 0.25           Discouraged                   Remeasure/reassess PCT  <0.1                 Strongly Discouraged          Remeasure/reassess PCT      As 28 day mortality risk marker: \"Change in Procalcitonin Result\" (> 80 % or <=80 %) if Day 0 (or Day 1) and Day 4 values are available. Refer to " [email or web address]-pct-[email or web address]/   Change in PCT <=80 %   A decrease of PCT levels below or equal to 80 % defines a positive change in PCT test result representing a higher risk for 28-day all-cause mortality of patients diagnosed with severe sepsis or septic shock.  Change in PCT > 80 %   A decrease of PCT levels of more than 80 % defines a negative change in PCT result representing a lower risk for 28-day all-cause mortality of patients diagnosed with severe sepsis or septic shock.                Results may be falsely decreased if patient taking Biotin.    LACTIC ACID, PLASMA - Normal   BLOOD CULTURE   BLOOD CULTURE   COVID PRE-OP / PRE-PROCEDURE SCREENING ORDER (NO ISOLATION)    Narrative:     The following orders were created for panel order COVID PRE-OP / PRE-PROCEDURE SCREENING ORDER (NO ISOLATION) - Swab, Nasal Cavity.  Procedure                               Abnormality         Status                     ---------                               -----------         ------                     COVID-19,[facility]...[[id number]]                      In process                   Please view results for these tests on the individual orders.   COVID-19,[facility] IN-HOUSE,NASAL SWAB NO TRANSPORT MEDIA 2 HR TAT   BLOOD GAS, ARTERIAL   RAINBOW DRAW    Narrative:     The following orders were created for panel order [facility] Draw.  Procedure                               Abnormality         Status                     ---------                               -----------         ------                     Light Blue Top[[id number]]                                   In process                 Green Top (Gel)[[id number]]                                  In process                 Lavender Top[[id number]]                                     In process                 Red Top[[id number]]                                          In process                 [facility] Blood Culture Cesar...[[id number]]                                                  Gray Top - Ice[[id number]]                                   In process                   Please view results for these tests on the individual orders.   GRAY TOP - ICE   CBC AND DIFFERENTIAL    Narrative:     The following orders were created for panel order CBC & Differential.  Procedure                               Abnormality         Status                     ---------                               -----------         ------                     CBC Auto Differential[[id number]]        Abnormal            Final result                 Please view results for these tests on the individual orders.   LIGHT BLUE TOP   GREEN TOP   LAVENDER TOP   RED TOP                                       MDM    Final diagnoses:   Chronic obstructive pulmonary disease, unspecified COPD type (CMS/HCC)   Hypercarbia   Hypoxia   Respiratory acidosis   Hypertension, unspecified type            Javier Reinoso MD  09/11/20 1026

## 2020-09-11 NOTE — H&P
[facility] Medicine Services  HISTORY AND PHYSICAL    Date of Admission: 9/11/2020  Primary Care Physician: Salbador Anderson MD    Subjective     Chief Complaint: Shortness of breath    History of Present Illness  70 year old male with PMH of COPD, ongoing tobacco use, DM NID, HTN that presents with cough, wheezing and shortness of breath worsening over the last 7-10 days. He had felt worse since yesterday, but did not want to call EMS and this AM could not tolerate the shortness of breath any longer. EMS noted oxygen saturation around 70%, ER initial abg shows pH of 7.288, CO@ of 64, pO2 of 79. He was treated with BiPAP, bronchodilators and steroids with mild improvement. Patient denies chest or leg pain, no fever or body aches.     Review of Systems   Otherwise complete ROS reviewed and negative except as mentioned in the HPI.    Past Medical History:   Past Medical History:   Diagnosis Date   • COPD (chronic obstructive pulmonary disease) (CMS/HCC)    • Diabetes mellitus (CMS/HCC)    • Hx of colonic polyp    • Hx of TIA (transient ischemic attack) and stroke    • Hyperlipidemia    • Hypertension      Past Surgical History:  Past Surgical History:   Procedure Laterality Date   • BACK SURGERY     • COLONOSCOPY  08/15/2012    Poor prep repeat exam in 3 years   • COLONOSCOPY W/ POLYPECTOMY  07/16/2009    Tubular adenoma at 60 cm, Hyperplastic polyp rectum suboptimal prep repeat in 3 months   • SHOULDER SURGERY     • VERTEBROPLASTY      neck     Social History:  reports that he quit smoking about 7 weeks ago. He has a 55.00 pack-year smoking history. He has never used smokeless tobacco. He reports that he drinks alcohol. He reports that he does not use drugs.    Family History: family history includes Diabetes in his brother, father, and sister; Heart disease in his brother, father, and sister.       Allergies:  No Known Allergies  Medications:  Prior to Admission medications     Medication Sig Start Date End Date Taking? Authorizing Provider   albuterol (PROVENTIL) (2.5 MG/3ML) 0.083% nebulizer solution Take 2.5 mg by nebulization Every 4 (Four) Hours As Needed for Wheezing. 5/7/20   Vee Gaines APRN   albuterol sulfate  (90 Base) MCG/ACT inhaler Inhale 2 puffs Every 4 (Four) Hours As Needed for Wheezing or Shortness of Air. 8/13/20   Raquel Sosa APRN   amLODIPine (NORVASC) 5 MG tablet Take 5 mg by mouth Daily.    ProviderGreg MD   atorvastatin (LIPITOR) 20 MG tablet Take 20 mg by mouth Daily.    ProviderGreg MD   benzonatate (TESSALON) 200 MG capsule Take 1 capsule by mouth 3 (Three) Times a Day As Needed for Cough.  Patient taking differently: Take 100 mg by mouth 3 (Three) Times a Day As Needed for Cough. 6/18/20   Natasha Mckeon APRN   budesonide-formoterol (Symbicort) 160-4.5 MCG/ACT inhaler Inhale 2 puffs 2 (Two) Times a Day for 30 days. 8/13/20 9/12/20  Raquel Sosa APRN   carvedilol (COREG) 3.125 MG tablet Take 3.125 mg by mouth 2 (Two) Times a Day With Meals.    ProviderGreg MD   Dulaglutide (Trulicity) 0.75 MG/0.5ML solution pen-injector Inject 0.75 mg under the skin into the appropriate area as directed 1 (One) Time Per Week. Thursday.    Greg Tavares MD   gabapentin (NEURONTIN) 300 MG capsule Take 300 mg by mouth 3 (Three) Times a Day.    Greg Tavares MD   glimepiride (AMARYL) 2 MG tablet Take 2 mg by mouth Daily.    Greg Tavares MD   guaiFENesin (Mucinex) 600 MG 12 hr tablet Take 2 tablets by mouth 2 (Two) Times a Day. 8/13/20   Raquel Sosa APRN   montelukast (SINGULAIR) 10 MG tablet Take 1 tablet by mouth Every Night. 8/13/20   Sosa, Raquel Betsy, APRN   predniSONE (DELTASONE) 10 MG tablet Take 4 tabs daily x 3 days, then take 3 tabs daily x 3 days, then take 2 tabs daily x 3 days, then take 1 tab daily x 3 days 8/14/20   Raquel Sosa APRN   tamsulosin  "(FLOMAX) 0.4 MG capsule 24 hr capsule Take 1 capsule by mouth Daily.    ProviderGreg MD   tiotropium (Spiriva HandiHaler) 18 MCG per inhalation capsule Spiriva with HandiHaler 18 mcg and inhalation capsules   Inhale 1 capsule every day by inhalation route for 30 days. 5/28/20   ProviderGreg MD   traZODone (DESYREL) 50 MG tablet Take 50 mg by mouth Every Night.    ProviderGreg MD   valsartan-hydrochlorothiazide (DIOVAN-HCT) 320-25 MG per tablet Take 1 tablet by mouth Daily.    Provider, MD Greg     Objective     Vital Signs: /86 (BP Location: Left arm, Patient Position: Lying) Comment: TRESSA Day notified  Pulse 86   Temp 97.7 °F (36.5 °C) (Oral)   Resp 18   Ht 180.3 cm (71\")   Wt 76.7 kg (169 lb 1 oz)   SpO2 95%   BMI 23.58 kg/m²   Physical Exam   Constitutional: He is oriented to person, place, and time. He appears well-developed and well-nourished. He appears distressed.   HENT:   Head: Normocephalic and atraumatic.   Right Ear: External ear normal.   Left Ear: External ear normal.   Eyes: Pupils are equal, round, and reactive to light. EOM are normal.   Neck: Normal range of motion. Neck supple. No JVD present. No thyromegaly present.   Cardiovascular: Normal rate, regular rhythm and normal heart sounds.   No murmur heard.  Pulmonary/Chest: No stridor. He is in respiratory distress. He has wheezes. He has no rales. He exhibits no tenderness.   Tolerating BiPAP, reduced breath sounds bilaterally   Abdominal: Soft. Bowel sounds are normal. He exhibits no distension and no mass. There is no tenderness. There is no guarding.   Musculoskeletal: Normal range of motion. He exhibits no edema, tenderness or deformity.   Neurological: He is alert and oriented to person, place, and time. He displays normal reflexes. No cranial nerve deficit. He exhibits normal muscle tone. Coordination normal.   Skin: Skin is warm. Capillary refill takes less than 2 seconds. He is not " diaphoretic. No erythema. No pallor.   Psychiatric: He has a normal mood and affect.      Results Reviewed:  Lab Results (last 24 hours)     Procedure Component Value Units Date/Time    [place] Draw [[id number]] Collected:  09/11/20 0347    Specimen:  Blood Updated:  09/11/20 0500    Narrative:       The following orders were created for panel order [place] Draw.  Procedure                               Abnormality         Status                     ---------                               -----------         ------                     Light Blue Top[[id number]]                                   Final result               Green Top (Gel)[[id number]]                                  Final result               Lavender Top[[id number]]                                     Final result               Red Top[[id number]]                                          Final result               [place] Blood Culture Cesar...[[id number]]                                                 Gray Top - Ice[[id number]]                                   Final result                 Please view results for these tests on the individual orders.    Light Blue Top [[id number]] Collected:  09/11/20 0347    Specimen:  Blood Updated:  09/11/20 0500     Extra Tube hold for add-on     Comment: Auto resulted       Green Top (Gel) [[id number]] Collected:  09/11/20 0347    Specimen:  Blood Updated:  09/11/20 0500     Extra Tube Hold for add-ons.     Comment: Auto resulted.       Lavender Top [[id number]] Collected:  09/11/20 0347    Specimen:  Blood Updated:  09/11/20 0500     Extra Tube hold for add-on     Comment: Auto resulted       Red Top [[id number]] Collected:  09/11/20 0347    Specimen:  Blood Updated:  09/11/20 0500     Extra Tube Hold for add-ons.     Comment: Auto resulted.       Gray Top - Ice [[id number]] Collected:  09/11/20 0347    Specimen:  Blood Updated:  09/11/20 0500     Extra Tube Hold for add-ons.     Comment: Auto resulted.       COVID PRE-OP /  "PRE-PROCEDURE SCREENING ORDER (NO ISOLATION) - Swab, Nasal Cavity [[id number]] Collected:  09/11/20 0350    Specimen:  Swab from Nasal Cavity Updated:  09/11/20 0459    Narrative:       The following orders were created for panel order COVID PRE-OP / PRE-PROCEDURE SCREENING ORDER (NO ISOLATION) - Swab, Nasal Cavity.  Procedure                               Abnormality         Status                     ---------                               -----------         ------                     COVID-19,[facility] IN-[facility]...[[id number]]  Normal              Final result                 Please view results for these tests on the individual orders.    COVID-19,[facility] IN-HOUSE,Nasal Swab No Transport Media 3-4 HR TAT - Swab, Nasal Cavity [[id number]]  (Normal) Collected:  09/11/20 0350    Specimen:  Swab from Nasal Cavity Updated:  09/11/20 0459     COVID19 Not Detected    Narrative:       Fact sheet for providers: https://www.fda.gov/media/268403/download     Fact sheet for patients: https://www.fda.gov/media/448647/download    Procalcitonin [[id number]]  (Normal) Collected:  09/11/20 0347    Specimen:  Blood Updated:  09/11/20 0419     Procalcitonin 0.02 ng/mL     Narrative:       As a Marker for Sepsis (Non-Neonates):   1. <0.5 ng/mL represents a low risk of severe sepsis and/or septic shock.  1. >2 ng/mL represents a high risk of severe sepsis and/or septic shock.    As a Marker for Lower Respiratory Tract Infections that require antibiotic therapy:  PCT on Admission     Antibiotic Therapy             6-12 Hrs later  > 0.5                Strongly Recommended            >0.25 - <0.5         Recommended  0.1 - 0.25           Discouraged                   Remeasure/reassess PCT  <0.1                 Strongly Discouraged          Remeasure/reassess PCT      As 28 day mortality risk marker: \"Change in Procalcitonin Result\" (> 80 % or <=80 %) if Day 0 (or Day 1) and Day 4 values are available. Refer to " http://www.[facility]-pct-calculator.com/   Change in PCT <=80 %   A decrease of PCT levels below or equal to 80 % defines a positive change in PCT test result representing a higher risk for 28-day all-cause mortality of patients diagnosed with severe sepsis or septic shock.  Change in PCT > 80 %   A decrease of PCT levels of more than 80 % defines a negative change in PCT result representing a lower risk for 28-day all-cause mortality of patients diagnosed with severe sepsis or septic shock.                Results may be falsely decreased if patient taking Biotin.     Blood Culture - Blood, Arm, Left [[id number]] Collected:  09/11/20 0342    Specimen:  Blood from Arm, Left Updated:  09/11/20 0414    Troponin [[id number]]  (Normal) Collected:  09/11/20 0347    Specimen:  Blood Updated:  09/11/20 0413     Troponin T <0.010 ng/mL     Narrative:       Troponin T Reference Range:  <= 0.03 ng/mL-   Negative for AMI  >0.03 ng/mL-     Abnormal for myocardial necrosis.  Clinicians would have to utilize clinical acumen, EKG, Troponin and serial changes to determine if it is an Acute Myocardial Infarction or myocardial injury due to an underlying chronic condition.       Results may be falsely decreased if patient taking Biotin.      BNP [[id number]]  (Normal) Collected:  09/11/20 0347    Specimen:  Blood Updated:  09/11/20 0412     proBNP 118.8 pg/mL     Narrative:       Among patients with dyspnea, NT-proBNP is highly sensitive for the detection of acute congestive heart failure. In addition NT-proBNP of <300 pg/ml effectively rules out acute congestive heart failure with 99% negative predictive value.    Results may be falsely decreased if patient taking Biotin.      Basic Metabolic Panel [[id number]]  (Abnormal) Collected:  09/11/20 0347    Specimen:  Blood Updated:  09/11/20 0411     Glucose 244 mg/dL      BUN 14 mg/dL      Creatinine 0.86 mg/dL      Sodium 139 mmol/L      Potassium 4.0 mmol/L      Chloride 101 mmol/L      CO2  30.0 mmol/L      Calcium 9.3 mg/dL      eGFR   Amer 106 mL/min/1.73      BUN/Creatinine Ratio 16.3     Anion Gap 8.0 mmol/L     Narrative:       GFR Normal >60  Chronic Kidney Disease <60  Kidney Failure <15      Lactic Acid, Plasma [[id number]]  (Normal) Collected:  09/11/20 0347    Specimen:  Blood Updated:  09/11/20 0411     Lactate 1.0 mmol/L     Blood Culture - Blood, Arm, Right [[id number]] Collected:  09/11/20 0337    Specimen:  Blood from Arm, Right Updated:  09/11/20 0406    Protime-INR [[id number]]  (Normal) Collected:  09/11/20 0347    Specimen:  Blood Updated:  09/11/20 0404     Protime 12.1 Seconds      INR 0.93    aPTT [[id number]]  (Normal) Collected:  09/11/20 0347    Specimen:  Blood Updated:  09/11/20 0404     PTT 32.5 seconds     CBC & Differential [[id number]] Collected:  09/11/20 0347    Specimen:  Blood Updated:  09/11/20 0355    Narrative:       The following orders were created for panel order CBC & Differential.  Procedure                               Abnormality         Status                     ---------                               -----------         ------                     CBC Auto Differential[[id number]]        Abnormal            Final result                 Please view results for these tests on the individual orders.    CBC Auto Differential [[id number]]  (Abnormal) Collected:  09/11/20 0347    Specimen:  Blood Updated:  09/11/20 0355     WBC 13.74 10*3/mm3      RBC 4.71 10*6/mm3      Hemoglobin 13.9 g/dL      Hematocrit 43.1 %      MCV 91.5 fL      MCH 29.5 pg      MCHC 32.3 g/dL      RDW 12.7 %      RDW-SD 42.3 fl      MPV 9.1 fL      Platelets 344 10*3/mm3      Neutrophil % 59.2 %      Lymphocyte % 25.2 %      Monocyte % 6.7 %      Eosinophil % 8.0 %      Basophil % 0.7 %      Immature Grans % 0.2 %      Neutrophils, Absolute 8.13 10*3/mm3      Lymphocytes, Absolute 3.46 10*3/mm3      Monocytes, Absolute 0.92 10*3/mm3      Eosinophils, Absolute 1.10 10*3/mm3       Basophils, Absolute 0.10 10*3/mm3      Immature Grans, Absolute 0.03 10*3/mm3      nRBC 0.0 /100 WBC     Blood Gas, Arterial [[id number]]  (Abnormal) Collected:  09/11/20 0346    Specimen:  Arterial Blood Updated:  09/11/20 0351     Site Right Radial     Nacho's Test Positive     pH, Arterial 7.288 pH units      Comment: 84 Value below reference range        pCO2, Arterial 64.4 mm Hg      Comment: 83 Value above reference range        pO2, Arterial 79.5 mm Hg      Comment: 84 Value below reference range        HCO3, Arterial 30.8 mmol/L      Comment: 83 Value above reference range        Base Excess, Arterial 2.3 mmol/L      Comment: 83 Value above reference range        O2 Saturation, Arterial 94.7 %      Temperature 37.0 C      Barometric Pressure for Blood Gas 754 mmHg      Modality Nasal Cannula     Flow Rate 3.5 lpm      Ventilator Mode NA     Collected by [id number]     Comment: Meter: [id number]     :  [id number]        pCO2, Temperature Corrected 64.4 mm Hg      pH, Temp Corrected 7.288 pH Units      pO2, Temperature Corrected 79.5 mm Hg         Imaging Results (Last 24 Hours)     Procedure Component Value Units Date/Time    XR Chest 1 View [[id number]] Resulted:  09/11/20 0405     Updated:  09/11/20 0405        I have personally reviewed and interpreted the radiology studies and ECG obtained at time of admission.     Assessment / Plan     Assessment:   Active Hospital Problems    Diagnosis   • **Acute respiratory failure with hypoxia and hypercapnia (CMS/HCC)   • COPD exacerbation (CMS/HCC)   • Type 2 diabetes mellitus, without long-term current use of insulin (CMS/HCC)   • Hypertension   • Tobacco abuse, in remission     Plan:   Admit to telemetry, vitals every 4 hour, continuous pulse oxymetry.  Npo until oxygen saturation allows for BiPAP pause or discontinuation  IVF NS 30 cc/hour    Continue BiPAP. Repeat abg  Solumedrol 40 mg IVP BID  Albuterol prn, Duoneb QID  Smoking cessation  advised    Control blood pressure    Humalog sliding scale  Home glimepiride on hold  Last A1C 6/25/2020 7.5    Home medications reconciled    DVT prophylaxis > Lovenox 40 mg SQ daily    Code Status: Full     I discussed the patient's findings and my recommendations with the patient    Estimated length of stay over 2 midnights    Patient seen and examined by me on 9/11/2020 at 5:45 AM.    Electronically signed by Arnaldo Freeman MD, 09/11/20, 06:05.

## 2020-09-11 NOTE — PAYOR COMM NOTE
"9/11/20 [facility] [phone]  [phone]    PATIENT ADMITTED ON 9/11/20 . INPATIENT ADMISSION. FAXING CLINICAL FOR REVIEW.    Giovanni Vela (70 y.o. Male)     Date of Birth Social Security Number Address Home Phone MRN    1950  [address] [facility] [address] [phone] [id number]    [employer] Marital Status          [employer]        Admission Date Admission Type Admitting Provider Attending Provider Department, Room/Bed    9/11/20 Emergency Arnaldo Freeman MD Moore, Jonathan Scott, MD [facility] 4B, 445/1    Discharge Date Discharge Disposition Discharge Destination                       Attending Provider:  Camden Garza MD    Allergies:  No Known Allergies    Isolation:  None   Infection:  None   Code Status:  CPR    Ht:  180.3 cm (71\")   Wt:  77.3 kg (170 lb 6.4 oz)    Admission Cmt:  None   Principal Problem:  Acute respiratory failure with hypoxia and hypercapnia (CMS/HCC) [J96.01,J96.02]                 Active Insurance as of 9/11/2020     Primary Coverage     Payor Plan Insurance Group Employer/Plan Group    HUMANA MEDICARE REPLACEMENT HUMANA MEDICARE REPLACEMENT [id number]     Payor Plan Address Payor Plan Phone Number Payor Plan Fax Number Effective Dates    [address] [address] [phone]  1/1/2020 - None Entered    [facility] [address]       Subscriber Name Subscriber Birth Date Member ID       GIOVANNI VELA 1950 [id number]                 Emergency Contacts      (Rel.) Home Phone Work Phone Mobile Phone    Maria L Vela (Other) [phone] -- [phone]    Janel Vela (Daughter) [phone] -- --    Tanner Vela (Son) -- -- [phone]        Javier Reinoso MD   Physician   Emergency Medicine   ED Provider Notes   Signed   Date of Service:  09/11/20 0341   Creation Time:  09/11/20 0341         Procedure Orders   ECG 12 Lead [[id number]] ordered by Javier Reinoso MD at 09/11/20 0336 " "         Signed        Expand All Collapse All      Show:Clear all  [x]Manual[x]Template[]Copied    Added by:  [x]Javier Reinoso MD    []Nicanor for details     Subjective      69 y/o male with history of COPD arrives for evaluation of difficulty breathing that began today. He actually called his PMD beginning around 1300 on 9/10 and was advised to come to the Ed at that time. He states he did not do so as he was hoping \"someone would drive me.\" he has been having SOB and cough with noted wheezing since that time. He denies fevers, chills, ill contacts, sore throat, loss of taste or smell, falls or trauma, CP, smoking or other issues. He looks to have last been on abx and steroids at the end of last month but cannot recall the exact date he finished. He does not wear oxygen at home. EMS noted hypoxia on arrival in the 70s. He arrives in moderate acute distress but is able to fully converse              Past Medical History:  No date: COPD (chronic obstructive pulmonary disease) (CMS/[facility])  No date: Diabetes mellitus (CMS/[facility])  No date: Hx of colonic polyp  No date: Hx of TIA (transient ischemic attack) and stroke  No date: Hyperlipidemia  No date: Hypertension         Physical Exam   Constitutional: He appears well-developed and well-nourished. He appears distressed.   HENT:   Head: Normocephalic.   Mouth/Throat: Oropharynx is clear and moist.   Eyes: Pupils are equal, round, and reactive to light.   Neck: Normal range of motion. Neck supple. No JVD present.   Cardiovascular: Regular rhythm. Tachycardia present.   Pulmonary/Chest: Accessory muscle usage present. Tachypnea noted. He is in respiratory distress. He has wheezes. He exhibits no mass and no tenderness.   Abdominal: Soft. Bowel sounds are normal.   Musculoskeletal: Normal range of motion.        Right lower leg: Normal. He exhibits no edema.        Left lower leg: Normal. He exhibits no edema.   Neurological: He is alert.   Skin: Skin is warm and dry. " "Capillary refill takes less than 2 seconds.   Psychiatric: He has a normal mood and affect. His behavior is normal.   Vitals reviewed.   12 Lead     Date/Time: 9/11/2020 3:36 AM  Performed by: Javier Reinoso MD  Authorized by: Javier Reinoso MD   Interpreted by physician  Rhythm: sinus tachycardia  Rate: tachycardic  BPM: 104  QRS axis: left  Conduction: right bundle branch block      ED Course as of Sep 11 0430   Fri Sep 11, 2020   0429 Patient with obvious hypercarbia and hypoxia with acidosis will admit     He did take off his bipap as he stated it was \"giving me a headache\" we will try ketamine and ativan to keep the mask on.     [JH]       ED Course User Index  [JH] Javier Reinoso MD        [facility]     Final diagnoses:   Chronic obstructive pulmonary disease, unspecified COPD type (CMS/HCC)   Hypercarbia   Hypoxia   Respiratory acidosis   Hypertension, unspecified type               Javier Reinoso MD  09/11/20 0430      Arnaldo Freeman MD   Physician   Medicine   H&P   Addendum   Date of Service:  09/11/20 0605   Creation Time:  09/11/20 0605            Expand All Collapse All      Show:Clear all  [x]Manual[x]Template[]Copied    Added by:  [x]Arnaldo Freeman MD    [][facility] for details       [facility] Medicine Services  HISTORY AND PHYSICAL     Date of Admission: 9/11/2020  Primary Care Physician: Salbador Anderson MD     Subjective      Chief Complaint: Shortness of breath     History of Present Illness  70 year old male with PMH of COPD, ongoing tobacco use, DM NID, HTN that presents with cough, wheezing and shortness of breath worsening over the last 7-10 days. He had felt worse since yesterday, but did not want to call EMS and this AM could not tolerate the shortness of breath any longer. EMS noted oxygen saturation around 70%, ER initial abg shows pH of 7.288, CO@ of 64, pO2 of 79. He was treated with BiPAP, bronchodilators and " "steroids with mild improvement. Patient denies chest or leg pain, no fever or body aches.      Review of Systems   Otherwise complete ROS reviewed and negative except as mentioned in the HPI.           Vital Signs: /86 (BP Location: Left arm, Patient Position: Lying) Comment: TRESSA Day notified  Pulse 86   Temp 97.7 °F (36.5 °C) (Oral)   Resp 18   Ht 180.3 cm (71\")   Wt 76.7 kg (169 lb 1 oz)   SpO2 95%   BMI 23.58 kg/m²   Physical Exam   Constitutional: He is oriented to person, place, and time. He appears well-developed and well-nourished. He appears distressed.   HENT:   Head: Normocephalic and atraumatic.   Right Ear: External ear normal.   Left Ear: External ear normal.   Eyes: Pupils are equal, round, and reactive to light. EOM are normal.   Neck: Normal range of motion. Neck supple. No JVD present. No thyromegaly present.   Cardiovascular: Normal rate, regular rhythm and normal heart sounds.   No murmur heard.  Pulmonary/Chest: No stridor. He is in respiratory distress. He has wheezes. He has no rales. He exhibits no tenderness.   Tolerating BiPAP, reduced breath sounds bilaterally   Abdominal: Soft. Bowel sounds are normal. He exhibits no distension and no mass. There is no tenderness. There is no guarding.   Musculoskeletal: Normal range of motion. He exhibits no edema, tenderness or deformity.   Neurological: He is alert and oriented to person, place, and time. He displays normal reflexes. No cranial nerve deficit. He exhibits normal muscle tone. Coordination normal.   Skin: Skin is warm. Capillary refill takes less than 2 seconds. He is not diaphoretic. No erythema. No pallor.   Psychiatric: He has a normal mood and affect.          falsely decreased if patient taking Biotin.         Basic Metabolic Panel [[id number]]  (Abnormal) Collected:  09/11/20 0347     Specimen:  Blood Updated:  09/11/20 0418       Glucose 244 mg/dL         BUN 14 mg/dL         Creatinine 0.86 mg/dL         Sodium 139 " mmol/L         Potassium 4.0 mmol/L         Chloride 101 mmol/L         CO2 30.0 mmol/L         Calcium 9.3 mg/dL         eGFR  African Amer 106 mL/min/1.73         BUN/Creatinine Ratio 16.3       Anion Gap 8.0 mmol/L       Narrative:        Differential [[id number]]  (Abnormal) Collected:  09/11/20 0347      Specimen:  Blood Updated:  09/11/20 0355       WBC 13.74 10*3/mm3         RBC 4.71 10*6/mm3         Hemoglobin 13.9 g/dL         Hematocrit 43.1 %         MCV 91.5 fL         MCH 29.5 pg         MCHC 32.3 g/dL         RDW 12.7 %         RDW-SD 42.3 fl         MPV 9.1 fL         Platelets 344 10*3/mm3         Neutrophil % 59.2 %         Lymphocyte % 25.2 %         Monocyte % 6.7 %         Eosinophil % 8.0 %         Basophil % 0.7 %         Immature Grans % 0.2 %         Neutrophils, Absolute 8.13 10*3/mm3         Lymphocytes, Absolute 3.46 10*3/mm3         Monocytes, Absolute 0.92 10*3/mm3         Eosinophils, Absolute 1.10 10*3/mm3         Basophils, Absolute 0.10 10*3/mm3         Immature Grans, Absolute 0.03 10*3/mm3         nRBC        nRBC 0.0 /100 WBC         Blood Gas, Arterial [[id number]]  (Abnormal) Collected:  09/11/20 0346     Specimen:  Arterial Blood Updated:  09/11/20 0351       Site Right Radial       Nacho's Test Positive       pH, Arterial 7.288 pH units         Comment: 84 Value below reference range          pCO2, Arterial 64.4 mm Hg         Comment: 83 Value above reference range          pO2, Arterial 79.5 mm Hg         Comment: 84 Value below reference range          HCO3, Arterial 30.8 mmol/L         Comment: 83 Value above reference range          Base Excess, Arterial 2.3 mmol/L         Comment: 83 Value above reference range          O2 Saturation, Arterial 94.7 %         Temperature 37.0 C         Barometric Pressure for Blood Gas 754 mmHg         Modality Nasal Cannula       Flow Rate 3.5 lpm         Ventilator Mode NA       Collected by [id number]       Comment: Meter: [id number]      :  [id number]          pCO2, Temperature Corrected 64.4 mm Hg         pH, Temp Corrected 7.288 pH Units         pO2, Temperature Corrected 79.5 mm Hg          Assessment:       Active Hospital Problems     Diagnosis   • **Acute respiratory failure with hypoxia and hypercapnia (CMS/[facility])   • COPD exacerbation (CMS/[facility])   • Type 2 diabetes mellitus, without long-term current use of insulin (CMS/[facility])   • Hypertension   • Tobacco abuse, in remission      Plan:   Admit to telemetry, vitals every 4 hour, continuous pulse oxymetry.  Npo until oxygen saturation allows for BiPAP pause or discontinuation  IVF NS 30 cc/hour     Continue BiPAP. Repeat abg  Solumedrol 40 mg IVP BID  Albuterol prn, Duoneb QID  Smoking cessation advised     Control blood pressure     Humalog sliding scale  Home glimepiride on hold  Last A1C 6/25/2020 7.5   Humalog sliding scale  Home glimepiride on hold  Last A1C 6/25/2020 7.5     Home medications reconciled     DVT prophylaxis > Lovenox 40 mg SQ daily     Code Status: Full     I discussed the patient's findings and my recommendations with the patient     Estimated length of stay over 2 midnights     Patient seen and examined by me on 9/11/2020 at 5:45 AM.     Electronically signed by Arnaldo Freeman MD, 09/11/20, 06:05.          --  --  --  --  --  2  --  --   09/11/20 0647  --  73  16  --  NPPV/NIV  --  30  98   09/11/20 0638  --  70  12  --  NPPV/NIV  --  30  95   09/11/20 0554  --  --  --  --  NPPV/NIV  --  30  --   09/11/20 0544  97.7 (36.5)  86  18  156/86   nasal cannula  2  --  95   BP: TRESSA Day notified at 09/11/20 0544   09/11/20 05:15:30  97.9 (36.6)  --  18  156/87  --  --  --  --   09/11/20 0515  --  85  --  --  --  --  --  94   09/11/20 05:00:30  --  --  --  134/95  --  --  --  --   09/11/20 0500  --  99  --  --  --  --  --  95   09/11/20 0446  --  95  --  --  --  --  --  96   09/11/20 04:45:30  --  --  --  160/87  --  --  --  --   09/11/20 04:30:30  --  --  --  167/98   --  --  --  --   09/11/20 0430  --  92  --  --  --  --  --  95   09/11/20 0416  --  --  --  167/94  --  --  --  --   09/11/20 0415  --  95  --  --  --  --  --  98   09/11/20 0412  --  97  20  --  NPPV/NIV  --  30  97   09/11/20 04:01:51  --  --  --  --  NPPV/NIV  --  --  --   09/11/20 0400  --  98  --  194/96Abnormal   --  --  --  --   09/11/20 0344  97.5 (36.4)  --  --  --  --  --  --  --   09/11/20 0339  --  104  22                             Current Facility-Administered Medications   Medication Dose Route Frequency Provider Last Rate Last Dose   • albuterol (PROVENTIL) nebulizer solution 0.083% 2.5 mg/3mL  2.5 mg Nebulization Q4H PRN Arnaldo Freeman MD       • amLODIPine (NORVASC) tablet 5 mg  5 mg Oral Daily Arnaldo Freeman MD   5 mg at 09/11/20 0813   • atorvastatin (LIPITOR) tablet 20 mg  20 mg Oral Daily Arnaldo Freeman MD   20 mg at 09/11/20 0813   • budesonide-formoterol (SYMBICORT) 160-4.5 MCG/ACT inhaler 2 puff  2 puff Inhalation BID - RT Arnaldo Freeman MD   2 puff at 09/11/20 1010   • carvedilol (COREG) tablet 3.125 mg  3.125 mg Oral BID With Meals Arnaldo Freeman MD   3.125 mg at 09/11/20 0813   • dextrose (D50W) 25 g/ 50mL Intravenous Solution 25 g  25 g Intravenous Q15 Min PRN Arnaldo Freeman MD       • dextrose (GLUTOSE) oral gel 15 g  15 g Oral Q15 Min PRN Arnaldo Freeman MD       • enoxaparin (LOVENOX) syringe 40 mg  40 mg Subcutaneous Q24H Arnaldo Freeman MD   40 mg at 09/11/20 0813   • gabapentin (NEURONTIN) capsule 300 mg  300 mg Oral TID Arnaldo Freeman MD   300 mg at 09/11/20 0813   • glucagon (human recombinant) (GLUCAGEN DIAGNOSTIC) injection 1 mg  1 mg Subcutaneous Q15 Min PRN Arnaldo Freeman MD       • guaiFENesin (MUCINEX) 12 hr tablet 600 mg  600 mg Oral Q12H Arnaldo Freeman MD   600 mg at 09/11/20 0817   • insulin lispro (humaLOG) injection 0-7 Units  0-7 Units Subcutaneous TID With  Meals Arnaldo Freeman MD   4 Units at 09/11/20 1109   • ipratropium-albuterol (DUO-NEB) nebulizer solution 3 mL  3 mL Nebulization Q6H - RT Arnaldo Freeman MD   3 mL at 09/11/20 1400   • levoFLOXacin (LEVAQUIN) tablet 750 mg  750 mg Oral Q24H Arnaldo Freeman MD   750 mg at 09/11/20 0813   • methylPREDNISolone sodium succinate (SOLU-Medrol) injection 40 mg  40 mg Intravenous BID Arnaldo Freeman MD       • montelukast (SINGULAIR) tablet 10 mg  10 mg Oral Nightly Arnaldo Freeman MD       • sodium chloride 0.9 % flush 10 mL  10 mL Intravenous PRN Javier Reinoso MD       • tamsulosin (FLOMAX) 24 hr capsule 0.4 mg  0.4 mg Oral Daily Arnaldo Freeman MD   0.4 mg at 09/11/20 0813   • traZODone (DESYREL) tablet 50 mg  50 mg Oral Nightly Arnaldo Freeman MD       • valsartan (DIOVAN) 320 mg, hydroCHLOROthiazide (HYDRODIURIL) 25 mg   Oral Daily Arnlado Freeman MD

## 2020-09-12 LAB
ARTERIAL PATENCY WRIST A: ABNORMAL
ATMOSPHERIC PRESS: 750 MMHG
BASE EXCESS BLDA CALC-SCNC: 4.8 MMOL/L (ref 0–2)
BDY SITE: ABNORMAL
BODY TEMPERATURE: 37 C
GAS FLOW AIRWAY: 2 LPM
GLUCOSE BLDC GLUCOMTR-MCNC: 262 MG/DL (ref 70–130)
GLUCOSE BLDC GLUCOMTR-MCNC: 274 MG/DL (ref 70–130)
GLUCOSE BLDC GLUCOMTR-MCNC: 324 MG/DL (ref 70–130)
GLUCOSE BLDC GLUCOMTR-MCNC: 325 MG/DL (ref 70–130)
HCO3 BLDA-SCNC: 30.5 MMOL/L (ref 20–26)
Lab: ABNORMAL
MODALITY: ABNORMAL
PCO2 BLDA: 48.7 MM HG (ref 35–45)
PCO2 TEMP ADJ BLD: 48.7 MM HG (ref 35–45)
PH BLDA: 7.41 PH UNITS (ref 7.35–7.45)
PH, TEMP CORRECTED: 7.41 PH UNITS (ref 7.35–7.45)
PO2 BLDA: 64.2 MM HG (ref 83–108)
PO2 TEMP ADJ BLD: 64.2 MM HG (ref 83–108)
SAO2 % BLDCOA: 93.2 % (ref 94–99)
VENTILATOR MODE: ABNORMAL

## 2020-09-12 PROCEDURE — 94799 UNLISTED PULMONARY SVC/PX: CPT

## 2020-09-12 PROCEDURE — 63710000001 PREDNISONE PER 1 MG: Performed by: FAMILY MEDICINE

## 2020-09-12 PROCEDURE — 82962 GLUCOSE BLOOD TEST: CPT

## 2020-09-12 PROCEDURE — 63710000001 INSULIN LISPRO (HUMAN) PER 5 UNITS: Performed by: FAMILY MEDICINE

## 2020-09-12 PROCEDURE — 36600 WITHDRAWAL OF ARTERIAL BLOOD: CPT

## 2020-09-12 PROCEDURE — 25010000002 METHYLPREDNISOLONE PER 40 MG: Performed by: FAMILY MEDICINE

## 2020-09-12 PROCEDURE — 82803 BLOOD GASES ANY COMBINATION: CPT

## 2020-09-12 PROCEDURE — 25010000002 ENOXAPARIN PER 10 MG: Performed by: FAMILY MEDICINE

## 2020-09-12 RX ORDER — BENZONATATE 100 MG/1
200 CAPSULE ORAL 3 TIMES DAILY PRN
Status: DISCONTINUED | OUTPATIENT
Start: 2020-09-12 | End: 2020-09-15 | Stop reason: HOSPADM

## 2020-09-12 RX ORDER — PREDNISONE 20 MG/1
20 TABLET ORAL 2 TIMES DAILY WITH MEALS
Status: DISCONTINUED | OUTPATIENT
Start: 2020-09-12 | End: 2020-09-13

## 2020-09-12 RX ADMIN — GABAPENTIN 300 MG: 300 CAPSULE ORAL at 08:37

## 2020-09-12 RX ADMIN — PREDNISONE 20 MG: 20 TABLET ORAL at 17:01

## 2020-09-12 RX ADMIN — GUAIFENESIN 600 MG: 600 TABLET, EXTENDED RELEASE ORAL at 08:36

## 2020-09-12 RX ADMIN — BUDESONIDE AND FORMOTEROL FUMARATE DIHYDRATE 2 PUFF: 160; 4.5 AEROSOL RESPIRATORY (INHALATION) at 06:09

## 2020-09-12 RX ADMIN — GUAIFENESIN 600 MG: 600 TABLET, EXTENDED RELEASE ORAL at 20:51

## 2020-09-12 RX ADMIN — HYDROCHLOROTHIAZIDE: 25 TABLET ORAL at 08:36

## 2020-09-12 RX ADMIN — ENOXAPARIN SODIUM 40 MG: 40 INJECTION SUBCUTANEOUS at 08:36

## 2020-09-12 RX ADMIN — BENZONATATE 200 MG: 100 CAPSULE ORAL at 12:59

## 2020-09-12 RX ADMIN — METHYLPREDNISOLONE SODIUM SUCCINATE 40 MG: 40 INJECTION, POWDER, FOR SOLUTION INTRAMUSCULAR; INTRAVENOUS at 08:37

## 2020-09-12 RX ADMIN — GABAPENTIN 300 MG: 300 CAPSULE ORAL at 15:41

## 2020-09-12 RX ADMIN — GABAPENTIN 300 MG: 300 CAPSULE ORAL at 20:51

## 2020-09-12 RX ADMIN — INSULIN LISPRO 4 UNITS: 100 INJECTION, SOLUTION INTRAVENOUS; SUBCUTANEOUS at 17:01

## 2020-09-12 RX ADMIN — CARVEDILOL 3.12 MG: 3.12 TABLET, FILM COATED ORAL at 17:01

## 2020-09-12 RX ADMIN — INSULIN LISPRO 4 UNITS: 100 INJECTION, SOLUTION INTRAVENOUS; SUBCUTANEOUS at 11:57

## 2020-09-12 RX ADMIN — IPRATROPIUM BROMIDE AND ALBUTEROL SULFATE 3 ML: 2.5; .5 SOLUTION RESPIRATORY (INHALATION) at 06:08

## 2020-09-12 RX ADMIN — INSULIN LISPRO 5 UNITS: 100 INJECTION, SOLUTION INTRAVENOUS; SUBCUTANEOUS at 08:42

## 2020-09-12 RX ADMIN — CARVEDILOL 3.12 MG: 3.12 TABLET, FILM COATED ORAL at 08:36

## 2020-09-12 RX ADMIN — IPRATROPIUM BROMIDE AND ALBUTEROL SULFATE 3 ML: 2.5; .5 SOLUTION RESPIRATORY (INHALATION) at 13:26

## 2020-09-12 RX ADMIN — BUDESONIDE AND FORMOTEROL FUMARATE DIHYDRATE 2 PUFF: 160; 4.5 AEROSOL RESPIRATORY (INHALATION) at 18:56

## 2020-09-12 RX ADMIN — MONTELUKAST SODIUM 10 MG: 10 TABLET, FILM COATED ORAL at 20:51

## 2020-09-12 RX ADMIN — IPRATROPIUM BROMIDE AND ALBUTEROL SULFATE 3 ML: 2.5; .5 SOLUTION RESPIRATORY (INHALATION) at 18:56

## 2020-09-12 RX ADMIN — AMLODIPINE BESYLATE 5 MG: 5 TABLET ORAL at 08:36

## 2020-09-12 RX ADMIN — TRAZODONE HYDROCHLORIDE 50 MG: 50 TABLET ORAL at 20:51

## 2020-09-12 RX ADMIN — TAMSULOSIN HYDROCHLORIDE 0.4 MG: 0.4 CAPSULE ORAL at 08:36

## 2020-09-12 RX ADMIN — SODIUM CHLORIDE, PRESERVATIVE FREE 10 ML: 5 INJECTION INTRAVENOUS at 20:51

## 2020-09-12 RX ADMIN — LEVOFLOXACIN 750 MG: 750 TABLET, FILM COATED ORAL at 08:36

## 2020-09-12 RX ADMIN — IPRATROPIUM BROMIDE AND ALBUTEROL SULFATE 3 ML: 2.5; .5 SOLUTION RESPIRATORY (INHALATION) at 01:00

## 2020-09-12 RX ADMIN — ATORVASTATIN CALCIUM 20 MG: 10 TABLET, FILM COATED ORAL at 08:36

## 2020-09-12 NOTE — PLAN OF CARE
Goal Outcome Evaluation:  Plan of Care Reviewed With: patient  Progress: improving  Outcome Summary: No c/o pain. A&Ox4. 2L 02 NC. Pt refused BiPAP overnight. output adequate. Wheezes and constant cough noted. NSR on tele.

## 2020-09-12 NOTE — PLAN OF CARE
Goal Outcome Evaluation:  Plan of Care Reviewed With: patient  Progress: improving  Outcome Summary: No current c/o pain, continues O2 per NC at 2L with 92-98%, tolerating diet, voiding per urinal, -325 covered with SSI, dry nonproductive cough and PRN tesslon pearles, patient resting comfortably, will continue to monitor.

## 2020-09-12 NOTE — PROGRESS NOTES
[facility] Medicine Services  INPATIENT PROGRESS NOTE    Patient Name: Naif Nick  Date of Admission: 9/11/2020  Today's Date: 09/12/20  Length of Stay: 1  Primary Care Physician: Salbador Anderson MD    Subjective   Chief Complaint: SOA  HPI   No SOA today.  Tolerating room air.  He is no longer wheezing.  He has a constant dry cough.  He is afebrile        Review of Systems   Respiratory: Positive for cough.         All pertinent negatives and positives are as above. All other systems have been reviewed and are negative unless otherwise stated.     Objective    Temp:  [97.7 °F (36.5 °C)-98.8 °F (37.1 °C)] 98.5 °F (36.9 °C)  Heart Rate:  [71-87] 87  Resp:  [16-18] 16  BP: (115-158)/(49-79) 139/61  Physical Exam  Vitals signs reviewed.   Constitutional:       General: He is not in acute distress.     Appearance: He is not toxic-appearing.   HENT:      Head: Normocephalic and atraumatic.      Mouth/Throat:      Mouth: Mucous membranes are moist.      Pharynx: Oropharynx is clear.   Eyes:      Extraocular Movements: Extraocular movements intact.      Conjunctiva/sclera: Conjunctivae normal.      Pupils: Pupils are equal, round, and reactive to light.   Neck:      Musculoskeletal: Normal range of motion and neck supple. No muscular tenderness.   Cardiovascular:      Rate and Rhythm: Normal rate and regular rhythm.      Pulses: Normal pulses.      Heart sounds: No murmur.   Pulmonary:      Effort: Pulmonary effort is normal. No respiratory distress.      Breath sounds: Normal breath sounds. No wheezing or rhonchi.   Abdominal:      General: Bowel sounds are normal. There is no distension.      Palpations: Abdomen is soft.      Tenderness: There is no abdominal tenderness.   Musculoskeletal: Normal range of motion.         General: No tenderness.   Skin:     General: Skin is warm and dry.      Findings: No erythema or rash.   Neurological:      General: No focal deficit present.       Mental Status: He is alert and oriented to person, place, and time.      Cranial Nerves: No cranial nerve deficit.      Motor: No weakness.   Psychiatric:         Mood and Affect: Mood normal.         Behavior: Behavior normal.           Results Review:  I have reviewed the labs, radiology results, and diagnostic studies.    Laboratory Data:   Results from last 7 days   Lab Units 09/11/20  0347   WBC 10*3/mm3 13.74*   HEMOGLOBIN g/dL 13.9   HEMATOCRIT % 43.1   PLATELETS 10*3/mm3 344        Results from last 7 days   Lab Units 09/11/20  0347   SODIUM mmol/L 139   POTASSIUM mmol/L 4.0   CHLORIDE mmol/L 101   CO2 mmol/L 30.0*   BUN mg/dL 14   CREATININE mg/dL 0.86   CALCIUM mg/dL 9.3   GLUCOSE mg/dL 244*       Culture Data:   Blood Culture   Date Value Ref Range Status   09/11/2020 No growth at 24 hours  Preliminary   09/11/2020 No growth at 24 hours  Preliminary       Radiology Data:   Imaging Results (Last 24 Hours)     ** No results found for the last 24 hours. **          I have reviewed the patient's current medications.     Assessment/Plan     Active Hospital Problems    Diagnosis   • **Acute respiratory failure with hypoxia and hypercapnia (CMS/HCC)   • COPD exacerbation (CMS/HCC)   • Acute on chronic respiratory failure with hypercapnia (CMS/HCC)   • Type 2 diabetes mellitus, without long-term current use of insulin (CMS/HCC)   • Hypertension   • Tobacco abuse, in remission     Imaging reviewed:  Chest xray  Chest xray independently interpreted by me:  No acute cardiopulmonary process    1.  COPD AE  -Change steroids to oral  -Nebs  -Symbicort    2.  Acute respiratory failure with hypercapnia   -Change steroids to oral  -Nebs  -Symbicort    3.  T2DM  -SSI    4.  HTN  -Diovan  -Coreg    5.  Tobacco abuse  -Cessation counseling    6.  Diabetic Neuropathy  -Gabapentin    7.  HLD  -Statin    8.  CVD  -ASA  -Statin      Discharge Planning: I expect the patient to be discharged to home in 1 day    Electronically  signed by Camden Garza MD, 09/12/20, 14:13 CDT.

## 2020-09-13 LAB
ALBUMIN SERPL-MCNC: 4.2 G/DL (ref 3.5–5.2)
ALBUMIN/GLOB SERPL: 1.4 G/DL
ALP SERPL-CCNC: 51 U/L (ref 39–117)
ALT SERPL W P-5'-P-CCNC: 26 U/L (ref 1–41)
ANION GAP SERPL CALCULATED.3IONS-SCNC: 7 MMOL/L (ref 5–15)
AST SERPL-CCNC: 15 U/L (ref 1–40)
BASOPHILS # BLD AUTO: 0.03 10*3/MM3 (ref 0–0.2)
BASOPHILS NFR BLD AUTO: 0.2 % (ref 0–1.5)
BILIRUB SERPL-MCNC: 0.5 MG/DL (ref 0–1.2)
BUN SERPL-MCNC: 25 MG/DL (ref 8–23)
BUN/CREAT SERPL: 27.8 (ref 7–25)
CALCIUM SPEC-SCNC: 9.9 MG/DL (ref 8.6–10.5)
CHLORIDE SERPL-SCNC: 92 MMOL/L (ref 98–107)
CO2 SERPL-SCNC: 34 MMOL/L (ref 22–29)
CREAT SERPL-MCNC: 0.9 MG/DL (ref 0.76–1.27)
DEPRECATED RDW RBC AUTO: 41.6 FL (ref 37–54)
EOSINOPHIL # BLD AUTO: 0.03 10*3/MM3 (ref 0–0.4)
EOSINOPHIL NFR BLD AUTO: 0.2 % (ref 0.3–6.2)
ERYTHROCYTE [DISTWIDTH] IN BLOOD BY AUTOMATED COUNT: 12.8 % (ref 12.3–15.4)
GFR SERPL CREATININE-BSD FRML MDRD: 101 ML/MIN/1.73
GLOBULIN UR ELPH-MCNC: 3.1 GM/DL
GLUCOSE BLDC GLUCOMTR-MCNC: 227 MG/DL (ref 70–130)
GLUCOSE BLDC GLUCOMTR-MCNC: 281 MG/DL (ref 70–130)
GLUCOSE BLDC GLUCOMTR-MCNC: 355 MG/DL (ref 70–130)
GLUCOSE BLDC GLUCOMTR-MCNC: 384 MG/DL (ref 70–130)
GLUCOSE SERPL-MCNC: 229 MG/DL (ref 65–99)
HCT VFR BLD AUTO: 41.4 % (ref 37.5–51)
HGB BLD-MCNC: 13.4 G/DL (ref 13–17.7)
IMM GRANULOCYTES # BLD AUTO: 0.05 10*3/MM3 (ref 0–0.05)
IMM GRANULOCYTES NFR BLD AUTO: 0.4 % (ref 0–0.5)
LYMPHOCYTES # BLD AUTO: 1.35 10*3/MM3 (ref 0.7–3.1)
LYMPHOCYTES NFR BLD AUTO: 10.2 % (ref 19.6–45.3)
MCH RBC QN AUTO: 29.4 PG (ref 26.6–33)
MCHC RBC AUTO-ENTMCNC: 32.4 G/DL (ref 31.5–35.7)
MCV RBC AUTO: 90.8 FL (ref 79–97)
MONOCYTES # BLD AUTO: 0.93 10*3/MM3 (ref 0.1–0.9)
MONOCYTES NFR BLD AUTO: 7 % (ref 5–12)
NEUTROPHILS NFR BLD AUTO: 10.87 10*3/MM3 (ref 1.7–7)
NEUTROPHILS NFR BLD AUTO: 82 % (ref 42.7–76)
NRBC BLD AUTO-RTO: 0 /100 WBC (ref 0–0.2)
PLATELET # BLD AUTO: 361 10*3/MM3 (ref 140–450)
PMV BLD AUTO: 9.5 FL (ref 6–12)
POTASSIUM SERPL-SCNC: 4 MMOL/L (ref 3.5–5.2)
PROT SERPL-MCNC: 7.3 G/DL (ref 6–8.5)
RBC # BLD AUTO: 4.56 10*6/MM3 (ref 4.14–5.8)
SODIUM SERPL-SCNC: 133 MMOL/L (ref 136–145)
WBC # BLD AUTO: 13.26 10*3/MM3 (ref 3.4–10.8)

## 2020-09-13 PROCEDURE — 94799 UNLISTED PULMONARY SVC/PX: CPT

## 2020-09-13 PROCEDURE — 25010000002 METHYLPREDNISOLONE PER 40 MG: Performed by: FAMILY MEDICINE

## 2020-09-13 PROCEDURE — 85025 COMPLETE CBC W/AUTO DIFF WBC: CPT | Performed by: FAMILY MEDICINE

## 2020-09-13 PROCEDURE — 63710000001 PREDNISONE PER 1 MG: Performed by: FAMILY MEDICINE

## 2020-09-13 PROCEDURE — 82962 GLUCOSE BLOOD TEST: CPT

## 2020-09-13 PROCEDURE — 25010000002 ENOXAPARIN PER 10 MG: Performed by: FAMILY MEDICINE

## 2020-09-13 PROCEDURE — 80053 COMPREHEN METABOLIC PANEL: CPT | Performed by: FAMILY MEDICINE

## 2020-09-13 PROCEDURE — 63710000001 INSULIN LISPRO (HUMAN) PER 5 UNITS: Performed by: FAMILY MEDICINE

## 2020-09-13 RX ORDER — METHYLPREDNISOLONE SODIUM SUCCINATE 40 MG/ML
40 INJECTION, POWDER, LYOPHILIZED, FOR SOLUTION INTRAMUSCULAR; INTRAVENOUS EVERY 12 HOURS
Status: DISCONTINUED | OUTPATIENT
Start: 2020-09-13 | End: 2020-09-15

## 2020-09-13 RX ADMIN — TRAZODONE HYDROCHLORIDE 50 MG: 50 TABLET ORAL at 20:52

## 2020-09-13 RX ADMIN — METHYLPREDNISOLONE SODIUM SUCCINATE 40 MG: 40 INJECTION, POWDER, LYOPHILIZED, FOR SOLUTION INTRAMUSCULAR; INTRAVENOUS at 20:52

## 2020-09-13 RX ADMIN — ENOXAPARIN SODIUM 40 MG: 40 INJECTION SUBCUTANEOUS at 08:23

## 2020-09-13 RX ADMIN — BENZONATATE 200 MG: 100 CAPSULE ORAL at 08:31

## 2020-09-13 RX ADMIN — TAMSULOSIN HYDROCHLORIDE 0.4 MG: 0.4 CAPSULE ORAL at 08:23

## 2020-09-13 RX ADMIN — IPRATROPIUM BROMIDE AND ALBUTEROL SULFATE 3 ML: 2.5; .5 SOLUTION RESPIRATORY (INHALATION) at 00:00

## 2020-09-13 RX ADMIN — SODIUM CHLORIDE, PRESERVATIVE FREE 10 ML: 5 INJECTION INTRAVENOUS at 20:52

## 2020-09-13 RX ADMIN — BENZONATATE 200 MG: 100 CAPSULE ORAL at 17:55

## 2020-09-13 RX ADMIN — IPRATROPIUM BROMIDE AND ALBUTEROL SULFATE 3 ML: 2.5; .5 SOLUTION RESPIRATORY (INHALATION) at 23:54

## 2020-09-13 RX ADMIN — GABAPENTIN 300 MG: 300 CAPSULE ORAL at 16:03

## 2020-09-13 RX ADMIN — HYDROCHLOROTHIAZIDE: 25 TABLET ORAL at 08:23

## 2020-09-13 RX ADMIN — INSULIN LISPRO 6 UNITS: 100 INJECTION, SOLUTION INTRAVENOUS; SUBCUTANEOUS at 08:30

## 2020-09-13 RX ADMIN — LEVOFLOXACIN 750 MG: 750 TABLET, FILM COATED ORAL at 08:24

## 2020-09-13 RX ADMIN — PREDNISONE 20 MG: 20 TABLET ORAL at 08:24

## 2020-09-13 RX ADMIN — IPRATROPIUM BROMIDE AND ALBUTEROL SULFATE 3 ML: 2.5; .5 SOLUTION RESPIRATORY (INHALATION) at 13:55

## 2020-09-13 RX ADMIN — ATORVASTATIN CALCIUM 20 MG: 10 TABLET, FILM COATED ORAL at 08:23

## 2020-09-13 RX ADMIN — GABAPENTIN 300 MG: 300 CAPSULE ORAL at 08:24

## 2020-09-13 RX ADMIN — BUDESONIDE AND FORMOTEROL FUMARATE DIHYDRATE 2 PUFF: 160; 4.5 AEROSOL RESPIRATORY (INHALATION) at 06:12

## 2020-09-13 RX ADMIN — INSULIN LISPRO 3 UNITS: 100 INJECTION, SOLUTION INTRAVENOUS; SUBCUTANEOUS at 12:25

## 2020-09-13 RX ADMIN — MONTELUKAST SODIUM 10 MG: 10 TABLET, FILM COATED ORAL at 20:52

## 2020-09-13 RX ADMIN — AMLODIPINE BESYLATE 5 MG: 5 TABLET ORAL at 08:24

## 2020-09-13 RX ADMIN — IPRATROPIUM BROMIDE AND ALBUTEROL SULFATE 3 ML: 2.5; .5 SOLUTION RESPIRATORY (INHALATION) at 19:36

## 2020-09-13 RX ADMIN — INSULIN LISPRO 6 UNITS: 100 INJECTION, SOLUTION INTRAVENOUS; SUBCUTANEOUS at 17:52

## 2020-09-13 RX ADMIN — CARVEDILOL 3.12 MG: 3.12 TABLET, FILM COATED ORAL at 08:24

## 2020-09-13 RX ADMIN — IPRATROPIUM BROMIDE AND ALBUTEROL SULFATE 3 ML: 2.5; .5 SOLUTION RESPIRATORY (INHALATION) at 06:12

## 2020-09-13 RX ADMIN — GUAIFENESIN 600 MG: 600 TABLET, EXTENDED RELEASE ORAL at 20:52

## 2020-09-13 RX ADMIN — GABAPENTIN 300 MG: 300 CAPSULE ORAL at 20:52

## 2020-09-13 RX ADMIN — CARVEDILOL 3.12 MG: 3.12 TABLET, FILM COATED ORAL at 17:52

## 2020-09-13 RX ADMIN — GUAIFENESIN 600 MG: 600 TABLET, EXTENDED RELEASE ORAL at 08:24

## 2020-09-13 RX ADMIN — BUDESONIDE AND FORMOTEROL FUMARATE DIHYDRATE 2 PUFF: 160; 4.5 AEROSOL RESPIRATORY (INHALATION) at 19:37

## 2020-09-13 NOTE — PLAN OF CARE
Goal Outcome Evaluation:  Plan of Care Reviewed With: patient  Progress: improving  Patient wearing 2L O2 PRN.  C/o dry, NP cough. Wheezing noted to left upper lobe.  No c/o pain.  VSS.  NSR-ST  with BBB per tele.  Hopeful for discharge today.  Will continue to monitor.

## 2020-09-13 NOTE — PLAN OF CARE
Goal Outcome Evaluation:  Plan of Care Reviewed With: patient  Progress: no change  Outcome Summary: pt continues to require O2 for Shortness of air. c/o dry nonproductive cough. no other complaints. sinus rhythm per tele.safety maintained.

## 2020-09-13 NOTE — PROGRESS NOTES
[facility] Medicine Services  INPATIENT PROGRESS NOTE    Patient Name: Naif Nick  Date of Admission: 9/11/2020  Today's Date: 09/13/20  Length of Stay: 2  Primary Care Physician: Salbador Anderson MD    Subjective   Chief Complaint: SOA  HPI   More SOA this AM.  Sats dropped to 89% on RA.  Wheezing and coughing more today      Review of Systems   Constitutional: Negative for fatigue and fever.   HENT: Negative for congestion and ear pain.    Eyes: Negative for redness and visual disturbance.   Respiratory: Positive for cough, shortness of breath and wheezing.    Cardiovascular: Negative for chest pain and palpitations.   Gastrointestinal: Negative for abdominal pain, diarrhea, nausea and vomiting.   Endocrine: Negative for cold intolerance and heat intolerance.   Genitourinary: Negative for dysuria and frequency.   Musculoskeletal: Negative for arthralgias and back pain.   Skin: Negative for rash and wound.   Neurological: Negative for dizziness and headaches.   Psychiatric/Behavioral: Negative for confusion. The patient is not nervous/anxious.         All pertinent negatives and positives are as above. All other systems have been reviewed and are negative unless otherwise stated.     Objective    Temp:  [98.4 °F (36.9 °C)-98.8 °F (37.1 °C)] 98.4 °F (36.9 °C)  Heart Rate:  [79-92] 83  Resp:  [16] 16  BP: (137-169)/(67-91) 150/68  Physical Exam  Vitals signs reviewed.   Constitutional:       Appearance: He is well-developed.   HENT:      Head: Normocephalic and atraumatic.      Right Ear: External ear normal.      Left Ear: External ear normal.      Nose: Nose normal.   Eyes:      General: No scleral icterus.        Right eye: No discharge.         Left eye: No discharge.      Conjunctiva/sclera: Conjunctivae normal.      Pupils: Pupils are equal, round, and reactive to light.   Neck:      Musculoskeletal: Normal range of motion and neck supple.      Thyroid: No thyromegaly.       Trachea: No tracheal deviation.   Cardiovascular:      Rate and Rhythm: Normal rate and regular rhythm.      Heart sounds: Normal heart sounds. No murmur. No friction rub. No gallop.    Pulmonary:      Effort: Pulmonary effort is normal. No respiratory distress.      Breath sounds: Decreased air movement present. No stridor. Wheezing present. No rales.   Chest:      Chest wall: No tenderness.   Abdominal:      General: Bowel sounds are normal. There is no distension.      Palpations: Abdomen is soft. There is no mass.      Tenderness: There is no abdominal tenderness. There is no guarding or rebound.      Hernia: No hernia is present.   Musculoskeletal: Normal range of motion.         General: No deformity.   Lymphadenopathy:      Cervical: No cervical adenopathy.   Skin:     General: Skin is warm and dry.      Coloration: Skin is not pale.      Findings: No erythema or rash.   Neurological:      Mental Status: He is alert and oriented to person, place, and time.      Cranial Nerves: No cranial nerve deficit.      Motor: No abnormal muscle tone.      Coordination: Coordination normal.      Deep Tendon Reflexes: Reflexes are normal and symmetric. Reflexes normal.   Psychiatric:         Behavior: Behavior normal.         Thought Content: Thought content normal.         Judgment: Judgment normal.             Results Review:  I have reviewed the labs, radiology results, and diagnostic studies.    Laboratory Data:   Results from last 7 days   Lab Units 09/13/20  1128 09/11/20  0347   WBC 10*3/mm3 13.26* 13.74*   HEMOGLOBIN g/dL 13.4 13.9   HEMATOCRIT % 41.4 43.1   PLATELETS 10*3/mm3 361 344        Results from last 7 days   Lab Units 09/13/20  1128 09/11/20  0347   SODIUM mmol/L 133* 139   POTASSIUM mmol/L 4.0 4.0   CHLORIDE mmol/L 92* 101   CO2 mmol/L 34.0* 30.0*   BUN mg/dL 25* 14   CREATININE mg/dL 0.90 0.86   CALCIUM mg/dL 9.9 9.3   BILIRUBIN mg/dL 0.5  --    ALK PHOS U/L 51  --    ALT (SGPT) U/L 26  --    AST (SGOT)  U/L 15  --    GLUCOSE mg/dL 229* 244*       Culture Data:   Blood Culture   Date Value Ref Range Status   09/11/2020 No growth at 2 days  Preliminary   09/11/2020 No growth at 2 days  Preliminary       Radiology Data:   Imaging Results (Last 24 Hours)     ** No results found for the last 24 hours. **          I have reviewed the patient's current medications.     Assessment/Plan     Active Hospital Problems    Diagnosis   • **Acute respiratory failure with hypoxia and hypercapnia (CMS/HCC)   • COPD exacerbation (CMS/HCC)   • Acute on chronic respiratory failure with hypercapnia (CMS/HCC)   • Type 2 diabetes mellitus, without long-term current use of insulin (CMS/HCC)   • Hypertension   • Tobacco abuse, in remission          1.  COPD AE  -Change steroids back to IV  -Nebs  -Symbicort     2.  Acute respiratory failure with hypercapnia   -Change steroids back to IV  -Nebs  -Symbicort     3.  T2DM  -SSI     4.  HTN  -Diovan  -Coreg     5.  Tobacco abuse  -Cessation counseling     6.  Diabetic Neuropathy  -Gabapentin     7.  HLD  -Statin     8.  CVD  -ASA  -Statin         Discharge Planning: I expect the patient to be discharged to home in 1-2 days    Electronically signed by Camden Garza MD, 09/13/20, 12:54 CDT.

## 2020-09-14 LAB
GLUCOSE BLDC GLUCOMTR-MCNC: 294 MG/DL (ref 70–130)
GLUCOSE BLDC GLUCOMTR-MCNC: 315 MG/DL (ref 70–130)
GLUCOSE BLDC GLUCOMTR-MCNC: 392 MG/DL (ref 70–130)
GLUCOSE BLDC GLUCOMTR-MCNC: 411 MG/DL (ref 70–130)
GLUCOSE BLDC GLUCOMTR-MCNC: 417 MG/DL (ref 70–130)

## 2020-09-14 PROCEDURE — 94762 N-INVAS EAR/PLS OXIMTRY CONT: CPT

## 2020-09-14 PROCEDURE — 63710000001 INSULIN LISPRO (HUMAN) PER 5 UNITS: Performed by: FAMILY MEDICINE

## 2020-09-14 PROCEDURE — 82962 GLUCOSE BLOOD TEST: CPT

## 2020-09-14 PROCEDURE — 94799 UNLISTED PULMONARY SVC/PX: CPT

## 2020-09-14 PROCEDURE — 25010000002 ENOXAPARIN PER 10 MG: Performed by: FAMILY MEDICINE

## 2020-09-14 PROCEDURE — 25010000002 METHYLPREDNISOLONE PER 40 MG: Performed by: FAMILY MEDICINE

## 2020-09-14 RX ORDER — GLIPIZIDE 5 MG/1
5 TABLET ORAL
Status: DISCONTINUED | OUTPATIENT
Start: 2020-09-15 | End: 2020-09-15 | Stop reason: HOSPADM

## 2020-09-14 RX ORDER — NICOTINE POLACRILEX 4 MG
15 LOZENGE BUCCAL
Status: DISCONTINUED | OUTPATIENT
Start: 2020-09-14 | End: 2020-09-14

## 2020-09-14 RX ORDER — DEXTROSE MONOHYDRATE 25 G/50ML
25 INJECTION, SOLUTION INTRAVENOUS
Status: DISCONTINUED | OUTPATIENT
Start: 2020-09-14 | End: 2020-09-14

## 2020-09-14 RX ORDER — FAMOTIDINE 20 MG/1
20 TABLET, FILM COATED ORAL 2 TIMES DAILY
Status: DISCONTINUED | OUTPATIENT
Start: 2020-09-14 | End: 2020-09-15 | Stop reason: HOSPADM

## 2020-09-14 RX ORDER — LEVOFLOXACIN 500 MG/1
500 TABLET, FILM COATED ORAL EVERY 24 HOURS
Status: COMPLETED | OUTPATIENT
Start: 2020-09-15 | End: 2020-09-15

## 2020-09-14 RX ADMIN — HYDROCHLOROTHIAZIDE: 25 TABLET ORAL at 07:42

## 2020-09-14 RX ADMIN — INSULIN LISPRO 5 UNITS: 100 INJECTION, SOLUTION INTRAVENOUS; SUBCUTANEOUS at 07:58

## 2020-09-14 RX ADMIN — INSULIN LISPRO 14 UNITS: 100 INJECTION, SOLUTION INTRAVENOUS; SUBCUTANEOUS at 17:13

## 2020-09-14 RX ADMIN — METHYLPREDNISOLONE SODIUM SUCCINATE 40 MG: 40 INJECTION, POWDER, LYOPHILIZED, FOR SOLUTION INTRAMUSCULAR; INTRAVENOUS at 07:42

## 2020-09-14 RX ADMIN — GUAIFENESIN 600 MG: 600 TABLET, EXTENDED RELEASE ORAL at 21:37

## 2020-09-14 RX ADMIN — ATORVASTATIN CALCIUM 20 MG: 10 TABLET, FILM COATED ORAL at 07:43

## 2020-09-14 RX ADMIN — MONTELUKAST SODIUM 10 MG: 10 TABLET, FILM COATED ORAL at 21:37

## 2020-09-14 RX ADMIN — GABAPENTIN 300 MG: 300 CAPSULE ORAL at 21:37

## 2020-09-14 RX ADMIN — INSULIN LISPRO 4 UNITS: 100 INJECTION, SOLUTION INTRAVENOUS; SUBCUTANEOUS at 11:52

## 2020-09-14 RX ADMIN — AMLODIPINE BESYLATE 5 MG: 5 TABLET ORAL at 07:43

## 2020-09-14 RX ADMIN — IPRATROPIUM BROMIDE AND ALBUTEROL SULFATE 3 ML: 2.5; .5 SOLUTION RESPIRATORY (INHALATION) at 13:51

## 2020-09-14 RX ADMIN — GABAPENTIN 300 MG: 300 CAPSULE ORAL at 07:58

## 2020-09-14 RX ADMIN — GUAIFENESIN 600 MG: 600 TABLET, EXTENDED RELEASE ORAL at 07:43

## 2020-09-14 RX ADMIN — METHYLPREDNISOLONE SODIUM SUCCINATE 40 MG: 40 INJECTION, POWDER, LYOPHILIZED, FOR SOLUTION INTRAMUSCULAR; INTRAVENOUS at 20:20

## 2020-09-14 RX ADMIN — CARVEDILOL 3.12 MG: 3.12 TABLET, FILM COATED ORAL at 17:13

## 2020-09-14 RX ADMIN — TAMSULOSIN HYDROCHLORIDE 0.4 MG: 0.4 CAPSULE ORAL at 07:42

## 2020-09-14 RX ADMIN — IPRATROPIUM BROMIDE AND ALBUTEROL SULFATE 3 ML: 2.5; .5 SOLUTION RESPIRATORY (INHALATION) at 19:31

## 2020-09-14 RX ADMIN — CARVEDILOL 3.12 MG: 3.12 TABLET, FILM COATED ORAL at 07:43

## 2020-09-14 RX ADMIN — TRAZODONE HYDROCHLORIDE 50 MG: 50 TABLET ORAL at 21:37

## 2020-09-14 RX ADMIN — BUDESONIDE AND FORMOTEROL FUMARATE DIHYDRATE 2 PUFF: 160; 4.5 AEROSOL RESPIRATORY (INHALATION) at 07:18

## 2020-09-14 RX ADMIN — FAMOTIDINE 20 MG: 20 TABLET, FILM COATED ORAL at 21:37

## 2020-09-14 RX ADMIN — LEVOFLOXACIN 750 MG: 750 TABLET, FILM COATED ORAL at 07:43

## 2020-09-14 RX ADMIN — BUDESONIDE AND FORMOTEROL FUMARATE DIHYDRATE 2 PUFF: 160; 4.5 AEROSOL RESPIRATORY (INHALATION) at 19:31

## 2020-09-14 RX ADMIN — GABAPENTIN 300 MG: 300 CAPSULE ORAL at 15:17

## 2020-09-14 RX ADMIN — IPRATROPIUM BROMIDE AND ALBUTEROL SULFATE 3 ML: 2.5; .5 SOLUTION RESPIRATORY (INHALATION) at 07:18

## 2020-09-14 RX ADMIN — ENOXAPARIN SODIUM 40 MG: 40 INJECTION SUBCUTANEOUS at 07:57

## 2020-09-14 NOTE — PROGRESS NOTES
[facility] Medicine Services  INPATIENT PROGRESS NOTE    Length of Stay: 3  Date of Admission: 9/11/2020  Primary Care Physician: Salbador Anderson MD    Subjective   Chief Complaint: Shortness of breath.    HPI   Patient breathing is improving.  Patient worried by black mall.  Patient stopped smoking about a month and a half ago.  Patient denies any chest pain.  Patient wants to know if she can qualify for oxygen at home, plan for overnight symmetry.    Review of Systems   Constitutional: Positive for activity change, appetite change and fatigue. Negative for chills and fever.   HENT: Negative for hearing loss, nosebleeds, tinnitus and trouble swallowing.    Eyes: Negative for visual disturbance.   Respiratory: Positive for cough, shortness of breath and wheezing. Negative for chest tightness.    Cardiovascular: Negative for chest pain, palpitations and leg swelling.   Gastrointestinal: Negative for abdominal distention, abdominal pain, blood in stool, constipation, diarrhea, nausea and vomiting.   Endocrine: Negative for cold intolerance, heat intolerance, polydipsia, polyphagia and polyuria.   Genitourinary: Negative for decreased urine volume, difficulty urinating, dysuria, flank pain, frequency and hematuria.   Musculoskeletal: Positive for arthralgias, gait problem and myalgias. Negative for joint swelling.   Skin: Negative for rash.   Allergic/Immunologic: Negative for immunocompromised state.   Neurological: Positive for weakness. Negative for dizziness, syncope, light-headedness and headaches.   Hematological: Negative for adenopathy. Does not bruise/bleed easily.   Psychiatric/Behavioral: Negative for confusion and sleep disturbance. The patient is not nervous/anxious.           All pertinent negatives and positives are as above. All other systems have been reviewed and are negative unless otherwise stated.     Objective    Temp:  [97.7 °F (36.5 °C)-98.5 °F (36.9 °C)]  98.4 °F (36.9 °C)  Heart Rate:  [75-89] 82  Resp:  [16-18] 16  BP: (133-173)/(57-78) 133/63    Intake/Output Summary (Last 24 hours) at 9/14/2020 1752  Last data filed at 9/14/2020 1500  Gross per 24 hour   Intake 1080 ml   Output 4350 ml   Net -3270 ml     Physical Exam  Vitals signs and nursing note reviewed.   Constitutional:       Appearance: He is well-developed.   HENT:      Head: Normocephalic.   Eyes:      Conjunctiva/sclera: Conjunctivae normal.      Pupils: Pupils are equal, round, and reactive to light.   Neck:      Musculoskeletal: Neck supple.      Vascular: No JVD.   Cardiovascular:      Rate and Rhythm: Normal rate and regular rhythm.      Heart sounds: Normal heart sounds. No murmur. No friction rub. No gallop.    Pulmonary:      Effort: No respiratory distress.      Breath sounds: No rales.      Comments: Patient currently on 2 L of oxygen.  Decrease in breath sound bilateral, clear.  Chest:      Chest wall: No tenderness.   Abdominal:      General: Bowel sounds are normal. There is no distension.      Palpations: Abdomen is soft.      Tenderness: There is no abdominal tenderness. There is no guarding or rebound.   Musculoskeletal: Normal range of motion.         General: No tenderness or deformity.   Skin:     General: Skin is warm and dry.      Findings: No rash.   Neurological:      Mental Status: He is alert and oriented to person, place, and time.      Cranial Nerves: No cranial nerve deficit.      Motor: Abnormal muscle tone present.      Coordination: Coordination abnormal.      Deep Tendon Reflexes: Reflexes normal.   Psychiatric:         Behavior: Behavior normal.         Thought Content: Thought content normal.         Judgment: Judgment normal.         Results Review:  Lab Results (last 24 hours)     Procedure Component Value Units Date/Time    POC Glucose Once [[id number]]  (Abnormal) Collected: 09/14/20 1626    Specimen: Blood Updated: 09/14/20 1639     Glucose 411 mg/dL     POC Glucose  Once [[id number]]  (Abnormal) Collected: 09/14/20 1624    Specimen: Blood Updated: 09/14/20 1638     Glucose 417 mg/dL     POC Glucose Once [[id number]]  (Abnormal) Collected: 09/14/20 1059    Specimen: Blood Updated: 09/14/20 1132     Glucose 294 mg/dL     POC Glucose Once [[id number]]  (Abnormal) Collected: 09/14/20 0736    Specimen: Blood Updated: 09/14/20 0755     Glucose 315 mg/dL     Blood Culture - Blood, Arm, Left [[id number]] Collected: 09/11/20 0342    Specimen: Blood from Arm, Left Updated: 09/14/20 0416     Blood Culture No growth at 3 days    Blood Culture - Blood, Arm, Right [[id number]] Collected: 09/11/20 0337    Specimen: Blood from Arm, Right Updated: 09/14/20 0416     Blood Culture No growth at 3 days    POC Glucose Once [[id number]]  (Abnormal) Collected: 09/13/20 2043    Specimen: Blood Updated: 09/13/20 2054     Glucose 281 mg/dL            Cultures:  Blood Culture   Date Value Ref Range Status   09/11/2020 No growth at 3 days  Preliminary   09/11/2020 No growth at 3 days  Preliminary       Radiology Data:    Imaging Results (Last 24 Hours)     ** No results found for the last 24 hours. **          No Known Allergies    Scheduled meds:   amLODIPine, 5 mg, Oral, Daily  atorvastatin, 20 mg, Oral, Daily  budesonide-formoterol, 2 puff, Inhalation, BID - RT  carvedilol, 3.125 mg, Oral, BID With Meals  enoxaparin, 40 mg, Subcutaneous, Q24H  gabapentin, 300 mg, Oral, TID  guaiFENesin, 600 mg, Oral, Q12H  insulin lispro, 3-14 Units, Subcutaneous, TID AC  ipratropium-albuterol, 3 mL, Nebulization, Q6H - RT  levoFLOXacin, 750 mg, Oral, Q24H  methylPREDNISolone sodium succinate, 40 mg, Intravenous, Q12H  montelukast, 10 mg, Oral, Nightly  tamsulosin, 0.4 mg, Oral, Daily  traZODone, 50 mg, Oral, Nightly  valsartan-HCTZ 320-25 combo dose, , Oral, Daily        PRN meds:  •  albuterol  •  benzonatate  •  dextrose  •  dextrose  •  glucagon (human recombinant)  •  influenza vaccine  •  ondansetron  •  sodium  chloride    Assessment/Plan       Acute respiratory failure with hypoxia and hypercapnia (CMS/HCC)    Type 2 diabetes mellitus, without long-term current use of insulin (CMS/HCC)    Hypertension    Tobacco abuse, in remission    COPD exacerbation (CMS/HCC)    Acute on chronic respiratory failure with hypercapnia (CMS/HCC)      Plan:  COPD Exacerbation/Chronic respiratory failure with hypercapnia.  Continue Solu-Medrol.  Continue duo nebs.  Continue Symbicort.  Continue Mucinex.  Continue Solu-Medrol.  Continue Singulair.  Tessalon Perle PRN.  BiPAP PRN.  Oxygen PRN.  Chest x-ray-no acute change.  Stachybotrys Chartarum pending.   Plan for overnight oximetry.    Diabetes.  Non-insulin-dependent.  Sliding scale for now.    Hypertension/hyperlipidemia.  Continue Norvasc, Coreg, Diovan.  Continue Lipitor.    Tobacco abuse.  Cutting back on smoking discussed with patient.    Neuropathy.  Continue gabapentin.    Nausea.  Zofran PRN.  Pepcid twice daily.    Benign prostate hypertrophy.  Continue Flomax.    Depression/insomnia.  Continue trazodone.    Lovenox prophylaxis.    Nutrition.  Regular/cardiac/consistent carb diet.    Blood culture-no growth in 3 days.  Over-19-negative.    Discharge Plannin to 2 days.    Electronically signed by Magdi Page MD, 20, 5:52 PM CDT.

## 2020-09-14 NOTE — PAYOR COMM NOTE
"9/14/20 [facility] [phone]  [phone]      FAXING ADDITIONAL CLINICAL.    AUTH [id number]                Date of Birth Social Security Number Address Home Phone MRN    [id number]  [id number] [facility] [address] [phone] [id number]    [employer] Marital Status          [employer] Legally        Admission Date Admission Type Admitting Provider Attending Provider Department, Room/Bed    9/11/20 Emergency Magdi Page MD Truong, Khai C, MD [facility] 4B, 445/1    Discharge Date Discharge Disposition Discharge Destination                       Attending Provider: Magdi Page MD    Allergies: No Known Allergies    Isolation: None   Infection: None   Code Status: CPR    Ht: 180.3 cm (71\")   Wt: 76.8 kg (169 lb 4 oz)    Admission Cmt: None   Principal Problem: Acute respiratory failure with hypoxia and hypercapnia (CMS/HCC) [J96.01,J96.02]                 Active Insurance as of 9/11/2020     Primary Coverage     Payor Plan Insurance Group Employer/Plan Group    HUMANA MEDICARE REPLACEMENT HUMANA MEDICARE REPLACEMENT [id number]     Payor Plan Address Payor Plan Phone Number Payor Plan Fax Number Effective Dates    [address] [address] [phone]  1/1/2020 - None Entered    [facility] [id number]       Subscriber Name Subscriber Birth Date Member ID       GIOVANNI VELA 1950 [id number]                 Emergency Contacts      (Rel.) Home Phone Work Phone Mobile Phone    Maria L Vela (Other) [phone] -- [phone]    Janel Vela (Daughter) [phone] -- --    Tanner Vela (Son) -- -- [phone]        Camden Garza MD   Physician   Medicine   Progress Notes   Addendum   Date of Service:  09/12/20 1413   Creation Time:  09/12/20 1413                 Show:Clear all  [x]Manual[x]Template[]Copied    Added by:  [x]Camden Garza MD    []Nicanor for details       [facility] Medicine " Services  INPATIENT PROGRESS NOTE     Patient Name: Naif Nick  Date of Admission: 9/11/2020  Today's Date: 09/12/20  Length of Stay: 1  Primary Care Physician: Salbador Anderson MD     Subjective   Chief Complaint: SOA  HPI   No SOA today.  Tolerating room air.  He is no longer wheezing.  He has a constant dry cough.  He is afebrile           Review of Systems   Respiratory: Positive for cough.          All pertinent negatives and positives are as above. All other systems have been reviewed and are negative unless otherwise stated.            Temp:  [97.7 °F (36.5 °C)-98.8 °F (37.1 °C)] 98.5 °F (36.9 °C)  Heart Rate:  [71-87] 87  Resp:  [16-18] 16  BP: (115-158)/(49-79) 139/61  Physical Exam  Vitals signs reviewed.   Constitutional:       General: He is not in acute distress.     Appearance: He is not toxic-appearing.   HENT:      Head: Normocephalic and atraumatic.      Mouth/Throat:      Mouth: Mucous membranes are moist.      Pharynx: Oropharynx is clear.   Eyes:      Extraocular Movements: Extraocular movements intact.      Conjunctiva/sclera: Conjunctivae normal.      Pupils: Pupils are equal, round, and reactive to light.   Neck:      Musculoskeletal: Normal range of motion and neck supple. No muscular tenderness.   Cardiovascular:      Rate and Rhythm: Normal rate and regular rhythm.      Pulses: Normal pulses.      Heart sounds: No murmur.   Pulmonary:      Effort: Pulmonary effort is normal. No respiratory distress.      Breath sounds: Normal breath sounds. No wheezing or rhonchi.   Abdominal:      General: Bowel sounds are normal. There is no distension.      Palpations: Abdomen is soft.      Tenderness: There is no abdominal tenderness.   Musculoskeletal: Normal range of motion.         General: No tenderness.   Skin:     General: Skin is warm and dry.      Findings: No erythema or rash.   Neurological:      General: No focal deficit present.      Mental Status: He is alert and oriented to person, place,  and time.      Cranial Nerves: No cranial nerve deficit.      Motor: No weakness.   Psychiatric  Active Hospital Problems     Diagnosis   • **Acute respiratory failure with hypoxia and hypercapnia (CMS/HCC)   • COPD exacerbation (CMS/HCC)   • Acute on chronic respiratory failure with hypercapnia (CMS/HCC)   • Type 2 diabetes mellitus, without long-term current use of insulin (CMS/HCC)   • Hypertension   • Tobacco abuse, in remission     Imaging reviewed:  Chest xray  Chest xray independently interpreted by me:  No acute cardiopulmonary process     1.  COPD AE  -Change steroids to oral  -Nebs  -Symbicort     2.  Acute respiratory failure with hypercapnia   -Change steroids to oral  -Nebs  -Symbicort     3.  T2DM  -SSI     4.  HTN  -Diovan  -Coreg     5.  Tobacco abuse  -Cessation counseling     6.  Diabetic Neuropathy  -Gabapentin     7.  HLD  -Statin     8.  CVD  -ASA  -Statin  Camden Garza MD   Physician   Medicine   Progress Notes   Signed   Date of Service:  09/13/20 1254   Creation Time:  09/13/20 1254            Signed             Show:Clear all  [x]Manual[x]Template[x]Copied    Added by:  [x]Camden Garza MD    []Nicanor for details       [facility] Medicine Services  INPATIENT PROGRESS NOTE     Patient Name: Naif Nick  Date of Admission: 9/11/2020  Today's Date: 09/13/20  Length of Stay: 2  Primary Care Physician: Salbador Anderson MD     Subjective   Chief Complaint: SOA  HPI   More SOA this AM.  Sats dropped to 89% on RA.  Wheezing and coughing more today        Review of Systems   Constitutional: Negative for fatigue and fever.   HENT: Negative for congestion and ear pain.    Eyes: Negative for redness and visual disturbance.   Respiratory: Positive for cough, shortness of breath and wheezing.    Cardiovascular: Negative for chest pain and palpitations.   Gastrointestinal: Negative for abdominal pain, diarrhea, nausea and vomiting.   Endocrine: Negative for  cold intolerance and heat intolerance.   Genitourinary: Negative for dysuria and frequency.   Musculoskeletal: Negative for arthralgias and back pain.   Skin: Negative for rash and wound.   Neurological: Negative for dizziness and headaches.   Psychiatric/Behavioral: Negative for confusion. The patient is not nervous/anxious.                   Objective    Temp:  [98.4 °F (36.9 °C)-98.8 °F (37.1 °C)] 98.4 °F (36.9 °C)  Heart Rate:  [79-92] 83  Resp:  [16] 16  BP: (137-169)/(67-91) 150/68  Physical Exam  Vitals signs reviewed.   Constitutional  Review:  I have reviewed the labs, radiology results, and diagnostic studies.     Laboratory Data:         Results from last 7 days   Lab Units 09/13/20  1128 09/11/20  0347   WBC 10*3/mm3 13.26* 13.74*   HEMOGLOBIN g/dL 13.4 13.9   HEMATOCRIT % 41.4 43.1   PLATELETS 10*3/mm3 361 344              Results from last 7 days   Lab Units 09/13/20  1128 09/11/20  0347   SODIUM mmol/L 133* 139   POTASSIUM mmol/L 4.0 4.0   CHLORIDE mmol/L 92* 101   CO2 mmol/L 34.0* 30.0*   BUN mg/dL 25* 14   CREATININE mg/dL 0.90 0.86   CALCIUM mg/dL 9.9 9.3   BILIRUBIN mg/dL 0.5  --    ALK PHOS U/L 51  --    ALT (SGPT) U/L 26  --    AST (SGOT) U/L 15  --    GLUCOSE mg/dL 229* 244*        Culture Data:         Blood Culture     Active Hospital Problems     Diagnosis   • **Acute respiratory failure with hypoxia and hypercapnia (CMS/HCC)   • COPD exacerbation (CMS/HCC)   • Acute on chronic respiratory failure with hypercapnia (CMS/HCC)   • Type 2 diabetes mellitus, without long-term current use of insulin (CMS/HCC)   • Hypertension   • Tobacco abuse, in remission     1.  COPD AE  -Change steroids back to IV  -Nebs  -Symbicort     2.  Acute respiratory failure with hypercapnia   -Change steroids back to IV  -Nebs  -Symbicort     3.  T2DM  -SSI     4.  HTN  -Diovan  -Coreg     5.  Tobacco abuse  -Cessation counseling     6.  Diabetic Neuropathy  -Gabapentin     7.  HLD  -Statin     8.   CVD  -ASA  -Statin           Discharge Planning: I expect the patient to be discharged to home in 1-2 days     Electronically signed by Camden Garza MD, 09/13/20, 12:54 CDT.                   --  87  16  --  nasal cannula  2  93   09/13/20 1355  --  85  16  --  nasal cannula  2  94   09/13/20 1100  98.4 (36.9)  83  16  150/68  nasal cannula  2  94   09/13/20 0833  --  --  --  --  nasal cannula  2  --   09/13/20 0700  98.6 (37)  79  16  152/73  nasal cannula  2  95   09/13/20 0619  --  80  16  --  nasal cannula  2  92   09/13/20 0612  --  86  16  --  room air   --  88Abnormal    Device (Oxygen Therapy): replaced O2 at 2lpm at 09/13/20 0612   09/13/20 0334                   09/14/20 0722  97.8 (36.6)  77  16  154/67  nasal cannula  2  94   09/14/20 0718  --  80  16  --  nasal cannula  2                Current Facility-Administered Medications   Medication Dose Route Frequency Provider Last Rate Last Dose   • albuterol (PROVENTIL) nebulizer solution 0.083% 2.5 mg/3mL  2.5 mg Nebulization Q4H PRN Arnaldo Freeman MD       • amLODIPine (NORVASC) tablet 5 mg  5 mg Oral Daily Arnaldo Freeman MD   5 mg at 09/14/20 0743   • atorvastatin (LIPITOR) tablet 20 mg  20 mg Oral Daily Arnaldo Freeman MD   20 mg at 09/14/20 0743   • benzonatate (TESSALON) capsule 200 mg  200 mg Oral TID PRN Camden Garza MD   200 mg at 09/13/20 1755   • budesonide-formoterol (SYMBICORT) 160-4.5 MCG/ACT inhaler 2 puff  2 puff Inhalation BID - RT Arnaldo Freeman MD   2 puff at 09/14/20 0718   • carvedilol (COREG) tablet 3.125 mg  3.125 mg Oral BID With Meals Arnaldo Freeman MD   3.125 mg at 09/14/20 0743   • dextrose (D50W) 25 g/ 50mL Intravenous Solution 25 g  25 g Intravenous Q15 Min PRN Arnaldo Freeman MD       • dextrose (GLUTOSE) oral gel 15 g  15 g Oral Q15 Min PRN Arnaldo Freeman MD       • enoxaparin (LOVENOX) syringe 40 mg  40 mg Subcutaneous Q24H Arnaldo Freeman  MD Sher   40 mg at 09/14/20 0757   • gabapentin (NEURONTIN) capsule 300 mg  300 mg Oral TID Arnaldo Freeman MD   300 mg at 09/14/20 0758   • glucagon (human recombinant) (GLUCAGEN DIAGNOSTIC) injection 1 mg  1 mg Subcutaneous Q15 Min PRN Arnaldo Freeman MD       • guaiFENesin (MUCINEX) 12 hr tablet 600 mg  600 mg Oral Q12H Arnaldo Freeman MD   600 mg at 09/14/20 0743   • influenza vac split quad (FLUZONE,FLUARIX,AFLURIA) injection 0.5 mL  0.5 mL Intramuscular During Hospitalization Camdne Garza MD       • insulin lispro (humaLOG) injection 0-7 Units  0-7 Units Subcutaneous TID With Meals Arnaldo Freeman MD   5 Units at 09/14/20 0758   • ipratropium-albuterol (DUO-NEB) nebulizer solution 3 mL  3 mL Nebulization Q6H - RT Arnaldo Freeman MD   3 mL at 09/14/20 0718   • levoFLOXacin (LEVAQUIN) tablet 750 mg  750 mg Oral Q24H Arnaldo Freeman MD   750 mg at 09/14/20 0743   • methylPREDNISolone sodium succinate (SOLU-Medrol) injection 40 mg  40 mg Intravenous Q12H Camden Garza MD   40 mg at 09/14/20 0742   • montelukast (SINGULAIR) tablet 10 mg  10 mg Oral Nightly Arnaldo Freeman MD   10 mg at 09/13/20 2052   • ondansetron (ZOFRAN) injection 4 mg  4 mg Intravenous Q6H PRN Arnaldo Freeman MD       • sodium chloride 0.9 % flush 10 mL  10 mL Intravenous PRN Javier Reinoso MD   10 mL at 09/13/20 2052   • tamsulosin (FLOMAX) 24 hr capsule 0.4 mg  0.4 mg Oral Daily Arnaldo Freeman MD   0.4 mg at 09/14/20 0742   • traZODone (DESYREL) tablet 50 mg  50 mg Oral Nightly Arnaldo Freeman MD   50 mg at 09/13/20 2052   • valsartan (DIOVAN) 320 mg, hydroCHLOROthiazide (HYDRODIURIL) 25 mg   Oral Daily Arnaldo Freeman MD

## 2020-09-14 NOTE — PROGRESS NOTES
Continued Stay Note   Diane     Patient Name: Naif Nick  MRN: [id number]  Today's Date: 9/14/2020    Admit Date: 9/11/2020    Discharge Plan     Row Name 09/14/20 0940       Plan    Plan Comments  Chart reviewed-- Pt plans on returning home at discharge. No new needs identified at this time. SW will follow and assist with any discharge needs that may arise.        Discharge Codes    No documentation.             Jimena Hernandez

## 2020-09-14 NOTE — PLAN OF CARE
Goal Outcome Evaluation:  Plan of Care Reviewed With: patient  Progress: no change  Outcome Summary:  Pt remains on 2L NC; may need home O2.  IV steroids restarted.  Pt c/o CONDE and NP cough.  No c/o pain.  VSS. Will continue to monitor.

## 2020-09-15 VITALS
DIASTOLIC BLOOD PRESSURE: 72 MMHG | RESPIRATION RATE: 16 BRPM | OXYGEN SATURATION: 97 % | TEMPERATURE: 97.7 F | SYSTOLIC BLOOD PRESSURE: 161 MMHG | HEIGHT: 71 IN | HEART RATE: 73 BPM | WEIGHT: 168.25 LBS | BODY MASS INDEX: 23.56 KG/M2

## 2020-09-15 LAB
ALBUMIN SERPL-MCNC: 4.2 G/DL (ref 3.5–5.2)
ALBUMIN/GLOB SERPL: 1.4 G/DL
ALP SERPL-CCNC: 53 U/L (ref 39–117)
ALT SERPL W P-5'-P-CCNC: 31 U/L (ref 1–41)
ANION GAP SERPL CALCULATED.3IONS-SCNC: 11 MMOL/L (ref 5–15)
AST SERPL-CCNC: 14 U/L (ref 1–40)
BASOPHILS # BLD AUTO: 0.01 10*3/MM3 (ref 0–0.2)
BASOPHILS NFR BLD AUTO: 0.1 % (ref 0–1.5)
BILIRUB SERPL-MCNC: 0.4 MG/DL (ref 0–1.2)
BUN SERPL-MCNC: 30 MG/DL (ref 8–23)
BUN/CREAT SERPL: 28.6 (ref 7–25)
CALCIUM SPEC-SCNC: 9.8 MG/DL (ref 8.6–10.5)
CHLORIDE SERPL-SCNC: 91 MMOL/L (ref 98–107)
CHOLEST SERPL-MCNC: 192 MG/DL (ref 0–200)
CO2 SERPL-SCNC: 32 MMOL/L (ref 22–29)
CREAT SERPL-MCNC: 1.05 MG/DL (ref 0.76–1.27)
DEPRECATED RDW RBC AUTO: 41.8 FL (ref 37–54)
EOSINOPHIL # BLD AUTO: 0 10*3/MM3 (ref 0–0.4)
EOSINOPHIL NFR BLD AUTO: 0 % (ref 0.3–6.2)
ERYTHROCYTE [DISTWIDTH] IN BLOOD BY AUTOMATED COUNT: 12.6 % (ref 12.3–15.4)
GFR SERPL CREATININE-BSD FRML MDRD: 84 ML/MIN/1.73
GLOBULIN UR ELPH-MCNC: 2.9 GM/DL
GLUCOSE BLDC GLUCOMTR-MCNC: 383 MG/DL (ref 70–130)
GLUCOSE SERPL-MCNC: 351 MG/DL (ref 65–99)
HBA1C MFR BLD: 8 % (ref 4.8–5.6)
HCT VFR BLD AUTO: 41.2 % (ref 37.5–51)
HDLC SERPL-MCNC: 76 MG/DL (ref 40–60)
HGB BLD-MCNC: 13.2 G/DL (ref 13–17.7)
IMM GRANULOCYTES # BLD AUTO: 0.06 10*3/MM3 (ref 0–0.05)
IMM GRANULOCYTES NFR BLD AUTO: 0.5 % (ref 0–0.5)
LDLC SERPL CALC-MCNC: 104 MG/DL (ref 0–100)
LDLC/HDLC SERPL: 1.36 {RATIO}
LYMPHOCYTES # BLD AUTO: 1 10*3/MM3 (ref 0.7–3.1)
LYMPHOCYTES NFR BLD AUTO: 8.5 % (ref 19.6–45.3)
MCH RBC QN AUTO: 29.3 PG (ref 26.6–33)
MCHC RBC AUTO-ENTMCNC: 32 G/DL (ref 31.5–35.7)
MCV RBC AUTO: 91.4 FL (ref 79–97)
MONOCYTES # BLD AUTO: 0.46 10*3/MM3 (ref 0.1–0.9)
MONOCYTES NFR BLD AUTO: 3.9 % (ref 5–12)
NEUTROPHILS NFR BLD AUTO: 10.21 10*3/MM3 (ref 1.7–7)
NEUTROPHILS NFR BLD AUTO: 87 % (ref 42.7–76)
NRBC BLD AUTO-RTO: 0 /100 WBC (ref 0–0.2)
PLATELET # BLD AUTO: 356 10*3/MM3 (ref 140–450)
PMV BLD AUTO: 9.5 FL (ref 6–12)
POTASSIUM SERPL-SCNC: 4.6 MMOL/L (ref 3.5–5.2)
PROT SERPL-MCNC: 7.1 G/DL (ref 6–8.5)
RBC # BLD AUTO: 4.51 10*6/MM3 (ref 4.14–5.8)
SODIUM SERPL-SCNC: 134 MMOL/L (ref 136–145)
T4 FREE SERPL-MCNC: 1.13 NG/DL (ref 0.93–1.7)
TRIGL SERPL-MCNC: 62 MG/DL (ref 0–150)
TSH SERPL DL<=0.05 MIU/L-ACNC: 0.16 UIU/ML (ref 0.27–4.2)
VLDLC SERPL-MCNC: 12.4 MG/DL
WBC # BLD AUTO: 11.74 10*3/MM3 (ref 3.4–10.8)

## 2020-09-15 PROCEDURE — 84443 ASSAY THYROID STIM HORMONE: CPT | Performed by: FAMILY MEDICINE

## 2020-09-15 PROCEDURE — 85025 COMPLETE CBC W/AUTO DIFF WBC: CPT | Performed by: FAMILY MEDICINE

## 2020-09-15 PROCEDURE — 80053 COMPREHEN METABOLIC PANEL: CPT | Performed by: FAMILY MEDICINE

## 2020-09-15 PROCEDURE — 25010000002 INFLUENZA VAC SPLIT QUAD 0.5 ML SUSPENSION PREFILLED SYRINGE: Performed by: FAMILY MEDICINE

## 2020-09-15 PROCEDURE — 84439 ASSAY OF FREE THYROXINE: CPT | Performed by: FAMILY MEDICINE

## 2020-09-15 PROCEDURE — 80061 LIPID PANEL: CPT | Performed by: FAMILY MEDICINE

## 2020-09-15 PROCEDURE — 25010000002 ENOXAPARIN PER 10 MG: Performed by: FAMILY MEDICINE

## 2020-09-15 PROCEDURE — 94618 PULMONARY STRESS TESTING: CPT

## 2020-09-15 PROCEDURE — 94799 UNLISTED PULMONARY SVC/PX: CPT

## 2020-09-15 PROCEDURE — 25010000002 METHYLPREDNISOLONE PER 40 MG: Performed by: FAMILY MEDICINE

## 2020-09-15 PROCEDURE — 83036 HEMOGLOBIN GLYCOSYLATED A1C: CPT | Performed by: FAMILY MEDICINE

## 2020-09-15 PROCEDURE — 90686 IIV4 VACC NO PRSV 0.5 ML IM: CPT | Performed by: FAMILY MEDICINE

## 2020-09-15 PROCEDURE — 63710000001 PREDNISONE PER 1 MG: Performed by: FAMILY MEDICINE

## 2020-09-15 PROCEDURE — G0008 ADMIN INFLUENZA VIRUS VAC: HCPCS | Performed by: FAMILY MEDICINE

## 2020-09-15 PROCEDURE — 82962 GLUCOSE BLOOD TEST: CPT

## 2020-09-15 PROCEDURE — 86003 ALLG SPEC IGE CRUDE XTRC EA: CPT | Performed by: FAMILY MEDICINE

## 2020-09-15 PROCEDURE — 63710000001 INSULIN LISPRO (HUMAN) PER 5 UNITS: Performed by: FAMILY MEDICINE

## 2020-09-15 RX ORDER — BENZONATATE 200 MG/1
200 CAPSULE ORAL 3 TIMES DAILY PRN
Qty: 30 CAPSULE | Refills: 0 | Status: ON HOLD | OUTPATIENT
Start: 2020-09-15 | End: 2022-11-13

## 2020-09-15 RX ORDER — IPRATROPIUM BROMIDE AND ALBUTEROL SULFATE 2.5; .5 MG/3ML; MG/3ML
3 SOLUTION RESPIRATORY (INHALATION)
Qty: 360 ML | Refills: 12 | Status: ON HOLD | OUTPATIENT
Start: 2020-09-15 | End: 2023-02-20

## 2020-09-15 RX ORDER — PREDNISONE 20 MG/1
40 TABLET ORAL
Status: DISCONTINUED | OUTPATIENT
Start: 2020-09-15 | End: 2020-09-15 | Stop reason: HOSPADM

## 2020-09-15 RX ORDER — FAMOTIDINE 20 MG/1
20 TABLET, FILM COATED ORAL 2 TIMES DAILY PRN
Qty: 60 TABLET | Refills: 2 | Status: SHIPPED | OUTPATIENT
Start: 2020-09-15 | End: 2020-09-28 | Stop reason: ALTCHOICE

## 2020-09-15 RX ORDER — METHYLPREDNISOLONE 4 MG/1
TABLET ORAL
Qty: 21 TABLET | Refills: 0 | Status: SHIPPED | OUTPATIENT
Start: 2020-09-15 | End: 2020-09-28

## 2020-09-15 RX ADMIN — FAMOTIDINE 20 MG: 20 TABLET, FILM COATED ORAL at 08:13

## 2020-09-15 RX ADMIN — INFLUENZA VIRUS VACCINE 0.5 ML: 15; 15; 15; 15 SUSPENSION INTRAMUSCULAR at 10:41

## 2020-09-15 RX ADMIN — PREDNISONE 40 MG: 20 TABLET ORAL at 09:35

## 2020-09-15 RX ADMIN — GABAPENTIN 300 MG: 300 CAPSULE ORAL at 08:13

## 2020-09-15 RX ADMIN — AMLODIPINE BESYLATE 5 MG: 5 TABLET ORAL at 08:13

## 2020-09-15 RX ADMIN — ENOXAPARIN SODIUM 40 MG: 40 INJECTION SUBCUTANEOUS at 08:14

## 2020-09-15 RX ADMIN — SODIUM CHLORIDE, PRESERVATIVE FREE 10 ML: 5 INJECTION INTRAVENOUS at 08:14

## 2020-09-15 RX ADMIN — GUAIFENESIN 600 MG: 600 TABLET, EXTENDED RELEASE ORAL at 08:13

## 2020-09-15 RX ADMIN — INSULIN LISPRO 12 UNITS: 100 INJECTION, SOLUTION INTRAVENOUS; SUBCUTANEOUS at 08:13

## 2020-09-15 RX ADMIN — TAMSULOSIN HYDROCHLORIDE 0.4 MG: 0.4 CAPSULE ORAL at 08:13

## 2020-09-15 RX ADMIN — CARVEDILOL 3.12 MG: 3.12 TABLET, FILM COATED ORAL at 08:13

## 2020-09-15 RX ADMIN — ATORVASTATIN CALCIUM 20 MG: 10 TABLET, FILM COATED ORAL at 08:13

## 2020-09-15 RX ADMIN — LEVOFLOXACIN 500 MG: 500 TABLET, FILM COATED ORAL at 08:13

## 2020-09-15 RX ADMIN — GLIPIZIDE 5 MG: 5 TABLET ORAL at 08:13

## 2020-09-15 RX ADMIN — HYDROCHLOROTHIAZIDE: 25 TABLET ORAL at 08:13

## 2020-09-15 RX ADMIN — BUDESONIDE AND FORMOTEROL FUMARATE DIHYDRATE 2 PUFF: 160; 4.5 AEROSOL RESPIRATORY (INHALATION) at 06:57

## 2020-09-15 RX ADMIN — IPRATROPIUM BROMIDE AND ALBUTEROL SULFATE 3 ML: 2.5; .5 SOLUTION RESPIRATORY (INHALATION) at 06:57

## 2020-09-15 NOTE — PROGRESS NOTES
Continued Stay Note  [facility]     Patient Name: Naif Nick  MRN: [id number]  Today's Date: 9/15/2020    Admit Date: 9/11/2020    Discharge Plan     Row Name 09/15/20 1045       Plan    Plan Comments  LANA received call from TRESSA Henry stating that pt was not able to afford his medications. LANA called retail pharmacy who states that medications were only $25.71. LANA went and spoke with pt and his wife who states that pt is able to afford this. LANA spoke with Serge from [facility] who is aware of this. LANA will follow and assist with any other discharge needs that may arise.       Jimena Hernandez

## 2020-09-15 NOTE — PROGRESS NOTES
Continued Stay Note   [facility]     Patient Name: Naif Nick  MRN: [id number]  Today's Date: 9/15/2020    Admit Date: 9/11/2020    Discharge Plan     Row Name 09/15/20 0944       Plan    Final Discharge Disposition Code  01 - home or self-care    Final Note  Pt is being discharged home with O2 orders. Pt has chosen to use [facility] for O2. LANA spoke with Dolly from [facility] who is aware of referral and faxed info to office. O2 will be delivered to pts room. SW will follow and assist with any other discharge needs that may arise.        Discharge Codes    No documentation.       Expected Discharge Date and Time     Expected Discharge Date Expected Discharge Time    Sep 15, 2020             Jimena Hernandez

## 2020-09-15 NOTE — PAYOR COMM NOTE
"9/15/20 [facility] [phone]  Fax [phone]    9/15/20 D/C HOME        Giovanni Vela (70 y.o. Male)     Date of Birth Social Security Number Address Home Phone MRN    1950  [id number] [facility] [address] [phone] [id number]    [employer] Marital Status          [employer]        Admission Date Admission Type Admitting Provider Attending Provider Department, Room/Bed    9/11/20 Emergency Magdi Dale MD  [facility] 4B, 445/1    Discharge Date Discharge Disposition Discharge Destination        9/15/2020 Home or Self Care              Attending Provider: (none)   Allergies: No Known Allergies    Isolation: None   Infection: None   Code Status: Prior    Ht: 180.3 cm (71\")   Wt: 76.3 kg (168 lb 4 oz)    Admission Cmt: None   Principal Problem: Acute respiratory failure with hypoxia and hypercapnia (CMS/HCC) [J96.01,J96.02]                 Active Insurance as of 9/11/2020     Primary Coverage     Payor Plan Insurance Group Employer/Plan Group    HUMANA MEDICARE REPLACEMENT HUMANA MEDICARE REPLACEMENT [id number]     Payor Plan Address Payor Plan Phone Number Payor Plan Fax Number Effective Dates    [address] [address] [phone]  1/1/2020 - None Entered    [facility] [id number]       Subscriber Name Subscriber Birth Date Member ID       GIOVANNI VELA 1950 [id number]                 Emergency Contacts      (Rel.) Home Phone Work Phone Mobile Phone    Norma Velanda (Other) [phone] -- [phone]    Janel Vela (Daughter) [phone] -- --    Tanner Vela (Son) -- -- [phone]            Discharge Order (From admission, onward)     Start     Ordered    09/15/20 0901  Discharge patient  Once     Expected Discharge Date: 09/15/20    Discharge Disposition: Home or Self Care    Physician of Record for Attribution - Please select from Treatment Team: MAGDI DALE [[id number]]    Review needed by CMO to determine Physician of Record: No  "      Question Answer Comment   Physician of Record for Attribution - Please select from Treatment Team PATRICE DALE    Review needed by CMO to determine Physician of Record No        09/15/20 1421

## 2020-09-15 NOTE — DISCHARGE PLACEMENT REQUEST
"Giovanni Vela (70 y.o. Male)     Date of Birth Social Security Number Address Home Phone MRN    [id number]  [id number] [facility] [address] [phone] [id number]    Muslim Marital Status          Yazdanism Legally        Admission Date Admission Type Admitting Provider Attending Provider Department, Room/Bed    9/11/20 Emergency Magdi Page MD Truong, Khai C, MD [facility] 4B, 445/1    Discharge Date Discharge Disposition Discharge Destination         Home or Self Care              Attending Provider: Magdi Page MD    Allergies: No Known Allergies    Isolation: None   Infection: None   Code Status: CPR    Ht: 180.3 cm (71\")   Wt: 76.3 kg (168 lb 4 oz)    Admission Cmt: None   Principal Problem: Acute respiratory failure with hypoxia and hypercapnia (CMS/HCC) [J96.01,J96.02]                 Active Insurance as of 9/11/2020     Primary Coverage     Payor Plan Insurance Group Employer/Plan Group    HUMANA MEDICARE REPLACEMENT HUMANA MEDICARE REPLACEMENT [id number]     Payor Plan Address Payor Plan Phone Number Payor Plan Fax Number Effective Dates    [address] [address] [phone]  1/1/2020 - None Entered    [facility] [address]       Subscriber Name Subscriber Birth Date Member ID       GIOVANNI VELA 1950 [id number]                 Emergency Contacts      (Rel.) Home Phone Work Phone Mobile Phone    Philip Velaa (Other) [phone] -- [phone]    Janel Vela (Daughter) [phone] -- --    Tanner Vela (Son) -- -- [phone]               History & Physical      Arnaldo Freeman MD at 09/11/20 0605              [facility] Medicine Services  HISTORY AND PHYSICAL    Date of Admission: 9/11/2020  Primary Care Physician: Salbador Anderson MD    Subjective     Chief Complaint: Shortness of breath    History of Present Illness  70 year old male with PMH of COPD, ongoing tobacco use, DM NID, HTN that presents with cough, " wheezing and shortness of breath worsening over the last 7-10 days. He had felt worse since yesterday, but did not want to call EMS and this AM could not tolerate the shortness of breath any longer. EMS noted oxygen saturation around 70%, ER initial abg shows pH of 7.288, CO@ of 64, pO2 of 79. He was treated with BiPAP, bronchodilators and steroids with mild improvement. Patient denies chest or leg pain, no fever or body aches.     Review of Systems   Otherwise complete ROS reviewed and negative except as mentioned in the HPI.    Past Medical History:   Past Medical History:   Diagnosis Date   • COPD (chronic obstructive pulmonary disease) (CMS/[facility])    • Diabetes mellitus (CMS/[facility])    • Hx of colonic polyp    • Hx of TIA (transient ischemic attack) and stroke    • Hyperlipidemia    • Hypertension      Past Surgical History:  Past Surgical History:   Procedure Laterality Date   • BACK SURGERY     • COLONOSCOPY  08/15/2012    Poor prep repeat exam in 3 years   • COLONOSCOPY W/ POLYPECTOMY  07/16/2009    Tubular adenoma at 60 cm, Hyperplastic polyp rectum suboptimal prep repeat in 3 months   • SHOULDER SURGERY     • VERTEBROPLASTY      neck     Social History:  reports that he quit smoking about 7 weeks ago. He has a 55.00 pack-year smoking history. He has never used smokeless tobacco. He reports that he drinks alcohol. He reports that he does not use drugs.    Family History: family history includes Diabetes in his brother, father, and sister; Heart disease in his brother, father, and sister.       Allergies:  No Known Allergies  Medications:  Prior to Admission medications    Medication Sig Start Date End Date Taking? Authorizing Provider   albuterol (PROVENTIL) (2.5 MG/3ML) 0.083% nebulizer solution Take 2.5 mg by nebulization Every 4 (Four) Hours As Needed for Wheezing. 5/7/20   Vee Gaines APRN   albuterol sulfate  (90 Base) MCG/ACT inhaler Inhale 2 puffs Every 4 (Four) Hours As Needed for  Wheezing or Shortness of Air. 8/13/20   Raquel Sosa APRN   amLODIPine (NORVASC) 5 MG tablet Take 5 mg by mouth Daily.    Greg Tavares MD   atorvastatin (LIPITOR) 20 MG tablet Take 20 mg by mouth Daily.    Greg Tavares MD   benzonatate (TESSALON) 200 MG capsule Take 1 capsule by mouth 3 (Three) Times a Day As Needed for Cough.  Patient taking differently: Take 100 mg by mouth 3 (Three) Times a Day As Needed for Cough. 6/18/20   Natasha Mckeon APRN   budesonide-formoterol (Symbicort) 160-4.5 MCG/ACT inhaler Inhale 2 puffs 2 (Two) Times a Day for 30 days. 8/13/20 9/12/20  Raquel Sosa APRN   carvedilol (COREG) 3.125 MG tablet Take 3.125 mg by mouth 2 (Two) Times a Day With Meals.    Greg Tavares MD   Dulaglutide (Trulicity) 0.75 MG/0.5ML solution pen-injector Inject 0.75 mg under the skin into the appropriate area as directed 1 (One) Time Per Week. Thursday.    Greg Tavares MD   gabapentin (NEURONTIN) 300 MG capsule Take 300 mg by mouth 3 (Three) Times a Day.    Greg Tavares MD   glimepiride (AMARYL) 2 MG tablet Take 2 mg by mouth Daily.    Greg Tavares MD   guaiFENesin (Mucinex) 600 MG 12 hr tablet Take 2 tablets by mouth 2 (Two) Times a Day. 8/13/20   Raquel Sosa APRN   montelukast (SINGULAIR) 10 MG tablet Take 1 tablet by mouth Every Night. 8/13/20   Raquel Sosa APRN   predniSONE (DELTASONE) 10 MG tablet Take 4 tabs daily x 3 days, then take 3 tabs daily x 3 days, then take 2 tabs daily x 3 days, then take 1 tab daily x 3 days 8/14/20   Raquel Sosa APRN   tamsulosin (FLOMAX) 0.4 MG capsule 24 hr capsule Take 1 capsule by mouth Daily.    Greg Tavares MD   tiotropium (Spiriva HandiHaler) 18 MCG per inhalation capsule Spiriva with HandiHaler 18 mcg and inhalation capsules   Inhale 1 capsule every day by inhalation route for 30 days. 5/28/20   Provider, MD Greg   traZODone (DESYREL) 50 MG  "tablet Take 50 mg by mouth Every Night.    Provider, MD Greg   valsartan-hydrochlorothiazide (DIOVAN-HCT) 320-25 MG per tablet Take 1 tablet by mouth Daily.    Provider, MD Greg     Objective     Vital Signs: /86 (BP Location: Left arm, Patient Position: Lying) Comment: TRESSA Dya notified  Pulse 86   Temp 97.7 °F (36.5 °C) (Oral)   Resp 18   Ht 180.3 cm (71\")   Wt 76.7 kg (169 lb 1 oz)   SpO2 95%   BMI 23.58 kg/m²   Physical Exam   Constitutional: He is oriented to person, place, and time. He appears well-developed and well-nourished. He appears distressed.   HENT:   Head: Normocephalic and atraumatic.   Right Ear: External ear normal.   Left Ear: External ear normal.   Eyes: Pupils are equal, round, and reactive to light. EOM are normal.   Neck: Normal range of motion. Neck supple. No JVD present. No thyromegaly present.   Cardiovascular: Normal rate, regular rhythm and normal heart sounds.   No murmur heard.  Pulmonary/Chest: No stridor. He is in respiratory distress. He has wheezes. He has no rales. He exhibits no tenderness.   Tolerating BiPAP, reduced breath sounds bilaterally   Abdominal: Soft. Bowel sounds are normal. He exhibits no distension and no mass. There is no tenderness. There is no guarding.   Musculoskeletal: Normal range of motion. He exhibits no edema, tenderness or deformity.   Neurological: He is alert and oriented to person, place, and time. He displays normal reflexes. No cranial nerve deficit. He exhibits normal muscle tone. Coordination normal.   Skin: Skin is warm. Capillary refill takes less than 2 seconds. He is not diaphoretic. No erythema. No pallor.   Psychiatric: He has a normal mood and affect.      Results Reviewed:  Lab Results (last 24 hours)     Procedure Component Value Units Date/Time    [place] Draw [[id number]] Collected:  09/11/20 0347    Specimen:  Blood Updated:  09/11/20 0500    Narrative:       The following orders were created for panel order " [place] Draw.  Procedure                               Abnormality         Status                     ---------                               -----------         ------                     Light Blue Top[[id number]]                                   Final result               Green Top (Gel)[[id number]]                                  Final result               Lavender Top[[id number]]                                     Final result               Red Top[[id number]]                                          Final result               [place] Blood Culture Cesar...[[id number]]                                                 Gray Top - Ice[[id number]]                                   Final result                 Please view results for these tests on the individual orders.    Light Blue Top [[id number]] Collected:  09/11/20 0347    Specimen:  Blood Updated:  09/11/20 0500     Extra Tube hold for add-on     Comment: Auto resulted       Green Top (Gel) [[id number]] Collected:  09/11/20 0347    Specimen:  Blood Updated:  09/11/20 0500     Extra Tube Hold for add-ons.     Comment: Auto resulted.       Lavender Top [[id number]] Collected:  09/11/20 0347    Specimen:  Blood Updated:  09/11/20 0500     Extra Tube hold for add-on     Comment: Auto resulted       Red Top [[id number]] Collected:  09/11/20 0347    Specimen:  Blood Updated:  09/11/20 0500     Extra Tube Hold for add-ons.     Comment: Auto resulted.       Gray Top - Ice [[id number]] Collected:  09/11/20 0347    Specimen:  Blood Updated:  09/11/20 0500     Extra Tube Hold for add-ons.     Comment: Auto resulted.       COVID PRE-OP / PRE-PROCEDURE SCREENING ORDER (NO ISOLATION) - Swab, Nasal Cavity [[id number]] Collected:  09/11/20 0350    Specimen:  Swab from Nasal Cavity Updated:  09/11/20 0459    Narrative:       The following orders were created for panel order COVID PRE-OP / PRE-PROCEDURE SCREENING ORDER (NO ISOLATION) - Swab, Nasal Cavity.  Procedure                         "       Abnormality         Status                     ---------                               -----------         ------                     COVID-19,[facility] IN-SIERRA...[[id number]]  Normal              Final result                 Please view results for these tests on the individual orders.    COVID-19,[facility] IN-HOUSE,Nasal Swab No Transport Media 3-4 HR TAT - Swab, Nasal Cavity [[id number]]  (Normal) Collected:  09/11/20 0350    Specimen:  Swab from Nasal Cavity Updated:  09/11/20 0459     COVID19 Not Detected    Narrative:       Fact sheet for providers: https://www.fda.gov/media/450127/download     Fact sheet for patients: https://www.fda.gov/media/082366/download    Procalcitonin [[id number]]  (Normal) Collected:  09/11/20 0347    Specimen:  Blood Updated:  09/11/20 0419     Procalcitonin 0.02 ng/mL     Narrative:       As a Marker for Sepsis (Non-Neonates):   1. <0.5 ng/mL represents a low risk of severe sepsis and/or septic shock.  1. >2 ng/mL represents a high risk of severe sepsis and/or septic shock.    As a Marker for Lower Respiratory Tract Infections that require antibiotic therapy:  PCT on Admission     Antibiotic Therapy             6-12 Hrs later  > 0.5                Strongly Recommended            >0.25 - <0.5         Recommended  0.1 - 0.25           Discouraged                   Remeasure/reassess PCT  <0.1                 Strongly Discouraged          Remeasure/reassess PCT      As 28 day mortality risk marker: \"Change in Procalcitonin Result\" (> 80 % or <=80 %) if Day 0 (or Day 1) and Day 4 values are available. Refer to http://www.[employer]-pct-calculator.com/   Change in PCT <=80 %   A decrease of PCT levels below or equal to 80 % defines a positive change in PCT test result representing a higher risk for 28-day all-cause mortality of patients diagnosed with severe sepsis or septic shock.  Change in PCT > 80 %   A decrease of PCT levels of more than 80 % defines a negative change in PCT result " representing a lower risk for 28-day all-cause mortality of patients diagnosed with severe sepsis or septic shock.                Results may be falsely decreased if patient taking Biotin.     Blood Culture - Blood, Arm, Left [[id number]] Collected:  09/11/20 0342    Specimen:  Blood from Arm, Left Updated:  09/11/20 0414    Troponin [[id number]]  (Normal) Collected:  09/11/20 0347    Specimen:  Blood Updated:  09/11/20 0413     Troponin T <0.010 ng/mL     Narrative:       Troponin T Reference Range:  <= 0.03 ng/mL-   Negative for AMI  >0.03 ng/mL-     Abnormal for myocardial necrosis.  Clinicians would have to utilize clinical acumen, EKG, Troponin and serial changes to determine if it is an Acute Myocardial Infarction or myocardial injury due to an underlying chronic condition.       Results may be falsely decreased if patient taking Biotin.      BNP [[id number]]  (Normal) Collected:  09/11/20 0347    Specimen:  Blood Updated:  09/11/20 0412     proBNP 118.8 pg/mL     Narrative:       Among patients with dyspnea, NT-proBNP is highly sensitive for the detection of acute congestive heart failure. In addition NT-proBNP of <300 pg/ml effectively rules out acute congestive heart failure with 99% negative predictive value.    Results may be falsely decreased if patient taking Biotin.      Basic Metabolic Panel [[id number]]  (Abnormal) Collected:  09/11/20 0347    Specimen:  Blood Updated:  09/11/20 0411     Glucose 244 mg/dL      BUN 14 mg/dL      Creatinine 0.86 mg/dL      Sodium 139 mmol/L      Potassium 4.0 mmol/L      Chloride 101 mmol/L      CO2 30.0 mmol/L      Calcium 9.3 mg/dL      eGFR   Amer 106 mL/min/1.73      BUN/Creatinine Ratio 16.3     Anion Gap 8.0 mmol/L     Narrative:       GFR Normal >60  Chronic Kidney Disease <60  Kidney Failure <15      Lactic Acid, Plasma [[id number]]  (Normal) Collected:  09/11/20 0347    Specimen:  Blood Updated:  09/11/20 0411     Lactate 1.0 mmol/L     Blood Culture -  Blood, Arm, Right [[id number]] Collected:  09/11/20 0337    Specimen:  Blood from Arm, Right Updated:  09/11/20 0406    Protime-INR [[id number]]  (Normal) Collected:  09/11/20 0347    Specimen:  Blood Updated:  09/11/20 0404     Protime 12.1 Seconds      INR 0.93    aPTT [[id number]]  (Normal) Collected:  09/11/20 0347    Specimen:  Blood Updated:  09/11/20 0404     PTT 32.5 seconds     CBC & Differential [[id number]] Collected:  09/11/20 0347    Specimen:  Blood Updated:  09/11/20 0355    Narrative:       The following orders were created for panel order CBC & Differential.  Procedure                               Abnormality         Status                     ---------                               -----------         ------                     CBC Auto Differential[[id number]]        Abnormal            Final result                 Please view results for these tests on the individual orders.    CBC Auto Differential [[id number]]  (Abnormal) Collected:  09/11/20 0347    Specimen:  Blood Updated:  09/11/20 0355     WBC 13.74 10*3/mm3      RBC 4.71 10*6/mm3      Hemoglobin 13.9 g/dL      Hematocrit 43.1 %      MCV 91.5 fL      MCH 29.5 pg      MCHC 32.3 g/dL      RDW 12.7 %      RDW-SD 42.3 fl      MPV 9.1 fL      Platelets 344 10*3/mm3      Neutrophil % 59.2 %      Lymphocyte % 25.2 %      Monocyte % 6.7 %      Eosinophil % 8.0 %      Basophil % 0.7 %      Immature Grans % 0.2 %      Neutrophils, Absolute 8.13 10*3/mm3      Lymphocytes, Absolute 3.46 10*3/mm3      Monocytes, Absolute 0.92 10*3/mm3      Eosinophils, Absolute 1.10 10*3/mm3      Basophils, Absolute 0.10 10*3/mm3      Immature Grans, Absolute 0.03 10*3/mm3      nRBC 0.0 /100 WBC     Blood Gas, Arterial [[id number]]  (Abnormal) Collected:  09/11/20 0346    Specimen:  Arterial Blood Updated:  09/11/20 0351     Site Right Radial     Nacho's Test Positive     pH, Arterial 7.288 pH units      Comment: 84 Value below reference range        pCO2, Arterial 64.4 mm  Hg      Comment: 83 Value above reference range        pO2, Arterial 79.5 mm Hg      Comment: 84 Value below reference range        HCO3, Arterial 30.8 mmol/L      Comment: 83 Value above reference range        Base Excess, Arterial 2.3 mmol/L      Comment: 83 Value above reference range        O2 Saturation, Arterial 94.7 %      Temperature 37.0 C      Barometric Pressure for Blood Gas 754 mmHg      Modality Nasal Cannula     Flow Rate 3.5 lpm      Ventilator Mode NA     Collected by [id number]     Comment: Meter: [id number]     :  [id number]        pCO2, Temperature Corrected 64.4 mm Hg      pH, Temp Corrected 7.288 pH Units      pO2, Temperature Corrected 79.5 mm Hg         Imaging Results (Last 24 Hours)     Procedure Component Value Units Date/Time    XR Chest 1 View [[id number]] Resulted:  09/11/20 0405     Updated:  09/11/20 0405        I have personally reviewed and interpreted the radiology studies and ECG obtained at time of admission.     Assessment / Plan     Assessment:   Active Hospital Problems    Diagnosis   • **Acute respiratory failure with hypoxia and hypercapnia (CMS/HCC)   • COPD exacerbation (CMS/HCC)   • Type 2 diabetes mellitus, without long-term current use of insulin (CMS/HCC)   • Hypertension   • Tobacco abuse, in remission     Plan:   Admit to telemetry, vitals every 4 hour, continuous pulse oxymetry.  Npo until oxygen saturation allows for BiPAP pause or discontinuation  IVF NS 30 cc/hour    Continue BiPAP. Repeat abg  Solumedrol 40 mg IVP BID  Albuterol prn, Duoneb QID  Smoking cessation advised    Control blood pressure    Humalog sliding scale  Home glimepiride on hold  Last A1C 6/25/2020 7.5    Home medications reconciled    DVT prophylaxis > Lovenox 40 mg SQ daily    Code Status: Full     I discussed the patient's findings and my recommendations with the patient    Estimated length of stay over 2 midnights    Patient seen and examined by me on 9/11/2020 at 5:45  AM.    Electronically signed by Arnaldo Freeman MD, 09/11/20, 06:05.              Electronically signed by Arnaldo Freeman MD at 09/11/20 0630          Physician Progress Notes (most recent note)      Magdi Page MD at 09/14/20 1749              [facility] Medicine Services  INPATIENT PROGRESS NOTE    Length of Stay: 3  Date of Admission: 9/11/2020  Primary Care Physician: Salbador Anderson MD    Subjective   Chief Complaint: Shortness of breath.    HPI   Patient breathing is improving.  Patient worried by black mall.  Patient stopped smoking about a month and a half ago.  Patient denies any chest pain.  Patient wants to know if she can qualify for oxygen at home, plan for overnight symmetry.    Review of Systems   Constitutional: Positive for activity change, appetite change and fatigue. Negative for chills and fever.   HENT: Negative for hearing loss, nosebleeds, tinnitus and trouble swallowing.    Eyes: Negative for visual disturbance.   Respiratory: Positive for cough, shortness of breath and wheezing. Negative for chest tightness.    Cardiovascular: Negative for chest pain, palpitations and leg swelling.   Gastrointestinal: Negative for abdominal distention, abdominal pain, blood in stool, constipation, diarrhea, nausea and vomiting.   Endocrine: Negative for cold intolerance, heat intolerance, polydipsia, polyphagia and polyuria.   Genitourinary: Negative for decreased urine volume, difficulty urinating, dysuria, flank pain, frequency and hematuria.   Musculoskeletal: Positive for arthralgias, gait problem and myalgias. Negative for joint swelling.   Skin: Negative for rash.   Allergic/Immunologic: Negative for immunocompromised state.   Neurological: Positive for weakness. Negative for dizziness, syncope, light-headedness and headaches.   Hematological: Negative for adenopathy. Does not bruise/bleed easily.   Psychiatric/Behavioral: Negative for confusion  and sleep disturbance. The patient is not nervous/anxious.           All pertinent negatives and positives are as above. All other systems have been reviewed and are negative unless otherwise stated.     Objective    Temp:  [97.7 °F (36.5 °C)-98.5 °F (36.9 °C)] 98.4 °F (36.9 °C)  Heart Rate:  [75-89] 82  Resp:  [16-18] 16  BP: (133-173)/(57-78) 133/63    Intake/Output Summary (Last 24 hours) at 9/14/2020 1752  Last data filed at 9/14/2020 1500  Gross per 24 hour   Intake 1080 ml   Output 4350 ml   Net -3270 ml     Physical Exam  Vitals signs and nursing note reviewed.   Constitutional:       Appearance: He is well-developed.   HENT:      Head: Normocephalic.   Eyes:      Conjunctiva/sclera: Conjunctivae normal.      Pupils: Pupils are equal, round, and reactive to light.   Neck:      Musculoskeletal: Neck supple.      Vascular: No JVD.   Cardiovascular:      Rate and Rhythm: Normal rate and regular rhythm.      Heart sounds: Normal heart sounds. No murmur. No friction rub. No gallop.    Pulmonary:      Effort: No respiratory distress.      Breath sounds: No rales.      Comments: Patient currently on 2 L of oxygen.  Decrease in breath sound bilateral, clear.  Chest:      Chest wall: No tenderness.   Abdominal:      General: Bowel sounds are normal. There is no distension.      Palpations: Abdomen is soft.      Tenderness: There is no abdominal tenderness. There is no guarding or rebound.   Musculoskeletal: Normal range of motion.         General: No tenderness or deformity.   Skin:     General: Skin is warm and dry.      Findings: No rash.   Neurological:      Mental Status: He is alert and oriented to person, place, and time.      Cranial Nerves: No cranial nerve deficit.      Motor: Abnormal muscle tone present.      Coordination: Coordination abnormal.      Deep Tendon Reflexes: Reflexes normal.   Psychiatric:         Behavior: Behavior normal.         Thought Content: Thought content normal.         Judgment:  Judgment normal.         Results Review:  Lab Results (last 24 hours)     Procedure Component Value Units Date/Time    POC Glucose Once [[id number]]  (Abnormal) Collected: 09/14/20 1626    Specimen: Blood Updated: 09/14/20 1639     Glucose 411 mg/dL     POC Glucose Once [[id number]]  (Abnormal) Collected: 09/14/20 1624    Specimen: Blood Updated: 09/14/20 1638     Glucose 417 mg/dL     POC Glucose Once [[id number]]  (Abnormal) Collected: 09/14/20 1059    Specimen: Blood Updated: 09/14/20 1132     Glucose 294 mg/dL     POC Glucose Once [[id number]]  (Abnormal) Collected: 09/14/20 0736    Specimen: Blood Updated: 09/14/20 0755     Glucose 315 mg/dL     Blood Culture - Blood, Arm, Left [[id number]] Collected: 09/11/20 0342    Specimen: Blood from Arm, Left Updated: 09/14/20 0416     Blood Culture No growth at 3 days    Blood Culture - Blood, Arm, Right [[id number]] Collected: 09/11/20 0337    Specimen: Blood from Arm, Right Updated: 09/14/20 0416     Blood Culture No growth at 3 days    POC Glucose Once [[id number]]  (Abnormal) Collected: 09/13/20 2043    Specimen: Blood Updated: 09/13/20 2054     Glucose 281 mg/dL            Cultures:  Blood Culture   Date Value Ref Range Status   09/11/2020 No growth at 3 days  Preliminary   09/11/2020 No growth at 3 days  Preliminary       Radiology Data:    Imaging Results (Last 24 Hours)     ** No results found for the last 24 hours. **          No Known Allergies    Scheduled meds:   amLODIPine, 5 mg, Oral, Daily  atorvastatin, 20 mg, Oral, Daily  budesonide-formoterol, 2 puff, Inhalation, BID - RT  carvedilol, 3.125 mg, Oral, BID With Meals  enoxaparin, 40 mg, Subcutaneous, Q24H  gabapentin, 300 mg, Oral, TID  guaiFENesin, 600 mg, Oral, Q12H  insulin lispro, 3-14 Units, Subcutaneous, TID AC  ipratropium-albuterol, 3 mL, Nebulization, Q6H - RT  levoFLOXacin, 750 mg, Oral, Q24H  methylPREDNISolone sodium succinate, 40 mg, Intravenous, Q12H  montelukast, 10 mg, Oral, Nightly  tamsulosin,  0.4 mg, Oral, Daily  traZODone, 50 mg, Oral, Nightly  valsartan-HCTZ 320-25 combo dose, , Oral, Daily        PRN meds:  •  albuterol  •  benzonatate  •  dextrose  •  dextrose  •  glucagon (human recombinant)  •  influenza vaccine  •  ondansetron  •  sodium chloride    Assessment/Plan       Acute respiratory failure with hypoxia and hypercapnia (CMS/HCC)    Type 2 diabetes mellitus, without long-term current use of insulin (CMS/[facility])    Hypertension    Tobacco abuse, in remission    COPD exacerbation (CMS/HCC)    Acute on chronic respiratory failure with hypercapnia (CMS/HCC)      Plan:  COPD Exacerbation/Chronic respiratory failure with hypercapnia.  Continue Solu-Medrol.  Continue duo nebs.  Continue Symbicort.  Continue Mucinex.  Continue Solu-Medrol.  Continue Singulair.  Tessalon Perle PRN.  BiPAP PRN.  Oxygen PRN.  Chest x-ray-no acute change.  Stachybotrys Chartarum pending.   Plan for overnight oximetry.    Diabetes.  Non-insulin-dependent.  Sliding scale for now.    Hypertension/hyperlipidemia.  Continue Norvasc, Coreg, Diovan.  Continue Lipitor.    Tobacco abuse.  Cutting back on smoking discussed with patient.    Neuropathy.  Continue gabapentin.    Nausea.  Zofran PRN.  Pepcid twice daily.    Benign prostate hypertrophy.  Continue Flomax.    Depression/insomnia.  Continue trazodone.    Lovenox prophylaxis.    Nutrition.  Regular/cardiac/consistent carb diet.    Blood culture-no growth in 3 days.  Over-19-negative.    Discharge Plannin to 2 days.    Electronically signed by Magdi Page MD, 20, 5:52 PM CDT.                    Electronically signed by Magdi Page MD at 20 [id number]       Consult Notes (most recent note)    No notes of this type exist for this encounter.         Physical Therapy Notes (most recent note)    No notes exist for this encounter.         Occupational Therapy Notes (most recent note)    No notes exist for this encounter.            Discharge Summary      Camilo  Magdi CALVILLO MD at 09/15/20 0854              [facility] Medicine Services  DISCHARGE SUMMARY       Date of Admission: 9/11/2020  Date of Discharge:  9/15/2020  Primary Care Physician: Salbador Anderson MD    Presenting Problem/History of Present Illness:  Chronic obstructive pulmonary disease, unspecified COPD type (CMS/HCC) [J44.9]  Chronic obstructive pulmonary disease, unspecified COPD type (CMS/HCC) [J44.9]     Final Discharge Diagnoses:  Active Hospital Problems    Diagnosis   • **Acute respiratory failure with hypoxia and hypercapnia (CMS/HCC)   • COPD exacerbation (CMS/HCC)   • Acute on chronic respiratory failure with hypercapnia (CMS/HCC)   • Type 2 diabetes mellitus, without long-term current use of insulin (CMS/[facility])   • Hypertension   • Tobacco abuse, in remission     Pertinent Test Results:   Impression: Chest x-ray.  No acute findings.    Chief Complaint on Day of Discharge: none    History of Present Illness on Day of Discharge:   70 year old male with PMH of COPD, ongoing tobacco use, DM NID, HTN that presents with cough, wheezing and shortness of breath worsening over the last 7-10 days. He had felt worse since yesterday, but did not want to call EMS and this AM could not tolerate the shortness of breath any longer. EMS noted oxygen saturation around 70%, ER initial abg shows pH of 7.288, CO@ of 64, pO2 of 79. He was treated with BiPAP, bronchodilators and steroids with mild improvement. Patient denies chest or leg pain, no fever or body aches.     Hospital Course:  The patient is a 70 y.o. male who presented to [facility] with severe exacerbation/chronic respiratory failure/diabetes uncontrolled.      COPD Exacerbation/Chronic respiratory failure with hypercapnia.    Patient was started on Solu-Medrol, this to be changed to [employer] Dosepak.    Patient to continue duo nebs, Symbicort, Mucinex, and Singulair.  Tessalon Perle PRN.    Patient will go home with home  "oxygen.   Chest x-ray-no acute change.  Stachybotrys Chartarum pending.  Plan for overnight oximetry.  Blood culture-no growth in 4 days.  COVID-19-negative.     Diabetes.  Non-insulin-dependent.  Sliding scale for now.  Patient go back on his home diabetic medication.  Hemoglobin A1 C is 8, discussed with patient.  Patient will need better control of his diabetes.     Hypertension/hyperlipidemia.    Patient to continue Norvasc, Coreg, and Diovan.    Patient to continue Lipitor.     Tobacco abuse.  Cutting back on smoking discussed with patient.  Patient state he has quit smoking for like a month.     Neuropathy.    Patient to continue gabapentin.     Nausea.  Zofran PRN.  Pepcid twice daily.     Benign prostate hypertrophy.  Continue Flomax.     Depression/insomnia.    Patient to continue trazodone.     Lovenox prophylaxis.     Vital signs stable, afebrile.  Plan to discharge patient home today.  Follow-up with PCP 1 week.  Patient go home with home oxygen.    Condition on Discharge: Stable.    Physical Exam on Discharge:  /72 (BP Location: Left arm, Patient Position: Lying)   Pulse 73   Temp 97.7 °F (36.5 °C) (Oral)   Resp 16   Ht 180.3 cm (71\")   Wt 76.3 kg (168 lb 4 oz)   SpO2 97%   BMI 23.47 kg/m²   Physical Exam  Vitals signs and nursing note reviewed.   Constitutional:       Appearance: He is well-developed.   HENT:      Head: Normocephalic and atraumatic.   Eyes:      Conjunctiva/sclera: Conjunctivae normal.      Pupils: Pupils are equal, round, and reactive to light.   Neck:      Musculoskeletal: Neck supple.      Vascular: No JVD.   Cardiovascular:      Rate and Rhythm: Normal rate and regular rhythm.      Heart sounds: Normal heart sounds. No murmur. No friction rub. No gallop.    Pulmonary:      Effort: Pulmonary effort is normal. No respiratory distress.      Breath sounds: Normal breath sounds. No wheezing or rales.   Chest:      Chest wall: No tenderness.   Abdominal:      General: Bowel " sounds are normal. There is no distension.      Palpations: Abdomen is soft.      Tenderness: There is no abdominal tenderness. There is no guarding or rebound.   Musculoskeletal: Normal range of motion.         General: No tenderness or deformity.   Skin:     General: Skin is warm and dry.      Findings: No rash.   Neurological:      Mental Status: He is alert and oriented to person, place, and time.      Cranial Nerves: No cranial nerve deficit.      Motor: No abnormal muscle tone.      Deep Tendon Reflexes: Reflexes normal.   Psychiatric:         Behavior: Behavior normal.         Thought Content: Thought content normal.         Judgment: Judgment normal.           Discharge Disposition:  Home or Self Care    Discharge Medications:     Discharge Medications      New Medications      Instructions Start Date   benzonatate 200 MG capsule  Commonly known as: TESSALON   200 mg, Oral, 3 Times Daily PRN      famotidine 20 MG tablet  Commonly known as: PEPCID   20 mg, Oral, 2 Times Daily PRN      influenza vac split quad 0.5 ML suspension prefilled syringe injection  Commonly known as: FLUZONE,FLUARIX,AFLURIA   0.5 mL, Intramuscular, Once      ipratropium-albuterol 0.5-2.5 mg/3 ml nebulizer  Commonly known as: DUO-NEB   3 mL, Nebulization, Every 6 Hours - RT, 3 boxes.      methylPREDNISolone 4 MG dose pack  Commonly known as: MEDROL   Take as directed on package instructions.         Changes to Medications      Instructions Start Date   albuterol sulfate  (90 Base) MCG/ACT inhaler  Commonly known as: PROVENTIL HFA;VENTOLIN HFA;PROAIR HFA  What changed: Another medication with the same name was removed. Continue taking this medication, and follow the directions you see here.   2 puffs, Inhalation, Every 4 Hours PRN         Continue These Medications      Instructions Start Date   amLODIPine 5 MG tablet  Commonly known as: NORVASC   5 mg, Oral, Daily      atorvastatin 20 MG tablet  Commonly known as: LIPITOR   20  mg, Oral, Daily      budesonide-formoterol 160-4.5 MCG/ACT inhaler  Commonly known as: Symbicort   2 puffs, Inhalation, 2 Times Daily - RT      carvedilol 3.125 MG tablet  Commonly known as: COREG   3.125 mg, Oral, 2 Times Daily With Meals      gabapentin 300 MG capsule  Commonly known as: NEURONTIN   300 mg, Oral, 3 Times Daily      glimepiride 2 MG tablet  Commonly known as: AMARYL   2 mg, Oral, Every Morning Before Breakfast      guaiFENesin 600 MG 12 hr tablet  Commonly known as: Mucinex   1,200 mg, Oral, 2 Times Daily      montelukast 10 MG tablet  Commonly known as: SINGULAIR   10 mg, Oral, Nightly      tamsulosin 0.4 MG capsule 24 hr capsule  Commonly known as: FLOMAX   1 capsule, Oral, Daily      traZODone 50 MG tablet  Commonly known as: DESYREL   50 mg, Oral, Nightly      Trulicity 0.75 MG/0.5ML solution pen-injector  Generic drug: Dulaglutide   0.75 mg, Subcutaneous, Weekly, Thursday.       valsartan-hydrochlorothiazide 320-25 MG per tablet  Commonly known as: DIOVAN-HCT   1 tablet, Oral, Daily             Discharge Diet:   Diet Instructions     Advance Diet As Tolerated            Activity at Discharge:   Activity Instructions     Activity as Tolerated            Discharge Care Plan/Instructions: Discharge home with family and home oxygen.    Follow-up Appointments:   Future Appointments   Date Time Provider Department Center   2020  1:00 PM Raquel Sosa APRN [facility] None   Follow with PCP 1 week.    Electronically signed by Magdi Page MD, 09/15/20, 09:12 CDT.    Time: Greater than 30 minutes.        Electronically signed by Magdi Page MD at 09/15/20 0913           [address] [address]  Dept. Phone:  [phone]  Dept. Fax:   Date Ordered: Sep 15, 2020         Patient:  Naif Nick MRN:  [id number]   [address] [facility] [address] :  1950  SSN:    Phone: [phone] Sex:  M     Weight: 76.3 kg (168 lb 4  "oz)         Ht Readings from Last 1 Encounters:   09/11/20 180.3 cm (71\")         Home Oxygen Therapy          (Order ID: [id number])    Diagnosis:  Chronic obstructive pulmonary disease, unspecified COPD type (CMS/HCC) (J44.9 [ICD-10-CM] 496 [ICD-9-CM])  Hypoxia (R09.02 [ICD-10-CM] 799.02 [ICD-9-CM])  COPD exacerbation (CMS/HCC) (J44.1 [ICD-10-CM] 491.21 [ICD-9-CM])  Hypertension, unspecified type (I10 [ICD-10-CM] 401.9 [ICD-9-CM])   Quantity:  1     Delivery Modality: Nasal Cannula  Liters Per Minute: 2  Duration: Continuous  Equipment:  Oxygen Concentrator &  &  Portable Gaseous Oxygen System & Portable Oxygen Contents Gaseous &  Conserving Regulator  Length of Need (99 Months = Lifetime): 99 Months = Lifetime        Authorizing Provider's Phone: [phone]   Authorizing Provider:Magdi Page MD  Authorizing Provider's NPI: [id number]  Order Entered By: Magdi Page MD 9/15/2020  9:12 AM     Electronically signed by: Magdi Page MD 9/15/2020  9:12 AM          "

## 2020-09-15 NOTE — PLAN OF CARE
Goal Outcome Evaluation:  Plan of Care Reviewed With: patient  Progress: no change  Outcome Summary:  Overnight pulse ox this shift.  O2 had to be reapplied after approximately being on room air for 30 minutes due to low O2 sat.  No c/o pain.  Cough is now productive with clear, thick sputum.  Pt states he only feels SOA while coughing.  Sepsis positive re-screen; MD aware.  VSS.  NSR-ST  on tele.  Will continue to monitor.  Possible dc to home today.

## 2020-09-15 NOTE — PROGRESS NOTES
Exercise Oximetry    Patient Name:Naif Nick   MRN: [id number]   Date: 09/15/20             ROOM AIR BASELINE   SpO2% 92   Heart Rate    Blood Pressure      EXERCISE ON ROOM AIR SpO2% EXERCISE ON O2 @2  LPM SpO2%   1 MINUTE 92 1 MINUTE 88   2 MINUTES 92 2 MINUTES 90   3 MINUTES 90 3 MINUTES 90   4 MINUTES 90 4 MINUTES 92   5 MINUTES 88 5 MINUTES    6 MINUTES  6 MINUTES               Distance Walked   Distance Walked   Dyspnea (Frandy Scale)   Dyspnea (Frandy Scale)   Fatigue (Frandy Scale)   Fatigue (Frandy Scale)   SpO2% Post Exercise   SpO2% Post Exercise   HR Post Exercise   HR Post Exercise   Time to Recovery   Time to Recovery     Comments: Patient's resting saturation was 92% on room air . Ambulated aprox. 600' on room air when patient desaturated to 88%.  Oxygen @ 2lpm applied . Saturation increased to 90%. Ambulated aprox 100' more. Patient's saturation upon arrival to room was   92% on oxygen @ 2lpm .

## 2020-09-16 ENCOUNTER — READMISSION MANAGEMENT (OUTPATIENT)
Dept: CALL CENTER | Facility: HOSPITAL | Age: 70
End: 2020-09-16

## 2020-09-16 LAB
BACTERIA SPEC AEROBE CULT: NORMAL
BACTERIA SPEC AEROBE CULT: NORMAL

## 2020-09-16 NOTE — OUTREACH NOTE
Prep Survey      Responses   [facility] patient discharged from?  [place]   Is LACE score < 7 ?  No   Eligibility  Readm Mgmt   Discharge diagnosis  COPD   COVID-19 Test Status  Negative   Does the patient have one of the following disease processes/diagnoses(primary or secondary)?  COPD/Pneumonia   Does the patient have Home health ordered?  No   Is there a DME ordered?  Yes   What DME was ordered?  O2   Comments regarding appointments  see AVS   Medication alerts for this patient  see AVS   Prep survey completed?  Yes          Bharti Loya RN

## 2020-09-17 LAB — S CHARTARUM IGE QN: <0.1 KU/L

## 2020-09-18 ENCOUNTER — READMISSION MANAGEMENT (OUTPATIENT)
Dept: CALL CENTER | Facility: HOSPITAL | Age: 70
End: 2020-09-18

## 2020-09-18 NOTE — OUTREACH NOTE
COPD/PN Week 1 Survey      Responses   [facility] patient discharged from?  [place]   COVID-19 Test Status  Negative   Does the patient have one of the following disease processes/diagnoses(primary or secondary)?  COPD/Pneumonia   Is there a successful TCM telephone encounter documented?  No   Was the primary reason for admission:  COPD exacerbation   Week 1 attempt successful?  No   Unsuccessful attempts  Attempt 1          Joselyn Davis RN

## 2020-09-21 ENCOUNTER — READMISSION MANAGEMENT (OUTPATIENT)
Dept: CALL CENTER | Facility: HOSPITAL | Age: 70
End: 2020-09-21

## 2020-09-21 NOTE — OUTREACH NOTE
COPD/PN Week 1 Survey      Responses   [facility] patient discharged from?  [place]   COVID-19 Test Status  Negative   Does the patient have one of the following disease processes/diagnoses(primary or secondary)?  COPD/Pneumonia   Is there a successful TCM telephone encounter documented?  No   Was the primary reason for admission:  COPD exacerbation   Week 1 attempt successful?  Yes   Call start time  1032   Call end time  1042   Discharge diagnosis  COPD   Meds reviewed with patient/caregiver?  Yes   Is the patient having any side effects they believe may be caused by any medication additions or changes?  No   Does the patient have all medications ordered at discharge?  Yes   Is the patient taking all medications as directed (includes completed medication regime)?  Yes   Medication comments  finishing steroids   Does the patient have a primary care provider?   Yes   Does the patient have an appointment with their PCP or pulmonologist within 7 days of discharge?  Yes   Has the patient kept scheduled appointments due by today?  N/A   Comments  appt tomorrow with PCP   Has home health visited the patient within 72 hours of discharge?  N/A   What DME was ordered?  O2   Has all DME been delivered?  Yes   DME comments  having some dryness with oxygen--I have told him he may need humidification   Psychosocial issues?  No   Did the patient receive a copy of their discharge instructions?  Yes   Nursing interventions  Reviewed instructions with patient   What is the patient's perception of their health status since discharge?  Improving   Nursing Interventions  Nurse provided patient education   Are the patient's immunizations up to date?   Yes [Had flu shot in  hospital and has had pneumonia]   Nursing interventions  Educated on importance of maintaining up to date immunizations as advised by provider   If the patient is a current smoker, are they able to teach back resources for cessation?  -- [not smoking]   Is the  patient/caregiver able to teach back the hierarchy of who to call/visit for symptoms/problems? PCP, Specialist, Home health nurse, Urgent Care, ED, 911  Yes   Additional teach back comments  pt feels like there is alot of mold in the home--I gave him several numbers to contact people to help in evaluation of his home. He had a water leak for quite some time.   Patient reports what zone on this call?  Green Zone   Green Zone  Reports doing well, Breathing without shortness of breath, Usual activity and exercise level   Green Zone interventions:  Take daily medications, Do not smoke, Use oxygen as prescribed, Avoid indoor/outdoor triggers   Week 1 call completed?  Yes          Thalia Mcdonnell, RN

## 2020-09-28 ENCOUNTER — OFFICE VISIT (OUTPATIENT)
Dept: PULMONOLOGY | Facility: CLINIC | Age: 70
End: 2020-09-28

## 2020-09-28 VITALS
HEART RATE: 88 BPM | BODY MASS INDEX: 24.36 KG/M2 | DIASTOLIC BLOOD PRESSURE: 70 MMHG | TEMPERATURE: 98.1 F | HEIGHT: 71 IN | OXYGEN SATURATION: 98 % | WEIGHT: 174 LBS | SYSTOLIC BLOOD PRESSURE: 130 MMHG

## 2020-09-28 DIAGNOSIS — J44.9 STAGE 4 VERY SEVERE COPD BY GOLD CLASSIFICATION (HCC): Primary | ICD-10-CM

## 2020-09-28 DIAGNOSIS — Z20.1 EXPOSURE TO TB: ICD-10-CM

## 2020-09-28 DIAGNOSIS — J44.9 ASTHMA-COPD OVERLAP SYNDROME (HCC): ICD-10-CM

## 2020-09-28 DIAGNOSIS — R91.1 LUNG NODULE: ICD-10-CM

## 2020-09-28 DIAGNOSIS — Z99.81 DEPENDENCE ON SUPPLEMENTAL OXYGEN: ICD-10-CM

## 2020-09-28 DIAGNOSIS — J96.11 CHRONIC RESPIRATORY FAILURE WITH HYPOXIA (HCC): ICD-10-CM

## 2020-09-28 DIAGNOSIS — Z87.891 FORMER SMOKER: ICD-10-CM

## 2020-09-28 DIAGNOSIS — J43.2 CENTRILOBULAR EMPHYSEMA (HCC): ICD-10-CM

## 2020-09-28 PROCEDURE — 99213 OFFICE O/P EST LOW 20 MIN: CPT | Performed by: NURSE PRACTITIONER

## 2020-09-28 PROCEDURE — 94761 N-INVAS EAR/PLS OXIMETRY MLT: CPT | Performed by: NURSE PRACTITIONER

## 2020-09-28 RX ORDER — BUDESONIDE AND FORMOTEROL FUMARATE DIHYDRATE 160; 4.5 UG/1; UG/1
2 AEROSOL RESPIRATORY (INHALATION) 2 TIMES DAILY
Qty: 1 INHALER | Refills: 0 | Status: SHIPPED | OUTPATIENT
Start: 2020-09-28 | End: 2020-12-28 | Stop reason: ALTCHOICE

## 2020-09-29 ENCOUNTER — READMISSION MANAGEMENT (OUTPATIENT)
Dept: CALL CENTER | Facility: HOSPITAL | Age: 70
End: 2020-09-29

## 2020-09-29 NOTE — OUTREACH NOTE
COPD/PN Week 2 Survey      Responses   [facility] patient discharged from?  [place]   COVID-19 Test Status  Negative   Does the patient have one of the following disease processes/diagnoses(primary or secondary)?  COPD/Pneumonia   Was the primary reason for admission:  COPD exacerbation   Week 2 attempt successful?  Yes   Call start time  1212   Call end time  1216   Discharge diagnosis  COPD   Meds reviewed with patient/caregiver?  Yes   Is the patient having any side effects they believe may be caused by any medication additions or changes?  No   Does the patient have all medications ordered at discharge?  Yes   Is the patient taking all medications as directed (includes completed medication regime)?  Yes   Does the patient have a primary care provider?   Yes   Does the patient have an appointment with their PCP or pulmonologist within 7 days of discharge?  Yes   Has the patient kept scheduled appointments due by today?  Yes   Has home health visited the patient within 72 hours of discharge?  N/A   Pulse Ox monitoring  None   Psychosocial issues?  No   Did the patient receive a copy of their discharge instructions?  Yes   Nursing interventions  Reviewed instructions with patient   What is the patient's perception of their health status since discharge?  Improving   Nursing Interventions  Nurse provided patient education   Is the patient/caregiver able to teach back the hierarchy of who to call/visit for symptoms/problems? PCP, Specialist, Home health nurse, Urgent Care, ED, 911  Yes   Additional teach back comments  pt states feeling better except for some lingering weakness   Is the patient able to teach back COPD zones?  Yes   Nursing interventions  Education provided on various zones   Patient reports what zone on this call?  Green Zone   Green Zone  Reports doing well, Breathing without shortness of breath, Usual activity and exercise level, Sleeping well, Usual amount of phlegm/mucus without difficulty  coughing up, Appetite is good   Green Zone interventions:  Take daily medications, Use oxygen as prescribed   Week 2 call completed?  Yes          Cathy Frias RN

## 2020-10-02 DIAGNOSIS — J96.11 CHRONIC RESPIRATORY FAILURE WITH HYPOXIA (HCC): ICD-10-CM

## 2020-10-02 DIAGNOSIS — J44.9 STAGE 4 VERY SEVERE COPD BY GOLD CLASSIFICATION (HCC): ICD-10-CM

## 2020-10-06 ENCOUNTER — READMISSION MANAGEMENT (OUTPATIENT)
Dept: CALL CENTER | Facility: HOSPITAL | Age: 70
End: 2020-10-06

## 2020-10-06 NOTE — OUTREACH NOTE
COPD/PN Week 3 Survey      Responses   [facility] patient discharged from?  [place]   Does the patient have one of the following disease processes/diagnoses(primary or secondary)?  COPD/Pneumonia   Was the primary reason for admission:  COPD exacerbation   Week 3 attempt successful?  Yes   Call start time  1141   Call end time  1142   Discharge diagnosis  COPD   Meds reviewed with patient/caregiver?  Yes   Is the patient having any side effects they believe may be caused by any medication additions or changes?  No   Does the patient have all medications ordered at discharge?  Yes   Is the patient taking all medications as directed (includes completed medication regime)?  Yes   Does the patient have a primary care provider?   Yes   Does the patient have an appointment with their PCP or specialist within 7 days of discharge?  Yes   Has the patient kept scheduled appointments due by today?  Yes   Has home health visited the patient within 72 hours of discharge?  N/A   What DME was ordered?  D/C'd 02   Has all DME been delivered?  Yes   Pulse Ox monitoring  None   Psychosocial issues?  No   Did the patient receive a copy of their discharge instructions?  Yes   Nursing interventions  Reviewed instructions with patient   What is the patient's perception of their health status since discharge?  Improving   Nursing Interventions  Nurse provided patient education   Nursing interventions  Educated on importance of maintaining up to date immunizations as advised by provider   Is the patient/caregiver able to teach back the hierarchy of who to call/visit for symptoms/problems? PCP, Specialist, Home health nurse, Urgent Care, ED, 911  Yes   Is the patient able to teach back COPD zones?  Yes   Nursing interventions  Education provided on various zones   Patient reports what zone on this call?  Green Zone   Green Zone  Reports doing well, Breathing without shortness of breath, Usual activity and exercise level, Sleeping  well, Usual amount of phlegm/mucus without difficulty coughing up, Appetite is good   Green Zone interventions:  Take daily medications, Use oxygen as prescribed   Week 3 call completed?  Yes          Sherwin Stokes RN

## 2020-10-06 NOTE — PROGRESS NOTES
This overnight was actually done on oxygen on 2L.  I did talk again with the pt and let him know to keep wearing his oxygen at 2L at night.  He stated he understood.

## 2020-10-14 ENCOUNTER — READMISSION MANAGEMENT (OUTPATIENT)
Dept: CALL CENTER | Facility: HOSPITAL | Age: 70
End: 2020-10-14

## 2020-10-14 NOTE — OUTREACH NOTE
COPD/PN Week 4 Survey      Responses   [facility] patient discharged from?  [place]   Does the patient have one of the following disease processes/diagnoses(primary or secondary)?  COPD/Pneumonia   Was the primary reason for admission:  COPD exacerbation   Week 4 attempt successful?  Yes   Call start time  1501   Call end time  1503   Discharge diagnosis  COPD   Meds reviewed with patient/caregiver?  Yes   Is the patient taking all medications as directed (includes completed medication regime)?  Yes   Has the patient kept scheduled appointments due by today?  Yes   Is the patient still receiving Home Health Services?  N/A   What is the patient's perception of their health status since discharge?  Improving   Is the patient able to teach back COPD zones?  Yes   Nursing interventions  Education provided on various zones   Patient reports what zone on this call?  Yellow Zone   Yellow Zone  Increased shortness of air, Unable to complete daily activities, Increased or thicker phlegm/mucus   Yellow interventions  Continue to use daily medications, Use quick relief inhaler as ordered, Use oxygen as ordered, Do not smoke, Get plenty of rest, Call provider immediatly if symptoms do not improve   Week 4 call completed?  Yes   Would the patient like one additional call?  No   Graduated  Yes   Did the patient feel the follow up calls were helpful during their recovery period?  Yes   Was the number of calls appropriate?  Yes          Prema Bernstein RN

## 2020-11-24 ENCOUNTER — OFFICE VISIT (OUTPATIENT)
Dept: PRIMARY CARE CLINIC | Age: 70
End: 2020-11-24
Payer: MEDICARE

## 2020-11-24 VITALS
TEMPERATURE: 97.6 F | SYSTOLIC BLOOD PRESSURE: 108 MMHG | OXYGEN SATURATION: 97 % | HEIGHT: 71 IN | BODY MASS INDEX: 24.64 KG/M2 | DIASTOLIC BLOOD PRESSURE: 64 MMHG | WEIGHT: 176 LBS | HEART RATE: 93 BPM | RESPIRATION RATE: 20 BRPM

## 2020-11-24 DIAGNOSIS — E34.9 TESTOSTERONE DEFICIENCY: ICD-10-CM

## 2020-11-24 DIAGNOSIS — Z12.5 PROSTATE CANCER SCREENING: ICD-10-CM

## 2020-11-24 DIAGNOSIS — E78.2 MIXED HYPERLIPIDEMIA: ICD-10-CM

## 2020-11-24 DIAGNOSIS — E11.69 TYPE 2 DIABETES MELLITUS WITH OTHER SPECIFIED COMPLICATION, WITHOUT LONG-TERM CURRENT USE OF INSULIN (HCC): ICD-10-CM

## 2020-11-24 DIAGNOSIS — I10 ESSENTIAL HYPERTENSION: ICD-10-CM

## 2020-11-24 LAB
BASOPHILS ABSOLUTE: 0.1 K/UL (ref 0–0.2)
BASOPHILS RELATIVE PERCENT: 0.9 % (ref 0–1)
CHOLESTEROL, FASTING: 189 MG/DL (ref 160–199)
CREATININE URINE: 171.6 MG/DL (ref 4.2–622)
EOSINOPHILS ABSOLUTE: 0.4 K/UL (ref 0–0.6)
EOSINOPHILS RELATIVE PERCENT: 3.4 % (ref 0–5)
HBA1C MFR BLD: 7.3 % (ref 4–6)
HCT VFR BLD CALC: 45.2 % (ref 42–52)
HDLC SERPL-MCNC: 60 MG/DL (ref 55–121)
HEMOGLOBIN: 14.1 G/DL (ref 14–18)
IMMATURE GRANULOCYTES #: 0 K/UL
LDL CHOLESTEROL CALCULATED: 94 MG/DL
LYMPHOCYTES ABSOLUTE: 4.1 K/UL (ref 1.1–4.5)
LYMPHOCYTES RELATIVE PERCENT: 36.3 % (ref 20–40)
MCH RBC QN AUTO: 29.9 PG (ref 27–31)
MCHC RBC AUTO-ENTMCNC: 31.2 G/DL (ref 33–37)
MCV RBC AUTO: 95.8 FL (ref 80–94)
MICROALBUMIN UR-MCNC: 3.4 MG/DL (ref 0–19)
MICROALBUMIN/CREAT UR-RTO: 19.8 MG/G
MONOCYTES ABSOLUTE: 0.9 K/UL (ref 0–0.9)
MONOCYTES RELATIVE PERCENT: 7.8 % (ref 0–10)
NEUTROPHILS ABSOLUTE: 5.8 K/UL (ref 1.5–7.5)
NEUTROPHILS RELATIVE PERCENT: 51.2 % (ref 50–65)
PDW BLD-RTO: 12.7 % (ref 11.5–14.5)
PLATELET # BLD: 400 K/UL (ref 130–400)
PMV BLD AUTO: 9.2 FL (ref 9.4–12.4)
PROSTATE SPECIFIC ANTIGEN: 2.29 NG/ML (ref 0–4)
RBC # BLD: 4.72 M/UL (ref 4.7–6.1)
TESTOSTERONE TOTAL: 280.4 NG/DL (ref 193–740)
TRIGLYCERIDE, FASTING: 174 MG/DL (ref 0–149)
WBC # BLD: 11.3 K/UL (ref 4.8–10.8)

## 2020-11-24 PROCEDURE — G8420 CALC BMI NORM PARAMETERS: HCPCS | Performed by: STUDENT IN AN ORGANIZED HEALTH CARE EDUCATION/TRAINING PROGRAM

## 2020-11-24 PROCEDURE — 4040F PNEUMOC VAC/ADMIN/RCVD: CPT | Performed by: STUDENT IN AN ORGANIZED HEALTH CARE EDUCATION/TRAINING PROGRAM

## 2020-11-24 PROCEDURE — 1123F ACP DISCUSS/DSCN MKR DOCD: CPT | Performed by: STUDENT IN AN ORGANIZED HEALTH CARE EDUCATION/TRAINING PROGRAM

## 2020-11-24 PROCEDURE — 3051F HG A1C>EQUAL 7.0%<8.0%: CPT | Performed by: STUDENT IN AN ORGANIZED HEALTH CARE EDUCATION/TRAINING PROGRAM

## 2020-11-24 PROCEDURE — 4004F PT TOBACCO SCREEN RCVD TLK: CPT | Performed by: STUDENT IN AN ORGANIZED HEALTH CARE EDUCATION/TRAINING PROGRAM

## 2020-11-24 PROCEDURE — 3017F COLORECTAL CA SCREEN DOC REV: CPT | Performed by: STUDENT IN AN ORGANIZED HEALTH CARE EDUCATION/TRAINING PROGRAM

## 2020-11-24 PROCEDURE — G8484 FLU IMMUNIZE NO ADMIN: HCPCS | Performed by: STUDENT IN AN ORGANIZED HEALTH CARE EDUCATION/TRAINING PROGRAM

## 2020-11-24 PROCEDURE — 99204 OFFICE O/P NEW MOD 45 MIN: CPT | Performed by: STUDENT IN AN ORGANIZED HEALTH CARE EDUCATION/TRAINING PROGRAM

## 2020-11-24 PROCEDURE — G8428 CUR MEDS NOT DOCUMENT: HCPCS | Performed by: STUDENT IN AN ORGANIZED HEALTH CARE EDUCATION/TRAINING PROGRAM

## 2020-11-24 PROCEDURE — 2022F DILAT RTA XM EVC RTNOPTHY: CPT | Performed by: STUDENT IN AN ORGANIZED HEALTH CARE EDUCATION/TRAINING PROGRAM

## 2020-11-24 RX ORDER — BUPROPION HYDROCHLORIDE 150 MG/1
150 TABLET, EXTENDED RELEASE ORAL 2 TIMES DAILY
Qty: 60 TABLET | Refills: 3 | Status: SHIPPED | OUTPATIENT
Start: 2020-11-24 | End: 2021-08-24 | Stop reason: SDUPTHER

## 2020-11-24 NOTE — PROGRESS NOTES
[address] [facility]  [address] [address] [place] [address]  Dept: [phone]  Dept Fax: [phone]  Loc: [phone]      Subjective:     Chief Complaint   Patient presents with    Establish Care    Dizziness       HPI:  Lyndsay Tapia is a 65 y.o. male presenting for    Moved here 2 years ago from [place], was getting mail-order meds from previous office    HTN   On valsartan/hctz, amlodipine   Denies HA, visual changes, cp, palpitations, or swelling     DMT2   On trulicity. Not on metformin   No recent a1c   W/ peripheral neuropathy, takes gabapentin    HLD  · On lipitor 20mg    COPD  · On symbicort. Sees [facility]    Tobacco abuse  · Has almost quit smoking. H/o 1pk/day x 50 years. Wants to quit, says he felt better when he was off 4-5 days.  Trigger is boredom    ROS:   Review of Systems  Reviewed with patient and noted to be negative except that listed in HPI    PMHx:  Past Medical History:   Diagnosis Date    BPH (benign prostatic hyperplasia)     Dizziness     DM (diabetes mellitus) ([address]     type 2    Dysphagia     HTN (hypertension)     Hyperlipidemia     Hypogonadism male     Peripheral neuropathy     Tobacco dependence      Patient Active Problem List   Diagnosis    Benign prostatic hyperplasia with lower urinary tract symptoms    Tobacco abuse    Mixed hyperlipidemia    Prostate cancer screening    Testosterone deficiency    Essential hypertension    Diabetes mellitus ([address]    GERD (gastroesophageal reflux disease)       PSHx:  Past Surgical History:   Procedure Laterality Date    BACK SURGERY      COLON SURGERY  2014    Done in [facility], hx of colon polyps with a 5 yr recall    COLONOSCOPY N/A 7/12/2019    Dr Adalgisa Mukherjee hemorrhoids-Grade 2, suboptimal prep, diverticulosis-HP-3 yr recall    EYE SURGERY Right 07/2017    UPPER GASTROINTESTINAL ENDOSCOPY N/A 7/12/2019    Dr Elidia Jenkins Rj-Mild gastritis and bulbar duodenitis, prominent major duodenal papilla, possible lipoma or GIST        PFHx:  Family History   Problem Relation Age of Onset    Colon Cancer Mother     Diabetes Type 1  Father     Diabetes Father     Cancer Sister     Cancer Brother     Stomach Cancer Brother     Esophageal Cancer Neg Hx     Liver Cancer Neg Hx     Liver Disease Neg Hx     Rectal Cancer Neg Hx        SocialHx:  Social History     Tobacco Use    Smoking status: Light Tobacco Smoker     Packs/day: 1.00    Smokeless tobacco: Never Used   Substance Use Topics    Alcohol use: Yes     Comment: rare       Allergies:  No Known Allergies    Medications:  Current Outpatient Medications   Medication Sig Dispense Refill    omeprazole (PRILOSEC) 20 MG delayed release capsule Take 1 tablet po BID ( on an empty stomach) x 14 days for treatment of h pylori infection. 28 capsule 0    albuterol sulfate HFA (PROAIR HFA) 108 (90 Base) MCG/ACT inhaler Inhale 2 puffs into the lungs every 6 hours as needed for Wheezing      atorvastatin (LIPITOR) 20 MG tablet Take 20 mg by mouth daily      traZODone (DESYREL) 50 MG tablet Take 50 mg by mouth nightly      gabapentin (NEURONTIN) 300 MG capsule Take 300 mg by mouth nightly.  amLODIPine (NORVASC) 5 MG tablet Take 5 mg by mouth daily       tamsulosin (FLOMAX) 0.4 MG capsule Take 0.4 mg by mouth daily      valsartan-hydrochlorothiazide (DIOVAN-HCT) 320-25 MG per tablet Take 1 tablet by mouth daily       Dulaglutide (TRULICITY) 6.07 RB/0.8FL SOPN Inject into the skin once a week       amoxicillin (AMOXIL) 500 MG capsule 2 tablets  p.o.  BID x 14 days for treatment of  h pylori infection 56 capsule 0    Testosterone Cypionate 200 MG/ML SOLN Inject 2.5 mLs as directed every 14 days      Cholecalciferol (VITAMIN D3) 03465 units CAPS Take 1 capsule by mouth once a week      carvedilol (COREG) 3.125 MG tablet Take 3.125 mg by mouth 2 times daily (with meals)  glimepiride (AMARYL) 2 MG tablet Take 2 mg by mouth every morning (before breakfast)       No current facility-administered medications for this visit. Objective:   PE:  /64   Pulse 93   Temp 97.6 °F (36.4 °C) (Temporal)   Resp 20   Ht 5' 11\" (1.803 m)   Wt 176 lb (79.8 kg)   SpO2 97%   BMI 24.55 kg/m²   Physical Exam  Constitutional:       General: He is not in acute distress. Appearance: Normal appearance. HENT:      Head: Normocephalic. Nose: Nose normal.      Mouth/Throat:      Mouth: Mucous membranes are moist.      Pharynx: Oropharynx is clear. No oropharyngeal exudate or posterior oropharyngeal erythema. Eyes:      General: No scleral icterus. Extraocular Movements: Extraocular movements intact. Conjunctiva/sclera: Conjunctivae normal.   Neck:      Musculoskeletal: Normal range of motion. Cardiovascular:      Rate and Rhythm: Normal rate and regular rhythm. Pulses: Normal pulses. Heart sounds: No murmur. Pulmonary:      Effort: Pulmonary effort is normal.      Breath sounds: Normal breath sounds. Abdominal:      General: There is no distension. Palpations: Abdomen is soft. There is no mass. Tenderness: There is no abdominal tenderness. Musculoskeletal: Normal range of motion. Skin:     General: Skin is warm and dry. Capillary Refill: Capillary refill takes less than 2 seconds. Neurological:      General: No focal deficit present. Mental Status: He is alert and oriented to person, place, and time. Psychiatric:         Mood and Affect: Mood normal.         Behavior: Behavior normal.         Thought Content: Thought content normal.            Assessment & Plan   Janis Guerrier was seen today for establish care and dizziness.     Diagnoses and all orders for this visit:    Type 2 diabetes mellitus with other specified complication, without long-term current use of insulin ([address]  -Continue trulicity  -     Hemoglobin A1C; Future  -

## 2020-12-01 RX ORDER — DULAGLUTIDE 1.5 MG/.5ML
1.5 INJECTION, SOLUTION SUBCUTANEOUS WEEKLY
Qty: 4 PEN | Refills: 3 | Status: SHIPPED | OUTPATIENT
Start: 2020-12-01 | End: 2021-06-28 | Stop reason: SDUPTHER

## 2020-12-04 ENCOUNTER — TELEPHONE (OUTPATIENT)
Dept: PRIMARY CARE CLINIC | Age: 70
End: 2020-12-04

## 2020-12-04 NOTE — TELEPHONE ENCOUNTER
Called pt with results left vm to retrn the call :    Please let pt know that labs mostly looked good. I will go over these more at our next visit but wanted to let him know the a1c for diabetes was slightly above goal at 7.2. Goal is less than 7.  I will go ahead and increase the dose of his trulicity to 4.6VZ from 0.75. I'm sending in prescription now.

## 2020-12-15 ENCOUNTER — OFFICE VISIT (OUTPATIENT)
Dept: PRIMARY CARE CLINIC | Age: 70
End: 2020-12-15
Payer: MEDICARE

## 2020-12-15 VITALS
BODY MASS INDEX: 25.34 KG/M2 | HEIGHT: 71 IN | WEIGHT: 181 LBS | HEART RATE: 85 BPM | SYSTOLIC BLOOD PRESSURE: 122 MMHG | RESPIRATION RATE: 20 BRPM | OXYGEN SATURATION: 96 % | DIASTOLIC BLOOD PRESSURE: 60 MMHG | TEMPERATURE: 97.5 F

## 2020-12-15 PROCEDURE — 99214 OFFICE O/P EST MOD 30 MIN: CPT | Performed by: STUDENT IN AN ORGANIZED HEALTH CARE EDUCATION/TRAINING PROGRAM

## 2020-12-15 PROCEDURE — 3051F HG A1C>EQUAL 7.0%<8.0%: CPT | Performed by: STUDENT IN AN ORGANIZED HEALTH CARE EDUCATION/TRAINING PROGRAM

## 2020-12-15 PROCEDURE — 4004F PT TOBACCO SCREEN RCVD TLK: CPT | Performed by: STUDENT IN AN ORGANIZED HEALTH CARE EDUCATION/TRAINING PROGRAM

## 2020-12-15 PROCEDURE — 1123F ACP DISCUSS/DSCN MKR DOCD: CPT | Performed by: STUDENT IN AN ORGANIZED HEALTH CARE EDUCATION/TRAINING PROGRAM

## 2020-12-15 PROCEDURE — G8484 FLU IMMUNIZE NO ADMIN: HCPCS | Performed by: STUDENT IN AN ORGANIZED HEALTH CARE EDUCATION/TRAINING PROGRAM

## 2020-12-15 PROCEDURE — G8427 DOCREV CUR MEDS BY ELIG CLIN: HCPCS | Performed by: STUDENT IN AN ORGANIZED HEALTH CARE EDUCATION/TRAINING PROGRAM

## 2020-12-15 PROCEDURE — 4040F PNEUMOC VAC/ADMIN/RCVD: CPT | Performed by: STUDENT IN AN ORGANIZED HEALTH CARE EDUCATION/TRAINING PROGRAM

## 2020-12-15 PROCEDURE — G8417 CALC BMI ABV UP PARAM F/U: HCPCS | Performed by: STUDENT IN AN ORGANIZED HEALTH CARE EDUCATION/TRAINING PROGRAM

## 2020-12-15 PROCEDURE — 3017F COLORECTAL CA SCREEN DOC REV: CPT | Performed by: STUDENT IN AN ORGANIZED HEALTH CARE EDUCATION/TRAINING PROGRAM

## 2020-12-15 PROCEDURE — 2022F DILAT RTA XM EVC RTNOPTHY: CPT | Performed by: STUDENT IN AN ORGANIZED HEALTH CARE EDUCATION/TRAINING PROGRAM

## 2020-12-15 SDOH — ECONOMIC STABILITY: TRANSPORTATION INSECURITY
IN THE PAST 12 MONTHS, HAS THE LACK OF TRANSPORTATION KEPT YOU FROM MEDICAL APPOINTMENTS OR FROM GETTING MEDICATIONS?: NO

## 2020-12-15 SDOH — ECONOMIC STABILITY: TRANSPORTATION INSECURITY
IN THE PAST 12 MONTHS, HAS LACK OF TRANSPORTATION KEPT YOU FROM MEETINGS, WORK, OR FROM GETTING THINGS NEEDED FOR DAILY LIVING?: NO

## 2020-12-15 SDOH — ECONOMIC STABILITY: FOOD INSECURITY: WITHIN THE PAST 12 MONTHS, YOU WORRIED THAT YOUR FOOD WOULD RUN OUT BEFORE YOU GOT MONEY TO BUY MORE.: NEVER TRUE

## 2020-12-15 SDOH — ECONOMIC STABILITY: FOOD INSECURITY: WITHIN THE PAST 12 MONTHS, THE FOOD YOU BOUGHT JUST DIDN'T LAST AND YOU DIDN'T HAVE MONEY TO GET MORE.: NEVER TRUE

## 2020-12-15 SDOH — ECONOMIC STABILITY: INCOME INSECURITY: HOW HARD IS IT FOR YOU TO PAY FOR THE VERY BASICS LIKE FOOD, HOUSING, MEDICAL CARE, AND HEATING?: NOT HARD AT ALL

## 2020-12-15 ASSESSMENT — ENCOUNTER SYMPTOMS
EYE PAIN: 0
EYE DISCHARGE: 0
DIARRHEA: 0
NAUSEA: 0
SHORTNESS OF BREATH: 0
SORE THROAT: 0
RHINORRHEA: 0
ABDOMINAL PAIN: 0
COUGH: 0

## 2020-12-15 ASSESSMENT — PATIENT HEALTH QUESTIONNAIRE - PHQ9
2. FEELING DOWN, DEPRESSED OR HOPELESS: 0
SUM OF ALL RESPONSES TO PHQ QUESTIONS 1-9: 0
SUM OF ALL RESPONSES TO PHQ QUESTIONS 1-9: 0
DEPRESSION UNABLE TO ASSESS: FUNCTIONAL CAPACITY MOTIVATION LIMITS ACCURACY
SUM OF ALL RESPONSES TO PHQ QUESTIONS 1-9: 0
1. LITTLE INTEREST OR PLEASURE IN DOING THINGS: 0
SUM OF ALL RESPONSES TO PHQ9 QUESTIONS 1 & 2: 0

## 2020-12-15 NOTE — PROGRESS NOTES
[address] [facility]  [address] [address] [place] [address]  Dept: [phone]  Dept Fax: [phone]  Loc: [phone]      Subjective:     Chief Complaint   Patient presents with    Diabetes    Hypertension       HPI:  Margie Cabrera is a 65 y.o. male presenting for      Here for follow up  Gets AMW visit through [facility] at home, hasn't yet this year    Health Maintenance Exam (MALE)    ASA (50-59 w/ ASCV >10%, no GI bleed, LE >10 Y):n/a  HLD: On atorvastatin 20mg  HTN (>18): h/o see below  DM (40-70 y overweight/obese, FH): H/o see below  Colorectal Cancer Screening: (50-75 w/ colonoscopy q 10 y or w/ annual fit): up todate. CLN 2019, repeat due 2022  Osteoporosis: (DEXA based on RF, insufficient evidence):n/a  AAA (once, 65-75 ever smoker): Needs screening  Lung Cancer Screening (Annual, LDCT, 46-80 Y w/ >30pk who smoke or quit within last 15 years):Due for exam. Has had done in years past however not in past year. Previously identified nodule that has not changed in size/character over subsequent exams. Hep C (once 0917-8350): low risk  Obesity (refer to nutrition >30 BMI): Body mass index is 25.24 kg/m². Prostate: (Not routine, based on FH, AA, preference): PSA wnl 11/2020  ETOH: moderate use  Tobacco: See below  Drug use: denies  STD's (Hep B, HIV, RPR, GC): low risk  Diet: Trying to eat healthy  Depression: denies  Eye exam: Due for f/u early 2021  Dentist: has dentures  Immunizations: Due for PPV23    Smoking cessation  States pharmacy never called for wellbutrin rx to pickup. He will go try and pick this up and start after visit  Has patches, finds that they tend not to stick well to arm  Would like to quit 01/2020    DMT2  Still using Trulicity 5.52HV pill, states rx is very expensive, waiting to  new 1.5mg when regular mediare coverage/rx costs resume 01/2020. Most recent a1c 7.3, just above goal. He has gained about 5 lbs over recent holidays. States he hasn't been as physically active as he should be and looks forward to getting up and out more. Endorses chronic, unchanged mild peripheral neuropathy, L>R thought to be 2/2 DM. Doesn't check feet but denies any cuts or lesions he is aware of. ROS:   Review of Systems   Constitutional: Negative for chills, fever and unexpected weight change. HENT: Negative for congestion, ear discharge, ear pain, rhinorrhea and sore throat. Eyes: Negative for pain and discharge. Respiratory: Negative for cough and shortness of breath. Cardiovascular: Negative for chest pain. Gastrointestinal: Negative for abdominal pain, diarrhea and nausea. Genitourinary: Negative for dysuria and hematuria. Musculoskeletal: Negative for arthralgias and joint swelling. Skin: Negative for rash. Neurological: Negative for weakness, numbness and headaches. Psychiatric/Behavioral: Negative for dysphoric mood. The patient is not nervous/anxious. PMHx:  Past Medical History:   Diagnosis Date    BPH (benign prostatic hyperplasia)     Dizziness     DM (diabetes mellitus) ([address]     type 2    Dysphagia     HTN (hypertension)     Hyperlipidemia     Hypogonadism male     Peripheral neuropathy     Tobacco dependence      There is no problem list on file for this patient.       PSHx:  Past Surgical History:   Procedure Laterality Date    BACK SURGERY      COLON SURGERY  2014    Done in [facility], hx of colon polyps with a 5 yr recall    COLONOSCOPY N/A 7/12/2019    Dr Xiong Prom hemorrhoids-Grade 2, suboptimal prep, diverticulosis-HP-3 yr recall    EYE SURGERY Right 07/2017    UPPER GASTROINTESTINAL ENDOSCOPY N/A 7/12/2019    Dr Izabel De La Rosa-Mild gastritis and bulbar duodenitis, prominent major duodenal papilla, possible lipoma or GIST        PFHx:  Family History   Problem Relation Age of Onset    Colon Cancer Mother     Diabetes Type 1  Father     Diabetes Father  Cancer Sister     Cancer Brother     Stomach Cancer Brother     Esophageal Cancer Neg Hx     Liver Cancer Neg Hx     Liver Disease Neg Hx     Rectal Cancer Neg Hx        SocialHx:  Social History     Tobacco Use    Smoking status: Light Tobacco Smoker     Packs/day: 1.00    Smokeless tobacco: Never Used   Substance Use Topics    Alcohol use: Yes     Comment: rare       Allergies:  No Known Allergies    Medications:  Current Outpatient Medications   Medication Sig Dispense Refill    Dulaglutide (TRULICITY) 1.5 RB/9.0ZM SOPN Inject 1.5 mg into the skin once a week 4 pen 3    buPROPion (WELLBUTRIN SR) 150 MG extended release tablet Take 1 tablet by mouth 2 times daily Days 1-3 take one tablet daily. Then take 2 tablets daily 60 tablet 3    omeprazole (PRILOSEC) 20 MG delayed release capsule Take 1 tablet po BID ( on an empty stomach) x 14 days for treatment of h pylori infection. 28 capsule 0    albuterol sulfate HFA (PROAIR HFA) 108 (90 Base) MCG/ACT inhaler Inhale 2 puffs into the lungs every 6 hours as needed for Wheezing      Testosterone Cypionate 200 MG/ML SOLN Inject 2.5 mLs as directed every 14 days      Cholecalciferol (VITAMIN D3) 43275 units CAPS Take 1 capsule by mouth once a week      atorvastatin (LIPITOR) 20 MG tablet Take 20 mg by mouth daily      traZODone (DESYREL) 50 MG tablet Take 50 mg by mouth nightly      gabapentin (NEURONTIN) 300 MG capsule Take 300 mg by mouth nightly.  amLODIPine (NORVASC) 5 MG tablet Take 5 mg by mouth daily       tamsulosin (FLOMAX) 0.4 MG capsule Take 0.4 mg by mouth daily      valsartan-hydrochlorothiazide (DIOVAN-HCT) 320-25 MG per tablet Take 1 tablet by mouth daily        No current facility-administered medications for this visit.         Objective:   PE:  /60   Pulse 85   Temp 97.5 °F (36.4 °C) (Temporal)   Resp 20   Ht 5' 11\" (1.803 m)   Wt 181 lb (82.1 kg)   SpO2 96%   BMI 25.24 kg/m²   Physical Exam  Constitutional: (1.5mg), around 04/2021. Other GLP-1 drugs looked into, appear as or more expensive for him. -     Hemoglobin A1C; Future  -     Basic Metabolic Panel; Future  -     Diabetic Foot Exam    Personal history of nicotine dependence   -     CT lung screen [Initial/Annual]; Future    Other orders  -     PNEUMOVAX 23 subcutaneous/IM (Pneumococcal polysaccharide vaccine 23-valent >= 1yo)    Return in about 4 months (around 4/15/2021) for follow up. Over 50% of the total visit time of 25 minutes was spent on counseling and or coordination of care of    Diagnosis Orders   1. Encounter for abdominal aortic aneurysm (AAA) screening  US SCREENING FOR AAA   2. Encounter for screening for lung cancer  CT lung screen [Initial/Annual]   3. Type 2 diabetes mellitus with other specified complication, without long-term current use of insulin ([facility])  Hemoglobin Y2P    Basic Metabolic Panel    Diabetic Foot Exam   4. Personal history of nicotine dependence   CT lung screen [Initial/Annual]         All questions were answered. Medications, including possible adverse effects, and instructions were reviewed and  understanding was confirmed. Follow-up recommendations, including when to contact or return to office (ie; if symptoms worsen or fail to improve), were discussed and acknowledged.     Electronically signed by Seven Vanegas MD on 12/15/20 at 11:37 AM CST

## 2020-12-17 DIAGNOSIS — E11.69 TYPE 2 DIABETES MELLITUS WITH OTHER SPECIFIED COMPLICATION, WITHOUT LONG-TERM CURRENT USE OF INSULIN (HCC): ICD-10-CM

## 2020-12-17 LAB
ANION GAP SERPL CALCULATED.3IONS-SCNC: 9 MMOL/L (ref 7–19)
BUN BLDV-MCNC: 18 MG/DL (ref 8–23)
CALCIUM SERPL-MCNC: 10.2 MG/DL (ref 8.8–10.2)
CHLORIDE BLD-SCNC: 96 MMOL/L (ref 98–111)
CO2: 31 MMOL/L (ref 22–29)
CREAT SERPL-MCNC: 1 MG/DL (ref 0.5–1.2)
GFR AFRICAN AMERICAN: >59
GFR NON-AFRICAN AMERICAN: >60
GLUCOSE BLD-MCNC: 130 MG/DL (ref 74–109)
HBA1C MFR BLD: 7.3 % (ref 4–6)
POTASSIUM SERPL-SCNC: 4.3 MMOL/L (ref 3.5–5)
SODIUM BLD-SCNC: 136 MMOL/L (ref 136–145)

## 2020-12-18 PROBLEM — E11.9 DIABETES MELLITUS (HCC): Status: ACTIVE | Noted: 2020-12-18

## 2020-12-18 PROBLEM — N40.1 BENIGN PROSTATIC HYPERPLASIA WITH LOWER URINARY TRACT SYMPTOMS: Status: ACTIVE | Noted: 2020-12-18

## 2020-12-18 PROBLEM — Z12.5 PROSTATE CANCER SCREENING: Status: ACTIVE | Noted: 2020-12-18

## 2020-12-18 PROBLEM — E78.2 MIXED HYPERLIPIDEMIA: Status: ACTIVE | Noted: 2020-12-18

## 2020-12-18 PROBLEM — K21.9 GERD (GASTROESOPHAGEAL REFLUX DISEASE): Status: ACTIVE | Noted: 2020-12-18

## 2020-12-18 PROBLEM — E34.9 TESTOSTERONE DEFICIENCY: Status: ACTIVE | Noted: 2020-12-18

## 2020-12-18 PROBLEM — Z72.0 TOBACCO ABUSE: Status: ACTIVE | Noted: 2020-12-18

## 2020-12-18 PROBLEM — I10 ESSENTIAL HYPERTENSION: Status: ACTIVE | Noted: 2020-12-18

## 2020-12-23 ENCOUNTER — HOSPITAL ENCOUNTER (OUTPATIENT)
Dept: ULTRASOUND IMAGING | Age: 70
Discharge: HOME | End: 2020-12-23
Payer: MEDICARE

## 2020-12-23 ENCOUNTER — HOSPITAL ENCOUNTER (OUTPATIENT)
Dept: CT IMAGING | Age: 70
Discharge: HOME | End: 2020-12-23
Payer: MEDICARE

## 2020-12-23 PROCEDURE — G0297 LDCT FOR LUNG CA SCREEN: HCPCS

## 2020-12-23 PROCEDURE — 76706 US ABDL AORTA SCREEN AAA: CPT

## 2020-12-23 NOTE — PROGRESS NOTES
[facility] S[facility]  [facility]   [facility] Clinic  [address] [place] [address]  Phone: [phone]  Fax: [phone]     Naif Nick is a 70 y.o. male.   CC:   Chief Complaint   Patient presents with   • stage 4 very severe copd        HPI:  Mr. Nick is a pleasant 70 year old male with known very severe stage 4 COPD, chronic respiratory failure on home oxygen of 2L NC, Former smoker, centrolobular emphysema, TB exposure, Lung nodule and Asthma/ copd overlap syndrome. He is on Mucinex, Nebulizer prn, Albuterol PRN, and Symbicort.  He has been out of his inhalers for 2 months and states he has not had any further attacks. He can not afford the Symbicort 160 at this time. He states his hips keep him from walking too far. He states his hip pain is more buttock and is better with rest. He has recently had an Aortic US. He is advised to discuss his claudication with his PCP. He has an occasional cough but not near like before. He denies shortness of breath at this time but again, his hips keep him from ambulating too far.     Patient denies fever, chills, cough, shortness of breath or difficulty breathing, fatigue, muscle or body aches, new onset headache, new loss of taste or smell, sore throat, congestion or runny nose, nausea or vomiting, diarrhea.  Patient denies any known exposure to a person under investigation or positive for COVID-19.  Patient is wearing mask today.     The following portions of the patient's history were reviewed and updated as appropriate: allergies, current medications, past family history, past medical history, past social history, past surgical history and problem list.    Past Medical History:   Diagnosis Date   • Asthma-COPD overlap syndrome (CMS/HCC) 12/26/2020   • Centrilobular emphysema (CMS/HCC) 12/26/2020   • Chronic respiratory failure with hypoxia (CMS/HCC) 12/26/2020   • COPD (chronic obstructive pulmonary disease) (CMS/HCC)    • Diabetes  mellitus (CMS/[facility])    • Hx of colonic polyp    • Hx of TIA (transient ischemic attack) and stroke    • Hyperlipidemia    • Hypertension    • Lung nodule 2020   • Stage 4 very severe COPD by GOLD classification (CMS/[facility]) 2020       Family History   Problem Relation Age of Onset   • Diabetes Father    • Heart disease Father    • Heart disease Sister    • Diabetes Sister    • Diabetes Brother    • Heart disease Brother        Social History     Socioeconomic History   • Marital status: Legally      Spouse name: Not on file   • Number of children: Not on file   • Years of education: Not on file   • Highest education level: Not on file   Tobacco Use   • Smoking status: Former Smoker     Packs/day: 1.00     Years: 55.00     Pack years: 55.00     Quit date: 2020     Years since quittin.4   • Smokeless tobacco: Never Used   • Tobacco comment: STATES 'NONE NOW, I CAN'T.'   Substance and Sexual Activity   • Alcohol use: Yes     Comment: OCC   • Drug use: No   • Sexual activity: Defer       Review of Systems   Constitutional: Negative for chills, fatigue and fever.   HENT: Negative for congestion, postnasal drip and sore throat.    Eyes: Negative for blurred vision, double vision and visual disturbance.   Respiratory: Positive for cough (occasional ). Negative for shortness of breath and wheezing.    Cardiovascular: Negative for chest pain, palpitations and leg swelling.   Gastrointestinal: Negative for diarrhea, nausea and vomiting.   Endocrine: Negative for cold intolerance, heat intolerance and polydipsia.   Musculoskeletal: Negative for back pain, gait problem and myalgias.        +vlaudication in the hips/ buttocks    Skin: Negative for color change, pallor and rash.   Allergic/Immunologic: Negative for environmental allergies, food allergies and immunocompromised state.   Neurological: Negative for dizziness, syncope and confusion.   Hematological: Negative for adenopathy. Does not  "bruise/bleed easily.   Psychiatric/Behavioral: Negative for agitation, behavioral problems and sleep disturbance.       /64   Pulse 97   Temp 97.1 °F (36.2 °C)   Ht 180.3 cm (71\")   Wt 81 kg (178 lb 9.6 oz)   SpO2 99% Comment: ra  BMI 24.91 kg/m²     Physical Exam  Constitutional:       General: He is not in acute distress.     Appearance: He is well-developed. He is not diaphoretic.   HENT:      Head: Normocephalic and atraumatic.   Eyes:      Conjunctiva/sclera: Conjunctivae normal.      Pupils: Pupils are equal, round, and reactive to light.   Neck:      Musculoskeletal: Normal range of motion and neck supple.   Cardiovascular:      Rate and Rhythm: Normal rate and regular rhythm.      Heart sounds: Normal heart sounds. No murmur. No friction rub. No gallop.    Pulmonary:      Effort: Pulmonary effort is normal.      Breath sounds: Normal breath sounds. No wheezing.   Abdominal:      General: Bowel sounds are normal.      Palpations: Abdomen is soft.   Musculoskeletal:         General: No deformity.   Lymphadenopathy:      Cervical: No cervical adenopathy.   Skin:     General: Skin is warm and dry.      Nails: There is clubbing.   Neurological:      Mental Status: He is oriented to person, place, and time.   Psychiatric:         Behavior: Behavior normal.         Thought Content: Thought content normal.         Judgment: Judgment normal.         Pulmonary Functions Testing Results:      Results for orders placed during the hospital encounter of 08/10/20   Full Pulmonary Function Test With Bronchodilator    Narrative [facility] - Pulmonary Function Test    [address] [place] [address]  [id number]  [phone]    Patient : Naif Nick   MRN : [id number]  YOB: 1950   :  Nacho Vaughan MD   Date : 8/10/2020    ______________________________________________________________________    Interpretation :  1. Spirometry shows very severe airflow obstruction " prebronchodilator  2. Following bronchodilator there is minimal not significant improvement   in FEV1 and FVC, but postbronchodilator spirometry meet criteria for   severe rather than very severe obstruction.  3. Mid flow is reduced.  4. Lung volume measurements show reduction in inspiratory capacity and   elevations in expiratory reserve volume residual volume and total lung   capacity suggesting hyperinflation and very significant gas trapping.  5. Airway resistance is increased and conductance is decreased.      Nacho Vaughan MD                      Rest/Exercise Pulse Ox Values        Some values may be hidden. Unless noted otherwise, only the newest values recorded on each date are displayed.         Rest/Exercise Pulse Ox Results 9/28/20   Rest room air SAT % 98   Exercise room air SAT % 96             My interpretation: no new    CXR: none         Problem List Items Addressed This Visit        Respiratory    Stage 4 very severe COPD by GOLD classification (CMS/[facility]) - Primary    Relevant Medications    albuterol sulfate  (90 Base) MCG/ACT inhaler    budesonide-formoterol (SYMBICORT) 80-4.5 MCG/ACT inhaler    Chronic respiratory failure with hypoxia (CMS/[facility])    Centrilobular emphysema (CMS/[facility])    Relevant Medications    albuterol sulfate  (90 Base) MCG/ACT inhaler    budesonide-formoterol (SYMBICORT) 80-4.5 MCG/ACT inhaler    Lung nodule    Asthma-COPD overlap syndrome (CMS/[facility])    Relevant Medications    albuterol sulfate  (90 Base) MCG/ACT inhaler    budesonide-formoterol (SYMBICORT) 80-4.5 MCG/ACT inhaler      Other Visit Diagnoses     Claudication (CMS/[facility])            Patient's Body mass index is 24.91 kg/m². BMI is within normal parameters. No follow-up required..      Assessment/Plan     Overall he is doing much better. He has been out of his maintenance Symbicort 160 for 2 months and his rescue inhaler longer than that. He is given samples of Symbicort 80 today as I do not  have hardly any samples at this time available. He is asked to look at his formulary so we can see which medications are covered and find one appropriate and cost effective. I have asked him to return in 3 months given he has been out of his meds for 2 months and I hade to give him a lower dose. He is looking forward to getting his Covid vaccination here soon.  I have asked him to discuss his claudication with his PCP as he also just had an Aortic US.     Health maintenance:   Influenza vaccine: Had in the fall while inpatient   Pneumovax 23:unsure where but had this last year.       Follow up: 3 months           Raquel Sosa, APRN  12/28/2020  14:15 CST    Return in about 3 months (around 3/28/2021).    This dictation was generated by voice recognition computer software. Although all attempts are made to edit dictation for accuracy, there may be errors in the transcription that are not intended.

## 2020-12-26 PROBLEM — J44.9 ASTHMA-COPD OVERLAP SYNDROME: Status: ACTIVE | Noted: 2020-12-26

## 2020-12-26 PROBLEM — J44.9 STAGE 4 VERY SEVERE COPD BY GOLD CLASSIFICATION: Status: ACTIVE | Noted: 2020-12-26

## 2020-12-26 PROBLEM — J44.89 ASTHMA-COPD OVERLAP SYNDROME: Status: ACTIVE | Noted: 2020-12-26

## 2020-12-26 PROBLEM — J96.11 CHRONIC RESPIRATORY FAILURE WITH HYPOXIA: Status: ACTIVE | Noted: 2020-12-26

## 2020-12-26 PROBLEM — R91.1 LUNG NODULE: Status: ACTIVE | Noted: 2020-12-26

## 2020-12-26 PROBLEM — J43.2 CENTRILOBULAR EMPHYSEMA: Status: ACTIVE | Noted: 2020-12-26

## 2020-12-28 ENCOUNTER — OFFICE VISIT (OUTPATIENT)
Dept: PULMONOLOGY | Facility: CLINIC | Age: 70
End: 2020-12-28

## 2020-12-28 ENCOUNTER — TELEPHONE (OUTPATIENT)
Dept: PULMONOLOGY | Facility: CLINIC | Age: 70
End: 2020-12-28

## 2020-12-28 VITALS
BODY MASS INDEX: 25 KG/M2 | OXYGEN SATURATION: 99 % | HEIGHT: 71 IN | DIASTOLIC BLOOD PRESSURE: 64 MMHG | WEIGHT: 178.6 LBS | TEMPERATURE: 97.1 F | HEART RATE: 97 BPM | SYSTOLIC BLOOD PRESSURE: 152 MMHG

## 2020-12-28 DIAGNOSIS — I73.9 CLAUDICATION (HCC): ICD-10-CM

## 2020-12-28 DIAGNOSIS — J44.9 ASTHMA-COPD OVERLAP SYNDROME (HCC): ICD-10-CM

## 2020-12-28 DIAGNOSIS — R91.1 LUNG NODULE: ICD-10-CM

## 2020-12-28 DIAGNOSIS — J96.11 CHRONIC RESPIRATORY FAILURE WITH HYPOXIA (HCC): ICD-10-CM

## 2020-12-28 DIAGNOSIS — J43.2 CENTRILOBULAR EMPHYSEMA (HCC): ICD-10-CM

## 2020-12-28 DIAGNOSIS — J44.9 STAGE 4 VERY SEVERE COPD BY GOLD CLASSIFICATION (HCC): Primary | ICD-10-CM

## 2020-12-28 PROCEDURE — 99214 OFFICE O/P EST MOD 30 MIN: CPT | Performed by: NURSE PRACTITIONER

## 2020-12-28 RX ORDER — BLOOD SUGAR DIAGNOSTIC
STRIP MISCELLANEOUS
Status: ON HOLD | COMMUNITY
Start: 2020-11-28 | End: 2023-02-20

## 2020-12-28 RX ORDER — BUDESONIDE AND FORMOTEROL FUMARATE DIHYDRATE 80; 4.5 UG/1; UG/1
2 AEROSOL RESPIRATORY (INHALATION) 2 TIMES DAILY
Qty: 2 INHALER | Refills: 0 | COMMUNITY
Start: 2020-12-28 | End: 2021-03-29

## 2020-12-28 RX ORDER — ALBUTEROL SULFATE 90 UG/1
2 AEROSOL, METERED RESPIRATORY (INHALATION) EVERY 4 HOURS PRN
Qty: 8 G | Refills: 3 | Status: SHIPPED | OUTPATIENT
Start: 2020-12-28 | End: 2021-05-07

## 2021-01-07 DIAGNOSIS — E11.69 TYPE 2 DIABETES MELLITUS WITH OTHER SPECIFIED COMPLICATION, WITHOUT LONG-TERM CURRENT USE OF INSULIN (HCC): Primary | ICD-10-CM

## 2021-01-07 RX ORDER — GABAPENTIN 300 MG/1
300 CAPSULE ORAL NIGHTLY
Qty: 90 CAPSULE | Refills: 0 | Status: SHIPPED | OUTPATIENT
Start: 2021-01-07 | End: 2021-01-19

## 2021-01-07 NOTE — TELEPHONE ENCOUNTER
Geraldine Quick called to request a refill on his medication. Last office visit : 12/15/2020   Next office visit : 3/18/2021     Last UDS: No results found for: [place], LABBENZ, BUPRENUR, COCAIMETSCRU, GABAPENTIN, MDMA, METAMPU, OPIATESCREENURINE, OXTCOSU, PHENCYCLIDINESCREENURINE, PROPOXYPHENE, THCSCREENUR, TRICYUR    Last Marrianne Lot: today  Medication Contract: not on file   Last Fill: 8/24/20    Requested Prescriptions     Pending Prescriptions Disp Refills    gabapentin (NEURONTIN) 300 MG capsule 90 capsule 0     Sig: Take 1 capsule by mouth nightly. Please approve or refuse this medication.    Azael Pinedo

## 2021-01-07 NOTE — TELEPHONE ENCOUNTER
Pt called requesting refill on this medication. You have never prescribed it before for him. I ran a maged today the last person that filled it for him was Dr. Babita Taveras.  Please advise

## 2021-01-15 ENCOUNTER — TELEPHONE (OUTPATIENT)
Dept: PRIMARY CARE CLINIC | Age: 71
End: 2021-01-15

## 2021-01-15 NOTE — TELEPHONE ENCOUNTER
Patient called and states his previous doc was giving him 90 day supply of gabapentin and he takes this 3x times daily at least twice it was sent in for 1 nightly please advise

## 2021-01-17 PROBLEM — Z12.5 PROSTATE CANCER SCREENING: Status: RESOLVED | Noted: 2020-12-18 | Resolved: 2021-01-17

## 2021-01-19 DIAGNOSIS — E11.69 TYPE 2 DIABETES MELLITUS WITH OTHER SPECIFIED COMPLICATION, WITHOUT LONG-TERM CURRENT USE OF INSULIN (HCC): ICD-10-CM

## 2021-01-19 RX ORDER — GABAPENTIN 300 MG/1
300 CAPSULE ORAL 3 TIMES DAILY
Qty: 270 CAPSULE | Refills: 0 | Status: SHIPPED | OUTPATIENT
Start: 2021-01-19 | End: 2021-03-19 | Stop reason: SDUPTHER

## 2021-02-09 RX ORDER — AMLODIPINE BESYLATE 5 MG/1
5 TABLET ORAL DAILY
Qty: 30 TABLET | Refills: 2 | Status: SHIPPED | OUTPATIENT
Start: 2021-02-09 | End: 2021-04-09

## 2021-02-09 NOTE — PROGRESS NOTES
Guillermina Clarke called to request a refill on his medication.       Last office visit : 12/15/2020   Next office visit : 3/18/2021     Requested Prescriptions     Pending Prescriptions Disp Refills    amLODIPine (NORVASC) 5 MG tablet 30 tablet 2     Sig: Take 1 tablet by mouth daily            Carmen Gordon MA   Pharmacy called requesting refills of amlodipine 5 mg daily

## 2021-02-15 ENCOUNTER — TELEPHONE (OUTPATIENT)
Dept: FAMILY MEDICINE CLINIC | Facility: CLINIC | Age: 71
End: 2021-02-15

## 2021-02-15 RX ORDER — TAMSULOSIN HYDROCHLORIDE 0.4 MG/1
1 CAPSULE ORAL DAILY
Qty: 30 CAPSULE | OUTPATIENT
Start: 2021-02-15 | End: 2021-03-17

## 2021-02-23 RX ORDER — ATORVASTATIN CALCIUM 20 MG/1
TABLET, FILM COATED ORAL
Qty: 90 TABLET | OUTPATIENT
Start: 2021-02-23

## 2021-02-25 RX ORDER — TAMSULOSIN HYDROCHLORIDE 0.4 MG/1
0.4 CAPSULE ORAL DAILY
Qty: 90 CAPSULE | Refills: 3 | Status: SHIPPED | OUTPATIENT
Start: 2021-02-25 | End: 2021-06-28 | Stop reason: SDUPTHER

## 2021-03-15 RX ORDER — VALSARTAN AND HYDROCHLOROTHIAZIDE 320; 25 MG/1; MG/1
TABLET, FILM COATED ORAL
Qty: 90 TABLET | OUTPATIENT
Start: 2021-03-15

## 2021-03-15 RX ORDER — TRAZODONE HYDROCHLORIDE 50 MG/1
TABLET ORAL
Qty: 90 TABLET | OUTPATIENT
Start: 2021-03-15

## 2021-03-19 DIAGNOSIS — E11.69 TYPE 2 DIABETES MELLITUS WITH OTHER SPECIFIED COMPLICATION, WITHOUT LONG-TERM CURRENT USE OF INSULIN (HCC): ICD-10-CM

## 2021-03-19 RX ORDER — GABAPENTIN 300 MG/1
300 CAPSULE ORAL 3 TIMES DAILY
Qty: 270 CAPSULE | Refills: 0 | Status: CANCELLED | OUTPATIENT
Start: 2021-03-19 | End: 2021-06-17

## 2021-03-19 RX ORDER — GABAPENTIN 300 MG/1
300 CAPSULE ORAL 3 TIMES DAILY
Qty: 270 CAPSULE | Refills: 0 | Status: SHIPPED | OUTPATIENT
Start: 2021-03-19 | End: 2021-08-10 | Stop reason: SDUPTHER

## 2021-03-19 NOTE — TELEPHONE ENCOUNTER
Patient is trying to confirm receipt of his Gabapentin prescription through Dr. Idalia Claude, he needs the script sent through his [address] ([address], [address] [facility] [phone] - F [phone]) for confirmation, as Ree had been sending the script to the wrong doctor. Please contact patient with any questions or updates. Thank you!

## 2021-03-19 NOTE — TELEPHONE ENCOUNTER
Ronald Gill called to request a refill on his medication. Last office visit : 3/18/2021   Next office visit : 3/30/2021     Last UDS: No results found for: Shagufta Mateo, LABBENZ, BUPRENUR, COCAIMETSCRU, GABAPENTIN, MDMA, METAMPU, OPIATESCREENURINE, OXTCOSU, PHENCYCLIDINESCREENURINE, PROPOXYPHENE, THCSCREENUR, TRICYUR    [facility]  Medication Contract: n/a pt needs new one   Last Fill: 8/20/20    Requested Prescriptions      No prescriptions requested or ordered in this encounter         Please approve or refuse this medication.    Moy Lopez, [address] [facility] [address]

## 2021-03-25 NOTE — PROGRESS NOTES
"Chief Complaint  COPD    Subjective    History of Present Illness      Naif Nick presents to [facility] PULMONARY & CRITICAL CARE MEDICINE    Mr. Nick is a pleasant 70 year old male with known very severe stage 4 COPD, chronic respiratory failure on home oxygen of 2L NC, Former smoker, centrolobular emphysema, TB exposure, Lung nodule and Asthma/ copd overlap syndrome. He is on Mucinex, Nebulizer prn, Albuterol PRN, Symbicort. Samples of Symbicort lower dose were provided at last visit as that was the only samples I had available. I had asked him to look at his formulary as the Symbicort was too expensive. He has not had to use his nebulizer is a long time. He is using his Albuterol HFA mostly at night but occasionally in the am. He is feeling great. He has some shortness of breath with exertion but able to do ADLs. He is planning to weed eat and leaf blow this summer and see how he does.        Objective   Vital Signs:   /80   Pulse 68   Resp 16   Ht 180.3 cm (71\")   Wt 79.4 kg (175 lb)   SpO2 98% Comment: RA  BMI 24.41 kg/m²     Physical Exam  Vitals reviewed.   Constitutional:       Appearance: Normal appearance.   Cardiovascular:      Rate and Rhythm: Normal rate and regular rhythm.   Pulmonary:      Effort: Pulmonary effort is normal.      Breath sounds: Decreased breath sounds present.   Skin:     Nails: There is clubbing.   Neurological:      General: No focal deficit present.      Mental Status: He is alert and oriented to person, place, and time.   Psychiatric:         Mood and Affect: Mood normal.         Behavior: Behavior normal.          Result Review :   The following data was reviewed by: TAMAR Sorenson on 03/29/2021:  CMP    CMP 9/13/20 9/15/20 12/17/20   Glucose 229 (A) 351 (A) 130 (A)   BUN 25 (A) 30 (A) 18   Creatinine 0.90 1.05 1   eGFR Non  Am   >60   eGFR African Am 101 84 >59   Sodium 133 (A) 134 (A) 136   Potassium 4.0 4.6 4.3   Chloride 92 " (A) 91 (A) 96 (A)   Calcium 9.9 9.8 10.2   Albumin 4.20 4.20    Total Bilirubin 0.5 0.4    Alkaline Phosphatase 51 53    AST (SGOT) 15 14    ALT (SGPT) 26 31    (A) Abnormal value       Comments are available for some flowsheets but are not being displayed.           CBC w/diff    CBC w/Diff 9/13/20 9/15/20 11/24/20   WBC 13.26 (A) 11.74 (A) 11.3 (A)   RBC 4.56 4.51 4.72   Hemoglobin 13.4 13.2 14.1   Hematocrit 41.4 41.2 45.2   MCV 90.8 91.4 95.8 (A)   MCH 29.4 29.3 29.9   MCHC 32.4 32.0 31.2 (A)   RDW 12.8 12.6 12.7   Platelets 361 356 400   Neutrophil Rel % 82.0 (A) 87.0 (A) 51.2   Immature Granulocyte Rel % 0.4 0.5    Lymphocyte Rel % 10.2 (A) 8.5 (A) 36.3   Monocyte Rel % 7.0 3.9 (A) 7.8   Eosinophil Rel % 0.2 (A) 0.0 (A) 3.4   Basophil Rel % 0.2 0.1 0.9   (A) Abnormal value            Data reviewed: Radiologic studies No new imaging       My interpretation of the PFT: no new    Results for orders placed during the hospital encounter of 08/10/20    Full Pulmonary Function Test With Bronchodilator    [facility] Pulmonary Function Test    [address] [place] [address]  [id number]  [phone]    Patient : Naif Nick  MRN : [id number]  YOB: 1950  :  Nacho Vaughan MD  Date : 8/10/2020    ______________________________________________________________________    Interpretation :  1. Spirometry shows very severe airflow obstruction prebronchodilator  2. Following bronchodilator there is minimal not significant improvement in FEV1 and FVC, but postbronchodilator spirometry meet criteria for severe rather than very severe obstruction.  3. Mid flow is reduced.  4. Lung volume measurements show reduction in inspiratory capacity and elevations in expiratory reserve volume residual volume and total lung capacity suggesting hyperinflation and very significant gas trapping.  5. Airway resistance is increased and conductance is decreased.      Nacho Vaughan,  MD    Rest/Exercise Pulse Ox Values        Some values may be hidden. Unless noted otherwise, only the newest values recorded on each date are displayed.         Rest/Exercise Pulse Ox Results 9/28/20   Rest room air SAT % 98   Exercise room air SAT % 96               Patient's Body mass index is 24.41 kg/m². BMI is within normal parameters. No follow-up required..      Assessment and Plan    Diagnoses and all orders for this visit:    1. Asthma-COPD overlap syndrome (CMS/HCC) (Primary)  Comments:  Advair diskus sent to pharmacy. Will see what cost is. I have asked pharmacy to look at what is covered and cost effectve otherwise. Continue Albuterol HFA.     2. Lung nodule    3. Centrilobular emphysema (CMS/HCC)    4. Chronic respiratory failure with hypoxia (CMS/HCC)  Comments:  No longer requiring oxygen supplementation     5. Stage 4 very severe COPD by GOLD classification (CMS/HCC)  Comments:  Continue Albuterol HFA    Other orders  -     fluticasone-salmeterol (ADVAIR) 100-50 MCG/DOSE DISKUS; Inhale 1 puff 2 (Two) Times a Day.  Dispense: 60 each; Refill: 5      Problem List Items Addressed This Visit        Pulmonary and Pneumonias    Stage 4 very severe COPD by GOLD classification (CMS/HCC)    Relevant Medications    fluticasone-salmeterol (ADVAIR) 100-50 MCG/DOSE DISKUS    Chronic respiratory failure with hypoxia (CMS/HCC)    Centrilobular emphysema (CMS/HCC)    Relevant Medications    fluticasone-salmeterol (ADVAIR) 100-50 MCG/DOSE DISKUS    Lung nodule    Asthma-COPD overlap syndrome (CMS/HCC) - Primary    Relevant Medications    fluticasone-salmeterol (ADVAIR) 100-50 MCG/DOSE DISKUS        Return in 6 months. Will look at getting PFTs at that time. Other plans as above.     Alpha 1: none     Health maintenance:   Influenza vaccine: 09/15/2020  Pneumovax 23: none  Prevnar 13: 03/2/2020  Covid-19 02/23/2021 moderna, second dose pending     Follow Up   Return in about 6 months (around 9/29/2021).  Patient was  given instructions and counseling regarding his condition or for health maintenance advice. Please see specific information pulled into the AVS if appropriate.     Raquel Sosa, APRN  3/29/2021  14:24 CDT

## 2021-03-29 ENCOUNTER — OFFICE VISIT (OUTPATIENT)
Dept: PULMONOLOGY | Facility: CLINIC | Age: 71
End: 2021-03-29

## 2021-03-29 VITALS
OXYGEN SATURATION: 98 % | HEIGHT: 71 IN | SYSTOLIC BLOOD PRESSURE: 128 MMHG | BODY MASS INDEX: 24.5 KG/M2 | RESPIRATION RATE: 16 BRPM | HEART RATE: 68 BPM | WEIGHT: 175 LBS | DIASTOLIC BLOOD PRESSURE: 80 MMHG

## 2021-03-29 DIAGNOSIS — J43.2 CENTRILOBULAR EMPHYSEMA (HCC): ICD-10-CM

## 2021-03-29 DIAGNOSIS — R91.1 LUNG NODULE: ICD-10-CM

## 2021-03-29 DIAGNOSIS — J96.11 CHRONIC RESPIRATORY FAILURE WITH HYPOXIA (HCC): ICD-10-CM

## 2021-03-29 DIAGNOSIS — J44.9 ASTHMA-COPD OVERLAP SYNDROME (HCC): Primary | Chronic | ICD-10-CM

## 2021-03-29 DIAGNOSIS — J44.9 STAGE 4 VERY SEVERE COPD BY GOLD CLASSIFICATION (HCC): Chronic | ICD-10-CM

## 2021-03-29 PROCEDURE — 99214 OFFICE O/P EST MOD 30 MIN: CPT | Performed by: NURSE PRACTITIONER

## 2021-03-30 ENCOUNTER — OFFICE VISIT (OUTPATIENT)
Dept: PRIMARY CARE CLINIC | Age: 71
End: 2021-03-30
Payer: MEDICARE

## 2021-03-30 VITALS
TEMPERATURE: 97.8 F | OXYGEN SATURATION: 98 % | SYSTOLIC BLOOD PRESSURE: 110 MMHG | HEART RATE: 92 BPM | DIASTOLIC BLOOD PRESSURE: 62 MMHG | HEIGHT: 71 IN | BODY MASS INDEX: 23.24 KG/M2 | RESPIRATION RATE: 20 BRPM | WEIGHT: 166 LBS

## 2021-03-30 DIAGNOSIS — E11.69 TYPE 2 DIABETES MELLITUS WITH OTHER SPECIFIED COMPLICATION, WITHOUT LONG-TERM CURRENT USE OF INSULIN (HCC): Primary | ICD-10-CM

## 2021-03-30 DIAGNOSIS — I10 ESSENTIAL HYPERTENSION: ICD-10-CM

## 2021-03-30 DIAGNOSIS — E78.2 MIXED HYPERLIPIDEMIA: ICD-10-CM

## 2021-03-30 DIAGNOSIS — Z72.0 TOBACCO ABUSE: ICD-10-CM

## 2021-03-30 PROCEDURE — G8420 CALC BMI NORM PARAMETERS: HCPCS | Performed by: STUDENT IN AN ORGANIZED HEALTH CARE EDUCATION/TRAINING PROGRAM

## 2021-03-30 PROCEDURE — 3017F COLORECTAL CA SCREEN DOC REV: CPT | Performed by: STUDENT IN AN ORGANIZED HEALTH CARE EDUCATION/TRAINING PROGRAM

## 2021-03-30 PROCEDURE — 4004F PT TOBACCO SCREEN RCVD TLK: CPT | Performed by: STUDENT IN AN ORGANIZED HEALTH CARE EDUCATION/TRAINING PROGRAM

## 2021-03-30 PROCEDURE — 1123F ACP DISCUSS/DSCN MKR DOCD: CPT | Performed by: STUDENT IN AN ORGANIZED HEALTH CARE EDUCATION/TRAINING PROGRAM

## 2021-03-30 PROCEDURE — 4040F PNEUMOC VAC/ADMIN/RCVD: CPT | Performed by: STUDENT IN AN ORGANIZED HEALTH CARE EDUCATION/TRAINING PROGRAM

## 2021-03-30 PROCEDURE — G8427 DOCREV CUR MEDS BY ELIG CLIN: HCPCS | Performed by: STUDENT IN AN ORGANIZED HEALTH CARE EDUCATION/TRAINING PROGRAM

## 2021-03-30 PROCEDURE — 99214 OFFICE O/P EST MOD 30 MIN: CPT | Performed by: STUDENT IN AN ORGANIZED HEALTH CARE EDUCATION/TRAINING PROGRAM

## 2021-03-30 PROCEDURE — 2022F DILAT RTA XM EVC RTNOPTHY: CPT | Performed by: STUDENT IN AN ORGANIZED HEALTH CARE EDUCATION/TRAINING PROGRAM

## 2021-03-30 PROCEDURE — G8484 FLU IMMUNIZE NO ADMIN: HCPCS | Performed by: STUDENT IN AN ORGANIZED HEALTH CARE EDUCATION/TRAINING PROGRAM

## 2021-03-30 PROCEDURE — 3044F HG A1C LEVEL LT 7.0%: CPT | Performed by: STUDENT IN AN ORGANIZED HEALTH CARE EDUCATION/TRAINING PROGRAM

## 2021-03-30 ASSESSMENT — PATIENT HEALTH QUESTIONNAIRE - PHQ9
SUM OF ALL RESPONSES TO PHQ QUESTIONS 1-9: 0
2. FEELING DOWN, DEPRESSED OR HOPELESS: 0

## 2021-03-30 NOTE — PROGRESS NOTES
[address] [facility]  [address] [address] [address]  [address] [address]  Dept: [phone]  Dept Fax: [phone]  Loc: [phone]      Subjective:     Chief Complaint   Patient presents with    Diabetes    Discuss Labs     imaging       HPI:  Robbin Mccurdy is a 65 y.o. male presenting for    DMT2   On trulicity 0.7KJ x 2mo, is doing well on this. His most recent A1c is 7.3. He is making dietary and exercise efforts to improve his sugar. No neuropathy symptoms. HLD  ·  On Lipitor 20 mg    HTN  · On Diovan 320-25 mg and amlodipine 5 mg  · Denies HA, visual changes, cp, palpitations, or swelling    Tobacco abuse   1 cigarette now and then however mostly not smoking. Quitting w/ wife. Doesn't need wellbutrin. Nose has quit running and cough resolved.   Feels better   CT lung screening was normal, as well as AAA screening ultrasound       ROS:   Review of Systems  Reviewed with patient and noted to be negative except that listed in HPI    PMHx:  Past Medical History:   Diagnosis Date    BPH (benign prostatic hyperplasia)     Dizziness     DM (diabetes mellitus) ([address]     type 2    Dysphagia     HTN (hypertension)     Hyperlipidemia     Hypogonadism male     Peripheral neuropathy     Tobacco dependence      Patient Active Problem List   Diagnosis    Benign prostatic hyperplasia with lower urinary tract symptoms    Tobacco abuse    Mixed hyperlipidemia    Testosterone deficiency    Essential hypertension    Diabetes mellitus ([address]    GERD (gastroesophageal reflux disease)       PSHx:  Past Surgical History:   Procedure Laterality Date    BACK SURGERY      COLON SURGERY  2014    Done in [place],  of colon polyps with a 5 yr recall    COLONOSCOPY N/A 7/12/2019    Dr Mercy Fabry hemorrhoids-Grade 2, suboptimal prep, diverticulosis-HP-3 yr recall    EYE SURGERY Right 07/2017    UPPER GASTROINTESTINAL ENDOSCOPY N/A Patient is looking to be seen as soon as possible by .  Patient is ok with a tele appointment if the provider is willing to see her.  Is this ok to schedule?  Please advise.   7/12/2019    Dr Christiano Vigil gastritis and bulbar duodenitis, prominent major duodenal papilla, possible lipoma or GIST        PFHx:  Family History   Problem Relation Age of Onset    Colon Cancer Mother     Diabetes Type 1  Father     Diabetes Father     Cancer Sister     Cancer Brother     Stomach Cancer Brother     Esophageal Cancer Neg Hx     Liver Cancer Neg Hx     Liver Disease Neg Hx     Rectal Cancer Neg Hx        SocialHx:  Social History     Tobacco Use    Smoking status: Light Tobacco Smoker     Packs/day: 1.00    Smokeless tobacco: Never Used   Substance Use Topics    Alcohol use: Yes     Comment: rare       Allergies:  No Known Allergies    Medications:  Current Outpatient Medications   Medication Sig Dispense Refill    gabapentin (NEURONTIN) 300 MG capsule Take 1 capsule by mouth 3 times daily for 90 days. 270 capsule 0    tamsulosin (FLOMAX) 0.4 MG capsule Take 1 capsule by mouth daily 90 capsule 3    Dulaglutide (TRULICITY) 1.5 GQ/1.2ZR SOPN Inject 1.5 mg into the skin once a week 4 pen 3    buPROPion (WELLBUTRIN SR) 150 MG extended release tablet Take 1 tablet by mouth 2 times daily Days 1-3 take one tablet daily. Then take 2 tablets daily 60 tablet 3    omeprazole (PRILOSEC) 20 MG delayed release capsule Take 1 tablet po BID ( on an empty stomach) x 14 days for treatment of h pylori infection.  28 capsule 0    albuterol sulfate HFA (PROAIR HFA) 108 (90 Base) MCG/ACT inhaler Inhale 2 puffs into the lungs every 6 hours as needed for Wheezing      Testosterone Cypionate 200 MG/ML SOLN Inject 2.5 mLs as directed every 14 days      Cholecalciferol (VITAMIN D3) 32275 units CAPS Take 1 capsule by mouth once a week      atorvastatin (LIPITOR) 20 MG tablet Take 20 mg by mouth daily      traZODone (DESYREL) 50 MG tablet Take 1 tablet by mouth nightly 90 tablet 3    valsartan-hydroCHLOROthiazide (DIOVAN-HCT) 320-25 MG per tablet Take 1 tablet by mouth daily 90 tablet 3  amLODIPine (NORVASC) 5 MG tablet TAKE 1 TABLET EVERY DAY 90 tablet 3     No current facility-administered medications for this visit. Objective:   PE:  /62   Pulse 92   Temp 97.8 °F (36.6 °C) (Temporal)   Resp 20   Ht 5' 11\" (1.803 m)   Wt 166 lb (75.3 kg)   SpO2 98%   BMI 23.15 kg/m²   Physical Exam  Constitutional:       General: He is not in acute distress. Appearance: Normal appearance. HENT:      Head: Normocephalic. Nose: Nose normal.      Mouth/Throat:      Mouth: Mucous membranes are moist.      Pharynx: Oropharynx is clear. No oropharyngeal exudate or posterior oropharyngeal erythema. Eyes:      General: No scleral icterus. Extraocular Movements: Extraocular movements intact. Conjunctiva/sclera: Conjunctivae normal.   Neck:      Musculoskeletal: Normal range of motion. Cardiovascular:      Rate and Rhythm: Normal rate and regular rhythm. Pulses: Normal pulses. Heart sounds: No murmur. Pulmonary:      Effort: Pulmonary effort is normal.      Breath sounds: Normal breath sounds. Abdominal:      General: There is no distension. Palpations: Abdomen is soft. There is no mass. Tenderness: There is no abdominal tenderness. Musculoskeletal: Normal range of motion. Skin:     General: Skin is warm and dry. Capillary Refill: Capillary refill takes less than 2 seconds. Neurological:      General: No focal deficit present. Mental Status: He is alert and oriented to person, place, and time. Psychiatric:         Mood and Affect: Mood normal.         Behavior: Behavior normal.         Thought Content: Thought content normal.            Assessment & Plan   Gary Abarca was seen today for diabetes and discuss labs. Diagnoses and all orders for this visit:    Type 2 diabetes mellitus with other specified complication, without long-term current use of insulin ([address]  -Uncontrolled. Not due for A1c check yet.   Will recheck in 1 month after being on Trulicity for 3 months. For now continue Trulicity at current dose. Encourage patient to obtain dilated retinal eye exam.  PPV 23 at next visit  -     Hemoglobin A1C; Future    Essential hypertension  -Controlled. Continue Diovan and amlodipine at current doses    Mixed hyperlipidemia  -Continue atorvastatin 20 mg    Tobacco abuse  -Has almost completely quit. Encouraged patient to continue cessation efforts. Screening for lung cancer and AAA normal.  Will need repeat low-dose CT scanning in 1 year    Return for 1 mo f/u after labs. All questions were answered. Medications, including possible adverse effects, and instructions were reviewed and  understanding was confirmed. Follow-up recommendations, including when to contact or return to office (ie; if symptoms worsen or fail to improve), were discussed and acknowledged.     Electronically signed by David Mackey MD on 3/30/21 at 2:35 PM CDT

## 2021-04-09 RX ORDER — AMLODIPINE BESYLATE 5 MG/1
TABLET ORAL
Qty: 90 TABLET | Refills: 3 | Status: SHIPPED | OUTPATIENT
Start: 2021-04-09 | End: 2021-08-24 | Stop reason: SDUPTHER

## 2021-04-16 RX ORDER — TRAZODONE HYDROCHLORIDE 50 MG/1
50 TABLET ORAL NIGHTLY
Qty: 90 TABLET | Refills: 3 | Status: SHIPPED | OUTPATIENT
Start: 2021-04-16 | End: 2021-08-19 | Stop reason: SDUPTHER

## 2021-04-16 RX ORDER — VALSARTAN AND HYDROCHLOROTHIAZIDE 320; 25 MG/1; MG/1
1 TABLET, FILM COATED ORAL DAILY
Qty: 90 TABLET | Refills: 3 | Status: SHIPPED | OUTPATIENT
Start: 2021-04-16 | End: 2021-06-18 | Stop reason: SDUPTHER

## 2021-04-27 DIAGNOSIS — E11.69 TYPE 2 DIABETES MELLITUS WITH OTHER SPECIFIED COMPLICATION, WITHOUT LONG-TERM CURRENT USE OF INSULIN (HCC): ICD-10-CM

## 2021-04-27 LAB — HBA1C MFR BLD: 6.6 % (ref 4–6)

## 2021-04-27 RX ORDER — BUDESONIDE AND FORMOTEROL FUMARATE DIHYDRATE 160; 4.5 UG/1; UG/1
AEROSOL RESPIRATORY (INHALATION)
Qty: 32.1 G | Refills: 3 | Status: SHIPPED | OUTPATIENT
Start: 2021-04-27 | End: 2021-10-11

## 2021-05-04 ENCOUNTER — TELEPHONE (OUTPATIENT)
Dept: PRIMARY CARE CLINIC | Age: 71
End: 2021-05-04

## 2021-05-04 NOTE — TELEPHONE ENCOUNTER
MD Brit Brown MA             Please notify patient that a1c has improved to 6.6, down from 7.3. This is great news. I recommend continuing on current medication and we will go over labs further at upcoming appt.

## 2021-05-06 ENCOUNTER — TELEPHONE (OUTPATIENT)
Dept: PRIMARY CARE CLINIC | Age: 71
End: 2021-05-06

## 2021-05-06 ENCOUNTER — OFFICE VISIT (OUTPATIENT)
Dept: PRIMARY CARE CLINIC | Age: 71
End: 2021-05-06
Payer: MEDICARE

## 2021-05-06 VITALS
SYSTOLIC BLOOD PRESSURE: 122 MMHG | RESPIRATION RATE: 20 BRPM | TEMPERATURE: 98.1 F | HEIGHT: 71 IN | OXYGEN SATURATION: 98 % | BODY MASS INDEX: 24.36 KG/M2 | HEART RATE: 67 BPM | WEIGHT: 174 LBS | DIASTOLIC BLOOD PRESSURE: 60 MMHG

## 2021-05-06 DIAGNOSIS — E11.69 TYPE 2 DIABETES MELLITUS WITH OTHER SPECIFIED COMPLICATION, WITHOUT LONG-TERM CURRENT USE OF INSULIN (HCC): Primary | ICD-10-CM

## 2021-05-06 DIAGNOSIS — M67.951 TENDINOPATHY OF RIGHT GLUTEAL REGION: ICD-10-CM

## 2021-05-06 DIAGNOSIS — I10 ESSENTIAL HYPERTENSION: ICD-10-CM

## 2021-05-06 PROCEDURE — 3044F HG A1C LEVEL LT 7.0%: CPT | Performed by: STUDENT IN AN ORGANIZED HEALTH CARE EDUCATION/TRAINING PROGRAM

## 2021-05-06 PROCEDURE — 3017F COLORECTAL CA SCREEN DOC REV: CPT | Performed by: STUDENT IN AN ORGANIZED HEALTH CARE EDUCATION/TRAINING PROGRAM

## 2021-05-06 PROCEDURE — G8420 CALC BMI NORM PARAMETERS: HCPCS | Performed by: STUDENT IN AN ORGANIZED HEALTH CARE EDUCATION/TRAINING PROGRAM

## 2021-05-06 PROCEDURE — 1123F ACP DISCUSS/DSCN MKR DOCD: CPT | Performed by: STUDENT IN AN ORGANIZED HEALTH CARE EDUCATION/TRAINING PROGRAM

## 2021-05-06 PROCEDURE — 4004F PT TOBACCO SCREEN RCVD TLK: CPT | Performed by: STUDENT IN AN ORGANIZED HEALTH CARE EDUCATION/TRAINING PROGRAM

## 2021-05-06 PROCEDURE — 2022F DILAT RTA XM EVC RTNOPTHY: CPT | Performed by: STUDENT IN AN ORGANIZED HEALTH CARE EDUCATION/TRAINING PROGRAM

## 2021-05-06 PROCEDURE — G8427 DOCREV CUR MEDS BY ELIG CLIN: HCPCS | Performed by: STUDENT IN AN ORGANIZED HEALTH CARE EDUCATION/TRAINING PROGRAM

## 2021-05-06 PROCEDURE — 4040F PNEUMOC VAC/ADMIN/RCVD: CPT | Performed by: STUDENT IN AN ORGANIZED HEALTH CARE EDUCATION/TRAINING PROGRAM

## 2021-05-06 PROCEDURE — 90732 PPSV23 VACC 2 YRS+ SUBQ/IM: CPT | Performed by: STUDENT IN AN ORGANIZED HEALTH CARE EDUCATION/TRAINING PROGRAM

## 2021-05-06 PROCEDURE — G0009 ADMIN PNEUMOCOCCAL VACCINE: HCPCS | Performed by: STUDENT IN AN ORGANIZED HEALTH CARE EDUCATION/TRAINING PROGRAM

## 2021-05-06 PROCEDURE — 99214 OFFICE O/P EST MOD 30 MIN: CPT | Performed by: STUDENT IN AN ORGANIZED HEALTH CARE EDUCATION/TRAINING PROGRAM

## 2021-05-06 NOTE — PROGRESS NOTES
[address] [facility]  [address] [address] [id number] [address] [address]  Dept: [phone]  Dept Fax: [phone]  Loc: [phone]      Subjective:     Chief Complaint   Patient presents with    Discuss Labs       HPI:  Benedict Turner is a 65 y.o. male presenting for    Here to go over lab results    DMT2  Pt is on trulicity 0.6PK. L2I after 3 mo of treatment has come down to 6.6. Pt doing well on medication. Is eating healthy and staying physically active. Pt up to date on labs 11/2020. Denies neuropathy sx. Has not had eye exam yet. HTN  -BP has been controlled on valsartan-hctz 320/25mg, amlodipine 5mg.  -Denies HA, visual changes, cp, palpitations, or swelling    R hip strain  -Describes mild pain posterior R hip that gets better w/ activity x1 year. Pt times sx w/ onset of covid lockdowns. He walks regularly to relatives' houses. Has not self-treated.       ROS:   Review of Systems  Reviewed with patient and noted to be negative except that listed in HPI    PMHx:  Past Medical History:   Diagnosis Date    BPH (benign prostatic hyperplasia)     Dizziness     DM (diabetes mellitus) ([address]     type 2    Dysphagia     HTN (hypertension)     Hyperlipidemia     Hypogonadism male     Peripheral neuropathy     Tobacco dependence      Patient Active Problem List   Diagnosis    Benign prostatic hyperplasia with lower urinary tract symptoms    Tobacco abuse    Mixed hyperlipidemia    Testosterone deficiency    Essential hypertension    Diabetes mellitus ([address]    GERD (gastroesophageal reflux disease)       PSHx:  Past Surgical History:   Procedure Laterality Date    BACK SURGERY      COLON SURGERY  2014    Done in [place],  of colon polyps with a 5 yr recall    COLONOSCOPY N/A 7/12/2019    Dr Tami Katz hemorrhoids-Grade 2, suboptimal prep, diverticulosis-HP-3 yr recall    EYE SURGERY Right 07/2017    UPPER GASTROINTESTINAL ENDOSCOPY N/A 7/12/2019    Dr Lashon De La Rosa-Mild gastritis and bulbar duodenitis, prominent major duodenal papilla, possible lipoma or GIST        PFHx:  Family History   Problem Relation Age of Onset    Colon Cancer Mother     Diabetes Type 1  Father     Diabetes Father     Cancer Sister     Cancer Brother     Stomach Cancer Brother     Esophageal Cancer Neg Hx     Liver Cancer Neg Hx     Liver Disease Neg Hx     Rectal Cancer Neg Hx        SocialHx:  Social History     Tobacco Use    Smoking status: Light Tobacco Smoker     Packs/day: 1.00    Smokeless tobacco: Never Used   Substance Use Topics    Alcohol use: Yes     Comment: rare       Allergies:  No Known Allergies    Medications:  Current Outpatient Medications   Medication Sig Dispense Refill    traZODone (DESYREL) 50 MG tablet Take 1 tablet by mouth nightly 90 tablet 3    valsartan-hydroCHLOROthiazide (DIOVAN-HCT) 320-25 MG per tablet Take 1 tablet by mouth daily 90 tablet 3    amLODIPine (NORVASC) 5 MG tablet TAKE 1 TABLET EVERY DAY 90 tablet 3    gabapentin (NEURONTIN) 300 MG capsule Take 1 capsule by mouth 3 times daily for 90 days. 270 capsule 0    tamsulosin (FLOMAX) 0.4 MG capsule Take 1 capsule by mouth daily 90 capsule 3    Dulaglutide (TRULICITY) 1.5 ZU/7.7PK SOPN Inject 1.5 mg into the skin once a week 4 pen 3    buPROPion (WELLBUTRIN SR) 150 MG extended release tablet Take 1 tablet by mouth 2 times daily Days 1-3 take one tablet daily. Then take 2 tablets daily 60 tablet 3    omeprazole (PRILOSEC) 20 MG delayed release capsule Take 1 tablet po BID ( on an empty stomach) x 14 days for treatment of h pylori infection.  28 capsule 0    albuterol sulfate HFA (PROAIR HFA) 108 (90 Base) MCG/ACT inhaler Inhale 2 puffs into the lungs every 6 hours as needed for Wheezing      Testosterone Cypionate 200 MG/ML SOLN Inject 2.5 mLs as directed every 14 days      Cholecalciferol (VITAMIN D3) 19569 units CAPS Take 1 capsule by mouth once a week      atorvastatin (LIPITOR) 20 MG tablet Take 20 mg by mouth daily       No current facility-administered medications for this visit. Objective:   PE:  /60   Pulse 67   Temp 98.1 °F (36.7 °C) (Temporal)   Resp 20   Ht 5' 11\" (1.803 m)   Wt 174 lb (78.9 kg)   SpO2 98%   BMI 24.27 kg/m²   Physical Exam  Constitutional:       General: He is not in acute distress. Appearance: Normal appearance. HENT:      Head: Normocephalic. Nose: Nose normal.      Mouth/Throat:      Mouth: Mucous membranes are moist.      Pharynx: Oropharynx is clear. No oropharyngeal exudate or posterior oropharyngeal erythema. Eyes:      General: No scleral icterus. Extraocular Movements: Extraocular movements intact. Conjunctiva/sclera: Conjunctivae normal.   Neck:      Musculoskeletal: Normal range of motion. Cardiovascular:      Rate and Rhythm: Normal rate and regular rhythm. Pulses: Normal pulses. Heart sounds: No murmur. Pulmonary:      Effort: Pulmonary effort is normal.      Breath sounds: Normal breath sounds. Abdominal:      General: There is no distension. Palpations: Abdomen is soft. There is no mass. Tenderness: There is no abdominal tenderness. Musculoskeletal: Normal range of motion. Back:       Comments: Pt indicates area of discomfort (not currently) posterolateral R hip. FROM. Motor 5/5, normal gait   Skin:     General: Skin is warm and dry. Capillary Refill: Capillary refill takes less than 2 seconds. Neurological:      General: No focal deficit present. Mental Status: He is alert and oriented to person, place, and time. Psychiatric:         Mood and Affect: Mood normal.         Behavior: Behavior normal.         Thought Content: Thought content normal.            Assessment & Plan   Isaias Lopez was seen today for discuss labs.     Diagnoses and all orders for this visit:    Type 2 diabetes mellitus with other specified complication, without long-term current use of insulin ([address]  -At goal  -Continue trulicity at current dose  -Encouraged continued healthy diet and exercise   -Encouraged pt to complete dilated retinal exam  -F/u 6 mo for a1c    Essential hypertension  -At goal. Continue valsartan-hctz and amlodipine at current doses    Tendinopathy of right gluteal region  -Mild sx c/w tendinopathy. Provided hip stretches handout, recommended he follow up with me if does not improve or worsens. PT would likely help    Other orders  -     PNEUMOVAX 23 subcutaneous/IM (Pneumococcal polysaccharide vaccine 23-valent >= 3yo)      Return in about 6 months (around 11/6/2021) for DMT2. All questions were answered. Medications, including possible adverse effects, and instructions were reviewed and  understanding was confirmed. Follow-up recommendations, including when to contact or return to office (ie; if symptoms worsen or fail to improve), were discussed and acknowledged.     Electronically signed by Christiano Dhillon MD on 5/6/21 at 9:52 AM CDT

## 2021-05-06 NOTE — PATIENT INSTRUCTIONS
Patient Education        Hip Flexor Strain: Rehab Exercises  Introduction  Here are some examples of exercises for you to try. The exercises may be suggested for a condition or for rehabilitation. Start each exercise slowly. Ease off the exercises if you start to have pain. You will be told when to start these exercises and which ones will work best for you. How to do the exercises  Pelvic tilt with marching   1. Lie on your back with your knees bent and your feet flat on the floor. 2. Tighten your belly muscles and buttocks, and press your lower back to the floor. 3. Keeping your knees bent, lift and then lower one leg up off the floor, and then lift and lower your other leg like you are marching. Each time you lift your leg, hold that position for about 6 seconds before lowering your leg. 4. Repeat 8 to 12 times. Scissors   1. Lie on your back with your knees bent at a 90-degree angle and your feet off the floor. 2. Tighten your belly muscles and buttocks, and press your lower back to the floor. 3. Slowly straighten one leg, and hold that position for about 6 seconds. Your leg should be about 12 inches off the floor. Bring that leg back to the starting position, and then straighten your other leg. Hold that position for about 6 seconds, and then switch legs again. 4. Repeat 8 to 12 times. Hamstring stretch (lying down)   1. Lie flat on your back with your legs straight. If you feel discomfort in your back, place a small towel roll under your lower back. 2. Holding the back of your affected leg for support, lift your leg straight up and toward your body until you feel a stretch at the back of your thigh. 3. Hold the stretch for at least 30 seconds. 4. Repeat 2 to 4 times. Quadricep and hip flexor stretch (lying on side)   1. Lie on your side with your good leg flat on the floor and your hand supporting your head.   2. Bend your top leg, and reach behind you to grab the front of that foot or ankle your healthcare professional. [place] [address] any warranty or liability for your use of this information. Patient Education        Gluteal Strain: Rehab Exercises  Introduction  Here are some examples of exercises for you to try. The exercises may be suggested for a condition or for rehabilitation. Start each exercise slowly. Ease off the exercises if you start to have pain. You will be told when to start these exercises and which ones will work best for you. How to do the exercises  Hip rotator stretch   1. Lie on your back with both knees bent and your feet flat on the floor. 2. Put the ankle of your affected leg on your opposite thigh near your knee. 3. Use your hand to gently push the knee of your affected leg away from your body until you feel a gentle stretch around your hip. 4. Hold the stretch for 15 to 30 seconds. 5. Repeat 2 to 4 times. 6. Repeat steps 1 through 5, but this time use your hand to gently pull your knee toward your opposite shoulder. 7. Switch legs and repeat steps 1 through 6, even if only one hip is sore. Seated hip rotator stretch   1. Sit in a sturdy chair. 2. Cross your affected leg over your knee, resting your foot on top of your knee. 3. Keep your back straight, and slowly lean forward until you feel a stretch in your hip. 4. Hold for 15 to 30 seconds. 5. Switch legs and repeat steps 1 through 4 on your other side. 6. Repeat 2 to 4 times. Hamstring stretch (lying down)   1. Lie flat on your back with your legs straight. If you feel discomfort in your back, place a small towel roll under your lower back. 2. Holding the back of your affected leg, lift your leg straight up and toward your body until you feel a stretch at the back of your thigh. 3. Hold the stretch for at least 30 seconds. 4. Repeat 2 to 4 times. 5. Switch legs and repeat steps 1 through 4, even if only one hip is sore. Bridging   1. Lie on your back with both knees bent.  Your knees should be bent about 90 degrees. 2. Then push your feet into the floor, squeeze your buttocks, and lift your hips off the floor until your shoulders, hips, and knees are all in a straight line. 3. Hold for about 6 seconds as you continue to breathe normally, and then slowly lower your hips back down to the floor and rest for up to 10 seconds. 4. Repeat 8 to 12 times. Single-leg bridge   1. Lie on your back, with your arms at your sides. 2. Bend one knee, and keep that foot flat on the floor. The other leg should be straight. 3. Raise the straight leg up so that the knee is level with the bent knee. 4. Tighten your belly muscles by pulling your belly button in toward your spine. Lift your buttocks up and be careful not to let your hips drop down. 5. Hold for about 6 seconds as you continue to breathe normally, and then slowly lower your hips back down to the floor. 6. Switch legs and repeat steps 1 though 5.  7. Repeat 8 to 12 times. Follow-up care is a key part of your treatment and safety. Be sure to make and go to all appointments, and call your doctor if you are having problems. It's also a good idea to know your test results and keep a list of the medicines you take. Where can you learn more? Go to [email or web address] org and sign in to your [employer] account. Enter Y529 in the [employer] box to learn more about \"Gluteal Strain: Rehab Exercises. \"     If you do not have an account, please click on the \"Sign Up Now\" link. Current as of: November 16, 2020               Content Version: 12.8  © 4745-1603 Healthwise, Incorporated. Care instructions adapted under license by [facility] ([facility]). If you have questions about a medical condition or this instruction, always ask your healthcare professional. [place] [address] any warranty or liability for your use of this information.

## 2021-05-07 RX ORDER — ALBUTEROL SULFATE 90 UG/1
AEROSOL, METERED RESPIRATORY (INHALATION)
Qty: 54 G | Refills: 3 | Status: SHIPPED | OUTPATIENT
Start: 2021-05-07 | End: 2021-08-17 | Stop reason: SDUPTHER

## 2021-05-24 RX ORDER — GLUCOSAMINE HCL/CHONDROITIN SU 500-400 MG
CAPSULE ORAL
Qty: 100 STRIP | Refills: 2 | Status: SHIPPED | OUTPATIENT
Start: 2021-05-24 | End: 2021-07-19

## 2021-05-24 NOTE — TELEPHONE ENCOUNTER
Coy Godoy called to request a refill on his medication. Last office visit : 5/6/2021   Next office visit : 6/18/2021     Requested Prescriptions     Pending Prescriptions Disp Refills    blood glucose monitor strips 100 strip 2     Sig: Test 2 times a day for E11.9 pt has Accu-check [employer].             Hellen Paul

## 2021-06-02 ENCOUNTER — TELEPHONE (OUTPATIENT)
Dept: PRIMARY CARE CLINIC | Age: 71
End: 2021-06-02

## 2021-06-02 RX ORDER — CARVEDILOL 3.12 MG/1
3.12 TABLET ORAL 2 TIMES DAILY WITH MEALS
OUTPATIENT
Start: 2021-06-02

## 2021-06-02 NOTE — TELEPHONE ENCOUNTER
Best call back number: [phone]  Medication needed:   Requested Prescriptions     Pending Prescriptions Disp Refills   • carvedilol (COREG) 3.125 MG tablet       Sig: Take 1 tablet by mouth 2 (Two) Times a Day With Meals.       When do you need the refill by: ASAP        Does the patient have less than a 3 day supply:  [x] Yes  [] No    What is the patient's preferred pharmacy: [facility] - [facility] [address] [facility] - [phone] [facility] [phone] FX<br>[facility] [address] [place] [address] [phone] [facility] [phone] FX

## 2021-06-18 ENCOUNTER — OFFICE VISIT (OUTPATIENT)
Dept: PRIMARY CARE CLINIC | Age: 71
End: 2021-06-18
Payer: MEDICARE

## 2021-06-18 VITALS
TEMPERATURE: 97.6 F | HEIGHT: 71 IN | SYSTOLIC BLOOD PRESSURE: 122 MMHG | DIASTOLIC BLOOD PRESSURE: 60 MMHG | HEART RATE: 87 BPM | BODY MASS INDEX: 24.64 KG/M2 | WEIGHT: 176 LBS | RESPIRATION RATE: 20 BRPM | OXYGEN SATURATION: 99 %

## 2021-06-18 DIAGNOSIS — E34.9 TESTOSTERONE DEFICIENCY: ICD-10-CM

## 2021-06-18 DIAGNOSIS — E11.69 TYPE 2 DIABETES MELLITUS WITH OTHER SPECIFIED COMPLICATION, WITHOUT LONG-TERM CURRENT USE OF INSULIN (HCC): ICD-10-CM

## 2021-06-18 DIAGNOSIS — Z00.00 ROUTINE GENERAL MEDICAL EXAMINATION AT A HEALTH CARE FACILITY: Primary | ICD-10-CM

## 2021-06-18 DIAGNOSIS — Z12.5 PROSTATE CANCER SCREENING: ICD-10-CM

## 2021-06-18 DIAGNOSIS — I10 ESSENTIAL HYPERTENSION: ICD-10-CM

## 2021-06-18 DIAGNOSIS — E78.2 MIXED HYPERLIPIDEMIA: ICD-10-CM

## 2021-06-18 PROCEDURE — 1123F ACP DISCUSS/DSCN MKR DOCD: CPT | Performed by: STUDENT IN AN ORGANIZED HEALTH CARE EDUCATION/TRAINING PROGRAM

## 2021-06-18 PROCEDURE — 3017F COLORECTAL CA SCREEN DOC REV: CPT | Performed by: STUDENT IN AN ORGANIZED HEALTH CARE EDUCATION/TRAINING PROGRAM

## 2021-06-18 PROCEDURE — G0438 PPPS, INITIAL VISIT: HCPCS | Performed by: STUDENT IN AN ORGANIZED HEALTH CARE EDUCATION/TRAINING PROGRAM

## 2021-06-18 PROCEDURE — 3044F HG A1C LEVEL LT 7.0%: CPT | Performed by: STUDENT IN AN ORGANIZED HEALTH CARE EDUCATION/TRAINING PROGRAM

## 2021-06-18 PROCEDURE — 4040F PNEUMOC VAC/ADMIN/RCVD: CPT | Performed by: STUDENT IN AN ORGANIZED HEALTH CARE EDUCATION/TRAINING PROGRAM

## 2021-06-18 RX ORDER — VALSARTAN AND HYDROCHLOROTHIAZIDE 320; 25 MG/1; MG/1
1 TABLET, FILM COATED ORAL DAILY
Qty: 90 TABLET | Refills: 3 | Status: SHIPPED | OUTPATIENT
Start: 2021-06-18 | End: 2021-08-24 | Stop reason: SDUPTHER

## 2021-06-18 ASSESSMENT — PATIENT HEALTH QUESTIONNAIRE - PHQ9
1. LITTLE INTEREST OR PLEASURE IN DOING THINGS: 0
SUM OF ALL RESPONSES TO PHQ QUESTIONS 1-9: 0
SUM OF ALL RESPONSES TO PHQ QUESTIONS 1-9: 0
SUM OF ALL RESPONSES TO PHQ9 QUESTIONS 1 & 2: 0
2. FEELING DOWN, DEPRESSED OR HOPELESS: 0
SUM OF ALL RESPONSES TO PHQ QUESTIONS 1-9: 0

## 2021-06-18 NOTE — PROGRESS NOTES
Medicare Annual Wellness Visit  Name: Modesto Lawrence Date: 2021   MRN: [id number] Sex: Male   Age: 65 y.o. Ethnicity: Non-/Non    : 1950 Race: Black      Abisai Frey is here for Medicare AWV    Screenings for behavioral, psychosocial and functional/safety risks, and cognitive dysfunction are all negative except as indicated below. These results, as well as other patient data from the [address] form, are documented in Flowsheets linked to this Encounter. No Known Allergies      Prior to Visit Medications    Medication Sig Taking? Authorizing Provider   valsartan-hydroCHLOROthiazide (DIOVAN-HCT) 320-25 MG per tablet Take 1 tablet by mouth daily Yes Cristian Augustin MD   blood glucose monitor strips Test 2 times a day for E11.9 pt has Accu-check [employer]. Yes Cristian Augustin MD   traZODone (DESYREL) 50 MG tablet Take 1 tablet by mouth nightly Yes Cristian Augustin MD   amLODIPine (NORVASC) 5 MG tablet TAKE 1 TABLET EVERY DAY Yes Cristian Augustin MD   tamsulosin (FLOMAX) 0.4 MG capsule Take 1 capsule by mouth daily Yes Cristian Augustin MD   Dulaglutide (TRULICITY) 1.5 PK/9.3HC SOPN Inject 1.5 mg into the skin once a week Yes Cristian Augustin MD   buPROPion [facility] SR) 150 MG extended release tablet Take 1 tablet by mouth 2 times daily Days 1-3 take one tablet daily. Then take 2 tablets daily Yes Cristian Augustin MD   omeprazole (PRILOSEC) 20 MG delayed release capsule Take 1 tablet po BID ( on an empty stomach) x 14 days for treatment of h pylori infection.  Yes ADI Melendez   albuterol sulfate HFA (PROAIR HFA) 108 (90 Base) MCG/ACT inhaler Inhale 2 puffs into the lungs every 6 hours as needed for Wheezing Yes Historical Provider, MD   Testosterone Cypionate 200 MG/ML SOLN Inject 2.5 mLs as directed every 14 days Yes Historical Provider, MD   atorvastatin (LIPITOR) 20 MG tablet Take 20 mg by mouth daily Yes Historical Provider, MD   gabapentin (NEURONTIN) 300 MG capsule Take 1 capsule by mouth 3 intact. Patient's complete Health Risk Assessment and screening values have been reviewed and are found in Flowsheets. The following problems were reviewed today and where indicated follow up appointments were made and/or referrals ordered. Positive Risk Factor Screenings with Interventions:         Substance History:  Social History     Tobacco History     Smoking Status  Light Tobacco Smoker Smoking Frequency  1 pack/day    Smokeless Tobacco Use  Never Used          Alcohol History     Alcohol Use Status  Yes Comment  rare          Drug Use     Drug Use Status  Yes Types  Marijuana Comment  occ          Sexual Activity     Sexually Active  Not Asked               Alcohol Screening:       A score of 8 or more is associated with harmful or hazardous drinking. A score of 13 or more in women, and 15 or more in men, is likely to indicate alcohol dependence. Substance Abuse Interventions:  · Tobacco abuse:  tobacco cessation tips and resources provided  · Recreational drug use:  patient is not ready to change his/her recreational drug use behavior at this time    General Health and ACP:  General  In general, how would you say your health is?: Good  In the past 7 days, have you experienced any of the following?  New or Increased Pain, New or Increased Fatigue, Loneliness, Social Isolation, Stress or Anger?: None of These  Do you get the social and emotional support that you need?: Yes  Do you have a Living Will?: (!) No  Advance Directives     Power of [address] [place] [address] Will ACP-Advance Directive ACP-Power of     Not on File Not on File Not on File Not on File      General Health Risk Interventions:  · No Living Will: Advance Care Planning addressed with patient today    Health Habits/Nutrition:  Health Habits/Nutrition  Do you exercise for at least 20 minutes 2-3 times per week?: (!) No  Have you lost any weight without trying in the past 3 months?: No  Do you eat only one meal per day?: (!) Yes  Have you Instructions/AVS.    Isabela Che was seen today for medicare awv. Diagnoses and all orders for this visit:    Routine general medical examination at a health care facility    Essential hypertension  -     Microalbumin, Ur; Future  -     Comprehensive Metabolic Panel; Future    Type 2 diabetes mellitus with other specified complication, without long-term current use of insulin (HCC)  -     Hemoglobin A1C; Future  -     Microalbumin, Ur; Future    Mixed hyperlipidemia  -     Lipid, Fasting; Future    Testosterone deficiency  -     Testosterone; Future  -     CBC Auto Differential; Future    Prostate cancer screening  -     PSA Screening; Future    Other orders  -     valsartan-hydroCHLOROthiazide (DIOVAN-HCT) 320-25 MG per tablet; Take 1 tablet by mouth daily      F/u 11/2021 as scheduled for chronic conditions.  Labs to be done beforehand

## 2021-06-18 NOTE — PATIENT INSTRUCTIONS
Personalized Preventive Plan for Gladis Bullard  6/18/2021  Medicare offers a range of preventive health benefits. Some of the tests and screenings are paid in full while other may be subject to a deductible, co-insurance, and/or copay. Some of these benefits include a comprehensive review of your medical history including lifestyle, illnesses that may run in your family, and various assessments and screenings as appropriate. After reviewing your medical record and screening and assessments performed today your provider may have ordered immunizations, labs, imaging, and/or referrals for you. A list of these orders (if applicable) as well as your Preventive Care list are included within your After Visit Summary for your review. Other Preventive Recommendations:    · A preventive eye exam performed by an eye specialist is recommended every 1-2 years to screen for glaucoma; cataracts, macular degeneration, and other eye disorders. · A preventive dental visit is recommended every 6 months. · Try to get at least 150 minutes of exercise per week or 10,000 steps per day on a pedometer . · Order or download the FREE \"Exercise & Physical Activity: Your Everyday Guide\" from [employer]. Call 2-403.329.7754 or search [employer] online. · You need 9488-7084 mg of calcium and 1781-2834 IU of vitamin D per day. It is possible to meet your calcium requirement with diet alone, but a vitamin D supplement is usually necessary to meet this goal.  · When exposed to the sun, use a sunscreen that protects against both UVA and UVB radiation with an SPF of 30 or greater. Reapply every 2 to 3 hours or after sweating, drying off with a towel, or swimming. · Always wear a seat belt when traveling in a car. Always wear a helmet when riding a bicycle or motorcycle. Personalized Preventive Plan for Gladis Ma - 6/18/2021  Medicare offers a range of preventive health benefits.  Some of the tests and screenings are paid in full while other may be subject to a deductible, co-insurance, and/or copay. Some of these benefits include a comprehensive review of your medical history including lifestyle, illnesses that may run in your family, and various assessments and screenings as appropriate. After reviewing your medical record and screening and assessments performed today your provider may have ordered immunizations, labs, imaging, and/or referrals for you. A list of these orders (if applicable) as well as your Preventive Care list are included within your After Visit Summary for your review. Other Preventive Recommendations:    A preventive eye exam performed by an eye specialist is recommended every 1-2 years to screen for glaucoma; cataracts, macular degeneration, and other eye disorders. A preventive dental visit is recommended every 6 months. Try to get at least 150 minutes of exercise per week or 10,000 steps per day on a pedometer . Order or download the FREE \"Exercise & Physical Activity: Your Everyday Guide\" from [employer]. Call 7-785.660.3601 or search [employer] online. You need 5411-0095 mg of calcium and 7620-9342 IU of vitamin D per day. It is possible to meet your calcium requirement with diet alone, but a vitamin D supplement is usually necessary to meet this goal.  When exposed to the sun, use a sunscreen that protects against both UVA and UVB radiation with an SPF of 30 or greater. Reapply every 2 to 3 hours or after sweating, drying off with a towel, or swimming. Always wear a seat belt when traveling in a car. Always wear a helmet when riding a bicycle or motorcycle.

## 2021-06-28 RX ORDER — DULAGLUTIDE 1.5 MG/.5ML
1.5 INJECTION, SOLUTION SUBCUTANEOUS WEEKLY
Qty: 4 PEN | Refills: 3 | Status: SHIPPED | OUTPATIENT
Start: 2021-06-28 | End: 2021-07-13 | Stop reason: SDUPTHER

## 2021-06-28 RX ORDER — TAMSULOSIN HYDROCHLORIDE 0.4 MG/1
0.4 CAPSULE ORAL DAILY
Qty: 90 CAPSULE | Refills: 3 | Status: SHIPPED | OUTPATIENT
Start: 2021-06-28 | End: 2021-08-24 | Stop reason: SDUPTHER

## 2021-06-28 NOTE — TELEPHONE ENCOUNTER
Elliottdanielle Lynch is requesting a refill of their   Requested Prescriptions     Pending Prescriptions Disp Refills    Dulaglutide (TRULICITY) 1.5 TC/3.5ZZ SOPN 4 pen 3     Sig: Inject 1.5 mg into the skin once a week    tamsulosin (FLOMAX) 0.4 MG capsule 90 capsule 3     Sig: Take 1 capsule by mouth daily   . Please advise. Pt also claims there is a third medication he needs filled but cannot remember the name.      Last Appt:  6/18/2021  Next Appt:   11/11/2021  Preferred pharmacy: [address] [facility]

## 2021-07-06 ENCOUNTER — TELEPHONE (OUTPATIENT)
Dept: PRIMARY CARE CLINIC | Age: 71
End: 2021-07-06

## 2021-07-06 NOTE — TELEPHONE ENCOUNTER
Pharmacy called in asking about Med request on Lipitor. Pt was seen in office on 6/18/2021, medication has not been ordered within [facility].   Please advise

## 2021-07-07 RX ORDER — ATORVASTATIN CALCIUM 20 MG/1
20 TABLET, FILM COATED ORAL DAILY
Qty: 90 TABLET | Refills: 1 | Status: SHIPPED | OUTPATIENT
Start: 2021-07-07 | End: 2021-08-24 | Stop reason: SDUPTHER

## 2021-07-07 RX ORDER — ATORVASTATIN CALCIUM 20 MG/1
20 TABLET, FILM COATED ORAL DAILY
Qty: 90 TABLET | Refills: 0 | Status: SHIPPED | OUTPATIENT
Start: 2021-07-07 | End: 2021-07-07 | Stop reason: SDUPTHER

## 2021-07-07 NOTE — TELEPHONE ENCOUNTER
Inge Lombard called to request a refill on his medication.       Last office visit : 6/18/2021   Next office visit : 11/11/2021     Requested Prescriptions     Pending Prescriptions Disp Refills    atorvastatin (LIPITOR) 20 MG tablet 90 tablet 1     Sig: Take 1 tablet by mouth daily            Bunny Burns

## 2021-07-07 NOTE — TELEPHONE ENCOUNTER
Lipitor is listed in med list on previous encounter, as well as HLD diagnosis.  Ok to send Patient: MARKO ESPAÑA     Acct: [id number]     Report: #[id number]  UNIT #: [id number]     : 1951    Encounter Date:2020  PRIMARY CARE: JOSE R MCGOWAN  ***Signed***  --------------------------------------------------------------------------------------------------------------------  NURSE INTAKE      Visit Type      Established Patient Visit            Chief Complaint      COLON CANCER            Referring Provider/Copies To      Primary Care Provider:  JOSE R MCGOWAN      Copies To:   JOSE R MCGOWAN            History and Present Illness      Past Oncology Illness History      1) Breast Cancer:            -RIGHT breast DCIS diagnosed 3/15/06; low-grade; s/p lumpectomy and adjuvant     XRT; ER: 100%, [place]: 70%      -LEFT breast invasive lobular carcinoma; diagnosed 2012; staged pT3 pN0 M0     stage IIB, ER: 83%, [place]: 0, HER2: 1+, Ki-67: 26%            Treatment History:            1) s/p LEFT mastectomy and SLND 2012      2) s/p adjuvant DD AC and Taxotere      3) s/p adjuvant XRT chest wall      4) Adjuvant Arimidex      5) Arimidex changed to Femara      6) Ongoing Femara (19)      7) Plan to D/C Femara and surveillance (20)            2) Colon Cancer:             Diagnosed 5/7/15; Low grade; staged pT4a pN0 M0 stage IIB; 0/37 LN; no MMR     testing; RIGHT sided            Treatment History:            1) s/p RIGHT hemicolectomy 5/7/15      2) s/p adjuvant Xeloda X 6 months      3) Recurrent dx diagnosed via RIGHT ureter mass resection (18)      4) s/p FOLFIRI X 9 (-18)      5) s/p XRT RIGHT pelvis total 5320 cGy (18-19)      6) Restaging MRI (-); resumption of Femara (10/3/19)      7) Ongoing surveillance (19)      8) Plan for restaging studies (20)            9) Restaging CT CAP and Brain MRI: [facility] (7/15/20)            10) C/O Melena; referral to Surgery for EGD/[place]; Femara d/c (20)      11) EGD/[place] (-) (20)      12) IV iron given  (8/10 and 8/17/20)            13) Improvement in cbc with IV iron; plan for surveillance and f/u with GI     (9/22/20)            HPI - Oncology Interim      1) Breast Cancer:      -RIGHT breast DCIS diagnosed 3/15/06; low-grade; s/p lumpectomy and adjuvant     XRT; ER: 100%, [place]: 70%      -LEFT breast invasive lobular carcinoma; diagnosed 06/2012; staged pT3 pN0 M0     stage IIB, ER: 83%, [place]: 0, HER2: 1+, Ki-67: 26%.            History of breast cancer, originally right breast DCIS in March of 2006, status     post lumpectomy and left breast cancer: Invasive lobular cancer in June of 2012.    Patient discontinued off of Femara with surveillance only in July 2020. Patient     reports she needs a right breast mammogram due in December 2020. She was last     seen in September 9/2020 by Dr. Bustillos. She completed brain MRI and CT CAP in    July and all was benign for any recurrent cancers.              2) Colon Cancer:       Diagnosed 5/7/15; Low grade; staged pT4a pN0 M0 stage IIB; 0/37 LN; no MMR     testing; RIGHT sided.She completed right hemicolectomy in May of 2015 with     adjuvant Xeloda x 6 months and then with recurrent disease in the right ureter     with mass resection in May of 2018 and subsequently completed 9 cycles of     FOLFIRI in December 2018. 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     See above with repeat imaging studies since July 2020 with no recurrent disease.          3) Anemia: Patient has been having worsening anemia since around late 2018. She     has received Injectafer infusions last here in our infusion area in August x 2     on 8/17/2020 and prior to this in May 5/7/20. Patient reports she received     additional IV iron ordered by her PCP she thinks last month. Patient reports she    had recent iron studies and was told they were in the normal range. Patient does    report she has seen the  "GI NP recently. She had a capsule endoscopy and was told    the results of this showed she had a \"bleeding ulcer\" in the small intestine and    was referred to a GI in [place] and has a follow up there in [place], December 3rd. She thinks the GI doctor is Dr. Lott but does not know where     located. Patient does report intermittent blood in her stool. She denies any     change in bowel habits, weight loss. She reports she has been taking Carafate     and Pantoprazole daily and was told to not use any non-steroidal meds. She     previously had taken Celebrex. She only takes Tylenol for pain. Recent CBC dated    11/24/20 shows a hemoglobin in the 8.1 range. She reports she is having fatigue     and rates this a 5/10 fatigue scale.            Clinical Trial Participant      No            ECOG Performance Status      1            Most Recent Lab Findings      Laboratory Tests      11/24/20 10:30            11/24/20 10:50            PAST, FAMILY   Past Medical History      Past Medical History:  Arthritis, Diabetes Type 2, High Cholesterol,     Hypertension, Short of Air      Hematology/Oncology (F):  Anemia, Breast Cancer, Colorectal Cancer            Past Surgical History      Biopsy, Hysterectomy, Joint Replacement, VAD Placement            Family History      Family History:  Colorectal Cancer, Skin Cancer            Social History      Lives independently:  Yes      Occupation:  housewife            Tobacco Use      Tobacco status:  Never smoker            Substance Use      Substance use:  Denies use            REVIEW OF SYSTEMS      General:  Admits: Fatigue;          Denies: Appetite Change, Fever, Night Sweats, Weight Gain, Weight Loss      Eye:  Denies Blurred Vision, Denies Corrective Lenses, Denies Diplopia, Denies     Vision Changes      ENT:  Admits Headache; Denies Hearing Loss, Denies Hoarseness, Denies Sore     Throat      Other      ALL THE TIME HEADACHE      Cardiovascular:  Denies Chest " Pain, Denies Palpitations      Respiratory:  Denies: Cough, Coughing Blood, Productive Cough, Shortness of Air,    Wheezing      Gastrointestinal:  Denies Bloody Stools, Denies Constipation, Denies Diarrhea,     Denies Nausea/Vomiting, Denies Problem Swallowing, Denies Unable to Control     Bowels      Genitourinary:  Denies Blood in Urine, Denies Incontinence, Denies Painful     Urination      Musculoskeletal:  Denies Back Pain; Admits Muscle Pain, Admits Painful Joints      Integumentary:  Denies Itching, Denies Lesions, Denies Rash      Neurologic:  Denies Dizziness, Denies Numbness\Tingling, Denies Seizures      Psychiatric:  Denies Anxiety, Denies Depression      Endocrine:  Denies Cold Intolerance, Denies Heat Intolerance      Hematologic/Lymphatic:  Denies Bruising, Denies Bleeding, Denies Enlarged Lymph     Nodes      Reproductive:  Denies: Menopause, Heavy Periods, Pregnant, Still Menstruating            VITAL SIGNS AND SCORES      Vitals      Weight 185 lbs 2.982 oz / 84. kg      Temperature 98.2 F / 36.78 C - Temporal      Pulse 87      Respirations 18      Blood Pressure 125/64 Sitting, Right Arm      Pulse Oximetry 99%, RM AIR            Pain Score      Experiencing any pain?:  Yes      Pain Scale Used:  Numerical      Pain Intensity:  5      Pain Comment:        HEADACHE         Fatigue Score      Experiencing any fatigue?:  Yes      Fatigue (0-10 scale):  5            EXAM      General appearance:  in no apparent distress, cooperative, appears stated age.      HEENT: No pallor, no icterus, oral mucosa moist      Neck: Supple, trachea central-not deviated      Lymph nodes: none palpable peripherally      Cardiovascular: S1-S2 heard, no murmurs, no rubs, no gallops.      Breast exam:      Respiratory: Clear to auscultation bilaterally, no adventitious sounds      Abdomen/gastro: Soft, nontender, no palpable hepatosplenomegaly, bowel sounds     heard      Skin: No lesions, no rashes, no petechiae.       Extremities: No pedal edema, peripheral pulses felt, no clubbing      Musculoskeletal: Normal tone, no rigidity of muscles; range of motion intact      Spine: No deformities      Psychiatric: Alert and oriented x3, appropriate affect, intact judgment      Neurologic: Cranial nerves II through XII grossly intact, no gross neurological     deficits noted.            PREVENTION      Hx Influenza Vaccination:  Yes      Date Influenza Vaccine Given:  Oct 5, 2020      Influenza Vaccine Declined:  No      2 or More Falls in Past Year?:  No      Fall Past Year with Injury?:  No      Hx Pneumococcal Vaccination:  Yes      Encouraged to follow-up with:  PCP regarding preventative exams.      Chart initiated by      ML BURK MA            ALLERGY/MEDS      Allergies      Coded Allergies:             NO KNOWN ALLERGIES (Unverified , 11/24/20)            Medications      Last Reconciled on 11/24/20 12:33 by JANE HENSON      Pravastatin Sodium (Pravastatin Sodium) 40 Mg Tablet      40 MG PO HS, #30 TAB 0 Refills         Reported         9/30/20       Gabapentin (Gabapentin) 400 Mg Capsule      400 MG PO TID, #90 CAP 0 Refills         Reported         9/30/20       Sucralfate (Carafate) 1 Gm Tab      1 GM PO QID, #120 TAB 0 Refills         Reported         9/30/20       metFORMIN ER (Glucophage XR) 500 Mg Tab.er.24h      500 MG PO BID, #60 TAB.ER 0 Refills         Reported         11/13/19       Escitalopram Oxalate (Escitalopram Oxalate*) 10 Mg Tablet      TAB         Reported         3/20/18       LORazepam (LORazepam) 1 Mg Tablet      MG, TAB         Reported         10/31/17       Budesonide/Formoterol Fumarate (Symbicort 160/4.5 Mcg) 10.2 Gm Inh      2 PUFF INH BID, #3 INH 1 Refill         Prov: Thang Blank         10/19/17       Pantoprazole (Protonix) 40 Mg Tablet      40 MG PO QDAY, #30 TAB 0 Refills         Reported         8/25/15      Medications Reviewed:  No Changes made to meds           "  IMPRESSION/PLAN      Impression      1) Breast Cancer:      -RIGHT breast DCIS diagnosed 3/15/06; low-grade; s/p lumpectomy and adjuvant     XRT; ER: 100%, [place]: 70%      -LEFT breast invasive lobular carcinoma; diagnosed 06/2012; staged pT3 pN0 M0     stage IIB, ER: 83%, DC: 0, HER2: 1+, Ki-67: 26%.            History of breast cancer, originally right breast DCIS in March of 2006, status     post lumpectomy and left breast cancer: Invasive lobular cancer in June of 2012.    Patient discontinued off of Femara with surveillance only in July 2020. Patient     reports she needs a right breast mammogram due in December 2020. She was last     seen in September 9/2020 by Dr. Bustillos. She completed brain MRI and CT CAP in    July and all was benign for any recurrent cancers.              2) Colon Cancer:       Diagnosed 5/7/15; Low grade; staged pT4a pN0 M0 stage IIB; 0/37 LN; no MMR     testing; RIGHT sided.She completed right hemicolectomy in May of 2015 with     adjuvant Xeloda x 6 months and then with recurrent disease in the right ureter     with mass resection in May of 2018 and subsequently completed 9 cycles of     FOLFIRI in December 2018. 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     See above with repeat imaging studies since July 2020 with no recurrent disease.          3) Anemia: Patient has been having worsening anemia since around late 2018. She     has received Injectafer infusions last here in our infusion area in August x 2     on 8/17/2020 and prior to this in May 5/7/20. Patient reports she received     additional IV iron ordered by her PCP she thinks last month. Patient reports she    had recent iron studies and was told they were in the normal range. Patient does    report she has seen the GI NP recently. She had a capsule endoscopy and was told    the results of this showed she had a \"bleeding ulcer\" in " the small intestine and    was referred to a GI in [place] and has a follow up there in [place],     December 3rd. She thinks the GI doctor is Dr. Lott but does not know where     located. Patient does report intermittent blood in her stool. She denies any     change in bowel habits, weight loss. She reports she has been taking Carafate     and Pantoprazole daily and was told to not use any non-steroidal meds. She     previously had taken Celebrex. She only takes Tylenol for pain. Recent CBC dated    11/24/20 shows a hemoglobin in the 8.1 range. She reports she is having fatigue     and rates this a 5/10 fatigue scale.             Patient  is a patient here as well with history of multiple myeloma and     follows with Dr. Schroeder.             We will recheck labs in 1 week along with iron labs, folate and B-12 to see if a    blood transfusion is needed. Will request records and labs and previous IV iron     infusions ordered by [facility] office. I have asked the patient to please     have GI office send us correspondence regarding her visit when she goes so we     can follow along with as well. In addition, we will check CEA level as well and     will consider repeat imaging studies at the next visit to evaluate for any     recurrent disease.            Diagnosis      Breast cancer - C50.919         Breast location: unspecified site of breast         Estrogen receptor status: positive         Laterality: bilateral            Notes      New Diagnostics      * Screening Mammo Right, Month         Dx: Breast cancer - C50.919      * CBC With Auto Diff, Week         Dx: Breast cancer - C50.919      * Iron Profile, Week         Dx: Breast cancer - C50.919      * Ferritin Level, Week         Dx: Breast cancer - C50.919      * B12      Dx: Breast cancer - C50.919      * Cea/Carcinoembryonic, Week         Dx: Breast cancer - C50.919      * CMP Comp Metabolic Panel, Week         Dx: Breast cancer - C50.919       * LDH, Week         Dx: Breast cancer - C50.919            Plan      1) Right mammogram in 1 month.             2) 3) Labs in 1 week with CBC, CMP< LDH, iron studies, ferritin, folate, B-12,     and CEA to evaluate for need for any blood transfusions.             Have GI in [place] fax us correspondence regarding evaluation and treatment.             Will consider re-imaging studies at her next visit to evaluate for any recurrent    cancer after seen by GI.             She will see Dr. Schroeder at the next visit in 3-4 weeks.            Pain      Pain Follow-up Plan            Advanced Care Plan Discussion      Declines Discussion [id number]            Patient Education      Patient Education Provided:  Yes            Electronically signed by JANE HENSON onc  11/24/2020 12:33       Disclaimer: Converted document may not contain table formatting or lab diagrams. Please see [employer] for the authenticated document.

## 2021-07-13 DIAGNOSIS — E11.69 TYPE 2 DIABETES MELLITUS WITH OTHER SPECIFIED COMPLICATION, WITHOUT LONG-TERM CURRENT USE OF INSULIN (HCC): Primary | ICD-10-CM

## 2021-07-13 RX ORDER — DULAGLUTIDE 1.5 MG/.5ML
1.5 INJECTION, SOLUTION SUBCUTANEOUS WEEKLY
Qty: 4 PEN | Refills: 3 | Status: SHIPPED | OUTPATIENT
Start: 2021-07-13 | End: 2021-08-24 | Stop reason: SDUPTHER

## 2021-07-13 NOTE — TELEPHONE ENCOUNTER
Madelyn Conception called to request a refill on his medication.       Last office visit : 6/18/2021   Next office visit : 11/11/2021     Requested Prescriptions     Pending Prescriptions Disp Refills    Dulaglutide (TRULICITY) 1.5 ZN/2.3WE SOPN 4 pen 3     Sig: Inject 1.5 mg into the skin once a week            Michelle March

## 2021-07-19 RX ORDER — BLOOD SUGAR DIAGNOSTIC
STRIP MISCELLANEOUS
Qty: 200 STRIP | Refills: 5 | Status: SHIPPED | OUTPATIENT
Start: 2021-07-19 | End: 2021-08-15 | Stop reason: SDUPTHER

## 2021-08-06 DIAGNOSIS — E11.69 TYPE 2 DIABETES MELLITUS WITH OTHER SPECIFIED COMPLICATION, WITHOUT LONG-TERM CURRENT USE OF INSULIN (HCC): ICD-10-CM

## 2021-08-06 NOTE — TELEPHONE ENCOUNTER
Timothy Brock called to request a refill on his medication. Last office visit : 6/18/2021   Next office visit : 11/11/2021     Last UDS: No results found for: Norman Gula, LABBENZ, BUPRENUR, COCAIMETSCRU, GABAPENTIN, MDMA, METAMPU, OPIATESCREENURINE, OXTCOSU, PHENCYCLIDINESCREENURINE, PROPOXYPHENE, THCSCREENUR, TRICYUR    Last Artemio: today  Medication Contract: needs update at next visit   Last Fill: 3/22/21    Requested Prescriptions     Pending Prescriptions Disp Refills    gabapentin (NEURONTIN) 300 MG capsule 270 capsule 0     Sig: Take 1 capsule by mouth 3 times daily for 90 days. Please approve or refuse this medication.    Jhony Magallanes

## 2021-08-10 RX ORDER — GABAPENTIN 300 MG/1
300 CAPSULE ORAL 3 TIMES DAILY
Qty: 270 CAPSULE | Refills: 0 | Status: SHIPPED | OUTPATIENT
Start: 2021-08-10 | End: 2021-08-25 | Stop reason: SDUPTHER

## 2021-08-13 ENCOUNTER — TELEPHONE (OUTPATIENT)
Dept: PRIMARY CARE CLINIC | Age: 71
End: 2021-08-13

## 2021-08-13 NOTE — TELEPHONE ENCOUNTER
Pt needs a prescription for test strips sent to [facility] please.

## 2021-08-15 RX ORDER — BLOOD SUGAR DIAGNOSTIC
STRIP MISCELLANEOUS
Qty: 200 STRIP | Refills: 5 | Status: SHIPPED | OUTPATIENT
Start: 2021-08-15 | End: 2022-03-10 | Stop reason: SDUPTHER

## 2021-08-17 DIAGNOSIS — J43.2 CENTRILOBULAR EMPHYSEMA (HCC): Primary | ICD-10-CM

## 2021-08-17 RX ORDER — ALBUTEROL SULFATE 90 UG/1
2 AEROSOL, METERED RESPIRATORY (INHALATION) EVERY 4 HOURS PRN
Qty: 54 G | Refills: 3 | Status: SHIPPED | OUTPATIENT
Start: 2021-08-17 | End: 2021-10-11 | Stop reason: SDUPTHER

## 2021-08-17 NOTE — TELEPHONE ENCOUNTER
Patient called to request refills on Albuterol HFA be sent to [facility] pharmacy. He recently changed insurance companies and pharmacy.  Rx Refill Note  Requested Prescriptions     Pending Prescriptions Disp Refills   • albuterol sulfate  (90 Base) MCG/ACT inhaler 54 g 3     Sig: Inhale 2 puffs Every 4 (Four) Hours As Needed for Wheezing or Shortness of Air.      Last office visit with prescribing clinician: 3/29/2021      Next office visit with prescribing clinician: 10/4/2021            Rayray Shane CMA  08/17/21, 13:09 CDT

## 2021-08-19 DIAGNOSIS — E11.69 TYPE 2 DIABETES MELLITUS WITH OTHER SPECIFIED COMPLICATION, WITHOUT LONG-TERM CURRENT USE OF INSULIN (HCC): ICD-10-CM

## 2021-08-19 RX ORDER — GABAPENTIN 300 MG/1
300 CAPSULE ORAL 3 TIMES DAILY
Qty: 270 CAPSULE | Refills: 0 | OUTPATIENT
Start: 2021-08-19 | End: 2021-11-17

## 2021-08-19 RX ORDER — TRAZODONE HYDROCHLORIDE 50 MG/1
50 TABLET ORAL NIGHTLY
Qty: 90 TABLET | Refills: 3 | Status: SHIPPED | OUTPATIENT
Start: 2021-08-19 | End: 2022-03-10 | Stop reason: SDUPTHER

## 2021-08-19 NOTE — TELEPHONE ENCOUNTER
Meet Tompkins called to request a refill on his medication. Last office visit : 6/18/2021   Next office visit : 11/11/2021     Last UDS: No results found for: Jd Chi, LABBENZ, BUPRENUR, COCAIMETSCRU, GABAPENTIN, MDMA, METAMPU, OPIATESCREENURINE, OXTCOSU, PHENCYCLIDINESCREENURINE, PROPOXYPHENE, THCSCREENUR, TRICYUR    Last Artemio: 8-4-21  Medication Contract:  Chrissie Siddiqui Fill: 3-22-21    Requested Prescriptions     Pending Prescriptions Disp Refills    gabapentin (NEURONTIN) 300 MG capsule 270 capsule 0     Sig: Take 1 capsule by mouth 3 times daily for 90 days.  traZODone (DESYREL) 50 MG tablet 90 tablet 3     Sig: Take 1 tablet by mouth nightly         Please approve or refuse this medication.    Tammy Lowery MA

## 2021-08-24 DIAGNOSIS — Z72.0 TOBACCO ABUSE: ICD-10-CM

## 2021-08-24 DIAGNOSIS — E11.69 TYPE 2 DIABETES MELLITUS WITH OTHER SPECIFIED COMPLICATION, WITHOUT LONG-TERM CURRENT USE OF INSULIN (HCC): ICD-10-CM

## 2021-08-24 RX ORDER — DULAGLUTIDE 1.5 MG/.5ML
1.5 INJECTION, SOLUTION SUBCUTANEOUS WEEKLY
Qty: 4 PEN | Refills: 3 | Status: SHIPPED | OUTPATIENT
Start: 2021-08-24 | End: 2021-12-17

## 2021-08-24 RX ORDER — ATORVASTATIN CALCIUM 20 MG/1
20 TABLET, FILM COATED ORAL DAILY
Qty: 90 TABLET | Refills: 1 | Status: SHIPPED | OUTPATIENT
Start: 2021-08-24 | End: 2022-01-13 | Stop reason: SDUPTHER

## 2021-08-24 RX ORDER — VALSARTAN AND HYDROCHLOROTHIAZIDE 320; 25 MG/1; MG/1
1 TABLET, FILM COATED ORAL DAILY
Qty: 90 TABLET | Refills: 3 | Status: SHIPPED | OUTPATIENT
Start: 2021-08-24 | End: 2022-01-13 | Stop reason: SDUPTHER

## 2021-08-24 RX ORDER — BUPROPION HYDROCHLORIDE 150 MG/1
150 TABLET, EXTENDED RELEASE ORAL 2 TIMES DAILY
Qty: 60 TABLET | Refills: 3 | Status: SHIPPED | OUTPATIENT
Start: 2021-08-24 | End: 2022-04-29

## 2021-08-24 RX ORDER — TAMSULOSIN HYDROCHLORIDE 0.4 MG/1
0.4 CAPSULE ORAL DAILY
Qty: 90 CAPSULE | Refills: 3 | Status: SHIPPED | OUTPATIENT
Start: 2021-08-24 | End: 2021-11-18 | Stop reason: SDUPTHER

## 2021-08-24 RX ORDER — AMLODIPINE BESYLATE 5 MG/1
TABLET ORAL
Qty: 90 TABLET | Refills: 3 | Status: SHIPPED | OUTPATIENT
Start: 2021-08-24 | End: 2022-01-13 | Stop reason: SDUPTHER

## 2021-08-25 RX ORDER — GABAPENTIN 300 MG/1
300 CAPSULE ORAL 3 TIMES DAILY
Qty: 270 CAPSULE | Refills: 0 | Status: SHIPPED | OUTPATIENT
Start: 2021-08-25 | End: 2022-01-19 | Stop reason: SDUPTHER

## 2021-09-29 ENCOUNTER — TELEPHONE (OUTPATIENT)
Dept: PRIMARY CARE CLINIC | Age: 71
End: 2021-09-29

## 2021-09-29 DIAGNOSIS — R06.02 SOB (SHORTNESS OF BREATH): Primary | ICD-10-CM

## 2021-09-29 NOTE — TELEPHONE ENCOUNTER
----- Message from [facility] Timothy sent at 9/27/2021 11:33 AM CDT -----  Subject: Referral Request    QUESTIONS   Reason for referral request? Pt is calling for a referral for Myles Julio Respiratory therapist at [facility] respiratory located [address] phone number [phone]. Has the physician seen you for this condition before? No   Preferred Specialist (if applicable)? Do you already have an appointment scheduled? Yes  Select Scheduled Date? 2021-10-04  Select Scheduled Physician? Additional Information for Provider? Please contact pt with any questions   at [phone] please leave a message if pt is unable to be reached.   ---------------------------------------------------------------------------  --------------  CALL BACK INFO  What is the best way for the office to contact you? OK to leave message on   voicemail  Preferred Call Back Phone Number?  [phone]

## 2021-09-30 ENCOUNTER — TELEPHONE (OUTPATIENT)
Dept: PRIMARY CARE CLINIC | Age: 71
End: 2021-09-30

## 2021-09-30 NOTE — TELEPHONE ENCOUNTER
----- Message from Mimi Douglas sent at 9/27/2021 11:33 AM CDT -----  Subject: Referral Request    QUESTIONS   Reason for referral request? Pt is calling for a referral for Avril Davis Respiratory therapist at [facility] respiratory located [address] phone number [phone]. Has the physician seen you for this condition before? No   Preferred Specialist (if applicable)? Do you already have an appointment scheduled? Yes  Select Scheduled Date? 2021-10-04  Select Scheduled Physician? Additional Information for Provider? Please contact pt with any questions   at [phone] please leave a message if pt is unable to be reached.   ---------------------------------------------------------------------------  --------------  CALL BACK INFO  What is the best way for the office to contact you? OK to leave message on   voicemail  Preferred Call Back Phone Number?  [phone]

## 2021-10-11 ENCOUNTER — OFFICE VISIT (OUTPATIENT)
Dept: PULMONOLOGY | Facility: CLINIC | Age: 71
End: 2021-10-11

## 2021-10-11 VITALS
DIASTOLIC BLOOD PRESSURE: 82 MMHG | SYSTOLIC BLOOD PRESSURE: 160 MMHG | HEART RATE: 83 BPM | OXYGEN SATURATION: 99 % | HEIGHT: 71 IN | WEIGHT: 170.4 LBS | BODY MASS INDEX: 23.85 KG/M2

## 2021-10-11 DIAGNOSIS — Z23 NEED FOR INFLUENZA VACCINATION: ICD-10-CM

## 2021-10-11 DIAGNOSIS — J44.9 STAGE 4 VERY SEVERE COPD BY GOLD CLASSIFICATION (HCC): Primary | Chronic | ICD-10-CM

## 2021-10-11 DIAGNOSIS — J44.9 ASTHMA-COPD OVERLAP SYNDROME (HCC): Chronic | ICD-10-CM

## 2021-10-11 DIAGNOSIS — F17.201 TOBACCO ABUSE, IN REMISSION: ICD-10-CM

## 2021-10-11 DIAGNOSIS — J43.2 CENTRILOBULAR EMPHYSEMA (HCC): Chronic | ICD-10-CM

## 2021-10-11 DIAGNOSIS — R91.1 LUNG NODULE: ICD-10-CM

## 2021-10-11 DIAGNOSIS — J96.11 CHRONIC RESPIRATORY FAILURE WITH HYPOXIA (HCC): Chronic | ICD-10-CM

## 2021-10-11 PROCEDURE — 90662 IIV NO PRSV INCREASED AG IM: CPT | Performed by: NURSE PRACTITIONER

## 2021-10-11 PROCEDURE — G0008 ADMIN INFLUENZA VIRUS VAC: HCPCS | Performed by: NURSE PRACTITIONER

## 2021-10-11 PROCEDURE — 99214 OFFICE O/P EST MOD 30 MIN: CPT | Performed by: NURSE PRACTITIONER

## 2021-10-11 RX ORDER — BUPROPION HYDROCHLORIDE 150 MG/1
150 TABLET, EXTENDED RELEASE ORAL
Status: ON HOLD | COMMUNITY
Start: 2021-08-24 | End: 2022-11-13

## 2021-10-11 RX ORDER — ALBUTEROL SULFATE 90 UG/1
2 AEROSOL, METERED RESPIRATORY (INHALATION) EVERY 4 HOURS PRN
Qty: 54 G | Refills: 3 | Status: SHIPPED | OUTPATIENT
Start: 2021-10-11 | End: 2022-01-12 | Stop reason: SDUPTHER

## 2021-10-11 NOTE — PATIENT INSTRUCTIONS
Umeclidinium; Vilanterol inhalation powder  What is this medicine?  UMECLIDINIUM; VILANTEROL (ue MEK li DIN ee um; vye ALEXIS ter ol) inhalation is a combination of two medicines that decrease inflammation and help to open up the airways of your lungs. It is for chronic obstructive pulmonary disease (COPD), including chronic bronchitis or emphysema. Do NOT use for asthma or an acute asthma attack. Do NOT use for a COPD attack.  This medicine may be used for other purposes; ask your health care provider or pharmacist if you have questions.  COMMON BRAND NAME(S): LIZ MOORE  What should I tell my health care provider before I take this medicine?  They need to know if you have any of these conditions:  · bladder problems or difficulty passing urine  · diabetes  · glaucoma  · heart disease or irregular heartbeat  · high blood pressure  · kidney disease  · pheochromocytoma  · prostate disease  · seizures  · thyroid disease  · an unusual or allergic reaction to umeclidinium, vilanterol, lactose, milk proteins, other medicines, foods, dyes, or preservatives  · pregnant or trying to get pregnant  · breast-feeding  How should I use this medicine?  This medicine is inhaled through the mouth. It is used once per day. Follow the directions on the prescription label. Do not use a spacer device with this inhaler. Take your medicine at regular intervals. Do not take your medicine more often than directed. Do not stop taking except on your doctor's advice. Make sure that you are using your inhaler correctly. Ask you doctor or health care provider if you have any questions.  Talk to your pediatrician regarding the use of this medicine in children. Special care may be needed.  Overdosage: If you think you have taken too much of this medicine contact a poison control center or emergency room at once.  NOTE: This medicine is only for you. Do not share this medicine with others.  What if I miss a dose?  If you miss a dose, use it as  soon as you can. If it is almost time for your next dose, use only that dose and continue with your regular schedule. Do not use double or extra doses.  What may interact with this medicine?  Do not take this medicine with any of the following medications:  · cisapride  · dofetilide  · dronedarone  · MAOIs like Carbex, Eldepryl, Marplan, Nardil, and Parnate  · pimozide  · thioridazine  · ziprasidone  This medicine may also interact with the following medications:  · antihistamines for allergy  · antiviral medicines for HIV or AIDS  · atropine  · beta-blockers like metoprolol and propranolol  · certain medicines for bladder problems like oxybutynin, tolterodine  · certain medicines for depression, anxiety, or psychotic disturbances  · certain medicines for Parkinson's disease like benztropine, trihexyphenidyl  · certain medicines for stomach problems like dicyclomine, hyoscyamine  · certain medicines for travel sickness like scopolamine  · diuretics  · ipratropium  · medicines for colds  · medicines for fungal infections like ketoconazole and itraconazole  · other medicines for breathing problems  · other medicines that prolong the QT interval (cause an abnormal heart rhythm)  · tiotropium  This list may not describe all possible interactions. Give your health care provider a list of all the medicines, herbs, non-prescription drugs, or dietary supplements you use. Also tell them if you smoke, drink alcohol, or use illegal drugs. Some items may interact with your medicine.  What should I watch for while using this medicine?  Visit your doctor or health care professional for regular checkups. Tell your doctor or health care professional if your symptoms do not get better.  If your symptoms get worse or if you need your short-acting inhalers more often, call your doctor right away. Do not use this medicine more than once every 24 hours.  What side effects may I notice from receiving this medicine?  Side effects that you  should report to your doctor or health care professional as soon as possible:  · allergic reactions like skin rash or hives, swelling of the face, lips, or tongue  · breathing problems right after inhaling your medicine  · changes in vision  · chest pain  · eye pain  · fast, irregular heartbeat  · feeling faint or lightheaded, falls  · fever or chills  · nausea, vomiting  · trouble passing urine or change in the amount of urine  Side effects that usually do not require medical attention (report to your doctor or health care professional if they continue or are bothersome):  · constipation  · cough  · diarrhea  · headache  · muscle cramps  · nervousness  · sore throat  · tremor  This list may not describe all possible side effects. Call your doctor for medical advice about side effects. You may report side effects to FDA at 9-911-FDA-2865.  Where should I keep my medicine?  Keep out of the reach of children.  Store at room temperature between 15 and 30 degrees C (59 and 86 degrees F). Store in a dry place away from direct heat or sunlight. Throw away 6 weeks after you remove the inhaler from the foil tray, or after the dose indicator reads 0, whichever comes first. Throw away any unopened packages after the expiration date.  NOTE: This sheet is a summary. It may not cover all possible information. If you have questions about this medicine, talk to your doctor, pharmacist, or health care provider.  © 2021 [employer] (2019-06-03 14:47:24)

## 2021-10-11 NOTE — PROGRESS NOTES
"Chief Complaint  asthma-copd overlap syndrome and stage 4 very severe copd    Subjective    History of Present Illness     Naif Nick presents to [facility] PULMONARY & CRITICAL CARE MEDICINE   Mr. Nick is a pleasant 71 year old male with known very severe stage 4 COPD, chronic respiratory failure on home oxygen of 2L NC, Former smoker, centrolobular emphysema, TB exposure, Lung nodule and Asthma/ copd overlap syndrome. He is on Mucinex, Nebulizer prn, Albuterol PRN. He has been out of inhalers. Symbicort was too expensive. He states he has had an inhaler from his PCP but does not know the name.  He is using his Albuterol HFA mostly at night but occasionally in the am. Last Nebulizer treatment was about 3 days ago. He uses Flonase as needed for nasal drainage. He has not used tessalon for cough in a while. He is not coughing as much. His shortness of breath is worse in the am which is when he tends to use the albuterol HFA.  CTA in July showed stable ground glass nodule.        Objective   Vital Signs:   /82   Pulse 83   Ht 180.3 cm (71\")   Wt 77.3 kg (170 lb 6.4 oz)   SpO2 99% Comment: ra  BMI 23.77 kg/m²     Physical Exam  Vitals reviewed.   Constitutional:       Appearance: Normal appearance.   Cardiovascular:      Rate and Rhythm: Normal rate and regular rhythm.   Pulmonary:      Effort: Pulmonary effort is normal.      Breath sounds: Normal breath sounds.   Neurological:      General: No focal deficit present.      Mental Status: He is alert and oriented to person, place, and time.   Psychiatric:         Mood and Affect: Mood normal.         Behavior: Behavior normal.          Result Review :  The following data was reviewed by: TAMAR Sorenson on 10/11/2021:    My interpretation of imaging:  No new   My interpretation of labs: no new   CT Angiogram Chest (07/06/2020 23:58)      My interpretation of the PFT: no new     Results for orders placed during the hospital " encounter of 08/10/20    Full Pulmonary Function Test With Bronchodilator    Narrative  [facility] - Pulmonary Function Test    [address] [facility]  [address]  [phone]    Patient : Naif Nick  MRN : [id number]  YOB: 1950  :  Nacho Vaughan MD  Date : 8/10/2020    ______________________________________________________________________    Interpretation :  1. Spirometry shows very severe airflow obstruction prebronchodilator  2. Following bronchodilator there is minimal not significant improvement in FEV1 and FVC, but postbronchodilator spirometry meet criteria for severe rather than very severe obstruction.  3. Mid flow is reduced.  4. Lung volume measurements show reduction in inspiratory capacity and elevations in expiratory reserve volume residual volume and total lung capacity suggesting hyperinflation and very significant gas trapping.  5. Airway resistance is increased and conductance is decreased.      Ncaho Vaughan MD    Rest/Exercise Pulse Ox Values        Some values may be hidden. Unless noted otherwise, only the newest values recorded on each date are displayed.         Rest/Exercise Pulse Ox Results 9/28/20   Rest room air SAT % 98   Exercise room air SAT % 96             Patient's BMI 23.77. BMI is within normal parameters. No follow-up required..    Assessment and Plan   Diagnoses and all orders for this visit:    1. Stage 4 very severe COPD by GOLD classification ([facility]) (Primary)    2. Centrilobular emphysema (HCC)  -     albuterol sulfate  (90 Base) MCG/ACT inhaler; Inhale 2 puffs Every 4 (Four) Hours As Needed for Wheezing or Shortness of Air.  Dispense: 54 g; Refill: 3    3. Chronic respiratory failure with hypoxia ([facility])    4. Tobacco abuse, in remission    5. Asthma-COPD overlap syndrome ([facility])    6. Lung nodule    7. Need for influenza vaccination  -     Fluzone High-Dose 65+yrs    Other orders  -     umeclidinium-vilanterol (Anoro  Ellipta) 62.5-25 MCG/INH aerosol powder  inhaler; Inhale 1 puff Daily for 90 days.  Dispense: 2 each; Refill: 0      I have refilled his albuterol HFA as he finds benefit. It does not appear he has been on a maintenance inhaler since the Symbicort. He may have had one though from his PCP. We will check with PCP and/or pharmacy on this. Symbicort was too expensive. We will look at doing PFTs and CT at next visit. In the meantime he is given samples of Anoro to use with demonstration and hand out provided.     Alpha 1: None     Health maintenance:   Influenza vaccine: 10/11/2021, given in office today.   Prevnar 13: March 2020  Covid-19 Moderna Feb/ March 2021     Follow Up   Return in about 6 months (around 4/11/2022).  Patient was given instructions and counseling regarding his condition or for health maintenance advice. Please see specific information pulled into the AVS if appropriate.     Raquel Sosa, TAMAR  10/11/2021  16:59 CDT

## 2021-10-12 ENCOUNTER — TELEPHONE (OUTPATIENT)
Dept: PULMONOLOGY | Facility: CLINIC | Age: 71
End: 2021-10-12

## 2021-10-12 NOTE — TELEPHONE ENCOUNTER
I called the PCP and the lady told me that the only inhaler that she seen in his chart is Proair Albuterol inhaler. So I am not sure if that was supposed to be replacing the Symbicort.

## 2021-10-13 NOTE — TELEPHONE ENCOUNTER
Ok. So the Proair is a rescue inhaler. We gave him the Anoro yesterday. Please call him and let him know that we will keep him on the Anoro if he finds benefit and he can still use the rescue inhaler as needed. He needs to call us in a few weeks if he likes it and we will send in script to see what his cost will be. Thanks.   TAMAR Clement         No answer and no voicemail available.

## 2021-10-14 NOTE — TELEPHONE ENCOUNTER
Patient said that the anoro isnt working for him since it is a dry powder.  He is going back to using [facility] instead of [facility] tomorrow so if you want to send something in we need to check with him to see where we need to send it.

## 2021-10-15 RX ORDER — TIOTROPIUM BROMIDE AND OLODATEROL 3.124; 2.736 UG/1; UG/1
2 SPRAY, METERED RESPIRATORY (INHALATION)
Qty: 2 EACH | Refills: 0 | COMMUNITY
Start: 2021-10-15 | End: 2022-10-12 | Stop reason: ALTCHOICE

## 2021-10-15 NOTE — TELEPHONE ENCOUNTER
2 Samples of stiolto inhalers    Talked with patient and he is going to come get the inhaler samples. Told him we could show him how to use it when he gets here.

## 2021-10-15 NOTE — TELEPHONE ENCOUNTER
See if we have samples of the higher dose Stiolto and then we will send in a script to his pharmacy to see if it is covered and what his cost will be.

## 2021-10-27 DIAGNOSIS — E11.69 TYPE 2 DIABETES MELLITUS WITH OTHER SPECIFIED COMPLICATION, WITHOUT LONG-TERM CURRENT USE OF INSULIN (HCC): ICD-10-CM

## 2021-10-27 DIAGNOSIS — I10 ESSENTIAL HYPERTENSION: ICD-10-CM

## 2021-10-27 DIAGNOSIS — Z12.5 PROSTATE CANCER SCREENING: ICD-10-CM

## 2021-10-27 DIAGNOSIS — E34.9 TESTOSTERONE DEFICIENCY: ICD-10-CM

## 2021-10-27 DIAGNOSIS — E78.2 MIXED HYPERLIPIDEMIA: ICD-10-CM

## 2021-10-27 LAB
ALBUMIN SERPL-MCNC: 4.2 G/DL (ref 3.5–5.2)
ALP BLD-CCNC: 58 U/L (ref 40–130)
ALT SERPL-CCNC: 24 U/L (ref 5–41)
ANION GAP SERPL CALCULATED.3IONS-SCNC: 10 MMOL/L (ref 7–19)
AST SERPL-CCNC: 16 U/L (ref 5–40)
BASOPHILS ABSOLUTE: 0.1 K/UL (ref 0–0.2)
BASOPHILS RELATIVE PERCENT: 0.7 % (ref 0–1)
BILIRUB SERPL-MCNC: 0.4 MG/DL (ref 0.2–1.2)
BUN BLDV-MCNC: 16 MG/DL (ref 8–23)
CALCIUM SERPL-MCNC: 9.3 MG/DL (ref 8.8–10.2)
CHLORIDE BLD-SCNC: 99 MMOL/L (ref 98–111)
CHOLESTEROL, FASTING: 189 MG/DL (ref 160–199)
CO2: 30 MMOL/L (ref 22–29)
CREAT SERPL-MCNC: 1.1 MG/DL (ref 0.5–1.2)
EOSINOPHILS ABSOLUTE: 0.2 K/UL (ref 0–0.6)
EOSINOPHILS RELATIVE PERCENT: 2.3 % (ref 0–5)
GFR AFRICAN AMERICAN: >59
GFR NON-AFRICAN AMERICAN: >60
GLUCOSE BLD-MCNC: 219 MG/DL (ref 74–109)
HBA1C MFR BLD: 7.5 % (ref 4–6)
HCT VFR BLD CALC: 43.6 % (ref 42–52)
HDLC SERPL-MCNC: 59 MG/DL (ref 55–121)
HEMOGLOBIN: 13.3 G/DL (ref 14–18)
IMMATURE GRANULOCYTES #: 0 K/UL
LDL CHOLESTEROL CALCULATED: 102 MG/DL
LYMPHOCYTES ABSOLUTE: 3.1 K/UL (ref 1.1–4.5)
LYMPHOCYTES RELATIVE PERCENT: 30 % (ref 20–40)
MCH RBC QN AUTO: 29.8 PG (ref 27–31)
MCHC RBC AUTO-ENTMCNC: 30.5 G/DL (ref 33–37)
MCV RBC AUTO: 97.8 FL (ref 80–94)
MONOCYTES ABSOLUTE: 0.9 K/UL (ref 0–0.9)
MONOCYTES RELATIVE PERCENT: 8.8 % (ref 0–10)
NEUTROPHILS ABSOLUTE: 6 K/UL (ref 1.5–7.5)
NEUTROPHILS RELATIVE PERCENT: 57.8 % (ref 50–65)
PDW BLD-RTO: 13.2 % (ref 11.5–14.5)
PLATELET # BLD: 386 K/UL (ref 130–400)
PMV BLD AUTO: 9.6 FL (ref 9.4–12.4)
POTASSIUM SERPL-SCNC: 4.6 MMOL/L (ref 3.5–5)
PROSTATE SPECIFIC ANTIGEN: 1.55 NG/ML (ref 0–4)
RBC # BLD: 4.46 M/UL (ref 4.7–6.1)
SODIUM BLD-SCNC: 139 MMOL/L (ref 136–145)
TESTOSTERONE TOTAL: 238.6 NG/DL (ref 193–740)
TOTAL PROTEIN: 7.4 G/DL (ref 6.6–8.7)
TRIGLYCERIDE, FASTING: 140 MG/DL (ref 0–149)
WBC # BLD: 10.3 K/UL (ref 4.8–10.8)

## 2021-11-08 ENCOUNTER — TRANSCRIBE ORDERS (OUTPATIENT)
Dept: ADMINISTRATIVE | Facility: HOSPITAL | Age: 71
End: 2021-11-08

## 2021-11-08 ENCOUNTER — HOSPITAL ENCOUNTER (OUTPATIENT)
Dept: CT IMAGING | Facility: HOSPITAL | Age: 71
Discharge: HOME | End: 2021-11-08

## 2021-11-08 DIAGNOSIS — Z13.9 SCREENING FOR UNSPECIFIED CONDITION: Primary | ICD-10-CM

## 2021-11-08 DIAGNOSIS — Z13.9 SCREENING FOR UNSPECIFIED CONDITION: ICD-10-CM

## 2021-11-08 PROCEDURE — 71271 CT THORAX LUNG CANCER SCR C-: CPT

## 2021-11-18 ENCOUNTER — OFFICE VISIT (OUTPATIENT)
Dept: PRIMARY CARE CLINIC | Age: 71
End: 2021-11-18
Payer: MEDICAID

## 2021-11-18 VITALS
TEMPERATURE: 97.1 F | WEIGHT: 175 LBS | BODY MASS INDEX: 24.5 KG/M2 | OXYGEN SATURATION: 97 % | HEIGHT: 71 IN | DIASTOLIC BLOOD PRESSURE: 78 MMHG | HEART RATE: 55 BPM | SYSTOLIC BLOOD PRESSURE: 130 MMHG

## 2021-11-18 DIAGNOSIS — N40.1 BENIGN PROSTATIC HYPERPLASIA WITH URINARY HESITANCY: ICD-10-CM

## 2021-11-18 DIAGNOSIS — R39.11 BENIGN PROSTATIC HYPERPLASIA WITH URINARY HESITANCY: ICD-10-CM

## 2021-11-18 DIAGNOSIS — E11.69 TYPE 2 DIABETES MELLITUS WITH OTHER SPECIFIED COMPLICATION, WITHOUT LONG-TERM CURRENT USE OF INSULIN (HCC): Primary | ICD-10-CM

## 2021-11-18 DIAGNOSIS — I10 ESSENTIAL HYPERTENSION: ICD-10-CM

## 2021-11-18 DIAGNOSIS — E11.69 TYPE 2 DIABETES MELLITUS WITH OTHER SPECIFIED COMPLICATION, WITHOUT LONG-TERM CURRENT USE OF INSULIN (HCC): ICD-10-CM

## 2021-11-18 LAB
CREATININE URINE: 50.6 MG/DL (ref 4.2–622)
MICROALBUMIN UR-MCNC: 5.1 MG/DL (ref 0–19)
MICROALBUMIN/CREAT UR-RTO: 100.8 MG/G

## 2021-11-18 PROCEDURE — 99214 OFFICE O/P EST MOD 30 MIN: CPT | Performed by: STUDENT IN AN ORGANIZED HEALTH CARE EDUCATION/TRAINING PROGRAM

## 2021-11-18 PROCEDURE — 3051F HG A1C>EQUAL 7.0%<8.0%: CPT | Performed by: STUDENT IN AN ORGANIZED HEALTH CARE EDUCATION/TRAINING PROGRAM

## 2021-11-18 RX ORDER — TAMSULOSIN HYDROCHLORIDE 0.4 MG/1
0.4 CAPSULE ORAL DAILY
Qty: 90 CAPSULE | Refills: 3 | Status: SHIPPED | OUTPATIENT
Start: 2021-11-18 | End: 2022-01-13 | Stop reason: SDUPTHER

## 2021-11-18 RX ORDER — GLIMEPIRIDE 2 MG/1
2 TABLET ORAL
Qty: 30 TABLET | Refills: 5 | Status: SHIPPED | OUTPATIENT
Start: 2021-11-18 | End: 2022-01-13 | Stop reason: SDUPTHER

## 2021-11-18 NOTE — Clinical Note
Can you contact pt and have him arrange for 4 week follow up? I instructed him to see me in 4 weeks.

## 2021-11-18 NOTE — PROGRESS NOTES
[address] [facility]  [address] [place] [address]  Dept: [phone]  Dept Fax: [phone]  Loc: [phone]      Subjective:     Chief Complaint   Patient presents with    3 Month Follow-Up     Numbeness/Tingles in feet- Patient is urinating alot and wants to dicuss prostate        HPI:  Daria Childers is a 66 y.o. male presenting for    Here for follow up     T2DM  -Pt states he hasn't actually been on trulicity 7.5DJ since early this year d/t cost being too high with insurance. Recent labs show a1c 7.5 which is up from 6.6 earlier this year. There are areas where he could cut back carbs. He is staying physically active. Couldn't tolerate metformin previously. Since last a1c pt has been having increased tingling in toes L>R. PSA wnl at 1.55. CMP unremarkable. Mild macrocytic anemia on cbc w/ hgb 13.3, mcv 97.8. Pt denies any significant etoh use. He is due for repeat CLN in 6 mo. Lipid levels at goal.     HTN  -BP has been controlled on valsartan-hctz 320/25mg, amlodipine 5mg.  -Denies HA, visual changes, cp, palpitations, or swelling    BPH  -Flomax on pt's med list however pt states he hasn't been taking.  He is endorsing increased urination at times but also weak stream and difficulty initiating stream.     ROS:   Review of Systems  Reviewed with patient and noted to be negative except that listed in HPI    PMHx:  Past Medical History:   Diagnosis Date    BPH (benign prostatic hyperplasia)     Dizziness     DM [facility]diabetes mellitus) ([facility] [address]     type 2    Dysphagia     HTN (hypertension)     Hyperlipidemia     Hypogonadism male     Peripheral neuropathy     Tobacco dependence      Patient Active Problem List   Diagnosis    Benign prostatic hyperplasia with lower urinary tract symptoms    Tobacco abuse    Mixed hyperlipidemia    Testosterone deficiency    Essential hypertension    Diabetes mellitus (HCC)    GERD (gastroesophageal reflux 150 MG extended release tablet Take 1 tablet by mouth 2 times daily Days 1-3 take one tablet daily. Then take 2 tablets daily 60 tablet 3    traZODone (DESYREL) 50 MG tablet Take 1 tablet by mouth nightly 90 tablet 3    blood glucose test strips (ACCU-CHEK FRANCISCO PLUS) strip TEST 2 TIMES A  strip 5    omeprazole (PRILOSEC) 20 MG delayed release capsule Take 1 tablet po BID ( on an empty stomach) x 14 days for treatment of h pylori infection. 28 capsule 0    albuterol sulfate HFA (PROAIR HFA) 108 (90 Base) MCG/ACT inhaler Inhale 2 puffs into the lungs every 6 hours as needed for Wheezing      Testosterone Cypionate 200 MG/ML SOLN Inject 2.5 mLs as directed every 14 days      Cholecalciferol (VITAMIN D3) 90499 units CAPS Take 1 capsule by mouth once a week        No current facility-administered medications for this visit. Objective:   PE:  /78   Pulse 55   Temp 97.1 °F (36.2 °C)   Ht 5' 11\" (1.803 m)   Wt 175 lb (79.4 kg)   SpO2 97%   BMI 24.41 kg/m²   Physical Exam  Constitutional:       General: He is not in acute distress. Appearance: Normal appearance. HENT:      Head: Normocephalic. Nose: Nose normal.      Mouth/Throat:      Mouth: Mucous membranes are moist.      Pharynx: Oropharynx is clear. No oropharyngeal exudate or posterior oropharyngeal erythema. Eyes:      General: No scleral icterus. Extraocular Movements: Extraocular movements intact. Conjunctiva/sclera: Conjunctivae normal.   Cardiovascular:      Rate and Rhythm: Normal rate and regular rhythm. Pulses: Normal pulses. Heart sounds: No murmur heard. Pulmonary:      Effort: Pulmonary effort is normal.      Breath sounds: Normal breath sounds. Abdominal:      General: There is no distension. Palpations: Abdomen is soft. There is no mass. Tenderness: There is no abdominal tenderness. Musculoskeletal:         General: Normal range of motion.       Cervical back: Normal range of motion. Skin:     General: Skin is warm and dry. Capillary Refill: Capillary refill takes less than 2 seconds. Neurological:      General: No focal deficit present. Mental Status: He is alert and oriented to person, place, and time. Psychiatric:         Mood and Affect: Mood normal.         Behavior: Behavior normal.         Thought Content: Thought content normal.            Assessment & Plan   Magalie Zaragoza was seen today for 3 month follow-up. Diagnoses and all orders for this visit:    Type 2 diabetes mellitus with other specified complication, without long-term current use of insulin ([facility] [address]  -Restart glimepiride. F/u in 4 weeks. Check fasting bg until then. Discussed limiting carbs/portions. Peripheral neuropathy and increased urination likely related to worsened a1c. Continue statin. Microalbumin, Ur; Future  -     glimepiride (AMARYL) 2 MG tablet; Take 1 tablet by mouth every morning (before breakfast)    Benign prostatic hyperplasia with urinary hesitancy  -     tamsulosin (FLOMAX) 0.4 MG capsule; Take 1 capsule by mouth daily    Essential hypertension  -At goal. Continue valsartan-hctz 320/25mg, amlodipine 5mg. Return in about 4 weeks (around 12/16/2021). All questions were answered. Medications, including possible adverse effects, and instructions were reviewed and  understanding was confirmed. Follow-up recommendations, including when to contact or return to office (ie; if symptoms worsen or fail to improve), were discussed and acknowledged.     Electronically signed by Amilcar Avila MD on 11/18/21 at 9:20 AM CST

## 2021-12-17 ENCOUNTER — OFFICE VISIT (OUTPATIENT)
Dept: PRIMARY CARE CLINIC | Age: 71
End: 2021-12-17
Payer: MEDICAID

## 2021-12-17 VITALS
HEART RATE: 72 BPM | BODY MASS INDEX: 24.84 KG/M2 | TEMPERATURE: 97.6 F | DIASTOLIC BLOOD PRESSURE: 68 MMHG | HEIGHT: 71 IN | OXYGEN SATURATION: 98 % | WEIGHT: 177.4 LBS | SYSTOLIC BLOOD PRESSURE: 136 MMHG

## 2021-12-17 DIAGNOSIS — R39.11 BENIGN PROSTATIC HYPERPLASIA WITH URINARY HESITANCY: ICD-10-CM

## 2021-12-17 DIAGNOSIS — I10 ESSENTIAL HYPERTENSION: ICD-10-CM

## 2021-12-17 DIAGNOSIS — E11.69 TYPE 2 DIABETES MELLITUS WITH OTHER SPECIFIED COMPLICATION, WITHOUT LONG-TERM CURRENT USE OF INSULIN (HCC): Primary | ICD-10-CM

## 2021-12-17 DIAGNOSIS — N40.1 BENIGN PROSTATIC HYPERPLASIA WITH URINARY HESITANCY: ICD-10-CM

## 2021-12-17 PROCEDURE — 3051F HG A1C>EQUAL 7.0%<8.0%: CPT | Performed by: STUDENT IN AN ORGANIZED HEALTH CARE EDUCATION/TRAINING PROGRAM

## 2021-12-17 PROCEDURE — 99214 OFFICE O/P EST MOD 30 MIN: CPT | Performed by: STUDENT IN AN ORGANIZED HEALTH CARE EDUCATION/TRAINING PROGRAM

## 2021-12-17 SDOH — ECONOMIC STABILITY: FOOD INSECURITY: WITHIN THE PAST 12 MONTHS, THE FOOD YOU BOUGHT JUST DIDN'T LAST AND YOU DIDN'T HAVE MONEY TO GET MORE.: NEVER TRUE

## 2021-12-17 SDOH — ECONOMIC STABILITY: FOOD INSECURITY: WITHIN THE PAST 12 MONTHS, YOU WORRIED THAT YOUR FOOD WOULD RUN OUT BEFORE YOU GOT MONEY TO BUY MORE.: NEVER TRUE

## 2021-12-17 ASSESSMENT — SOCIAL DETERMINANTS OF HEALTH (SDOH): HOW HARD IS IT FOR YOU TO PAY FOR THE VERY BASICS LIKE FOOD, HOUSING, MEDICAL CARE, AND HEATING?: NOT VERY HARD

## 2021-12-17 NOTE — PROGRESS NOTES
[address] [facility]  [address] [address] [address]  [address] [address]  Dept: [phone]  Dept Fax: [phone]  Loc: [phone]      Subjective:     Chief Complaint   Patient presents with    1 Month Follow-Up     Patient states his blood sugar has been running slightly high the last couple of days he has been checking his glucose level after having coffee with sugar        HPI:  Steve Jenkins is a 66 y.o. male presenting for    T2DM  -Patient restarted on glimepiride 2 mg at last visit. Last A1c 7.3 6 weeks ago. He has been checking his blood sugars however he is doing this during periods after sugar sweetened coffee and meals. Was previously unable to afford the Trulicity with insurance and unable to tolerate Metformin previously. No hyper or hypoglycemia symptoms    Hypertension  -On valsartan-hctz 320/25mg, amlodipine 5mg  -Denies HA, visual changes, cp, palpitations, or swelling    BPH  -Restarted on Flomax at last visit due to being symptomatic.   He reports somewhat improved symptoms    ROS:   Reviewed with patient and noted to be negative except that listed in HPI    PMHx:  Past Medical History:   Diagnosis Date    BPH (benign prostatic hyperplasia)     Dizziness     DM (diabetes mellitus) ([address]     type 2    Dysphagia     HTN (hypertension)     Hyperlipidemia     Hypogonadism male     Peripheral neuropathy     Tobacco dependence      Patient Active Problem List   Diagnosis    Benign prostatic hyperplasia with lower urinary tract symptoms    Tobacco abuse    Mixed hyperlipidemia    Testosterone deficiency    Essential hypertension    Diabetes mellitus ([address]    GERD (gastroesophageal reflux disease)       PSHx:  Past Surgical History:   Procedure Laterality Date    BACK SURGERY      COLON SURGERY  2014    Done in [facility],  of colon polyps with a 5 yr recall    COLONOSCOPY N/A 7/12/2019    Dr Dre Garcia hemorrhoids-Grade 2, suboptimal prep, diverticulosis-HP-3 yr recall    EYE SURGERY Right 07/2017    UPPER GASTROINTESTINAL ENDOSCOPY N/A 7/12/2019    Dr Tabatha De La Rosa-Mild gastritis and bulbar duodenitis, prominent major duodenal papilla, possible lipoma or GIST        PFHx:  Family History   Problem Relation Age of Onset    Colon Cancer Mother     Diabetes Type 1  Father     Diabetes Father     Cancer Sister     Cancer Brother     Stomach Cancer Brother     Esophageal Cancer Neg Hx     Liver Cancer Neg Hx     Liver Disease Neg Hx     Rectal Cancer Neg Hx        SocialHx:  Social History     Tobacco Use    Smoking status: Current Every Day Smoker     Packs/day: 0.50     Types: Cigarettes    Smokeless tobacco: Never Used   Substance Use Topics    Alcohol use: Yes     Comment: rare       Allergies:  No Known Allergies    Medications:  Current Outpatient Medications   Medication Sig Dispense Refill    tiotropium-olodaterol (STIOLTO RESPIMAT) 2.5-2.5 MCG/ACT AERS Inhale 2 puffs into the lungs Daily      glimepiride (AMARYL) 2 MG tablet Take 1 tablet by mouth every morning (before breakfast) 30 tablet 5    tamsulosin (FLOMAX) 0.4 MG capsule Take 1 capsule by mouth daily 90 capsule 3    gabapentin (NEURONTIN) 300 MG capsule Take 1 capsule by mouth 3 times daily for 90 days. 270 capsule 0    atorvastatin (LIPITOR) 20 MG tablet Take 1 tablet by mouth daily 90 tablet 1    valsartan-hydroCHLOROthiazide (DIOVAN-HCT) 320-25 MG per tablet Take 1 tablet by mouth daily 90 tablet 3    amLODIPine (NORVASC) 5 MG tablet TAKE 1 TABLET EVERY DAY 90 tablet 3    buPROPion (WELLBUTRIN SR) 150 MG extended release tablet Take 1 tablet by mouth 2 times daily Days 1-3 take one tablet daily.  Then take 2 tablets daily 60 tablet 3    traZODone (DESYREL) 50 MG tablet Take 1 tablet by mouth nightly 90 tablet 3    blood glucose test strips (ACCU-CHEK FRANCISCO PLUS) strip TEST 2 TIMES A  strip 5    omeprazole (PRILOSEC) 20 MG delayed release capsule Take 1 tablet po BID ( on an empty stomach) x 14 days for treatment of h pylori infection. 28 capsule 0    albuterol sulfate HFA (PROAIR HFA) 108 (90 Base) MCG/ACT inhaler Inhale 2 puffs into the lungs every 6 hours as needed for Wheezing      Testosterone Cypionate 200 MG/ML SOLN Inject 2.5 mLs as directed every 14 days      Cholecalciferol (VITAMIN D3) 93889 units CAPS Take 1 capsule by mouth once a week        No current facility-administered medications for this visit. Objective:   PE:  /68   Pulse 72   Temp 97.6 °F (36.4 °C)   Ht 5' 11\" (1.803 m)   Wt 177 lb 6.4 oz (80.5 kg)   SpO2 98%   BMI 24.74 kg/m²   Physical Exam  Constitutional:       General: He is not in acute distress. Appearance: Normal appearance. HENT:      Head: Normocephalic. Nose: Nose normal.      Mouth/Throat:      Mouth: Mucous membranes are moist.      Pharynx: Oropharynx is clear. No oropharyngeal exudate or posterior oropharyngeal erythema. Eyes:      General: No scleral icterus. Extraocular Movements: Extraocular movements intact. Conjunctiva/sclera: Conjunctivae normal.   Cardiovascular:      Rate and Rhythm: Normal rate and regular rhythm. Pulses: Normal pulses. Heart sounds: No murmur heard. Pulmonary:      Effort: Pulmonary effort is normal.      Breath sounds: Normal breath sounds. Abdominal:      General: There is no distension. Palpations: Abdomen is soft. There is no mass. Tenderness: There is no abdominal tenderness. Musculoskeletal:         General: Normal range of motion. Cervical back: Normal range of motion. Skin:     General: Skin is warm and dry. Capillary Refill: Capillary refill takes less than 2 seconds. Neurological:      General: No focal deficit present. Mental Status: He is alert and oriented to person, place, and time.    Psychiatric:         Mood and Affect: Mood normal. Behavior: Behavior normal.         Thought Content: Thought content normal.            Assessment & Plan   Khadar Adamson was seen today for 1 month follow-up. Diagnoses and all orders for this visit:    Type 2 diabetes mellitus with other specified complication, without long-term current use of insulin [address]  -Uncontrolled. We will continue glimepiride 2 mg, and repeat A1c at the 3-month sergo in 6 weeks. Discussed limiting excess carbs. Patient to start checking fasting sugars and keeping log    Essential hypertension  -At goal.  Continue valsartan-hctz 320/25mg, amlodipine 5mg    Benign prostatic hyperplasia with urinary hesitancy  -Symptoms improved. Continue Flomax      Return in about 6 weeks (around 1/28/2022) for T2DM needs poca1c. All questions were answered. Medications, including possible adverse effects, and instructions were reviewed and  understanding was confirmed. Follow-up recommendations, including when to contact or return to office (ie; if symptoms worsen or fail to improve), were discussed and acknowledged.     Electronically signed by Natalie Almaraz MD on 12/17/21 at 9:25 AM CST

## 2022-01-12 ENCOUNTER — TELEPHONE (OUTPATIENT)
Dept: PULMONOLOGY | Facility: CLINIC | Age: 72
End: 2022-01-12

## 2022-01-12 DIAGNOSIS — J43.2 CENTRILOBULAR EMPHYSEMA: Chronic | ICD-10-CM

## 2022-01-12 RX ORDER — ALBUTEROL SULFATE 90 UG/1
2 AEROSOL, METERED RESPIRATORY (INHALATION) EVERY 4 HOURS PRN
Qty: 54 G | Refills: 3 | Status: SHIPPED | OUTPATIENT
Start: 2022-01-12 | End: 2022-07-18 | Stop reason: SDUPTHER

## 2022-01-12 NOTE — TELEPHONE ENCOUNTER
Patient is needing albuterol inhaler. He is wanting it sent to [facility] pharmacy. He said he usually gets a 3 month supply  Rx Refill Note  Requested Prescriptions     Pending Prescriptions Disp Refills   • albuterol sulfate  (90 Base) MCG/ACT inhaler 54 g 3     Sig: Inhale 2 puffs Every 4 (Four) Hours As Needed for Wheezing or Shortness of Air.      Last office visit with prescribing clinician: 10/11/2021      Next office visit with prescribing clinician: 4/11/2022            Thuy Denton MA  01/12/22, 09:34 CST

## 2022-01-13 DIAGNOSIS — E11.69 TYPE 2 DIABETES MELLITUS WITH OTHER SPECIFIED COMPLICATION, WITHOUT LONG-TERM CURRENT USE OF INSULIN (HCC): ICD-10-CM

## 2022-01-13 DIAGNOSIS — R39.11 BENIGN PROSTATIC HYPERPLASIA WITH URINARY HESITANCY: ICD-10-CM

## 2022-01-13 DIAGNOSIS — N40.1 BENIGN PROSTATIC HYPERPLASIA WITH URINARY HESITANCY: ICD-10-CM

## 2022-01-13 RX ORDER — ATORVASTATIN CALCIUM 20 MG/1
20 TABLET, FILM COATED ORAL DAILY
Qty: 90 TABLET | Refills: 1 | Status: SHIPPED | OUTPATIENT
Start: 2022-01-13 | End: 2022-05-31

## 2022-01-13 RX ORDER — TAMSULOSIN HYDROCHLORIDE 0.4 MG/1
0.4 CAPSULE ORAL DAILY
Qty: 90 CAPSULE | Refills: 3 | Status: SHIPPED | OUTPATIENT
Start: 2022-01-13 | End: 2022-11-02

## 2022-01-13 RX ORDER — AMLODIPINE BESYLATE 5 MG/1
TABLET ORAL
Qty: 90 TABLET | Refills: 3 | Status: SHIPPED | OUTPATIENT
Start: 2022-01-13 | End: 2022-11-02

## 2022-01-13 RX ORDER — VALSARTAN AND HYDROCHLOROTHIAZIDE 320; 25 MG/1; MG/1
1 TABLET, FILM COATED ORAL DAILY
Qty: 90 TABLET | Refills: 3 | Status: SHIPPED | OUTPATIENT
Start: 2022-01-13 | End: 2022-11-02

## 2022-01-13 RX ORDER — GLIMEPIRIDE 2 MG/1
2 TABLET ORAL
Qty: 30 TABLET | Refills: 5 | Status: SHIPPED | OUTPATIENT
Start: 2022-01-13 | End: 2022-04-14 | Stop reason: SDUPTHER

## 2022-01-19 DIAGNOSIS — E11.69 TYPE 2 DIABETES MELLITUS WITH OTHER SPECIFIED COMPLICATION, WITHOUT LONG-TERM CURRENT USE OF INSULIN (HCC): ICD-10-CM

## 2022-01-19 RX ORDER — GABAPENTIN 300 MG/1
300 CAPSULE ORAL 3 TIMES DAILY
Qty: 270 CAPSULE | Refills: 0 | Status: SHIPPED | OUTPATIENT
Start: 2022-01-19 | End: 2022-01-28

## 2022-01-19 NOTE — TELEPHONE ENCOUNTER
Jeannine Garyleila called to request a refill on his medication. Last office visit : 12/17/2021   Next office visit : 1/28/2022     Last Theresa Agrawal: 01-  Medication Contract: Not found   Last Fill: 08/25/2021    Last UDS: No results found for: LABAMPH, LABBARB, LABBENZ, BUPRENUR, COCAIMETSCRU, GABAPENTIN, MDMA, METAMPU, OPIATESCREENURINE, OXTCOSU, PHENCYCLIDINESCREENURINE, PROPOXYPHENE, THCSCREENUR, TRICYUR              Requested Prescriptions     Pending Prescriptions Disp Refills    gabapentin (NEURONTIN) 300 MG capsule 270 capsule 0     Sig: Take 1 capsule by mouth 3 times daily for 90 days. Please approve or refuse this medication.    Jacqueline Gallegos MA

## 2022-01-28 ENCOUNTER — HOSPITAL ENCOUNTER (OUTPATIENT)
Dept: GENERAL RADIOLOGY | Age: 72
Discharge: HOME | End: 2022-01-28
Payer: MEDICARE

## 2022-01-28 ENCOUNTER — OFFICE VISIT (OUTPATIENT)
Dept: PRIMARY CARE CLINIC | Age: 72
End: 2022-01-28
Payer: MEDICARE

## 2022-01-28 VITALS
WEIGHT: 182 LBS | SYSTOLIC BLOOD PRESSURE: 122 MMHG | TEMPERATURE: 97.7 F | HEIGHT: 71 IN | BODY MASS INDEX: 25.48 KG/M2 | HEART RATE: 73 BPM | DIASTOLIC BLOOD PRESSURE: 78 MMHG | OXYGEN SATURATION: 97 %

## 2022-01-28 DIAGNOSIS — M79.672 CHRONIC PAIN IN LEFT FOOT: ICD-10-CM

## 2022-01-28 DIAGNOSIS — E11.69 TYPE 2 DIABETES MELLITUS WITH OTHER SPECIFIED COMPLICATION, WITHOUT LONG-TERM CURRENT USE OF INSULIN (HCC): Primary | ICD-10-CM

## 2022-01-28 DIAGNOSIS — E11.69 TYPE 2 DIABETES MELLITUS WITH OTHER SPECIFIED COMPLICATION, WITHOUT LONG-TERM CURRENT USE OF INSULIN (HCC): ICD-10-CM

## 2022-01-28 DIAGNOSIS — G89.29 CHRONIC PAIN IN LEFT FOOT: ICD-10-CM

## 2022-01-28 DIAGNOSIS — I10 ESSENTIAL HYPERTENSION: ICD-10-CM

## 2022-01-28 LAB — HBA1C MFR BLD: 6.6 %

## 2022-01-28 PROCEDURE — 73630 X-RAY EXAM OF FOOT: CPT

## 2022-01-28 PROCEDURE — 83036 HEMOGLOBIN GLYCOSYLATED A1C: CPT | Performed by: STUDENT IN AN ORGANIZED HEALTH CARE EDUCATION/TRAINING PROGRAM

## 2022-01-28 PROCEDURE — 99214 OFFICE O/P EST MOD 30 MIN: CPT | Performed by: STUDENT IN AN ORGANIZED HEALTH CARE EDUCATION/TRAINING PROGRAM

## 2022-01-28 RX ORDER — GABAPENTIN 600 MG/1
600 TABLET ORAL DAILY
Qty: 180 TABLET | Refills: 3 | Status: SHIPPED | OUTPATIENT
Start: 2022-01-28 | End: 2022-04-14 | Stop reason: SDUPTHER

## 2022-01-28 NOTE — PROGRESS NOTES
[address] [facility]  [address] [address] [address]  [address] [address]  Dept: [phone]  Dept Fax: [phone]  Loc: [phone]      Subjective:     Chief Complaint   Patient presents with    Diabetes       HPI:  Gunnar Baer is a 66 y.o. male presenting for    T2DM  -Here for follow up. Pt is on glimepiride 20mg. A1c to be checked today. Last was 7.5 10/2021. Pt has been working on his diet, less sugar w/ coffee. Due for foot exam today. Has peripheral neuropathy treated w/ gabapentin 300mg that he takes bid. Describes L>R sx as well as some MTP joint pain. No h/o gout. States he had dilated eye exam 11/2021, do not have record of this. HTN  -Pt on valsartan 320mg, hctz 25mg, amlodipine 5mg. Pt reports controlled bp readings at home.  Denies HA, visual changes, cp, palpitations, or swelling    ROS:   Reviewed with patient and noted to be negative except that listed in HPI    PMHx:  Past Medical History:   Diagnosis Date    BPH (benign prostatic hyperplasia)     Dizziness     DM (diabetes mellitus) ([address]     type 2    Dysphagia     HTN (hypertension)     Hyperlipidemia     Hypogonadism male     Peripheral neuropathy     Tobacco dependence      Patient Active Problem List   Diagnosis    Benign prostatic hyperplasia with lower urinary tract symptoms    Tobacco abuse    Mixed hyperlipidemia    Testosterone deficiency    Essential hypertension    Diabetes mellitus ([address]    GERD (gastroesophageal reflux disease)       PSHx:  Past Surgical History:   Procedure Laterality Date    BACK SURGERY      COLON SURGERY  2014    Done in [place],  of colon polyps with a 5 yr recall    COLONOSCOPY N/A 7/12/2019    Dr Meredith Tamayo hemorrhoids-Grade 2, suboptimal prep, diverticulosis-HP-3 yr recall    EYE SURGERY Right 07/2017    UPPER GASTROINTESTINAL ENDOSCOPY N/A 7/12/2019    Dr Juanice Barthel Chandrashekar-Mild gastritis and bulbar duodenitis, prominent major duodenal papilla, possible lipoma or GIST        PFHx:  Family History   Problem Relation Age of Onset    Colon Cancer Mother     Diabetes Type 1  Father     Diabetes Father     Cancer Sister     Cancer Brother     Stomach Cancer Brother     Esophageal Cancer Neg Hx     Liver Cancer Neg Hx     Liver Disease Neg Hx     Rectal Cancer Neg Hx        SocialHx:  Social History     Tobacco Use    Smoking status: Current Every Day Smoker     Packs/day: 0.25     Years: 54.00     Pack years: 13.50     Types: Cigarettes     Start date: 1964    Smokeless tobacco: Never Used   Substance Use Topics    Alcohol use: Yes     Comment: rare       Allergies:  No Known Allergies    Medications:  Current Outpatient Medications   Medication Sig Dispense Refill    gabapentin (NEURONTIN) 600 MG tablet Take 1 tablet by mouth daily for 90 days. 180 tablet 3    tamsulosin (FLOMAX) 0.4 MG capsule Take 1 capsule by mouth daily 90 capsule 3    glimepiride (AMARYL) 2 MG tablet Take 1 tablet by mouth every morning (before breakfast) 30 tablet 5    valsartan-hydroCHLOROthiazide (DIOVAN-HCT) 320-25 MG per tablet Take 1 tablet by mouth daily 90 tablet 3    traZODone (DESYREL) 50 MG tablet Take 1 tablet by mouth nightly 90 tablet 3    omeprazole (PRILOSEC) 20 MG delayed release capsule Take 1 tablet po BID ( on an empty stomach) x 14 days for treatment of h pylori infection. 28 capsule 0    amLODIPine (NORVASC) 5 MG tablet TAKE 1 TABLET EVERY DAY 90 tablet 3    atorvastatin (LIPITOR) 20 MG tablet Take 1 tablet by mouth daily 90 tablet 1    buPROPion (WELLBUTRIN SR) 150 MG extended release tablet Take 1 tablet by mouth 2 times daily Days 1-3 take one tablet daily.  Then take 2 tablets daily (Patient not taking: Reported on 1/28/2022) 60 tablet 3    blood glucose test strips (ACCU-CHEK FRANCISCO PLUS) strip TEST 2 TIMES A  strip 5    albuterol sulfate HFA (PROAIR HFA) 108 (90 Base) MCG/ACT inhaler Inhale 2 puffs into the lungs every 6 hours as needed for Wheezing       No current facility-administered medications for this visit. Objective:   PE:  /78   Pulse 73   Temp 97.7 °F (36.5 °C) (Temporal)   Ht 5' 11\" (1.803 m)   Wt 182 lb (82.6 kg)   SpO2 97%   BMI 25.38 kg/m²   Physical Exam  Constitutional:       General: He is not in acute distress. Appearance: Normal appearance. HENT:      Head: Normocephalic. Nose: Nose normal.      Mouth/Throat:      Mouth: Mucous membranes are moist.      Pharynx: Oropharynx is clear. No oropharyngeal exudate or posterior oropharyngeal erythema. Eyes:      General: No scleral icterus. Extraocular Movements: Extraocular movements intact. Conjunctiva/sclera: Conjunctivae normal.   Cardiovascular:      Rate and Rhythm: Normal rate and regular rhythm. Pulses: Normal pulses. Heart sounds: No murmur heard. Comments: DP/PT 2+ BL  Pulmonary:      Effort: Pulmonary effort is normal.      Breath sounds: Normal breath sounds. Abdominal:      General: There is no distension. Palpations: Abdomen is soft. There is no mass. Tenderness: There is no abdominal tenderness. Musculoskeletal:         General: Normal range of motion. Cervical back: Normal range of motion. Skin:     General: Skin is warm and dry. Capillary Refill: Capillary refill takes less than 2 seconds. Comments: No foot wounds/lesions identified     Neurological:      General: No focal deficit present. Mental Status: He is alert and oriented to person, place, and time. Comments: Monofilament 10/10 BL  Propioception and vibratory senses intact     Psychiatric:         Mood and Affect: Mood normal.         Behavior: Behavior normal.         Thought Content: Thought content normal.            Assessment & Plan   Ramon Isidro was seen today for diabetes.     Diagnoses and all orders for this visit:    Type 2 diabetes mellitus with other specified complication, without long-term current use of insulin (HCC)  -     POCT glycosylated hemoglobin (Hb A1C)->6.6. At goal. Continue glimepiride 2mg. Pt encouraged on efforts to improve glycemic control  -     Diabetic Foot Exam->Shoes to [facility]  -Asked pt to fill out records release for [facility] to get optometrist exam results.  -Check xray L foot, suspect likely OA. Can consider checking uric acid  -Increase gabapentin to 600mg bid    Essential hypertension  -At goal. Continue valsartan 320mg, hctz 25mg      Return in about 3 months (around 4/28/2022) for T2DM. All questions were answered. Medications, including possible adverse effects, and instructions were reviewed and  understanding was confirmed. Follow-up recommendations, including when to contact or return to office (ie; if symptoms worsen or fail to improve), were discussed and acknowledged.     Electronically signed by Umer Norman MD on 1/28/22 at 9:02 AM CST

## 2022-02-10 ENCOUNTER — TELEPHONE (OUTPATIENT)
Dept: PRIMARY CARE CLINIC | Age: 72
End: 2022-02-10

## 2022-02-10 NOTE — TELEPHONE ENCOUNTER
----- Message from Miguel Rosas MD sent at 2/8/2022  3:25 PM CST -----  Please let patient know that x-ray of his foot showed fairly advanced osteoarthritis in the left foot where he was indicating he was having pain. Generally acetaminophen/Tylenol is recommended for arthritic pain like this. If pain is significant I would recommend that he see a foot specialist.  Have him let me know if he wants to proceed with this. Attempted to call patient with results. Had to leave voicemail to return call.

## 2022-03-10 RX ORDER — BLOOD SUGAR DIAGNOSTIC
STRIP MISCELLANEOUS
Qty: 200 STRIP | Refills: 5 | Status: SHIPPED | OUTPATIENT
Start: 2022-03-10

## 2022-03-10 RX ORDER — TRAZODONE HYDROCHLORIDE 50 MG/1
50 TABLET ORAL NIGHTLY
Qty: 90 TABLET | Refills: 3 | Status: SHIPPED | OUTPATIENT
Start: 2022-03-10

## 2022-04-07 NOTE — PROGRESS NOTES
" TAMAR Sorenson  [facility]   Pulmonary and Critical Care  [address]  [place] [address]  Phone: [phone]  Fax: [phone]           Chief Complaint  stage 4 very severe copd and Asthma    Subjective    History of Present Illness     Naif Nick presents to [facility] PULMONARY & CRITICAL CARE MEDICINE   Mr. Nick is a pleasant 71 year old male with known very severe stage 4 COPD, chronic respiratory failure on home oxygen of 2L NC, Former smoker, centrolobular emphysema, TB exposure, Lung nodule and Asthma/ copd overlap syndrome. He is on Mucinex, Nebulizer prn, Albuterol PRN. Once a day use on the albuterol HFA. Symbicort was too expensive. He was given Anoro at last visit and did not like the powder.  Last Nebulizer treatment was last week. He uses Flonase as needed for nasal drainage. He has not used it in a while. He has a cough that is described as a little bit and sputum comes up easy.  His shortness of breath is worse in the am. He feels that with his walking regimen this has improved. He had a CT in Nov for which the report is unavailable however he states he was notified the lung nodule was stable.        Objective   Vital Signs:   /64   Pulse 80   Ht 180.3 cm (71\")   Wt 80.3 kg (177 lb)   SpO2 99% Comment: ra  BMI 24.69 kg/m²     Physical Exam  Vitals reviewed.   Constitutional:       Appearance: Normal appearance.   Cardiovascular:      Rate and Rhythm: Normal rate and regular rhythm.   Pulmonary:      Effort: Pulmonary effort is normal.      Breath sounds: Normal breath sounds.   Neurological:      General: No focal deficit present.      Mental Status: He is alert and oriented to person, place, and time.   Psychiatric:         Mood and Affect: Mood normal.         Behavior: Behavior normal.          Result Review :  The following data was reviewed by: TAMAR Sorenson on 04/11/2022:    My interpretation of imaging:  HAD LDCT " in Nov however report unavailable. Patient reports he was told the nodule was stable.   My interpretation of labs: none  CT Chest Low Dose Cancer Screening WO (11/08/2021 13:33)      My interpretation of the PFT: No new     Results for orders placed during the hospital encounter of 08/10/20    Full Pulmonary Function Test With Bronchodilator    [facility] - Pulmonary Function Test    [address]  [facility]  [address]  [phone]    Patient : Naif Nick  MRN : [id number]  YOB: 1950  :  Nacho Vaughan MD  Date : 8/10/2020    ______________________________________________________________________    Interpretation :  1. Spirometry shows very severe airflow obstruction prebronchodilator  2. Following bronchodilator there is minimal not significant improvement in FEV1 and FVC, but postbronchodilator spirometry meet criteria for severe rather than very severe obstruction.  3. Mid flow is reduced.  4. Lung volume measurements show reduction in inspiratory capacity and elevations in expiratory reserve volume residual volume and total lung capacity suggesting hyperinflation and very significant gas trapping.  5. Airway resistance is increased and conductance is decreased.      Nacho Vaughan MD    Rest/Exercise Pulse Ox Values        Some values may be hidden. Unless noted otherwise, only the newest values recorded on each date are displayed.         Rest/Exercise Pulse Ox Results 9/28/20   Rest room air SAT % 98   Exercise room air SAT % 96             Patient's BMI 24.69. BMI is within normal parameters. No follow-up required..    Assessment and Plan   Diagnoses and all orders for this visit:    1. Stage 4 very severe COPD by GOLD classification (HCC) (Primary)    2. Centrilobular emphysema (HCC)    3. Chronic respiratory failure with hypoxia (HCC)    4. Tobacco abuse, in remission    5. Asthma-COPD overlap syndrome (HCC)    6. Lung nodule      Patient is  doing well and feels he is stable. He however has severe disease and not on a maintenance inhaler. Symbicort was too expensive. He does not like the powder inhalers. He has had a history of glaucoma. He will continue his PRN Albuterol HFA as well as his nebulizer. He is asked to discuss with his eye doctor and have them respond in writing if his eye pressures are ok to tolerate and anticholinergic. He will let us know and check for samples of Spiriva if ok. Lung nodule is followed with recent LDCT in Nov and attempts made to get the report. Patient was notified by ordering provider that it was stable. Follow up in 6 months with [facility] with DLCO in office.     Alpha 1: none     Health maintenance:   Influenza vaccine:Oct 2021   Pneumovax 23: may 2021   Prevnar 13: March 2020   Covid-19 Moderna Feb/ march, booster Dec 2021     Follow Up   No follow-ups on file.  Patient was given instructions and counseling regarding his condition or for health maintenance advice. Please see specific information pulled into the AVS if appropriate.     Raquel Sosa, TAMAR  4/11/2022  15:15 CDT

## 2022-04-11 ENCOUNTER — OFFICE VISIT (OUTPATIENT)
Dept: PULMONOLOGY | Facility: CLINIC | Age: 72
End: 2022-04-11

## 2022-04-11 VITALS
HEART RATE: 80 BPM | BODY MASS INDEX: 24.78 KG/M2 | SYSTOLIC BLOOD PRESSURE: 130 MMHG | HEIGHT: 71 IN | WEIGHT: 177 LBS | OXYGEN SATURATION: 99 % | DIASTOLIC BLOOD PRESSURE: 64 MMHG

## 2022-04-11 DIAGNOSIS — J43.2 CENTRILOBULAR EMPHYSEMA: ICD-10-CM

## 2022-04-11 DIAGNOSIS — J96.11 CHRONIC RESPIRATORY FAILURE WITH HYPOXIA: ICD-10-CM

## 2022-04-11 DIAGNOSIS — J44.9 STAGE 4 VERY SEVERE COPD BY GOLD CLASSIFICATION: Primary | ICD-10-CM

## 2022-04-11 DIAGNOSIS — J44.9 ASTHMA-COPD OVERLAP SYNDROME: ICD-10-CM

## 2022-04-11 DIAGNOSIS — R91.1 LUNG NODULE: ICD-10-CM

## 2022-04-11 DIAGNOSIS — F17.201 TOBACCO ABUSE, IN REMISSION: ICD-10-CM

## 2022-04-11 PROCEDURE — 99213 OFFICE O/P EST LOW 20 MIN: CPT | Performed by: NURSE PRACTITIONER

## 2022-04-11 NOTE — PATIENT INSTRUCTIONS
Please speak with your eye doctor and let them know your lung provider would like to put you on an inhaler called Spiriva. This is an anticholinergic and has to be used with caution in people with known glaucoma. Please make sure they are ok with you trying this medication before we start it. See if they can send us something in writing. Call and let us know and we will see if we have samples available.

## 2022-04-14 ENCOUNTER — TELEPHONE (OUTPATIENT)
Dept: PULMONOLOGY | Facility: CLINIC | Age: 72
End: 2022-04-14

## 2022-04-14 DIAGNOSIS — E11.69 TYPE 2 DIABETES MELLITUS WITH OTHER SPECIFIED COMPLICATION, WITHOUT LONG-TERM CURRENT USE OF INSULIN (HCC): ICD-10-CM

## 2022-04-14 RX ORDER — GLIMEPIRIDE 2 MG/1
2 TABLET ORAL
Qty: 30 TABLET | Refills: 5 | Status: SHIPPED | OUTPATIENT
Start: 2022-04-14 | End: 2022-04-25 | Stop reason: SDUPTHER

## 2022-04-14 RX ORDER — GABAPENTIN 600 MG/1
600 TABLET ORAL DAILY
Qty: 180 TABLET | Refills: 3 | Status: SHIPPED | OUTPATIENT
Start: 2022-04-14 | End: 2022-04-25 | Stop reason: SDUPTHER

## 2022-04-14 NOTE — TELEPHONE ENCOUNTER
Costa Maurice called to request a refill on his medication. Last office visit : 1/28/2022   Next office visit : 4/29/2022     Last UDS: No results found for: Juli Cowan, LABBENZ, BUPRENUR, COCAIMETSCRU, GABAPENTIN, MDMA, METAMPU, OPIATESCREENURINE, OXTCOSU, PHENCYCLIDINESCREENURINE, PROPOXYPHENE, THCSCREENUR, TRICYUR    Last [facility]: 01-19-22  Medication Contract:     Last Fill: 01-28-22    Requested Prescriptions     Pending Prescriptions Disp Refills    gabapentin (NEURONTIN) 600 MG tablet 180 tablet 3     Sig: Take 1 tablet by mouth daily for 90 days. Signed Prescriptions Disp Refills    glimepiride (AMARYL) 2 MG tablet 30 tablet 5     Sig: Take 1 tablet by mouth every morning (before breakfast)     Authorizing Provider: Laci Muñoz     Ordering User: Ilya Padilla         Please approve or refuse this medication.    Felicity Reyes MA

## 2022-04-14 NOTE — TELEPHONE ENCOUNTER
----- Message from Brooklynn Salamanca sent at 4/13/2022 10:42 AM CDT -----  Subject: Refill Request    QUESTIONS  Name of Medication? gabapentin (NEURONTIN) 600 MG tablet  Patient-reported dosage and instructions? 2 tab in morning and 2 tabs at   night   How many days do you have left? 0  Preferred Pharmacy? [facility] phone number (if available)? [phone]  Additional Information for Provider? Pt states that gabapentin is 300 mg. Pt states that he did not have enough to take 2 tab in morning and 2 tabs   at night . Pt would like enough for 90 days supply  ---------------------------------------------------------------------------  --------------,  Name of Medication? glimepiride (AMARYL) 2 MG tablet  Patient-reported dosage and instructions? 1 tab a day  How many days do you have left? 10  Preferred Pharmacy? [facility] phone number (if available)? [phone]  Additional Information for Provider? Pt would like a 90 day supply  ---------------------------------------------------------------------------  --------------  CALL BACK INFO  What is the best way for the office to contact you? OK to leave message on   voicemail  Preferred Call Back Phone Number? [phone]  ---------------------------------------------------------------------------  --------------  SCRIPT ANSWERS  Relationship to Patient?  Self

## 2022-04-14 NOTE — TELEPHONE ENCOUNTER
[facility] called asking us to let the patient know he needs to make an appointment for a glaucoma test.     Patient informed and will call.

## 2022-04-21 DIAGNOSIS — E11.69 TYPE 2 DIABETES MELLITUS WITH OTHER SPECIFIED COMPLICATION, WITHOUT LONG-TERM CURRENT USE OF INSULIN (HCC): ICD-10-CM

## 2022-04-22 RX ORDER — GABAPENTIN 300 MG/1
CAPSULE ORAL
Qty: 270 CAPSULE | OUTPATIENT
Start: 2022-04-22

## 2022-04-25 DIAGNOSIS — E11.69 TYPE 2 DIABETES MELLITUS WITH OTHER SPECIFIED COMPLICATION, WITHOUT LONG-TERM CURRENT USE OF INSULIN (HCC): ICD-10-CM

## 2022-04-25 RX ORDER — GLIMEPIRIDE 2 MG/1
2 TABLET ORAL
Qty: 90 TABLET | Refills: 3 | Status: SHIPPED | OUTPATIENT
Start: 2022-04-25 | End: 2022-04-29

## 2022-04-25 NOTE — TELEPHONE ENCOUNTER
This was sent around the time the e-scrip was down please resend the pharmacy never received this . Karolyn Pickard

## 2022-04-26 RX ORDER — GABAPENTIN 600 MG/1
600 TABLET ORAL DAILY
Qty: 180 TABLET | Refills: 3 | Status: SHIPPED | OUTPATIENT
Start: 2022-04-26 | End: 2022-04-29

## 2022-04-29 ENCOUNTER — OFFICE VISIT (OUTPATIENT)
Dept: PRIMARY CARE CLINIC | Age: 72
End: 2022-04-29
Payer: MEDICARE

## 2022-04-29 VITALS
HEIGHT: 71 IN | WEIGHT: 174 LBS | HEART RATE: 72 BPM | TEMPERATURE: 97.7 F | BODY MASS INDEX: 24.36 KG/M2 | RESPIRATION RATE: 20 BRPM | SYSTOLIC BLOOD PRESSURE: 124 MMHG | OXYGEN SATURATION: 97 % | DIASTOLIC BLOOD PRESSURE: 66 MMHG

## 2022-04-29 DIAGNOSIS — E11.69 TYPE 2 DIABETES MELLITUS WITH OTHER SPECIFIED COMPLICATION, WITHOUT LONG-TERM CURRENT USE OF INSULIN (HCC): Primary | ICD-10-CM

## 2022-04-29 DIAGNOSIS — Z72.0 TOBACCO ABUSE: ICD-10-CM

## 2022-04-29 DIAGNOSIS — J44.9 CHRONIC OBSTRUCTIVE PULMONARY DISEASE, UNSPECIFIED COPD TYPE (HCC): ICD-10-CM

## 2022-04-29 DIAGNOSIS — I10 ESSENTIAL HYPERTENSION: ICD-10-CM

## 2022-04-29 DIAGNOSIS — E78.2 MIXED HYPERLIPIDEMIA: ICD-10-CM

## 2022-04-29 LAB — HBA1C MFR BLD: 9.2 %

## 2022-04-29 PROCEDURE — 4040F PNEUMOC VAC/ADMIN/RCVD: CPT | Performed by: STUDENT IN AN ORGANIZED HEALTH CARE EDUCATION/TRAINING PROGRAM

## 2022-04-29 PROCEDURE — 99214 OFFICE O/P EST MOD 30 MIN: CPT | Performed by: STUDENT IN AN ORGANIZED HEALTH CARE EDUCATION/TRAINING PROGRAM

## 2022-04-29 PROCEDURE — G8420 CALC BMI NORM PARAMETERS: HCPCS | Performed by: STUDENT IN AN ORGANIZED HEALTH CARE EDUCATION/TRAINING PROGRAM

## 2022-04-29 PROCEDURE — 83036 HEMOGLOBIN GLYCOSYLATED A1C: CPT | Performed by: STUDENT IN AN ORGANIZED HEALTH CARE EDUCATION/TRAINING PROGRAM

## 2022-04-29 PROCEDURE — 4004F PT TOBACCO SCREEN RCVD TLK: CPT | Performed by: STUDENT IN AN ORGANIZED HEALTH CARE EDUCATION/TRAINING PROGRAM

## 2022-04-29 PROCEDURE — 3023F SPIROM DOC REV: CPT | Performed by: STUDENT IN AN ORGANIZED HEALTH CARE EDUCATION/TRAINING PROGRAM

## 2022-04-29 PROCEDURE — 2022F DILAT RTA XM EVC RTNOPTHY: CPT | Performed by: STUDENT IN AN ORGANIZED HEALTH CARE EDUCATION/TRAINING PROGRAM

## 2022-04-29 PROCEDURE — G8427 DOCREV CUR MEDS BY ELIG CLIN: HCPCS | Performed by: STUDENT IN AN ORGANIZED HEALTH CARE EDUCATION/TRAINING PROGRAM

## 2022-04-29 PROCEDURE — 1123F ACP DISCUSS/DSCN MKR DOCD: CPT | Performed by: STUDENT IN AN ORGANIZED HEALTH CARE EDUCATION/TRAINING PROGRAM

## 2022-04-29 PROCEDURE — 3017F COLORECTAL CA SCREEN DOC REV: CPT | Performed by: STUDENT IN AN ORGANIZED HEALTH CARE EDUCATION/TRAINING PROGRAM

## 2022-04-29 PROCEDURE — 3044F HG A1C LEVEL LT 7.0%: CPT | Performed by: STUDENT IN AN ORGANIZED HEALTH CARE EDUCATION/TRAINING PROGRAM

## 2022-04-29 RX ORDER — GABAPENTIN 600 MG/1
600 TABLET ORAL 2 TIMES DAILY
Qty: 60 TABLET | Refills: 3 | Status: SHIPPED | OUTPATIENT
Start: 2022-04-29 | End: 2022-10-14 | Stop reason: ALTCHOICE

## 2022-04-29 NOTE — PROGRESS NOTES
[address] [facility]  [address] [address] [place] [address]  Dept: [phone]  Dept Fax: [phone]  Loc: [phone]      Subjective:     Chief Complaint   Patient presents with    Diabetes     pt states the glucose in the AM before coffee has been running around mid 50's range        HPI:  Margie Cabrera is a 66 y.o. male presenting for    T2DM  -Patient here for follow-up. A1c last visit 6.6. Patient on glimepiride 2 mg. Notes having some AM lows in 50's. States diet hasn't changed much. Has dug into sweets last 2 weeks.  -We increased gabapentin to 600 mg twice daily for peripheral neuropathy, pt had issues between office and pharmacy getting this.  -Pt is on atorvastatin 20mg    Hypertension  -Patient on valsartan 320mg, hctz 25mg, amlodipine 5 mg  -Pt reports bp <140/90 outside of clinic  -Denies HA, visual changes, cp, palpitations, or swelling    COPD  -Followed by [facility] pulmonology.  Uses albuterol prn    ROS:   Reviewed with patient and noted to be negative except that listed in HPI    PMHx:  Past Medical History:   Diagnosis Date    BPH (benign prostatic hyperplasia)     Dizziness     DM (diabetes mellitus) ([address]     type 2    Dysphagia     HTN (hypertension)     Hyperlipidemia     Hypogonadism male     Peripheral neuropathy     Tobacco dependence      Patient Active Problem List   Diagnosis    Benign prostatic hyperplasia with lower urinary tract symptoms    Tobacco abuse    Mixed hyperlipidemia    Testosterone deficiency    Essential hypertension    Diabetes mellitus ([address]    GERD (gastroesophageal reflux disease)    Chronic obstructive pulmonary disease (HCC)       PSHx:  Past Surgical History:   Procedure Laterality Date    BACK SURGERY      COLON SURGERY  2014    Done in [address] [place] [address], hx of colon polyps with a 5 yr recall    COLONOSCOPY N/A 7/12/2019    Dr Nitin Perez hemorrhoids-Grade 2, suboptimal prep, diverticulosis-HP-3 yr recall    EYE SURGERY Right 07/2017    UPPER GASTROINTESTINAL ENDOSCOPY N/A 7/12/2019    Dr Izabel De La Rosa-Mild gastritis and bulbar duodenitis, prominent major duodenal papilla, possible lipoma or GIST        PFHx:  Family History   Problem Relation Age of Onset    Colon Cancer Mother     Diabetes Type 1  Father     Diabetes Father     Cancer Sister     Cancer Brother     Stomach Cancer Brother     Esophageal Cancer Neg Hx     Liver Cancer Neg Hx     Liver Disease Neg Hx     Rectal Cancer Neg Hx        SocialHx:  Social History     Tobacco Use    Smoking status: Current Every Day Smoker     Packs/day: 0.25     Years: 54.00     Pack years: 13.50     Types: Cigarettes     Start date: 1964    Smokeless tobacco: Never Used   Substance Use Topics    Alcohol use: Yes     Comment: rare       Allergies:  No Known Allergies    Medications:  Current Outpatient Medications   Medication Sig Dispense Refill    gabapentin (NEURONTIN) 600 MG tablet Take 1 tablet by mouth 2 times daily for 30 days. 60 tablet 3    Insulin Glargine-Lixisenatide 100-33 UNT-MCG/ML SOPN Inject 15 Units into the skin daily 4 pen 2    traZODone (DESYREL) 50 MG tablet Take 1 tablet by mouth nightly 90 tablet 3    blood glucose test strips (ACCU-CHEK FRANCISCO PLUS) strip TEST 2 TIMES A  strip 5    tamsulosin (FLOMAX) 0.4 MG capsule Take 1 capsule by mouth daily 90 capsule 3    amLODIPine (NORVASC) 5 MG tablet TAKE 1 TABLET EVERY DAY 90 tablet 3    valsartan-hydroCHLOROthiazide (DIOVAN-HCT) 320-25 MG per tablet Take 1 tablet by mouth daily 90 tablet 3    atorvastatin (LIPITOR) 20 MG tablet Take 1 tablet by mouth daily 90 tablet 1    omeprazole (PRILOSEC) 20 MG delayed release capsule Take 1 tablet po BID ( on an empty stomach) x 14 days for treatment of h pylori infection.  28 capsule 0    albuterol sulfate HFA (PROAIR HFA) 108 (90 Base) MCG/ACT inhaler Inhale 2 puffs into the lungs every 6 hours as needed for Wheezing       No current facility-administered medications for this visit. Objective:   PE:  /66   Pulse 72   Temp 97.7 °F (36.5 °C) (Temporal)   Resp 20   Ht 5' 11\" (1.803 m)   Wt 174 lb (78.9 kg)   SpO2 97%   BMI 24.27 kg/m²   Physical Exam  Constitutional:       General: He is not in acute distress. Appearance: Normal appearance. HENT:      Head: Normocephalic. Nose: Nose normal.      Mouth/Throat:      Mouth: Mucous membranes are moist.      Pharynx: Oropharynx is clear. No oropharyngeal exudate or posterior oropharyngeal erythema. Eyes:      General: No scleral icterus. Extraocular Movements: Extraocular movements intact. Conjunctiva/sclera: Conjunctivae normal.   Cardiovascular:      Rate and Rhythm: Normal rate and regular rhythm. Pulses: Normal pulses. Heart sounds: No murmur heard. Pulmonary:      Effort: Pulmonary effort is normal.      Breath sounds: Normal breath sounds. Musculoskeletal:         General: Normal range of motion. Cervical back: Normal range of motion. Skin:     General: Skin is warm and dry. Capillary Refill: Capillary refill takes less than 2 seconds. Neurological:      General: No focal deficit present. Mental Status: He is alert and oriented to person, place, and time. Psychiatric:         Mood and Affect: Mood normal.         Behavior: Behavior normal.         Thought Content: Thought content normal.            Assessment & Plan   Demetri Mccracken was seen today for diabetes. Diagnoses and all orders for this visit:    Type 2 diabetes mellitus with other specified complication, without long-term current use of insulin (HCC)  -Uncontrolled  -     POCT glycosylated hemoglobin (Hb A1C)->9.7  -Discussed keeping closer eye on diet, reducing carbs/sweets  -DC glimepiride especially considering lows he is having  -Start soliqua 15u, keep daily fasting BG log, f/u in 1 week.  Pt given 2 pen samples  - Insulin Glargine-Lixisenatide 100-33 UNT-MCG/ML SOPN; Inject 15 Units into the skin daily        -     gabapentin (NEURONTIN) 600 MG tablet; Take 1 tablet by mouth 2 times daily for 30 days. Essential hypertension  -At goal. Continue valsartan 320mg, hctz 25mg, amlodipine 5 mg    Mixed hyperlipidemia  -Continue atorvastatin    Tobacco abuse  -Pt contemplative, address at follow up visit. Chronic obstructive pulmonary disease, unspecified COPD type ([facility]  -Followed by pulmonology      Return in about 1 week (around 5/6/2022) for T2DM f/u after starting soliqua. All questions were answered. Medications, including possible adverse effects, and instructions were reviewed and  understanding was confirmed. Follow-up recommendations, including when to contact or return to office (ie; if symptoms worsen or fail to improve), were discussed and acknowledged.     Electronically signed by Joanne Mckeon MD on 4/29/22 at 8:49 AM CDT

## 2022-05-06 ENCOUNTER — OFFICE VISIT (OUTPATIENT)
Dept: PRIMARY CARE CLINIC | Age: 72
End: 2022-05-06
Payer: MEDICARE

## 2022-05-06 VITALS
TEMPERATURE: 97.7 F | HEIGHT: 71 IN | BODY MASS INDEX: 24.22 KG/M2 | OXYGEN SATURATION: 98 % | SYSTOLIC BLOOD PRESSURE: 122 MMHG | HEART RATE: 77 BPM | DIASTOLIC BLOOD PRESSURE: 62 MMHG | WEIGHT: 173 LBS | RESPIRATION RATE: 20 BRPM

## 2022-05-06 DIAGNOSIS — E11.69 TYPE 2 DIABETES MELLITUS WITH OTHER SPECIFIED COMPLICATION, WITHOUT LONG-TERM CURRENT USE OF INSULIN (HCC): Primary | ICD-10-CM

## 2022-05-06 PROCEDURE — 3046F HEMOGLOBIN A1C LEVEL >9.0%: CPT | Performed by: STUDENT IN AN ORGANIZED HEALTH CARE EDUCATION/TRAINING PROGRAM

## 2022-05-06 PROCEDURE — 2022F DILAT RTA XM EVC RTNOPTHY: CPT | Performed by: STUDENT IN AN ORGANIZED HEALTH CARE EDUCATION/TRAINING PROGRAM

## 2022-05-06 PROCEDURE — 4040F PNEUMOC VAC/ADMIN/RCVD: CPT | Performed by: STUDENT IN AN ORGANIZED HEALTH CARE EDUCATION/TRAINING PROGRAM

## 2022-05-06 PROCEDURE — 4004F PT TOBACCO SCREEN RCVD TLK: CPT | Performed by: STUDENT IN AN ORGANIZED HEALTH CARE EDUCATION/TRAINING PROGRAM

## 2022-05-06 PROCEDURE — 99213 OFFICE O/P EST LOW 20 MIN: CPT | Performed by: STUDENT IN AN ORGANIZED HEALTH CARE EDUCATION/TRAINING PROGRAM

## 2022-05-06 PROCEDURE — G8420 CALC BMI NORM PARAMETERS: HCPCS | Performed by: STUDENT IN AN ORGANIZED HEALTH CARE EDUCATION/TRAINING PROGRAM

## 2022-05-06 PROCEDURE — G8427 DOCREV CUR MEDS BY ELIG CLIN: HCPCS | Performed by: STUDENT IN AN ORGANIZED HEALTH CARE EDUCATION/TRAINING PROGRAM

## 2022-05-06 PROCEDURE — 1123F ACP DISCUSS/DSCN MKR DOCD: CPT | Performed by: STUDENT IN AN ORGANIZED HEALTH CARE EDUCATION/TRAINING PROGRAM

## 2022-05-06 PROCEDURE — 3017F COLORECTAL CA SCREEN DOC REV: CPT | Performed by: STUDENT IN AN ORGANIZED HEALTH CARE EDUCATION/TRAINING PROGRAM

## 2022-05-06 RX ORDER — FLASH GLUCOSE SENSOR
1 KIT MISCELLANEOUS
Qty: 2 EACH | Refills: 5 | Status: CANCELLED | OUTPATIENT
Start: 2022-05-06

## 2022-05-06 NOTE — PROGRESS NOTES
[address] [facility]  [address] [address]  [address] [place] [address]  Dept: [phone]  Dept Fax: [phone]  Loc: [phone]      Subjective:     Chief Complaint   Patient presents with    Diabetes       HPI:  Tahmina Espinoza is a 66 y.o. male presenting for    T2DM  -Most recent a1c 9.7. Started on soliqua 15u last week. Tolerating well. Has BG log with him, see media. First 3-4 readings unusually low, pt thinks strips were defective.  2/3 of most recent readings at goal.       ROS:   Reviewed with patient and noted to be negative except that listed in HPI    PMHx:  Past Medical History:   Diagnosis Date    BPH (benign prostatic hyperplasia)     Dizziness     DM (diabetes mellitus) ([address]     type 2    Dysphagia     HTN (hypertension)     Hyperlipidemia     Hypogonadism male     Peripheral neuropathy     Tobacco dependence      Patient Active Problem List   Diagnosis    Benign prostatic hyperplasia with lower urinary tract symptoms    Tobacco abuse    Mixed hyperlipidemia    Testosterone deficiency    Essential hypertension    Diabetes mellitus ([address]    GERD (gastroesophageal reflux disease)    Chronic obstructive pulmonary disease (HCC)       PSHx:  Past Surgical History:   Procedure Laterality Date    BACK SURGERY      COLON SURGERY  2014    Done in [place],  of colon polyps with a 5 yr recall    COLONOSCOPY N/A 7/12/2019    Dr Joby Gan hemorrhoids-Grade 2, suboptimal prep, diverticulosis-HP-3 yr recall    EYE SURGERY Right 07/2017    UPPER GASTROINTESTINAL ENDOSCOPY N/A 7/12/2019    Dr Isaiah De La Rosa-Mild gastritis and bulbar duodenitis, prominent major duodenal papilla, possible lipoma or GIST        PFHx:  Family History   Problem Relation Age of Onset    Colon Cancer Mother     Diabetes Type 1  Father     Diabetes Father     Cancer Sister     Cancer Brother     Stomach Cancer Brother     Esophageal Cancer Neg Hx     Liver Cancer Neg Hx     Liver Disease Neg Hx     Rectal Cancer Neg Hx        SocialHx:  Social History     Tobacco Use    Smoking status: Current Every Day Smoker     Packs/day: 0.25     Years: 54.00     Pack years: 13.50     Types: Cigarettes     Start date: 1964    Smokeless tobacco: Never Used   Substance Use Topics    Alcohol use: Yes     Comment: rare       Allergies:  No Known Allergies    Medications:  Current Outpatient Medications   Medication Sig Dispense Refill    gabapentin (NEURONTIN) 600 MG tablet Take 1 tablet by mouth 2 times daily for 30 days. 60 tablet 3    Insulin Glargine-Lixisenatide 100-33 UNT-MCG/ML SOPN Inject 15 Units into the skin daily 4 pen 2    traZODone (DESYREL) 50 MG tablet Take 1 tablet by mouth nightly 90 tablet 3    blood glucose test strips (ACCU-CHEK FRANCISCO PLUS) strip TEST 2 TIMES A  strip 5    tamsulosin (FLOMAX) 0.4 MG capsule Take 1 capsule by mouth daily 90 capsule 3    amLODIPine (NORVASC) 5 MG tablet TAKE 1 TABLET EVERY DAY 90 tablet 3    valsartan-hydroCHLOROthiazide (DIOVAN-HCT) 320-25 MG per tablet Take 1 tablet by mouth daily 90 tablet 3    omeprazole (PRILOSEC) 20 MG delayed release capsule Take 1 tablet po BID ( on an empty stomach) x 14 days for treatment of h pylori infection. 28 capsule 0    albuterol sulfate HFA (PROAIR HFA) 108 (90 Base) MCG/ACT inhaler Inhale 2 puffs into the lungs every 6 hours as needed for Wheezing      atorvastatin (LIPITOR) 20 MG tablet Take 1 tablet by mouth daily 90 tablet 1     No current facility-administered medications for this visit. Objective:   PE:  /62   Pulse 77   Temp 97.7 °F (36.5 °C) (Temporal)   Resp 20   Ht 5' 11\" (1.803 m)   Wt 173 lb (78.5 kg)   SpO2 98%   BMI 24.13 kg/m²   Physical Exam  Constitutional:       General: He is not in acute distress. Appearance: Normal appearance. HENT:      Head: Normocephalic.       Nose: Nose normal. Mouth/Throat:      Mouth: Mucous membranes are moist.      Pharynx: Oropharynx is clear. No oropharyngeal exudate or posterior oropharyngeal erythema. Eyes:      General: No scleral icterus. Extraocular Movements: Extraocular movements intact. Conjunctiva/sclera: Conjunctivae normal.   Cardiovascular:      Rate and Rhythm: Normal rate and regular rhythm. Pulses: Normal pulses. Heart sounds: No murmur heard. Pulmonary:      Effort: Pulmonary effort is normal.      Breath sounds: Normal breath sounds. Musculoskeletal:         General: Normal range of motion. Cervical back: Normal range of motion. Skin:     General: Skin is warm and dry. Capillary Refill: Capillary refill takes less than 2 seconds. Neurological:      General: No focal deficit present. Mental Status: He is alert and oriented to person, place, and time. Psychiatric:         Mood and Affect: Mood normal.         Behavior: Behavior normal.         Thought Content: Thought content normal.            Assessment & Plan   Percy Whitt was seen today for diabetes. Diagnoses and all orders for this visit:    Type 2 diabetes mellitus with other specified complication, without long-term current use of insulin ([address]  -Continue soliqua, increase to 17u daily for next week  -Given information for freestyle michaela monitor ordering  -Continue fasting bg log      Return in about 1 week (around 5/13/2022) for T2DM. All questions were answered. Medications, including possible adverse effects, and instructions were reviewed and  understanding was confirmed. Follow-up recommendations, including when to contact or return to office (ie; if symptoms worsen or fail to improve), were discussed and acknowledged.     Electronically signed by Fatuma Grant MD on 5/6/22 at 8:29 AM CDT

## 2022-05-13 ENCOUNTER — TELEMEDICINE (OUTPATIENT)
Dept: PRIMARY CARE CLINIC | Age: 72
End: 2022-05-13
Payer: MEDICARE

## 2022-05-13 DIAGNOSIS — E11.69 TYPE 2 DIABETES MELLITUS WITH OTHER SPECIFIED COMPLICATION, WITHOUT LONG-TERM CURRENT USE OF INSULIN (HCC): Primary | ICD-10-CM

## 2022-05-13 PROCEDURE — 99441 PR PHYS/QHP TELEPHONE EVALUATION 5-10 MIN: CPT | Performed by: STUDENT IN AN ORGANIZED HEALTH CARE EDUCATION/TRAINING PROGRAM

## 2022-05-13 NOTE — PROGRESS NOTES
Juanita Maldonado is a 66 y.o. male evaluated via telephone on 5/13/2022 for No chief complaint on file. .        Documentation:  I communicated with the patient and/or health care decision maker about     T2DM  -Most recent a1c 9.7. Started on [address] previously. Now at 17 u daily. Fasting BG has been running in low 200's. Pt not sure why higher. He has had some cookies and will work on diet    Details of this discussion including any medical advice provided:     T2DM  -Uncontrolled. Increase soliqua to 20u. F/u via telephone in 1 week. I will not be in office but if fasting BG not in  range ok to increase soliqua to 22 or 23 units. Pt to work on dietary improvements as discussed/carb limitations. Total Time: minutes: 11-20 minutes    Juanita Maldonado was evaluated through a synchronous (real-time) audio encounter. Patient identification was verified at the start of the visit. He (or guardian if applicable) is aware that this is a billable service, which includes applicable co-pays. This visit was conducted with the patient's (and/or legal guardian's) verbal consent. He has not had a related appointment within my department in the past 7 days or scheduled within the next 24 hours. The patient was located in a state where the provider was licensed to provide care.     Note: not billable if this call serves to triage the patient into an appointment for the relevant concern    Lavelle Pavon MD

## 2022-05-20 ENCOUNTER — TELEPHONE (OUTPATIENT)
Dept: PRIMARY CARE CLINIC | Age: 72
End: 2022-05-20

## 2022-05-20 NOTE — TELEPHONE ENCOUNTER
Patient calls in today with blood sugar readings from this week as follows:    [phone]    He also says he needs an order for a freestyle michaela to [employer]

## 2022-05-25 NOTE — TELEPHONE ENCOUNTER
Please advise pt to go up on soliqua dose 4 units if he has not already increased dose and stay at this level for 1 week

## 2022-05-31 RX ORDER — ATORVASTATIN CALCIUM 20 MG/1
TABLET, FILM COATED ORAL
Qty: 90 TABLET | Refills: 1 | Status: SHIPPED | OUTPATIENT
Start: 2022-05-31

## 2022-06-13 RX ORDER — GLUCOSAMINE HCL/CHONDROITIN SU 500-400 MG
CAPSULE ORAL
Qty: 100 STRIP | Refills: 5 | Status: SHIPPED | OUTPATIENT
Start: 2022-06-13 | End: 2022-10-14 | Stop reason: ALTCHOICE

## 2022-06-13 RX ORDER — LANCETS 30 GAUGE
1 EACH MISCELLANEOUS DAILY
Qty: 100 EACH | Refills: 5 | Status: SHIPPED | OUTPATIENT
Start: 2022-06-13

## 2022-06-15 ENCOUNTER — TELEPHONE (OUTPATIENT)
Dept: PRIMARY CARE CLINIC | Age: 72
End: 2022-06-15

## 2022-06-20 ENCOUNTER — TELEPHONE (OUTPATIENT)
Dept: GASTROENTEROLOGY | Age: 72
End: 2022-06-20

## 2022-06-20 NOTE — TELEPHONE ENCOUNTER
Rowena Goldberg called to schedule a colonoscopy. Please be advised that the best time to call him to accommodate their needs is Anytime. Thank you.

## 2022-06-24 ENCOUNTER — TELEPHONE (OUTPATIENT)
Dept: PRIMARY CARE CLINIC | Age: 72
End: 2022-06-24

## 2022-06-24 NOTE — TELEPHONE ENCOUNTER
Hard to understand pharmacy rep requested prescriptions for the following be sent to the below fax #:    accucheck monitor  accucheck test strips  Soft clix  Lancet  BD single use swabs  Accucheck guide l1-l2 control solution      7

## 2022-07-05 DIAGNOSIS — E11.69 TYPE 2 DIABETES MELLITUS WITH OTHER SPECIFIED COMPLICATION, WITHOUT LONG-TERM CURRENT USE OF INSULIN (HCC): Primary | ICD-10-CM

## 2022-07-12 ENCOUNTER — HOSPITAL ENCOUNTER (EMERGENCY)
Facility: HOSPITAL | Age: 72
Discharge: HOME | End: 2022-07-12
Attending: EMERGENCY MEDICINE | Admitting: EMERGENCY MEDICINE

## 2022-07-12 ENCOUNTER — APPOINTMENT (OUTPATIENT)
Dept: GENERAL RADIOLOGY | Facility: HOSPITAL | Age: 72
End: 2022-07-12

## 2022-07-12 VITALS
WEIGHT: 174 LBS | HEIGHT: 70 IN | TEMPERATURE: 98.2 F | BODY MASS INDEX: 24.91 KG/M2 | OXYGEN SATURATION: 99 % | SYSTOLIC BLOOD PRESSURE: 174 MMHG | HEART RATE: 64 BPM | DIASTOLIC BLOOD PRESSURE: 77 MMHG | RESPIRATION RATE: 18 BRPM

## 2022-07-12 DIAGNOSIS — R07.89 MUSCULOSKELETAL CHEST PAIN: Primary | ICD-10-CM

## 2022-07-12 LAB
ALBUMIN SERPL-MCNC: 4.2 G/DL (ref 3.5–5.2)
ALBUMIN/GLOB SERPL: 1.3 G/DL
ALP SERPL-CCNC: 54 U/L (ref 39–117)
ALT SERPL W P-5'-P-CCNC: 17 U/L (ref 1–41)
ANION GAP SERPL CALCULATED.3IONS-SCNC: 8 MMOL/L (ref 5–15)
AST SERPL-CCNC: 16 U/L (ref 1–40)
BASOPHILS # BLD AUTO: 0.08 10*3/MM3 (ref 0–0.2)
BASOPHILS NFR BLD AUTO: 0.9 % (ref 0–1.5)
BILIRUB SERPL-MCNC: 0.3 MG/DL (ref 0–1.2)
BUN SERPL-MCNC: 21 MG/DL (ref 8–23)
BUN/CREAT SERPL: 18.8 (ref 7–25)
CALCIUM SPEC-SCNC: 9.6 MG/DL (ref 8.6–10.5)
CHLORIDE SERPL-SCNC: 104 MMOL/L (ref 98–107)
CO2 SERPL-SCNC: 30 MMOL/L (ref 22–29)
CREAT SERPL-MCNC: 1.12 MG/DL (ref 0.76–1.27)
D DIMER PPP FEU-MCNC: 0.52 MCGFEU/ML (ref 0–0.5)
DEPRECATED RDW RBC AUTO: 43.6 FL (ref 37–54)
EGFRCR SERPLBLD CKD-EPI 2021: 69.4 ML/MIN/1.73
EOSINOPHIL # BLD AUTO: 0.34 10*3/MM3 (ref 0–0.4)
EOSINOPHIL NFR BLD AUTO: 3.7 % (ref 0.3–6.2)
ERYTHROCYTE [DISTWIDTH] IN BLOOD BY AUTOMATED COUNT: 12.7 % (ref 12.3–15.4)
GLOBULIN UR ELPH-MCNC: 3.3 GM/DL
GLUCOSE SERPL-MCNC: 82 MG/DL (ref 65–99)
HCT VFR BLD AUTO: 42.8 % (ref 37.5–51)
HGB BLD-MCNC: 13.8 G/DL (ref 13–17.7)
HOLD SPECIMEN: NORMAL
HOLD SPECIMEN: NORMAL
IMM GRANULOCYTES # BLD AUTO: 0.02 10*3/MM3 (ref 0–0.05)
IMM GRANULOCYTES NFR BLD AUTO: 0.2 % (ref 0–0.5)
LIPASE SERPL-CCNC: 32 U/L (ref 13–60)
LYMPHOCYTES # BLD AUTO: 2.87 10*3/MM3 (ref 0.7–3.1)
LYMPHOCYTES NFR BLD AUTO: 31.1 % (ref 19.6–45.3)
MCH RBC QN AUTO: 30.1 PG (ref 26.6–33)
MCHC RBC AUTO-ENTMCNC: 32.2 G/DL (ref 31.5–35.7)
MCV RBC AUTO: 93.2 FL (ref 79–97)
MONOCYTES # BLD AUTO: 0.71 10*3/MM3 (ref 0.1–0.9)
MONOCYTES NFR BLD AUTO: 7.7 % (ref 5–12)
NEUTROPHILS NFR BLD AUTO: 5.21 10*3/MM3 (ref 1.7–7)
NEUTROPHILS NFR BLD AUTO: 56.4 % (ref 42.7–76)
NRBC BLD AUTO-RTO: 0 /100 WBC (ref 0–0.2)
NT-PROBNP SERPL-MCNC: 101.1 PG/ML (ref 0–900)
PLATELET # BLD AUTO: 380 10*3/MM3 (ref 140–450)
PMV BLD AUTO: 9.2 FL (ref 6–12)
POTASSIUM SERPL-SCNC: 4.2 MMOL/L (ref 3.5–5.2)
PROT SERPL-MCNC: 7.5 G/DL (ref 6–8.5)
RBC # BLD AUTO: 4.59 10*6/MM3 (ref 4.14–5.8)
SODIUM SERPL-SCNC: 142 MMOL/L (ref 136–145)
TROPONIN T SERPL-MCNC: <0.01 NG/ML (ref 0–0.03)
TROPONIN T SERPL-MCNC: <0.01 NG/ML (ref 0–0.03)
WBC NRBC COR # BLD: 9.23 10*3/MM3 (ref 3.4–10.8)
WHOLE BLOOD HOLD COAG: NORMAL
WHOLE BLOOD HOLD SPECIMEN: NORMAL

## 2022-07-12 PROCEDURE — 80053 COMPREHEN METABOLIC PANEL: CPT

## 2022-07-12 PROCEDURE — 93010 ELECTROCARDIOGRAM REPORT: CPT | Performed by: INTERNAL MEDICINE

## 2022-07-12 PROCEDURE — 85025 COMPLETE CBC W/AUTO DIFF WBC: CPT

## 2022-07-12 PROCEDURE — 93005 ELECTROCARDIOGRAM TRACING: CPT

## 2022-07-12 PROCEDURE — 99284 EMERGENCY DEPT VISIT MOD MDM: CPT

## 2022-07-12 PROCEDURE — 85379 FIBRIN DEGRADATION QUANT: CPT | Performed by: NURSE PRACTITIONER

## 2022-07-12 PROCEDURE — 83690 ASSAY OF LIPASE: CPT | Performed by: NURSE PRACTITIONER

## 2022-07-12 PROCEDURE — 83880 ASSAY OF NATRIURETIC PEPTIDE: CPT | Performed by: NURSE PRACTITIONER

## 2022-07-12 PROCEDURE — 36415 COLL VENOUS BLD VENIPUNCTURE: CPT

## 2022-07-12 PROCEDURE — 71045 X-RAY EXAM CHEST 1 VIEW: CPT

## 2022-07-12 PROCEDURE — 84484 ASSAY OF TROPONIN QUANT: CPT

## 2022-07-12 RX ORDER — NAPROXEN 500 MG/1
500 TABLET ORAL 2 TIMES DAILY PRN
Qty: 20 TABLET | Refills: 0 | Status: ON HOLD | OUTPATIENT
Start: 2022-07-12 | End: 2023-02-20 | Stop reason: DRUGHIGH

## 2022-07-12 RX ORDER — SODIUM CHLORIDE 0.9 % (FLUSH) 0.9 %
10 SYRINGE (ML) INJECTION AS NEEDED
Status: DISCONTINUED | OUTPATIENT
Start: 2022-07-12 | End: 2022-07-12 | Stop reason: HOSPADM

## 2022-07-12 RX ORDER — ASPIRIN 81 MG/1
324 TABLET, CHEWABLE ORAL ONCE
Status: COMPLETED | OUTPATIENT
Start: 2022-07-12 | End: 2022-07-12

## 2022-07-12 RX ORDER — CYCLOBENZAPRINE HCL 5 MG
5 TABLET ORAL 3 TIMES DAILY PRN
Qty: 20 TABLET | Refills: 0 | Status: ON HOLD | OUTPATIENT
Start: 2022-07-12 | End: 2023-02-20

## 2022-07-12 RX ADMIN — ASPIRIN 324 MG: 81 TABLET, CHEWABLE ORAL at 11:58

## 2022-07-12 NOTE — ED PROVIDER NOTES
Subjective   71-year-old male presents to the ER with right-sided chest pain/axillary pain.  He has a history of COPD, asthma/COPD overlap syndrome, hypertension, hyperlipidemia, diabetes.  Does not wear home oxygen.  Patient presents with 3 to 4-day history of continuous right-sided chest pain associated with shortness of breath.  No associated nausea, diaphoresis, generalized weakness fatigue.  No change with rest or exertion.  States it hurts a little more if he is leaning on an armrest with his right arm.  No significant change with palpation of the right shoulder or range of motion of the right shoulder.  No significant change with palpation of his chest wall.  No associated cough, sore throat or rhinorrhea.  Symptoms are mild to moderate with no aggravating or alleviating factors.      History provided by:  Patient      Review of Systems   All other systems reviewed and are negative.      Past Medical History:   Diagnosis Date   • Asthma-COPD overlap syndrome (HCC) 12/26/2020   • Centrilobular emphysema (HCC) 12/26/2020   • Chronic respiratory failure with hypoxia ([facility]) 12/26/2020   • COPD (chronic obstructive pulmonary disease) (HCC)    • Diabetes mellitus (HCC)    • Hx of colonic polyp    • Hx of TIA (transient ischemic attack) and stroke    • Hyperlipidemia    • Hypertension    • Lung nodule 12/26/2020   • Stage 4 very severe COPD by GOLD classification ([facility]) 12/26/2020       No Known Allergies    Past Surgical History:   Procedure Laterality Date   • BACK SURGERY     • COLONOSCOPY  08/15/2012    Poor prep repeat exam in 3 years   • COLONOSCOPY W/ POLYPECTOMY  07/16/2009    Tubular adenoma at 60 cm, Hyperplastic polyp rectum suboptimal prep repeat in 3 months   • SHOULDER SURGERY     • VERTEBROPLASTY      neck       Family History   Problem Relation Age of Onset   • Diabetes Father    • Heart disease Father    • Heart disease Sister    • Diabetes Sister    • Diabetes Brother    • Heart disease Brother         Social History     Socioeconomic History   • Marital status: Legally    Tobacco Use   • Smoking status: Former Smoker     Packs/day: 1.00     Years: 55.00     Pack years: 55.00     Types: Cigarettes     Quit date: 2020     Years since quittin.9   • Smokeless tobacco: Never Used   • Tobacco comment: STATES 'NONE NOW, I CAN'T.'   Vaping Use   • Vaping Use: Never used   Substance and Sexual Activity   • Alcohol use: Yes     Comment: OCC   • Drug use: No   • Sexual activity: Defer           Objective   Physical Exam  Vitals and nursing note reviewed.   Constitutional:       General: He is not in acute distress.     Appearance: Normal appearance. He is normal weight.   HENT:      Head: Normocephalic and atraumatic.      Nose: Nose normal.      Mouth/Throat:      Mouth: Mucous membranes are moist.      Pharynx: Oropharynx is clear. No oropharyngeal exudate or posterior oropharyngeal erythema.   Eyes:      Extraocular Movements: Extraocular movements intact.      Conjunctiva/sclera: Conjunctivae normal.      Pupils: Pupils are equal, round, and reactive to light.   Cardiovascular:      Rate and Rhythm: Normal rate and regular rhythm.      Pulses: Normal pulses.      Heart sounds: Normal heart sounds.   Pulmonary:      Effort: Pulmonary effort is normal.      Breath sounds: Normal breath sounds. No wheezing, rhonchi or rales.   Abdominal:      General: Abdomen is flat. Bowel sounds are normal. There is no distension.      Palpations: Abdomen is soft.      Tenderness: There is no abdominal tenderness.   Musculoskeletal:         General: No tenderness. Normal range of motion.      Cervical back: Normal range of motion and neck supple. No rigidity. No muscular tenderness.      Right lower leg: No edema.      Left lower leg: No edema.   Skin:     General: Skin is warm and dry.      Capillary Refill: Capillary refill takes less than 2 seconds.      Findings: No rash.   Neurological:      General: No focal  deficit present.      Mental Status: He is alert and oriented to person, place, and time. Mental status is at baseline.      Cranial Nerves: No cranial nerve deficit.      Sensory: No sensory deficit.      Motor: No weakness.   Psychiatric:         Mood and Affect: Mood normal.         Behavior: Behavior normal.         Thought Content: Thought content normal.         Procedures       Lab Results (last 24 hours)     Procedure Component Value Units Date/Time    CBC & Differential [[id number]]  (Normal) Collected: 07/12/22 1057    Specimen: Blood Updated: 07/12/22 1107    Narrative:      The following orders were created for panel order CBC & Differential.  Procedure                               Abnormality         Status                     ---------                               -----------         ------                     CBC Auto Differential[[id number]]        Normal              Final result                 Please view results for these tests on the individual orders.    Comprehensive Metabolic Panel [[id number]]  (Abnormal) Collected: 07/12/22 1057    Specimen: Blood Updated: 07/12/22 1130     Glucose 82 mg/dL      BUN 21 mg/dL      Creatinine 1.12 mg/dL      Sodium 142 mmol/L      Potassium 4.2 mmol/L      Comment: Slight hemolysis detected by analyzer. Results may be affected.        Chloride 104 mmol/L      CO2 30.0 mmol/L      Calcium 9.6 mg/dL      Total Protein 7.5 g/dL      Albumin 4.20 g/dL      ALT (SGPT) 17 U/L      AST (SGOT) 16 U/L      Alkaline Phosphatase 54 U/L      Total Bilirubin 0.3 mg/dL      Globulin 3.3 gm/dL      A/G Ratio 1.3 g/dL      BUN/Creatinine Ratio 18.8     Anion Gap 8.0 mmol/L      eGFR 69.4 mL/min/1.73      Comment: National Kidney Foundation and American Society of Nephrology (ASN) Task Force recommended calculation based on the Chronic Kidney Disease Epidemiology Collaboration (CKD-EPI) equation refit without adjustment for race.       Narrative:      GFR Normal >60  Chronic  Kidney Disease <60  Kidney Failure <15      Troponin [[id number]]  (Normal) Collected: 07/12/22 1057    Specimen: Blood Updated: 07/12/22 1127     Troponin T <0.010 ng/mL     Narrative:      Troponin T Reference Range:  <= 0.03 ng/mL-   Negative for AMI  >0.03 ng/mL-     Abnormal for myocardial necrosis.  Clinicians would have to utilize clinical acumen, EKG, Troponin and serial changes to determine if it is an Acute Myocardial Infarction or myocardial injury due to an underlying chronic condition.       Results may be falsely decreased if patient taking Biotin.      CBC Auto Differential [[id number]]  (Normal) Collected: 07/12/22 1057    Specimen: Blood Updated: 07/12/22 1107     WBC 9.23 10*3/mm3      RBC 4.59 10*6/mm3      Hemoglobin 13.8 g/dL      Hematocrit 42.8 %      MCV 93.2 fL      MCH 30.1 pg      MCHC 32.2 g/dL      RDW 12.7 %      RDW-SD 43.6 fl      MPV 9.2 fL      Platelets 380 10*3/mm3      Neutrophil % 56.4 %      Lymphocyte % 31.1 %      Monocyte % 7.7 %      Eosinophil % 3.7 %      Basophil % 0.9 %      Immature Grans % 0.2 %      Neutrophils, Absolute 5.21 10*3/mm3      Lymphocytes, Absolute 2.87 10*3/mm3      Monocytes, Absolute 0.71 10*3/mm3      Eosinophils, Absolute 0.34 10*3/mm3      Basophils, Absolute 0.08 10*3/mm3      Immature Grans, Absolute 0.02 10*3/mm3      nRBC 0.0 /100 WBC     Lipase [[id number]]  (Normal) Collected: 07/12/22 1156    Specimen: Blood Updated: 07/12/22 1219     Lipase 32 U/L     D-dimer, Quantitative [[id number]]  (Abnormal) Collected: 07/12/22 1156    Specimen: Blood Updated: 07/12/22 1218     D-Dimer, Quantitative 0.52 MCGFEU/mL     Narrative:      Reference Range is 0-0.50 MCGFEU/mL. However, results <0.50 MCGFEU/mL tends to rule out DVT or PE. Results >0.50 MCGFEU/mL are not useful in predicting absence or presence of DVT or PE.      BNP [[id number]]  (Normal) Collected: 07/12/22 1156    Specimen: Blood Updated: 07/12/22 1223     proBNP 101.1 pg/mL     Narrative:       Among patients with dyspnea, NT-proBNP is highly sensitive for the detection of acute congestive heart failure. In addition NT-proBNP of <300 pg/ml effectively rules out acute congestive heart failure with 99% negative predictive value.    Results may be falsely decreased if patient taking Biotin.      Troponin [[id number]]  (Normal) Collected: 07/12/22 1313    Specimen: Blood Updated: 07/12/22 1345     Troponin T <0.010 ng/mL     Narrative:      Troponin T Reference Range:  <= 0.03 ng/mL-   Negative for AMI  >0.03 ng/mL-     Abnormal for myocardial necrosis.  Clinicians would have to utilize clinical acumen, EKG, Troponin and serial changes to determine if it is an Acute Myocardial Infarction or myocardial injury due to an underlying chronic condition.       Results may be falsely decreased if patient taking Biotin.        XR Chest 1 View    Result Date: 7/12/2022  EXAM: XR CHEST 1 VW- 7/12/2022 12:12 PM CDT  HISTORY: Chest Pain Triage Protocol   COMPARISON: September 11, 2020.  TECHNIQUE: Frontal radiograph of the chest  FINDINGS: The lungs are clear. The cardiomediastinal silhouette and pulmonary vascularity are within normal limits.  The osseous structures and surrounding soft tissues demonstrate no acute abnormality.       1. No radiographic evidence of acute cardiopulmonary process.   This report was finalized on 07/12/2022 12:19 by Dr. Ceasar Smyth MD.    HEART score: 3  ED Course  ED Course as of 07/12/22 1501   Tue Jul 12, 2022   1458 Patient is feeling better.  Etiology for symptoms likely musculoskeletal given the reproducible component.  His chest x-ray was clear, no evidence for infiltrate, no pneumothorax.  His initial repeat troponin were normal at less than 0.010.  Age-adjusted D-dimer normal.  Will give trial of muscle relaxants and NSAIDs, and return for worsening symptoms. [AW]      ED Course User Index  [AW] Krunal Cavazos MD                                           [facility]    Final  diagnoses:   Musculoskeletal chest pain       ED Disposition  ED Disposition     ED Disposition   Discharge    Condition   Stable    Comment   --             Lebron Gutierrez MD  [address] [place] [address] [address] [place] [address]  [phone]    Schedule an appointment as soon as possible for a visit   As needed         Medication List      New Prescriptions    cyclobenzaprine 5 MG tablet  Commonly known as: FLEXERIL  Take 1 tablet by mouth 3 (Three) Times a Day As Needed for Muscle Spasms.     naproxen 500 MG tablet  Commonly known as: NAPROSYN  Take 1 tablet by mouth 2 (Two) Times a Day As Needed for Mild Pain .           Where to Get Your Medications      These medications were sent to [facility] (Now [facility]) - [place], [place] - [address]  - [phone] [facility] [phone] FX  [address], [facility] [address]    Phone: [phone]   · cyclobenzaprine 5 MG tablet  · naproxen 500 MG tablet          Krunal Cavazos MD  07/12/22 4822

## 2022-07-14 LAB
QT INTERVAL: 416 MS
QT INTERVAL: 434 MS
QTC INTERVAL: 439 MS
QTC INTERVAL: 458 MS

## 2022-07-18 DIAGNOSIS — J43.2 CENTRILOBULAR EMPHYSEMA: Chronic | ICD-10-CM

## 2022-07-18 RX ORDER — ALBUTEROL SULFATE 90 UG/1
2 AEROSOL, METERED RESPIRATORY (INHALATION) EVERY 4 HOURS PRN
Qty: 54 G | Refills: 3 | Status: SHIPPED | OUTPATIENT
Start: 2022-07-18

## 2022-07-29 ENCOUNTER — OFFICE VISIT (OUTPATIENT)
Dept: PRIMARY CARE CLINIC | Age: 72
End: 2022-07-29
Payer: MEDICARE

## 2022-07-29 VITALS
TEMPERATURE: 97.9 F | SYSTOLIC BLOOD PRESSURE: 128 MMHG | OXYGEN SATURATION: 95 % | HEART RATE: 76 BPM | HEIGHT: 71 IN | WEIGHT: 183 LBS | RESPIRATION RATE: 20 BRPM | BODY MASS INDEX: 25.62 KG/M2 | DIASTOLIC BLOOD PRESSURE: 68 MMHG

## 2022-07-29 DIAGNOSIS — M79.605 LEFT LEG PAIN: ICD-10-CM

## 2022-07-29 DIAGNOSIS — Z00.00 MEDICARE ANNUAL WELLNESS VISIT, SUBSEQUENT: ICD-10-CM

## 2022-07-29 DIAGNOSIS — E11.69 TYPE 2 DIABETES MELLITUS WITH OTHER SPECIFIED COMPLICATION, WITHOUT LONG-TERM CURRENT USE OF INSULIN (HCC): Primary | ICD-10-CM

## 2022-07-29 PROCEDURE — 2022F DILAT RTA XM EVC RTNOPTHY: CPT | Performed by: STUDENT IN AN ORGANIZED HEALTH CARE EDUCATION/TRAINING PROGRAM

## 2022-07-29 PROCEDURE — 83036 HEMOGLOBIN GLYCOSYLATED A1C: CPT | Performed by: STUDENT IN AN ORGANIZED HEALTH CARE EDUCATION/TRAINING PROGRAM

## 2022-07-29 PROCEDURE — G0439 PPPS, SUBSEQ VISIT: HCPCS | Performed by: STUDENT IN AN ORGANIZED HEALTH CARE EDUCATION/TRAINING PROGRAM

## 2022-07-29 PROCEDURE — 99214 OFFICE O/P EST MOD 30 MIN: CPT | Performed by: STUDENT IN AN ORGANIZED HEALTH CARE EDUCATION/TRAINING PROGRAM

## 2022-07-29 PROCEDURE — 4004F PT TOBACCO SCREEN RCVD TLK: CPT | Performed by: STUDENT IN AN ORGANIZED HEALTH CARE EDUCATION/TRAINING PROGRAM

## 2022-07-29 PROCEDURE — 1123F ACP DISCUSS/DSCN MKR DOCD: CPT | Performed by: STUDENT IN AN ORGANIZED HEALTH CARE EDUCATION/TRAINING PROGRAM

## 2022-07-29 PROCEDURE — G8417 CALC BMI ABV UP PARAM F/U: HCPCS | Performed by: STUDENT IN AN ORGANIZED HEALTH CARE EDUCATION/TRAINING PROGRAM

## 2022-07-29 PROCEDURE — 3044F HG A1C LEVEL LT 7.0%: CPT | Performed by: STUDENT IN AN ORGANIZED HEALTH CARE EDUCATION/TRAINING PROGRAM

## 2022-07-29 PROCEDURE — 3017F COLORECTAL CA SCREEN DOC REV: CPT | Performed by: STUDENT IN AN ORGANIZED HEALTH CARE EDUCATION/TRAINING PROGRAM

## 2022-07-29 PROCEDURE — G8428 CUR MEDS NOT DOCUMENT: HCPCS | Performed by: STUDENT IN AN ORGANIZED HEALTH CARE EDUCATION/TRAINING PROGRAM

## 2022-07-29 RX ORDER — TIZANIDINE 2 MG/1
2 TABLET ORAL NIGHTLY PRN
Qty: 30 TABLET | Refills: 0 | Status: SHIPPED | OUTPATIENT
Start: 2022-07-29 | End: 2022-10-17 | Stop reason: SDUPTHER

## 2022-07-29 RX ORDER — GLUCOSAMINE HCL/CHONDROITIN SU 500-400 MG
CAPSULE ORAL
Qty: 100 STRIP | Refills: 5 | Status: SHIPPED | OUTPATIENT
Start: 2022-07-29

## 2022-07-29 ASSESSMENT — PATIENT HEALTH QUESTIONNAIRE - PHQ9
SUM OF ALL RESPONSES TO PHQ9 QUESTIONS 1 & 2: 0
SUM OF ALL RESPONSES TO PHQ QUESTIONS 1-9: 0
SUM OF ALL RESPONSES TO PHQ QUESTIONS 1-9: 0
1. LITTLE INTEREST OR PLEASURE IN DOING THINGS: 0
DEPRESSION UNABLE TO ASSESS: FUNCTIONAL CAPACITY MOTIVATION LIMITS ACCURACY
SUM OF ALL RESPONSES TO PHQ QUESTIONS 1-9: 0
SUM OF ALL RESPONSES TO PHQ QUESTIONS 1-9: 0
2. FEELING DOWN, DEPRESSED OR HOPELESS: 0

## 2022-07-29 ASSESSMENT — LIFESTYLE VARIABLES
HOW OFTEN DO YOU HAVE A DRINK CONTAINING ALCOHOL: NEVER
HOW MANY STANDARD DRINKS CONTAINING ALCOHOL DO YOU HAVE ON A TYPICAL DAY: PATIENT DOES NOT DRINK

## 2022-07-29 NOTE — PROGRESS NOTES
Medicare Annual Wellness Visit    Geroldine Lundborg is here for Medicare AWV    Assessment & Plan   Type 2 diabetes mellitus with other specified complication, without long-term current use of insulin ([facility])  -POC a1c 6.9. Much improved. Continue soliqua at current dose. Discussed keeping closer BG log. Working on getting freestyle michaela. -     blood glucose monitor strips; Test 1 times a day & as needed for symptoms of irregular blood glucose. Dispense sufficient amount for indicated testing frequency plus additional to accommodate PRN testing needs. AccuCheck Guide strips, Disp-100 strip, R-5, Normal  Left leg pain  -     US LOWER EXTREMITY LEFT VEIN MAPPING W DVT      Recommendations for Preventive Services Due: see orders and patient instructions/AVS.  Recommended screening schedule for the next 5-10 years is provided to the patient in written form: see Patient Instructions/AVS.     Return in about 2 weeks (around 8/12/2022) for F/u T2DM, possible trigger pt injection, US DVT results. Subjective   The following acute and/or chronic problems were also addressed today:    L leg pain x 1 mo. In calf region. No period of immobility    T2DM  -Last a1c 3 mo ago was 9.7 On soliqua 20 units. He notes BG has improved. Patient's complete Health Risk Assessment and screening values have been reviewed and are found in Flowsheets. The following problems were reviewed today and where indicated follow up appointments were made and/or referrals ordered.     Positive Risk Factor Screenings with Interventions:    Fall Risk:  Do you feel unsteady or are you worried about falling? : (!) yes  2 or more falls in past year?: no  Fall with injury in past year?: no   Fall Risk Interventions:    Home safety tips provided  Offered PT for gait/balance training      Tobacco Use:  Tobacco Use: High Risk    Smoking Tobacco Use: Every Day    Smokeless Tobacco Use: Never     E-cigarette/Vaping       Questions Responses    E-cigarette/Vaping Use Never User    Start Date     Passive Exposure     Quit Date     Counseling Given     Comments           Substance Use - Tobacco Interventions:  tobacco cessation tips and resources provided     Drug Use Screening:    DAST-10 Score Interpretation:  1-2: Low level - Monitor, re-assess at a later date; 3-5: Moderate level - Further Investigation; 6-8: Substantial level - Intensive Assessment; 9-10: Severe level - Intensive Assessment  Substance Use - Drug Use Interventions:  patient expresses readiness for treatment or support with interventions noted       General Health and ACP:  General  In general, how would you say your health is?: Good  In the past 7 days, have you experienced any of the following: New or Increased Pain, New or Increased Fatigue, Loneliness, Social Isolation, Stress or Anger?: (!) Yes  Select all that apply: (!) New or Increased Pain  Do you get the social and emotional support that you need?: Yes  Do you have a Living Will?: (!) No    Advance Directives       Power of  Living Will ACP-Advance Directive ACP-Power of     Not on File Not on File Not on File Not on File        General Health Risk Interventions:  N/a    Health Habits/Nutrition:  Physical Activity: Insufficiently Active    Days of Exercise per Week: 3 days    Minutes of Exercise per Session: 30 min     Have you lost any weight without trying in the past 3 months?: No  Body mass index: (!) 25.52  Have you seen the dentist within the past year?: (!) No  Health Habits/Nutrition Interventions:  Dental exam overdue:  patient encouraged to make appointment with his/her dentist    Hearing/Vision:  Do you or your family notice any trouble with your hearing that hasn't been managed with hearing aids?: No  Do you have difficulty driving, watching TV, or doing any of your daily activities because of your eyesight?: No  Have you had an eye exam within the past year?: (!) No  No results found.   Hearing/Vision Interventions:  Vision concerns:  patient encouraged to make appointment with his/her eye specialist    Safety:  Do you have working smoke detectors?: Yes  Do you have any tripping hazards - loose or unsecured carpets or rugs?: No  Do you have any tripping hazards - clutter in doorways, halls, or stairs?: (!) Yes  Do you have either shower bars, grab bars, non-slip mats or non-slip surfaces in your shower or bathtub?: Yes  Do all of your stairways have a railing or banister?: Yes  Do you always fasten your seatbelt when you are in a car?: Yes  Safety Interventions:  Home safety tips provided           Objective   Vitals:    07/29/22 1353   BP: 128/68   Pulse: 76   Resp: 20   Temp: 97.9 °F (36.6 °C)   TempSrc: Temporal   SpO2: 95%   Weight: 183 lb (83 kg)   Height: 5' 11\" (1.803 m)      Body mass index is 25.52 kg/m². General Appearance: alert and oriented to person, place and time, well developed and well- nourished, in no acute distress  Skin: warm and dry, no rash or erythema  Head: normocephalic and atraumatic  Eyes: pupils equal, round, and reactive to light, extraocular eye movements intact, conjunctivae normal  ENT: tympanic membrane, external ear and ear canal normal bilaterally, nose without deformity, nasal mucosa and turbinates normal without polyps  Neck: supple and non-tender without mass, no thyromegaly or thyroid nodules, no cervical lymphadenopathy  Pulmonary/Chest: clear to auscultation bilaterally- no wheezes, rales or rhonchi, normal air movement, no respiratory distress  Cardiovascular: normal rate, regular rhythm, normal S1 and S2, no murmurs, rubs, clicks, or gallops, distal pulses intact, no carotid bruits  Abdomen: soft, non-tender, non-distended, normal bowel sounds, no masses or organomegaly  Extremities: no cyanosis, clubbing or edema  Musculoskeletal: normal range of motion, no joint swelling, deformity or tenderness.  No calf tenderness, swelling, or warmth  Neurologic: reflexes normal and symmetric, no cranial nerve deficit, gait, coordination and speech normal       No Known Allergies  Prior to Visit Medications    Medication Sig Taking? Authorizing Provider   blood glucose monitor kit and supplies Dispense sufficient amount for indicated testing frequency plus additional to accommodate PRN testing needs. Dispense all needed supplies to include: monitor, strips, lancing device, lancets, control solutions, alcohol swabs. Yes Noe Valdez MD   blood glucose monitor strips Test 2 times a day & as needed for symptoms of irregular blood glucose. Dispense sufficient amount for indicated testing frequency plus additional to accommodate PRN testing needs. Yes Noe Valdez MD   Lancets MISC 1 each by Does not apply route daily Yes Noe Valdez MD   atorvastatin (LIPITOR) 20 MG tablet TAKE 1 TABLET EVERY DAY Yes Noe Valdez MD   Insulin Glargine-Lixisenatide 100-33 UNT-MCG/ML SOPN Inject 15 Units into the skin daily Yes Noe Valdez MD   traZODone (DESYREL) 50 MG tablet Take 1 tablet by mouth nightly Yes Noe Valdez MD   blood glucose test strips (ACCU-CHEK FRANCISCO PLUS) strip TEST 2 TIMES A DAY Yes Noe Valdez MD   tamsulosin (FLOMAX) 0.4 MG capsule Take 1 capsule by mouth daily Yes Noe Valdez MD   amLODIPine (NORVASC) 5 MG tablet TAKE 1 TABLET EVERY DAY Yes Noe Valdez MD   valsartan-hydroCHLOROthiazide (DIOVAN-HCT) 320-25 MG per tablet Take 1 tablet by mouth daily Yes Noe Valdez MD   omeprazole (PRILOSEC) 20 MG delayed release capsule Take 1 tablet po BID ( on an empty stomach) x 14 days for treatment of h pylori infection. Yes ADI Garcia   albuterol sulfate HFA (PROVENTIL;VENTOLIN;PROAIR) 108 (90 Base) MCG/ACT inhaler Inhale 2 puffs into the lungs every 6 hours as needed for Wheezing Yes Historical Provider, MD   gabapentin (NEURONTIN) 600 MG tablet Take 1 tablet by mouth 2 times daily for 30 days.   Noe Valdez MD       CareTeam (Including outside providers/suppliers regularly involved in providing care):   Patient Care Team:  Laura Mckeon MD as PCP - General (Family Medicine)  Laura Mckeon MD as PCP - [facility] Empaneled Provider     Reviewed and updated this visit:  Allergies

## 2022-08-05 ENCOUNTER — ANESTHESIA EVENT (OUTPATIENT)
Dept: OPERATING ROOM | Age: 72
End: 2022-08-05

## 2022-08-09 ENCOUNTER — ANESTHESIA (OUTPATIENT)
Dept: OPERATING ROOM | Age: 72
End: 2022-08-09

## 2022-08-09 ENCOUNTER — APPOINTMENT (OUTPATIENT)
Dept: OPERATING ROOM | Age: 72
End: 2022-08-09

## 2022-08-09 ENCOUNTER — HOSPITAL ENCOUNTER (OUTPATIENT)
Age: 72
Setting detail: OUTPATIENT SURGERY
Discharge: HOME | End: 2022-08-09
Attending: INTERNAL MEDICINE | Admitting: INTERNAL MEDICINE
Payer: MEDICARE

## 2022-08-09 VITALS
DIASTOLIC BLOOD PRESSURE: 78 MMHG | WEIGHT: 187 LBS | SYSTOLIC BLOOD PRESSURE: 165 MMHG | HEIGHT: 71 IN | TEMPERATURE: 97.6 F | OXYGEN SATURATION: 96 % | BODY MASS INDEX: 26.18 KG/M2 | HEART RATE: 68 BPM | RESPIRATION RATE: 20 BRPM

## 2022-08-09 PROCEDURE — G0105 COLORECTAL SCRN; HI RISK IND: HCPCS | Performed by: INTERNAL MEDICINE

## 2022-08-09 PROCEDURE — G0105 COLORECTAL SCRN; HI RISK IND: HCPCS

## 2022-08-09 RX ORDER — SODIUM CHLORIDE 9 MG/ML
INJECTION, SOLUTION INTRAVENOUS CONTINUOUS
Status: DISCONTINUED | OUTPATIENT
Start: 2022-08-09 | End: 2022-08-09 | Stop reason: HOSPADM

## 2022-08-09 RX ORDER — DEXTROSE MONOHYDRATE 50 MG/ML
INJECTION, SOLUTION INTRAVENOUS CONTINUOUS
Status: DISCONTINUED | OUTPATIENT
Start: 2022-08-09 | End: 2022-08-09 | Stop reason: HOSPADM

## 2022-08-09 RX ORDER — LIDOCAINE HYDROCHLORIDE 10 MG/ML
INJECTION, SOLUTION INFILTRATION; PERINEURAL PRN
Status: DISCONTINUED | OUTPATIENT
Start: 2022-08-09 | End: 2022-08-09 | Stop reason: SDUPTHER

## 2022-08-09 RX ORDER — PROPOFOL 10 MG/ML
INJECTION, EMULSION INTRAVENOUS PRN
Status: DISCONTINUED | OUTPATIENT
Start: 2022-08-09 | End: 2022-08-09 | Stop reason: SDUPTHER

## 2022-08-09 RX ADMIN — DEXTROSE MONOHYDRATE: 50 INJECTION, SOLUTION INTRAVENOUS at 09:26

## 2022-08-09 RX ADMIN — PROPOFOL 250 MG: 10 INJECTION, EMULSION INTRAVENOUS at 10:28

## 2022-08-09 RX ADMIN — LIDOCAINE HYDROCHLORIDE 40 MG: 10 INJECTION, SOLUTION INFILTRATION; PERINEURAL at 10:28

## 2022-08-09 RX ADMIN — SODIUM CHLORIDE: 9 INJECTION, SOLUTION INTRAVENOUS at 09:16

## 2022-08-09 ASSESSMENT — PAIN - FUNCTIONAL ASSESSMENT: PAIN_FUNCTIONAL_ASSESSMENT: NONE - DENIES PAIN

## 2022-08-09 NOTE — DISCHARGE INSTRUCTIONS
POST-OP ORDERS: ENDOSCOPY & COLONOSCOPY:    1. Rest today. 2. DO NOT eat or drink until wide awake; eat your usual diet today in moderate amount only. 3. DO NOT drive today. 4. Call physician if you have severe pain, vomiting, fever, rectal bleeding or black bowel movements. 5.  If a biopsy was taken or a polyp removed, you should expect to hear results in about 21 days. If you have heard nothing from your physician by then, call the office for results. 6.  Discharge home when patient awake, vitals signs stable and tolerating liquids. 1. Repeat colonoscopy: In 5 years due to his personal and family history as well as prep quality today.   2. - Resume previous meds and diet  - GI clinic f/u PRN   - Keep scheduled f/u appts with other MDs

## 2022-08-09 NOTE — ANESTHESIA PRE PROCEDURE
TIMES A DAY 3/10/22   Kyle Jordan MD   tamsulosin [facility]) 0.4 MG capsule Take 1 capsule by mouth daily 1/13/22   Kyle Jordan MD   amLODIPine (NORVASC) 5 MG tablet TAKE 1 TABLET EVERY DAY 1/13/22   Kyle Jordan MD   valsartan-hydroCHLOROthiazide (DIOVAN-HCT) 320-25 MG per tablet Take 1 tablet by mouth daily 1/13/22   Kyle Jordan MD   omeprazole (PRILOSEC) 20 MG delayed release capsule Take 1 tablet po BID ( on an empty stomach) x 14 days for treatment of h pylori infection.  7/30/19   ADI Morataya   albuterol sulfate HFA (PROVENTIL;VENTOLIN;PROAIR) 108 (90 Base) MCG/ACT inhaler Inhale 2 puffs into the lungs every 6 hours as needed for Wheezing    Historical Provider, MD       Current medications:    Current Facility-Administered Medications   Medication Dose Route Frequency Provider Last Rate Last Admin    0.9 % sodium chloride infusion   IntraVENous Continuous Rosemarie Dunlap MD           Allergies:  No Known Allergies    Problem List:    Patient Active Problem List   Diagnosis Code    Benign prostatic hyperplasia with lower urinary tract symptoms N40.1    Tobacco abuse Z72.0    Mixed hyperlipidemia E78.2    Testosterone deficiency E34.9    Essential hypertension I10    Diabetes mellitus ([address] E11.9    GERD (gastroesophageal reflux disease) K21.9    Chronic obstructive pulmonary disease ([address] J44.9       Past Medical History:        Diagnosis Date    BPH (benign prostatic hyperplasia)     Dizziness     DM (diabetes mellitus) ([address]     type 2    Dysphagia     HTN (hypertension)     Hyperlipidemia     Hypogonadism male     Peripheral neuropathy     Tobacco dependence        Past Surgical History:        Procedure Laterality Date    BACK SURGERY      COLON SURGERY  2014    Done in [facility], hx of colon polyps with a 5 yr recall    COLONOSCOPY N/A 7/12/2019    Dr Cecile Ricks hemorrhoids-Grade 2, suboptimal prep, diverticulosis-HP-3 yr recall    EYE SURGERY Right 07/2017    UPPER GASTROINTESTINAL ENDOSCOPY N/A 7/12/2019    Dr Manuelito De La Rosa-Mild gastritis and bulbar duodenitis, prominent major duodenal papilla, possible lipoma or GIST        Social History:    Social History     Tobacco Use    Smoking status: Every Day     Packs/day: 0.25     Years: 54.00     Pack years: 13.50     Types: Cigarettes     Start date: 1964    Smokeless tobacco: Never   Substance Use Topics    Alcohol use: Yes     Comment: rare 2-3x yearly                                Ready to quit: Not Answered  Counseling given: Not Answered      Vital Signs (Current):   Vitals:    08/09/22 0903   BP: (!) 150/78   Pulse: 67   Resp: 15   Temp: 97.6 °F (36.4 °C)   TempSrc: Temporal   SpO2: 97%   Weight: 187 lb (84.8 kg)   Height: 5' 11\" (1.803 m)                                              BP Readings from Last 3 Encounters:   08/09/22 (!) 150/78   07/29/22 128/68   05/06/22 122/62       NPO Status: Time of last liquid consumption: 0700 (prep)                        Time of last solid consumption: 1800                        Date of last liquid consumption: 08/09/22                        Date of last solid food consumption: 08/07/22    BMI:   Wt Readings from Last 3 Encounters:   08/09/22 187 lb (84.8 kg)   07/29/22 183 lb (83 kg)   05/06/22 173 lb (78.5 kg)     Body mass index is 26.08 kg/m².     CBC:   Lab Results   Component Value Date/Time    WBC 10.3 10/27/2021 09:39 AM    RBC 4.46 10/27/2021 09:39 AM    HGB 13.3 10/27/2021 09:39 AM    HCT 43.6 10/27/2021 09:39 AM    MCV 97.8 10/27/2021 09:39 AM    RDW 13.2 10/27/2021 09:39 AM     10/27/2021 09:39 AM       CMP:   Lab Results   Component Value Date/Time     10/27/2021 09:39 AM    K 4.6 10/27/2021 09:39 AM    CL 99 10/27/2021 09:39 AM    CO2 30 10/27/2021 09:39 AM    BUN 16 10/27/2021 09:39 AM    CREATININE 1.1 10/27/2021 09:39 AM    GFRAA >59 10/27/2021 09:39 AM    LABGLOM >60 10/27/2021 09:39 AM    GLUCOSE 219 10/27/2021 09:39 AM    PROT 7.4 10/27/2021 09:39 AM    CALCIUM 9.3 10/27/2021 09:39 AM    BILITOT 0.4 10/27/2021 09:39 AM    ALKPHOS 58 10/27/2021 09:39 AM    AST 16 10/27/2021 09:39 AM    ALT 24 10/27/2021 09:39 AM       POC Tests: No results for input(s): POCGLU, POCNA, POCK, POCCL, POCBUN, POCHEMO, POCHCT in the last 72 hours. Coags: No results found for: PROTIME, INR, APTT    HCG (If Applicable): No results found for: PREGTESTUR, PREGSERUM, HCG, HCGQUANT     ABGs: No results found for: PHART, PO2ART, HNS9SSA, ILM6ZMO, BEART, V1IQRFOC     Type & Screen (If Applicable):  No results found for: LABABO, LABRH    Drug/Infectious Status (If Applicable):  No results found for: HIV, HEPCAB    COVID-19 Screening (If Applicable): No results found for: COVID19        Anesthesia Evaluation  Patient summary reviewed and Nursing notes reviewed  Airway: Mallampati: II  TM distance: >3 FB   Neck ROM: full  Mouth opening: > = 3 FB   Dental: normal exam         Pulmonary:normal exam    (+) COPD:                             Cardiovascular:    (+) hypertension:,          Beta Blocker:  Not on Beta Blocker         Neuro/Psych:   (+) neuromuscular disease:,             GI/Hepatic/Renal:   (+) GERD:,           Endo/Other:    (+) Diabetes, . Abdominal:             Vascular: Other Findings:           Anesthesia Plan      general and TIVA     ASA 3       Induction: intravenous. Anesthetic plan and risks discussed with patient. Plan discussed with CRNA.                     ADI Sewell - MAJOR   8/9/2022

## 2022-08-09 NOTE — H&P
monitor, strips, lancing device, lancets, control solutions, alcohol swabs. 6/13/22   Amilcar Avila MD   blood glucose monitor strips Test 2 times a day & as needed for symptoms of irregular blood glucose. Dispense sufficient amount for indicated testing frequency plus additional to accommodate PRN testing needs. 6/13/22   Amilcar Avila MD   Lancets MISC 1 each by Does not apply route daily 6/13/22   Amilcar Avila MD   atorvastatin (LIPITOR) 20 MG tablet TAKE 1 TABLET EVERY DAY 5/31/22   Amilcar Avila MD   gabapentin (NEURONTIN) 600 MG tablet Take 1 tablet by mouth 2 times daily for 30 days. 4/29/22 5/29/22  Amilcar Avila MD   Insulin Glargine-Lixisenatide 100-33 UNT-MCG/ML SOPN Inject 15 Units into the skin daily 4/29/22   Amilcar Avila MD   traZODone (DESYREL) 50 MG tablet Take 1 tablet by mouth nightly 3/10/22   Amilcar Avila MD   blood glucose test strips (ACCU-CHEK FRANCISCO PLUS) strip TEST 2 TIMES A DAY 3/10/22   Amilcar Avila MD   [facility] 0.4 MG capsule Take 1 capsule by mouth daily 1/13/22   Amilcar Avila MD   amLODIPine (NORVASC) 5 MG tablet TAKE 1 TABLET EVERY DAY 1/13/22   Amilacr Avila MD   valsartan-hydroCHLOROthiazide (DIOVAN-HCT) 320-25 MG per tablet Take 1 tablet by mouth daily 1/13/22   Amilcar Avila MD   omeprazole (PRILOSEC) 20 MG delayed release capsule Take 1 tablet po BID ( on an empty stomach) x 14 days for treatment of h pylori infection.  7/30/19   Aleatha Lundborg, APRN   albuterol sulfate HFA (PROVENTIL;VENTOLIN;PROAIR) 108 (90 Base) MCG/ACT inhaler Inhale 2 puffs into the lungs every 6 hours as needed for Wheezing    Historical Provider, MD       Past Medical History:  Past Medical History:   Diagnosis Date    BPH (benign prostatic hyperplasia)     Dizziness     DM (diabetes mellitus) ([facility]     type 2    Dysphagia     HTN (hypertension)     Hyperlipidemia     Hypogonadism male     Peripheral neuropathy     Tobacco dependence        Past Surgical History:  Past Surgical History:   Procedure Laterality Date    BACK SURGERY      COLON SURGERY  2014    Done in [facility], hx of colon polyps with a 5 yr recall    COLONOSCOPY N/A 7/12/2019    Dr Rei Bell hemorrhoids-Grade 2, suboptimal prep, diverticulosis-HP-3 yr recall    EYE SURGERY Right 07/2017    UPPER GASTROINTESTINAL ENDOSCOPY N/A 7/12/2019    Dr Mimi De La Rosa-Mild gastritis and bulbar duodenitis, prominent major duodenal papilla, possible lipoma or GIST        Social History:  Social History     Tobacco Use    Smoking status: Every Day     Packs/day: 0.25     Years: 54.00     Pack years: 13.50     Types: Cigarettes     Start date: 1964    Smokeless tobacco: Never   Vaping Use    Vaping Use: Never used   Substance Use Topics    Alcohol use: Yes     Comment: rare 2-3x yearly    Drug use: Yes     Types: Marijuana (Weed)     Comment: occ       Vital Signs:   Vitals:    08/09/22 0903   BP: (!) 150/78   Pulse: 67   Resp: 15   Temp: 97.6 °F (36.4 °C)   SpO2: 97%        Physical Exam:  Cardiac:  [x]WNL  []Comments:  Pulmonary:  [x]WNL   []Comments:  Neuro/Mental Status:  [x]WNL  []Comments:  Abdominal:  [x]WNL    []Comments:  Other:   []WNL  []Comments:    Informed Consent:  The risks and benefits of the procedure have been discussed with either the patient or if they cannot consent, their representative. Assessment:  Patient examined and appropriate for planned sedation and procedure. Plan:  Proceed with planned sedation and procedure as above.          Es Grande MD

## 2022-08-09 NOTE — ANESTHESIA POSTPROCEDURE EVALUATION
Department of Anesthesiology  Postprocedure Note    Patient: Holli Palacio  MRN: [id number]  YOB: 1950  Date of evaluation: 8/9/2022      Procedure Summary     Date: 08/09/22 Room / Location: [facility] 02 / [address]    Anesthesia Start: 3393 Anesthesia Stop:     Procedure: COLORECTAL CANCER SCREENING, NOT HIGH RISK (Abdomen) Diagnosis:       Screen for colon cancer      Hx of colonic polyps      FH: colon cancer      (Screen for colon cancer [Z12.11])      (Hx of colonic polyps [Z86.010])      (FH: colon cancer [Z80.0])    Surgeons: Mary Reddy MD Responsible Provider: ADI Hall CRNA    Anesthesia Type: general, TIVA ASA Status: 3          Anesthesia Type: No value filed.     Lenore Phase I:      Lenore Phase II:        Anesthesia Post Evaluation    Patient location during evaluation: bedside  Patient participation: complete - patient participated  Level of consciousness: sleepy but conscious  Pain score: 0  Airway patency: patent  Nausea & Vomiting: no nausea and no vomiting  Complications: no  Cardiovascular status: blood pressure returned to baseline  Respiratory status: acceptable, room air and spontaneous ventilation  Hydration status: euvolemic

## 2022-08-09 NOTE — OP NOTE
Patient: Lorri Stiles :   [facility] Rec#: [id number] Acc#: [id number]   Primary Care Provider Estrellita Shahid MD    Date of Procedure:  2022    Endoscopist: Basilia Matt MD, MD    Referring Provider: Estrellita Shahid MD,     Operation Performed: Colonoscopy up to the cecum    Indications: Family history of colon cancer-mother; personal history of colon polyps-needs colorectal cancer surveillance    Anesthesia:  Sedation was administered by anesthesia who monitored the patient during the procedure. I met with Lorri Stiles prior to procedure. We discussed the procedure itself, and I have discussed the risks of endoscopy (including-- but not limited to-- pain, discomfort, bleeding potentially requiring second endoscopic procedure and/or blood transfusion, organ perforation requiring operative repair, damage to organs near the colon, infection, aspiration, cardiopulmonary/allergic reaction), benefits, indications to endoscopy. Additionally, we discussed options other than colonoscopy. The patient expressed understanding. All questions answered. The patient decided to proceed with the procedure. Signed informed consent was placed on the chart. Blood Loss: minimal    Withdrawal time: More than 6 minutes  Bowel Prep: Fair  with small amounts of thick solid and semisolid stool and a moderate amount of thick, opaque liquid scattered in patchy segments throughout the colon obscuring the underlying mucosa. Lesions including polyps may have been missed. Complications: no immediate complications    DESCRIPTION OF PROCEDURE:     A time out was performed. After written informed consent was obtained, the patient was placed in the left lateral position. The perianal area was inspected, and a digital rectal exam was performed. A rectal exam was performed: normal tone, no palpable lesions.  At this point, a forward viewing Olympus colonoscope was inserted into the anus and carefully advanced to the cecum.  The cecum was identified by the ileocecal valve and the appendiceal orifice. The colonoscope was then slowly withdrawn with careful inspection of the mucosa in a linear and circumferential fashion. The scope was retroflexed in the rectum. Suction was utilized during the procedure to remove as much air as possible from the bowel. The colonoscope was removed from the patient, and the procedure was terminated. Findings are listed below. Findings:     NO large polyps or masses or strictures or colitis. Suboptimal exam due to prep quality as described above. Moderate diverticulosis in the left colon  Internal hemorrhoids-Grade 1 without any bleeding stigmata  Where it was clearly visible, the mucosa appeared normal throughout the entire examined colon  Retroflexion in the rectum was otherwise normal and revealed no further abnormalities        Recommendations:  1. Repeat colonoscopy: In 5 years due to his personal and family history as well as prep quality today. 2. - Resume previous meds and diet  - GI clinic f/u PRN   - Keep scheduled f/u appts with other MDs     Findings and recommendations were discussed w/ the patient. A copy of the images was provided.     (Please note that portions of this note were completed with a voice recognition program. Efforts were made to edit the dictations but occasionally words may be mis-transcribed.)     Jemima Cotto MD, MD  8/9/2022  10:29 AM

## 2022-08-24 DIAGNOSIS — Z01.818 PRE-OP TESTING: ICD-10-CM

## 2022-08-24 DIAGNOSIS — R60.9 SWELLING: Primary | ICD-10-CM

## 2022-08-25 ENCOUNTER — HOSPITAL ENCOUNTER (OUTPATIENT)
Dept: VASCULAR LAB | Age: 72
Discharge: HOME | End: 2022-08-25
Payer: MEDICARE

## 2022-08-25 DIAGNOSIS — R60.9 SWELLING: ICD-10-CM

## 2022-08-25 PROCEDURE — 93971 EXTREMITY STUDY: CPT

## 2022-08-30 LAB — HBA1C MFR BLD: 6.8 %

## 2022-09-01 ENCOUNTER — OFFICE VISIT (OUTPATIENT)
Dept: PRIMARY CARE CLINIC | Age: 72
End: 2022-09-01
Payer: MEDICARE

## 2022-09-01 VITALS
BODY MASS INDEX: 25.2 KG/M2 | SYSTOLIC BLOOD PRESSURE: 138 MMHG | DIASTOLIC BLOOD PRESSURE: 68 MMHG | HEIGHT: 71 IN | OXYGEN SATURATION: 96 % | HEART RATE: 81 BPM | RESPIRATION RATE: 20 BRPM | TEMPERATURE: 98.2 F | WEIGHT: 180 LBS

## 2022-09-01 DIAGNOSIS — E11.69 TYPE 2 DIABETES MELLITUS WITH OTHER SPECIFIED COMPLICATION, WITHOUT LONG-TERM CURRENT USE OF INSULIN (HCC): Primary | ICD-10-CM

## 2022-09-01 DIAGNOSIS — I10 ESSENTIAL HYPERTENSION: ICD-10-CM

## 2022-09-01 DIAGNOSIS — E78.2 MIXED HYPERLIPIDEMIA: ICD-10-CM

## 2022-09-01 PROCEDURE — 2022F DILAT RTA XM EVC RTNOPTHY: CPT | Performed by: STUDENT IN AN ORGANIZED HEALTH CARE EDUCATION/TRAINING PROGRAM

## 2022-09-01 PROCEDURE — 1123F ACP DISCUSS/DSCN MKR DOCD: CPT | Performed by: STUDENT IN AN ORGANIZED HEALTH CARE EDUCATION/TRAINING PROGRAM

## 2022-09-01 PROCEDURE — 3017F COLORECTAL CA SCREEN DOC REV: CPT | Performed by: STUDENT IN AN ORGANIZED HEALTH CARE EDUCATION/TRAINING PROGRAM

## 2022-09-01 PROCEDURE — 99214 OFFICE O/P EST MOD 30 MIN: CPT | Performed by: STUDENT IN AN ORGANIZED HEALTH CARE EDUCATION/TRAINING PROGRAM

## 2022-09-01 PROCEDURE — 3044F HG A1C LEVEL LT 7.0%: CPT | Performed by: STUDENT IN AN ORGANIZED HEALTH CARE EDUCATION/TRAINING PROGRAM

## 2022-09-01 PROCEDURE — 4004F PT TOBACCO SCREEN RCVD TLK: CPT | Performed by: STUDENT IN AN ORGANIZED HEALTH CARE EDUCATION/TRAINING PROGRAM

## 2022-09-01 PROCEDURE — G8417 CALC BMI ABV UP PARAM F/U: HCPCS | Performed by: STUDENT IN AN ORGANIZED HEALTH CARE EDUCATION/TRAINING PROGRAM

## 2022-09-01 PROCEDURE — G8427 DOCREV CUR MEDS BY ELIG CLIN: HCPCS | Performed by: STUDENT IN AN ORGANIZED HEALTH CARE EDUCATION/TRAINING PROGRAM

## 2022-09-01 NOTE — PROGRESS NOTES
[address] [facility]  [address] [address]  [address] [address]  Dept: [phone]  Dept Fax: [phone]  Loc: [phone]      Subjective:     Chief Complaint   Patient presents with    Diabetes       HPI:  Raeann Victoria is a 74 y.o. male presenting for    T2DM  -Most recent a1c 6.8 (down from 9.2). On soliqua 24u. Fasting  today, has been running in 130's since last visit. Has had difficulty getting freestyle michaela covered. Working on diet.     ROS:   Reviewed with patient and noted to be negative except that listed in HPI    PMHx:  Past Medical History:   Diagnosis Date    BPH (benign prostatic hyperplasia)     Dizziness     DM (diabetes mellitus) [facility]     type 2    Dysphagia     HTN (hypertension)     Hyperlipidemia     Hypogonadism male     Peripheral neuropathy     Tobacco dependence      Patient Active Problem List   Diagnosis    Benign prostatic hyperplasia with lower urinary tract symptoms    Tobacco abuse    Mixed hyperlipidemia    Testosterone deficiency    Essential hypertension    Diabetes mellitus (HCC)    GERD (gastroesophageal reflux disease)    Chronic obstructive pulmonary disease (HCC)       PSHx:  Past Surgical History:   Procedure Laterality Date    BACK SURGERY      COLON SURGERY  2014    Done in [facility], hx of colon polyps with a 5 yr recall    COLONOSCOPY N/A 07/12/2019    Dr Cecilio Meier hemorrhoids-Grade 2, suboptimal prep, diverticulosis-HP-3 yr recall    COLONOSCOPY N/A 08/09/2022    Dr Jeny Brody, Int hem Gr 1 wo bleeding, mod diverticulosis, sub prep fair, 5 year recall    EYE SURGERY Right 07/2017    UPPER GASTROINTESTINAL ENDOSCOPY N/A 07/12/2019    Dr Divine De La Rosa-Mild gastritis and bulbar duodenitis, prominent major duodenal papilla, possible lipoma or GIST        PFHx:  Family History   Problem Relation Age of Onset    Colon Cancer Mother     Diabetes Type 1  Father     Diabetes Father     Cancer Sister     Cancer Brother     Stomach Cancer Brother     Esophageal Cancer Neg Hx     Liver Cancer Neg Hx     Liver Disease Neg Hx     Rectal Cancer Neg Hx        SocialHx:  Social History     Tobacco Use    Smoking status: Every Day     Packs/day: 0.25     Years: 54.00     Pack years: 13.50     Types: Cigarettes     Start date: 1964    Smokeless tobacco: Never   Substance Use Topics    Alcohol use: Yes     Comment: rare 2-3x yearly       Allergies:  No Known Allergies    Medications:  Current Outpatient Medications   Medication Sig Dispense Refill    tiZANidine (ZANAFLEX) 2 MG tablet Take 1 tablet by mouth nightly as needed (back spasm) Can take up to 2 tablets nightly 30 tablet 0    blood glucose monitor strips Test 1 times a day & as needed for symptoms of irregular blood glucose. Dispense sufficient amount for indicated testing frequency plus additional to accommodate PRN testing needs. AccuCheck Guide strips 100 strip 5    blood glucose monitor kit and supplies Dispense sufficient amount for indicated testing frequency plus additional to accommodate PRN testing needs. Dispense all needed supplies to include: monitor, strips, lancing device, lancets, control solutions, alcohol swabs. 1 kit 0    blood glucose monitor strips Test 2 times a day & as needed for symptoms of irregular blood glucose. Dispense sufficient amount for indicated testing frequency plus additional to accommodate PRN testing needs. 100 strip 5    Lancets MISC 1 each by Does not apply route daily 100 each 5    atorvastatin (LIPITOR) 20 MG tablet TAKE 1 TABLET EVERY DAY 90 tablet 1    gabapentin (NEURONTIN) 600 MG tablet Take 1 tablet by mouth 2 times daily for 30 days.  60 tablet 3    Insulin Glargine-Lixisenatide 100-33 UNT-MCG/ML SOPN Inject 15 Units into the skin daily 4 pen 2    traZODone (DESYREL) 50 MG tablet Take 1 tablet by mouth nightly 90 tablet 3    blood glucose test strips (ACCU-CHEK FRANCISCO PLUS) strip TEST 2 TIMES A  strip 5    tamsulosin (FLOMAX) 0.4 MG capsule Take 1 capsule by mouth daily 90 capsule 3    amLODIPine (NORVASC) 5 MG tablet TAKE 1 TABLET EVERY DAY 90 tablet 3    valsartan-hydroCHLOROthiazide (DIOVAN-HCT) 320-25 MG per tablet Take 1 tablet by mouth daily 90 tablet 3    omeprazole (PRILOSEC) 20 MG delayed release capsule Take 1 tablet po BID ( on an empty stomach) x 14 days for treatment of h pylori infection. 28 capsule 0    albuterol sulfate HFA (PROVENTIL;VENTOLIN;PROAIR) 108 (90 Base) MCG/ACT inhaler Inhale 2 puffs into the lungs every 6 hours as needed for Wheezing       No current facility-administered medications for this visit. Objective:   PE:  /68   Pulse 81   Temp 98.2 °F (36.8 °C) (Temporal)   Resp 20   Ht 5' 11\" (1.803 m)   Wt 180 lb (81.6 kg)   SpO2 96%   BMI 25.10 kg/m²   Physical Exam  Constitutional:       General: He is not in acute distress. Appearance: Normal appearance. HENT:      Head: Normocephalic. Nose: Nose normal.      Mouth/Throat:      Mouth: Mucous membranes are moist.      Pharynx: Oropharynx is clear. No oropharyngeal exudate or posterior oropharyngeal erythema. Eyes:      General: No scleral icterus. Extraocular Movements: Extraocular movements intact. Conjunctiva/sclera: Conjunctivae normal.   Cardiovascular:      Rate and Rhythm: Normal rate and regular rhythm. Pulses: Normal pulses. Heart sounds: No murmur heard. Pulmonary:      Effort: Pulmonary effort is normal.      Breath sounds: Normal breath sounds. Musculoskeletal:         General: Normal range of motion. Cervical back: Normal range of motion. Skin:     General: Skin is warm and dry. Capillary Refill: Capillary refill takes less than 2 seconds. Neurological:      General: No focal deficit present. Mental Status: He is alert and oriented to person, place, and time.    Psychiatric:         Mood and Affect: Mood normal.         Behavior: Behavior normal.         Thought Content: Thought content normal.          Assessment & Plan   Madelyn Damon was seen today for diabetes. Diagnoses and all orders for this visit:    Type 2 diabetes mellitus with other specified complication, without long-term current use of insulin ([address]  -Improved, at goal currently. -Increase soliqua to 26 u, keep BP log  -Given samples for freestyle michaela. Will work to get him this or dexcom  -Encourage healthy low-carb diet  -Recheck a1c in 3 mo    Mixed hyperlipidemia  -Continue atorvastatin 20m    Essential hypertension  -Continue current anthypertensive regimen with amlodipine, valasartan, hctz    Return in about 4 weeks (around 9/29/2022). All questions were answered. Medications, including possible adverse effects, and instructions were reviewed and  understanding was confirmed. Follow-up recommendations, including when to contact or return to office (ie; if symptoms worsen or fail to improve), were discussed and acknowledged.     Electronically signed by Ena Sheffield MD on 9/1/22 at 2:20 PM CDT

## 2022-10-06 NOTE — PROGRESS NOTES
" TAMAR Sorenson  [facility]   Pulmonary and Critical Care  [address]  [place] [address]  Phone: [phone]  Fax: [phone]           Chief Complaint  Stage 4 very severe COPD by GOLD classification    Subjective    History of Present Illness     Naif Nick presents to [facility] PULMONARY & CRITICAL CARE MEDICINE   History of Present Illness  Mr. Nick is a pleasant 71 year old male with known very severe stage 4 COPD, Former smoker, centrolobular emphysema, TB exposure, Lung nodule and Asthma/ copd overlap syndrome. He is on Mucinex, Nebulizer prn, Albuterol PRN. Albuterol HFA is used 3 times a day. Last Nebulizer treatment was sometime back. He uses Flonase as needed for nasal drainage.  His shortness of breath has not changed. His cough is intermittent with intermittent production of clear sputum. He denies fever, chills, night sweats. CT in Nov 2021 through the [facility] program with stable nodules. His pulmonary function test today showed improvement of his FEV1 from 29 % predicted in 2020 to 65% predicted today. Normal diffusion capacity.        Objective   Vital Signs:   /82   Pulse 99   Ht 176.5 cm (69.5\") Comment: measured  Wt 81.2 kg (179 lb)   SpO2 96%   BMI 26.05 kg/m²     Physical Exam  Vitals reviewed.   Constitutional:       Appearance: Normal appearance.   Cardiovascular:      Rate and Rhythm: Normal rate and regular rhythm.   Pulmonary:      Effort: Pulmonary effort is normal.      Breath sounds: Normal breath sounds.   Neurological:      General: No focal deficit present.      Mental Status: He is alert and oriented to person, place, and time.   Psychiatric:         Mood and Affect: Mood normal.         Behavior: Behavior normal.          Result Review :  The following data was reviewed by: TAMAR Sorenson on 10/12/2022:    My interpretation of imaging:  No new   My interpretation of labs: No new     PFT Values        Some " values may be hidden. Unless noted otherwise, only the newest values recorded on each date are displayed.         Old Values PFT Results 10/12/22   No data to display.      Pre Drug PFT Results 10/12/22   FVC 79   FEV1 65   FEF 25-75% 54   FEV1/FVC 62      Post Drug PFT Results 10/12/22   No data to display.      Other Tests PFT Results 10/12/22   DLCO 83   D/VAsb 83           My interpretation of the PFT: as below    Results for orders placed in visit on 10/12/22    Pulmonary Function Test    Narrative  Pulmonary Function Test  Performed by: Hamida Hartman, RRT  Authorized by: Raquel Sosa APRN    Pre Drug % Predicted  FVC: 79%  FEV1: 65%  FEF 25-75%: 54%  FEV1/FVC: 62%  DLCO: 83%  D/VAsb: 83%    Interpretation  Spirometry  Spirometry shows moderate obstruction. There is reduced midflow suggesting small airway/airflow obstruction.  Diffusion Capacity  The patient's diffusion capacity is normal.  Diffusion capacity is normal when corrected for alveolar volume.      Results for orders placed during the hospital encounter of 08/10/20    Full Pulmonary Function Test With Bronchodilator    Narrative  [facility] - Pulmonary Function Test    [address] [facility]  [address]  [phone]    Patient : Naif Nick  MRN : [id number]  YOB: 1950  :  Nacho Vaughan MD  Date : 8/10/2020    ______________________________________________________________________    Interpretation :  1. Spirometry shows very severe airflow obstruction prebronchodilator  2. Following bronchodilator there is minimal not significant improvement in FEV1 and FVC, but postbronchodilator spirometry meet criteria for severe rather than very severe obstruction.  3. Mid flow is reduced.  4. Lung volume measurements show reduction in inspiratory capacity and elevations in expiratory reserve volume residual volume and total lung capacity suggesting hyperinflation and very significant gas  trapping.  5. Airway resistance is increased and conductance is decreased.      Nacho Vaughan MD                   Assessment and Plan   Diagnoses and all orders for this visit:    1. Centrilobular emphysema (HCC) (Primary)    2. Stage 4 very severe COPD by GOLD classification (HCC)    3. Chronic respiratory failure with hypoxia (HCC)    4. Tobacco abuse, in remission    5. Asthma-COPD overlap syndrome (HCC)    6. Lung nodule    Other orders  -     fluticasone-salmeterol (Advair HFA) 45-21 MCG/ACT inhaler; Inhale 2 puffs 2 (Two) Times a Day.  Dispense: 1 each; Refill: 11        He will continue his albuterol HFA as needed. He will try Advair HFA. He sates he had his eyes examined back in April and his pressures were good. We will avoid anticholnergics for the time being. He is doing well at this point.       Follow Up   Return in about 6 months (around 4/12/2023).  Patient was given instructions and counseling regarding his condition or for health maintenance advice. Please see specific information pulled into the AVS if appropriate.     Raquel Sosa, TAMAR  10/12/2022  15:20 CDT

## 2022-10-12 ENCOUNTER — PROCEDURE VISIT (OUTPATIENT)
Dept: PULMONOLOGY | Facility: CLINIC | Age: 72
End: 2022-10-12

## 2022-10-12 ENCOUNTER — OFFICE VISIT (OUTPATIENT)
Dept: PULMONOLOGY | Facility: CLINIC | Age: 72
End: 2022-10-12

## 2022-10-12 VITALS
SYSTOLIC BLOOD PRESSURE: 138 MMHG | HEIGHT: 70 IN | WEIGHT: 179 LBS | HEART RATE: 99 BPM | BODY MASS INDEX: 25.62 KG/M2 | DIASTOLIC BLOOD PRESSURE: 82 MMHG | OXYGEN SATURATION: 96 %

## 2022-10-12 DIAGNOSIS — J44.9 STAGE 4 VERY SEVERE COPD BY GOLD CLASSIFICATION: ICD-10-CM

## 2022-10-12 DIAGNOSIS — J43.2 CENTRILOBULAR EMPHYSEMA: ICD-10-CM

## 2022-10-12 DIAGNOSIS — F17.201 TOBACCO ABUSE, IN REMISSION: ICD-10-CM

## 2022-10-12 DIAGNOSIS — J43.2 CENTRILOBULAR EMPHYSEMA: Primary | ICD-10-CM

## 2022-10-12 DIAGNOSIS — J44.9 ASTHMA-COPD OVERLAP SYNDROME: ICD-10-CM

## 2022-10-12 DIAGNOSIS — J96.11 CHRONIC RESPIRATORY FAILURE WITH HYPOXIA: ICD-10-CM

## 2022-10-12 DIAGNOSIS — R91.1 LUNG NODULE: ICD-10-CM

## 2022-10-12 PROCEDURE — 94010 BREATHING CAPACITY TEST: CPT | Performed by: NURSE PRACTITIONER

## 2022-10-12 PROCEDURE — 99214 OFFICE O/P EST MOD 30 MIN: CPT | Performed by: NURSE PRACTITIONER

## 2022-10-12 PROCEDURE — 94729 DIFFUSING CAPACITY: CPT | Performed by: NURSE PRACTITIONER

## 2022-10-12 RX ORDER — INSULIN GLARGINE AND LIXISENATIDE 100; 33 U/ML; UG/ML
27 INJECTION, SOLUTION SUBCUTANEOUS DAILY
COMMUNITY
Start: 2022-10-11

## 2022-10-12 RX ORDER — GABAPENTIN 600 MG/1
1 TABLET ORAL 2 TIMES DAILY
COMMUNITY
Start: 2022-09-20

## 2022-10-12 RX ORDER — FLUTICASONE PROPIONATE AND SALMETEROL XINAFOATE 45; 21 UG/1; UG/1
2 AEROSOL, METERED RESPIRATORY (INHALATION)
Qty: 1 EACH | Refills: 11 | Status: ON HOLD | OUTPATIENT
Start: 2022-10-12 | End: 2023-02-20

## 2022-10-12 RX ORDER — TIZANIDINE 2 MG/1
1 TABLET ORAL NIGHTLY
Status: ON HOLD | COMMUNITY
Start: 2022-07-29 | End: 2023-02-20

## 2022-10-12 NOTE — PROCEDURES
Pulmonary Function Test  Performed by: Hamida Hartman, RRT  Authorized by: Raquel Sosa APRN      Pre Drug % Predicted    FVC: 79%   FEV1: 65%   FEF 25-75%: 54%   FEV1/FVC: 62%   DLCO: 83%   D/VAsb: 83%    Interpretation   Spirometry   Spirometry shows moderate obstruction. There is reduced midflow suggesting small airway/airflow obstruction.   Diffusion Capacity  The patient's diffusion capacity is normal.  Diffusion capacity is normal when corrected for alveolar volume.

## 2022-10-14 ENCOUNTER — OFFICE VISIT (OUTPATIENT)
Dept: PRIMARY CARE CLINIC | Age: 72
End: 2022-10-14
Payer: MEDICARE

## 2022-10-14 VITALS
DIASTOLIC BLOOD PRESSURE: 64 MMHG | WEIGHT: 184.6 LBS | HEIGHT: 71 IN | SYSTOLIC BLOOD PRESSURE: 138 MMHG | BODY MASS INDEX: 25.84 KG/M2 | OXYGEN SATURATION: 96 % | HEART RATE: 76 BPM

## 2022-10-14 DIAGNOSIS — I10 ESSENTIAL HYPERTENSION: ICD-10-CM

## 2022-10-14 DIAGNOSIS — K21.9 GASTROESOPHAGEAL REFLUX DISEASE WITHOUT ESOPHAGITIS: ICD-10-CM

## 2022-10-14 DIAGNOSIS — E11.69 TYPE 2 DIABETES MELLITUS WITH OTHER SPECIFIED COMPLICATION, WITHOUT LONG-TERM CURRENT USE OF INSULIN (HCC): Primary | ICD-10-CM

## 2022-10-14 DIAGNOSIS — E78.2 MIXED HYPERLIPIDEMIA: ICD-10-CM

## 2022-10-14 DIAGNOSIS — M47.812 CERVICAL ARTHRITIS: ICD-10-CM

## 2022-10-14 DIAGNOSIS — N40.1 BENIGN PROSTATIC HYPERPLASIA WITH LOWER URINARY TRACT SYMPTOMS, SYMPTOM DETAILS UNSPECIFIED: ICD-10-CM

## 2022-10-14 DIAGNOSIS — J44.9 CHRONIC OBSTRUCTIVE PULMONARY DISEASE, UNSPECIFIED COPD TYPE (HCC): ICD-10-CM

## 2022-10-14 DIAGNOSIS — Z23 NEED FOR IMMUNIZATION AGAINST INFLUENZA: ICD-10-CM

## 2022-10-14 PROCEDURE — G8417 CALC BMI ABV UP PARAM F/U: HCPCS | Performed by: FAMILY MEDICINE

## 2022-10-14 PROCEDURE — 3044F HG A1C LEVEL LT 7.0%: CPT | Performed by: FAMILY MEDICINE

## 2022-10-14 PROCEDURE — 4004F PT TOBACCO SCREEN RCVD TLK: CPT | Performed by: FAMILY MEDICINE

## 2022-10-14 PROCEDURE — 2022F DILAT RTA XM EVC RTNOPTHY: CPT | Performed by: FAMILY MEDICINE

## 2022-10-14 PROCEDURE — 3023F SPIROM DOC REV: CPT | Performed by: FAMILY MEDICINE

## 2022-10-14 PROCEDURE — G8427 DOCREV CUR MEDS BY ELIG CLIN: HCPCS | Performed by: FAMILY MEDICINE

## 2022-10-14 PROCEDURE — 99215 OFFICE O/P EST HI 40 MIN: CPT | Performed by: FAMILY MEDICINE

## 2022-10-14 PROCEDURE — G8484 FLU IMMUNIZE NO ADMIN: HCPCS | Performed by: FAMILY MEDICINE

## 2022-10-14 PROCEDURE — 1123F ACP DISCUSS/DSCN MKR DOCD: CPT | Performed by: FAMILY MEDICINE

## 2022-10-14 PROCEDURE — 3017F COLORECTAL CA SCREEN DOC REV: CPT | Performed by: FAMILY MEDICINE

## 2022-10-14 RX ORDER — NAPROXEN 500 MG/1
500 TABLET ORAL 2 TIMES DAILY WITH MEALS
COMMUNITY
End: 2022-10-17 | Stop reason: SDUPTHER

## 2022-10-14 NOTE — PROGRESS NOTES
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 male presents today for a routine follow-up  and to get establish with me. 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 Pt is a diabetic  - well controlled on current therapy. He states that he noticed that he does not have numbness, pain and tingling in his feet anymore so he quit taking Gabapentin. He  does have some neck pain and muscle tension. He is s/p surgery on his neck 2015 for cervical disc disease  Pt admits he still smokes  1/2 to 1 pack a day. ROS:   Review of Systems   Constitutional:  Negative for chills, fever and unexpected weight change. HENT:  Negative for congestion, ear discharge, ear pain, rhinorrhea and sore throat. Eyes:  Negative for pain and discharge. Respiratory:  Negative for cough and shortness of breath. Cardiovascular:  Negative for chest pain. Gastrointestinal:  Negative for abdominal pain, diarrhea and nausea. Genitourinary:  Negative for dysuria and hematuria. Musculoskeletal:  Negative for arthralgias and joint swelling. Skin:  Negative for rash. Neurological:  Negative for weakness, numbness and headaches. Psychiatric/Behavioral:  Negative for dysphoric mood. The patient is not nervous/anxious.       PMHx:  Past Medical History:   Diagnosis Date    BPH (benign prostatic hyperplasia)     Dizziness     DM (diabetes mellitus) ([facility]     type 2    Dysphagia     HTN (hypertension)     Hyperlipidemia     Hypogonadism male     Peripheral neuropathy     Tobacco dependence      Patient Active Problem List   Diagnosis    Benign prostatic hyperplasia with lower urinary tract symptoms    Tobacco abuse    Mixed hyperlipidemia    Testosterone deficiency    Essential hypertension Diabetes mellitus ([facility]    GERD (gastroesophageal reflux disease)    Chronic obstructive pulmonary disease (HCC)       PSHx:  Past Surgical History:   Procedure Laterality Date    BACK SURGERY      COLON SURGERY  2014    Done in [facility], hx of colon polyps with a 5 yr recall    COLONOSCOPY N/A 07/12/2019    Dr Cecilio Loyd hemorrhoids-Grade 2, suboptimal prep, diverticulosis-HP-3 yr recall    COLONOSCOPY N/A 08/09/2022    Dr Anders Stark, Int hem Gr 1 wo bleeding, mod diverticulosis, sub prep fair, 5 year recall    EYE SURGERY Right 07/2017    UPPER GASTROINTESTINAL ENDOSCOPY N/A 07/12/2019    Dr Herbert De La Rosa-Mild gastritis and bulbar duodenitis, prominent major duodenal papilla, possible lipoma or GIST        PFHx:  Family History   Problem Relation Age of Onset    Colon Cancer Mother     Diabetes Type 1  Father     Diabetes Father     Cancer Sister     Cancer Brother     Stomach Cancer Brother     Esophageal Cancer Neg Hx     Liver Cancer Neg Hx     Liver Disease Neg Hx     Rectal Cancer Neg Hx        SocialHx:  Social History     Tobacco Use    Smoking status: Every Day     Packs/day: 0.25     Years: 54.00     Pack years: 13.50     Types: Cigarettes     Start date: 1964    Smokeless tobacco: Never   Substance Use Topics    Alcohol use: Yes     Comment: rare 2-3x yearly       Allergies:  No Known Allergies    Medications:  Current Outpatient Medications   Medication Sig Dispense Refill    naproxen (NAPROSYN) 500 MG tablet Take 1 tablet by mouth Daily 30 tablet 2    tiZANidine (ZANAFLEX) 2 MG tablet Take 1 tablet by mouth nightly as needed (back spasm) Can take up to 2 tablets nightly 30 tablet 2    albuterol sulfate HFA (PROVENTIL;VENTOLIN;PROAIR) 108 (90 Base) MCG/ACT inhaler Inhale 2 puffs into the lungs every 6 hours as needed for Wheezing 18 g 2    Insulin Glargine-Lixisenatide 100-33 UNT-MCG/ML SOPN Inject 26 Units into the skin daily 4 Adjustable Dose Pre-filled Pen Syringe 2    blood glucose monitor strips Test 1 times a day & as needed for symptoms of irregular blood glucose. Dispense sufficient amount for indicated testing frequency plus additional to accommodate PRN testing needs. AccuCheck Guide strips 100 strip 5    Lancets MISC 1 each by Does not apply route daily 100 each 5    atorvastatin (LIPITOR) 20 MG tablet TAKE 1 TABLET EVERY DAY 90 tablet 1    traZODone (DESYREL) 50 MG tablet Take 1 tablet by mouth nightly 90 tablet 3    blood glucose test strips (ACCU-CHEK FRANCISCO PLUS) strip TEST 2 TIMES A  strip 5    tamsulosin (FLOMAX) 0.4 MG capsule Take 1 capsule by mouth daily 90 capsule 3    amLODIPine (NORVASC) 5 MG tablet TAKE 1 TABLET EVERY DAY 90 tablet 3    valsartan-hydroCHLOROthiazide (DIOVAN-HCT) 320-25 MG per tablet Take 1 tablet by mouth daily 90 tablet 3    omeprazole (PRILOSEC) 20 MG delayed release capsule Take 1 tablet po BID ( on an empty stomach) x 14 days for treatment of h pylori infection. 28 capsule 0     No current facility-administered medications for this visit. Objective:   PE:  /64   Pulse 76   Ht 5' 11\" (1.803 m)   Wt 184 lb 9.6 oz (83.7 kg)   SpO2 96%   BMI 25.75 kg/m²   Physical Exam  Vitals and nursing note reviewed. Constitutional:       General: He is not in acute distress. Appearance: Normal appearance. HENT:      Head: Normocephalic. Nose: Nose normal.      Mouth/Throat:      Mouth: Mucous membranes are moist.      Pharynx: Oropharynx is clear. No oropharyngeal exudate or posterior oropharyngeal erythema. Eyes:      General: No scleral icterus. Extraocular Movements: Extraocular movements intact. Conjunctiva/sclera: Conjunctivae normal.   Cardiovascular:      Rate and Rhythm: Normal rate and regular rhythm. Pulses: Normal pulses. Heart sounds: No murmur heard. Pulmonary:      Effort: Pulmonary effort is normal.      Breath sounds: Normal breath sounds.    Musculoskeletal: General: Normal range of motion. Cervical back: Normal range of motion. Skin:     General: Skin is warm and dry. Capillary Refill: Capillary refill takes less than 2 seconds. Neurological:      General: No focal deficit present. Mental Status: He is alert and oriented to person, place, and time. Psychiatric:         Mood and Affect: Mood normal.         Behavior: Behavior normal.         Thought Content: Thought content normal.          Assessment & Plan   Greg Peabody was seen today for new patient. Diagnoses and all orders for this visit:    Type 2 diabetes mellitus with other specified complication, without long-term current use of insulin ([facility])  -     Insulin Glargine-Lixisenatide 100-33 UNT-MCG/ML SOPN; Inject 26 Units into the skin daily  -     Hemoglobin A1C; Future    Cervical arthritis  -     naproxen (NAPROSYN) 500 MG tablet; Take 1 tablet by mouth Daily  -     tiZANidine (ZANAFLEX) 2 MG tablet; Take 1 tablet by mouth nightly as needed (back spasm) Can take up to 2 tablets nightly    Essential hypertension  -     CBC with Auto Differential; Future  -     Comprehensive Metabolic Panel; Future    Gastroesophageal reflux disease without esophagitis    Benign prostatic hyperplasia with lower urinary tract symptoms, symptom details unspecified    Chronic obstructive pulmonary disease, unspecified COPD type ([facility])  -     albuterol sulfate HFA (PROVENTIL;VENTOLIN;PROAIR) 108 (90 Base) MCG/ACT inhaler; Inhale 2 puffs into the lungs every 6 hours as needed for Wheezing    Mixed hyperlipidemia  -     Lipid Panel; Future    Need for immunization against influenza  -     Influenza, FLUAD, (age 72 y+), IM, Preservative Free, 0.5 mL    Continue all maintenance medications as prescribed  Continue diabetic diet  Continue attempts at weight loss.   Engaged in regular exercise as tolerated - at least 30 minutes/day  Low fat/low calorie diet  Daily foot care  Recommend annual diabetic eye exam  Encouraged to d/c smoking  Stay well hydrated - 64 oz of water a day  Keep scheduled follow-up visit with me and with other specialist  Call with new concerns     Return in 3 months (on 1/14/2023) for lab review. All questions were answered. Medications, including possible adverse effects, and instructions were reviewed and  understanding was confirmed. Follow-up recommendations, including when to contact or return to office (ie; if symptoms worsen or fail to improve), were discussed and acknowledged.     Electronically signed by Diane Erickson MD on 10/14/22 at 10:01 AM CECILIA

## 2022-10-17 RX ORDER — TIZANIDINE 2 MG/1
2 TABLET ORAL NIGHTLY PRN
Qty: 30 TABLET | Refills: 2 | Status: SHIPPED | OUTPATIENT
Start: 2022-10-17

## 2022-10-17 RX ORDER — ALBUTEROL SULFATE 90 UG/1
2 AEROSOL, METERED RESPIRATORY (INHALATION) EVERY 6 HOURS PRN
Qty: 18 G | Refills: 2 | Status: SHIPPED | OUTPATIENT
Start: 2022-10-17

## 2022-10-17 RX ORDER — NAPROXEN 500 MG/1
500 TABLET ORAL DAILY
Qty: 30 TABLET | Refills: 2 | Status: SHIPPED | OUTPATIENT
Start: 2022-10-17

## 2022-10-17 ASSESSMENT — ENCOUNTER SYMPTOMS
SORE THROAT: 0
SHORTNESS OF BREATH: 0
RHINORRHEA: 0
DIARRHEA: 0
COUGH: 0
ABDOMINAL PAIN: 0
EYE PAIN: 0
EYE DISCHARGE: 0
NAUSEA: 0

## 2022-10-18 PROCEDURE — 90694 VACC AIIV4 NO PRSRV 0.5ML IM: CPT | Performed by: FAMILY MEDICINE

## 2022-10-18 PROCEDURE — G0008 ADMIN INFLUENZA VIRUS VAC: HCPCS | Performed by: FAMILY MEDICINE

## 2022-10-31 DIAGNOSIS — N40.1 BENIGN PROSTATIC HYPERPLASIA WITH URINARY HESITANCY: ICD-10-CM

## 2022-10-31 DIAGNOSIS — R39.11 BENIGN PROSTATIC HYPERPLASIA WITH URINARY HESITANCY: ICD-10-CM

## 2022-11-02 RX ORDER — TAMSULOSIN HYDROCHLORIDE 0.4 MG/1
CAPSULE ORAL
Qty: 90 CAPSULE | Refills: 3 | Status: SHIPPED | OUTPATIENT
Start: 2022-11-02

## 2022-11-02 RX ORDER — AMLODIPINE BESYLATE 5 MG/1
TABLET ORAL
Qty: 90 TABLET | Refills: 3 | Status: SHIPPED | OUTPATIENT
Start: 2022-11-02

## 2022-11-02 RX ORDER — VALSARTAN AND HYDROCHLOROTHIAZIDE 320; 25 MG/1; MG/1
TABLET, FILM COATED ORAL
Qty: 90 TABLET | Refills: 3 | Status: SHIPPED | OUTPATIENT
Start: 2022-11-02

## 2022-11-13 ENCOUNTER — APPOINTMENT (OUTPATIENT)
Dept: CT IMAGING | Facility: HOSPITAL | Age: 72
End: 2022-11-13

## 2022-11-13 ENCOUNTER — APPOINTMENT (OUTPATIENT)
Dept: GENERAL RADIOLOGY | Facility: HOSPITAL | Age: 72
End: 2022-11-13

## 2022-11-13 ENCOUNTER — HOSPITAL ENCOUNTER (OUTPATIENT)
Facility: HOSPITAL | Age: 72
Setting detail: OBSERVATION
Discharge: HOME | End: 2022-11-14
Attending: INTERNAL MEDICINE | Admitting: FAMILY MEDICINE

## 2022-11-13 DIAGNOSIS — N39.0 URINARY TRACT INFECTION WITHOUT HEMATURIA, SITE UNSPECIFIED: ICD-10-CM

## 2022-11-13 DIAGNOSIS — F14.90 COCAINE USE: ICD-10-CM

## 2022-11-13 DIAGNOSIS — R55 SYNCOPE, UNSPECIFIED SYNCOPE TYPE: Primary | ICD-10-CM

## 2022-11-13 DIAGNOSIS — F12.90 MARIJUANA USE: ICD-10-CM

## 2022-11-13 LAB
ALBUMIN SERPL-MCNC: 4.3 G/DL (ref 3.5–5.2)
ALBUMIN/GLOB SERPL: 1.3 G/DL
ALP SERPL-CCNC: 55 U/L (ref 39–117)
ALT SERPL W P-5'-P-CCNC: 20 U/L (ref 1–41)
AMPHET+METHAMPHET UR QL: NEGATIVE
AMPHETAMINES UR QL: NEGATIVE
ANION GAP SERPL CALCULATED.3IONS-SCNC: 12 MMOL/L (ref 5–15)
APTT PPP: 28.3 SECONDS (ref 24.1–35)
ARTERIAL PATENCY WRIST A: ABNORMAL
AST SERPL-CCNC: 18 U/L (ref 1–40)
ATMOSPHERIC PRESS: 760 MMHG
BACTERIA UR QL AUTO: ABNORMAL /HPF
BARBITURATES UR QL SCN: NEGATIVE
BASE EXCESS BLDA CALC-SCNC: 4.7 MMOL/L (ref 0–2)
BASOPHILS # BLD AUTO: 0.06 10*3/MM3 (ref 0–0.2)
BASOPHILS NFR BLD AUTO: 0.5 % (ref 0–1.5)
BDY SITE: ABNORMAL
BENZODIAZ UR QL SCN: NEGATIVE
BILIRUB SERPL-MCNC: 0.6 MG/DL (ref 0–1.2)
BILIRUB UR QL STRIP: NEGATIVE
BODY TEMPERATURE: 37 C
BUN SERPL-MCNC: 19 MG/DL (ref 8–23)
BUN/CREAT SERPL: 14.1 (ref 7–25)
BUPRENORPHINE SERPL-MCNC: NEGATIVE NG/ML
CALCIUM SPEC-SCNC: 10 MG/DL (ref 8.6–10.5)
CANNABINOIDS SERPL QL: POSITIVE
CHLORIDE SERPL-SCNC: 96 MMOL/L (ref 98–107)
CLARITY UR: CLEAR
CO2 SERPL-SCNC: 28 MMOL/L (ref 22–29)
COCAINE UR QL: POSITIVE
COLOR UR: YELLOW
CREAT SERPL-MCNC: 1.35 MG/DL (ref 0.76–1.27)
D DIMER PPP FEU-MCNC: 0.59 MCGFEU/ML (ref 0–0.5)
D-LACTATE SERPL-SCNC: 1.4 MMOL/L (ref 0.5–2)
D-LACTATE SERPL-SCNC: 3 MMOL/L (ref 0.5–2)
DEPRECATED RDW RBC AUTO: 43.6 FL (ref 37–54)
EGFRCR SERPLBLD CKD-EPI 2021: 55.1 ML/MIN/1.73
EOSINOPHIL # BLD AUTO: 0.1 10*3/MM3 (ref 0–0.4)
EOSINOPHIL NFR BLD AUTO: 0.8 % (ref 0.3–6.2)
ERYTHROCYTE [DISTWIDTH] IN BLOOD BY AUTOMATED COUNT: 13 % (ref 12.3–15.4)
ETHANOL UR QL: <0.01 %
FLUAV RNA RESP QL NAA+PROBE: NOT DETECTED
FLUBV RNA RESP QL NAA+PROBE: NOT DETECTED
GLOBULIN UR ELPH-MCNC: 3.3 GM/DL
GLUCOSE BLDC GLUCOMTR-MCNC: 259 MG/DL (ref 70–130)
GLUCOSE SERPL-MCNC: 230 MG/DL (ref 65–99)
GLUCOSE UR STRIP-MCNC: ABNORMAL MG/DL
HBA1C MFR BLD: 6.9 % (ref 4.8–5.6)
HCO3 BLDA-SCNC: 28.6 MMOL/L (ref 20–26)
HCT VFR BLD AUTO: 41.9 % (ref 37.5–51)
HGB BLD-MCNC: 13.7 G/DL (ref 13–17.7)
HGB UR QL STRIP.AUTO: NEGATIVE
HOLD SPECIMEN: NORMAL
HYALINE CASTS UR QL AUTO: ABNORMAL /LPF
IMM GRANULOCYTES # BLD AUTO: 0.04 10*3/MM3 (ref 0–0.05)
IMM GRANULOCYTES NFR BLD AUTO: 0.3 % (ref 0–0.5)
INR PPP: 1.01 (ref 0.91–1.09)
KETONES UR QL STRIP: NEGATIVE
LEUKOCYTE ESTERASE UR QL STRIP.AUTO: ABNORMAL
LYMPHOCYTES # BLD AUTO: 3.79 10*3/MM3 (ref 0.7–3.1)
LYMPHOCYTES NFR BLD AUTO: 30.1 % (ref 19.6–45.3)
Lab: ABNORMAL
MAGNESIUM SERPL-MCNC: 2 MG/DL (ref 1.6–2.4)
MCH RBC QN AUTO: 29.9 PG (ref 26.6–33)
MCHC RBC AUTO-ENTMCNC: 32.7 G/DL (ref 31.5–35.7)
MCV RBC AUTO: 91.5 FL (ref 79–97)
METHADONE UR QL SCN: NEGATIVE
MODALITY: ABNORMAL
MONOCYTES # BLD AUTO: 0.84 10*3/MM3 (ref 0.1–0.9)
MONOCYTES NFR BLD AUTO: 6.7 % (ref 5–12)
NEUTROPHILS NFR BLD AUTO: 61.6 % (ref 42.7–76)
NEUTROPHILS NFR BLD AUTO: 7.76 10*3/MM3 (ref 1.7–7)
NITRITE UR QL STRIP: NEGATIVE
NRBC BLD AUTO-RTO: 0 /100 WBC (ref 0–0.2)
OPIATES UR QL: NEGATIVE
OXYCODONE UR QL SCN: NEGATIVE
PCO2 BLDA: 39 MM HG (ref 35–45)
PCO2 TEMP ADJ BLD: 39 MM HG (ref 35–45)
PCP UR QL SCN: NEGATIVE
PH BLDA: 7.47 PH UNITS (ref 7.35–7.45)
PH UR STRIP.AUTO: 7 [PH] (ref 5–8)
PH, TEMP CORRECTED: 7.47 PH UNITS (ref 7.35–7.45)
PLATELET # BLD AUTO: 361 10*3/MM3 (ref 140–450)
PMV BLD AUTO: 8.8 FL (ref 6–12)
PO2 BLDA: 89.5 MM HG (ref 83–108)
PO2 TEMP ADJ BLD: 89.5 MM HG (ref 83–108)
POTASSIUM SERPL-SCNC: 3.9 MMOL/L (ref 3.5–5.2)
PROPOXYPH UR QL: NEGATIVE
PROT SERPL-MCNC: 7.6 G/DL (ref 6–8.5)
PROT UR QL STRIP: ABNORMAL
PROTHROMBIN TIME: 12.9 SECONDS (ref 11.9–14.6)
RBC # BLD AUTO: 4.58 10*6/MM3 (ref 4.14–5.8)
RBC # UR STRIP: ABNORMAL /HPF
REF LAB TEST METHOD: ABNORMAL
RSV RNA NPH QL NAA+NON-PROBE: NOT DETECTED
SAO2 % BLDCOA: 98.5 % (ref 94–99)
SARS-COV-2 RNA RESP QL NAA+PROBE: NOT DETECTED
SODIUM SERPL-SCNC: 136 MMOL/L (ref 136–145)
SP GR UR STRIP: 1.03 (ref 1–1.03)
SQUAMOUS #/AREA URNS HPF: ABNORMAL /HPF
TRICYCLICS UR QL SCN: NEGATIVE
TROPONIN T SERPL-MCNC: 0.04 NG/ML (ref 0–0.03)
TROPONIN T SERPL-MCNC: <0.01 NG/ML (ref 0–0.03)
UROBILINOGEN UR QL STRIP: ABNORMAL
VENTILATOR MODE: ABNORMAL
WBC # UR STRIP: ABNORMAL /HPF
WBC NRBC COR # BLD: 12.59 10*3/MM3 (ref 3.4–10.8)
WHOLE BLOOD HOLD COAG: NORMAL
WHOLE BLOOD HOLD SPECIMEN: NORMAL

## 2022-11-13 PROCEDURE — 25010000002 CEFTRIAXONE PER 250 MG: Performed by: PHYSICIAN ASSISTANT

## 2022-11-13 PROCEDURE — G0378 HOSPITAL OBSERVATION PER HR: HCPCS

## 2022-11-13 PROCEDURE — 80053 COMPREHEN METABOLIC PANEL: CPT | Performed by: PHYSICIAN ASSISTANT

## 2022-11-13 PROCEDURE — 0 IOPAMIDOL PER 1 ML: Performed by: PHYSICIAN ASSISTANT

## 2022-11-13 PROCEDURE — 94799 UNLISTED PULMONARY SVC/PX: CPT

## 2022-11-13 PROCEDURE — 36415 COLL VENOUS BLD VENIPUNCTURE: CPT

## 2022-11-13 PROCEDURE — 83036 HEMOGLOBIN GLYCOSYLATED A1C: CPT | Performed by: INTERNAL MEDICINE

## 2022-11-13 PROCEDURE — 71045 X-RAY EXAM CHEST 1 VIEW: CPT

## 2022-11-13 PROCEDURE — 93010 ELECTROCARDIOGRAM REPORT: CPT | Performed by: INTERNAL MEDICINE

## 2022-11-13 PROCEDURE — 25010000002 ENOXAPARIN PER 10 MG: Performed by: INTERNAL MEDICINE

## 2022-11-13 PROCEDURE — 85610 PROTHROMBIN TIME: CPT | Performed by: PHYSICIAN ASSISTANT

## 2022-11-13 PROCEDURE — 81001 URINALYSIS AUTO W/SCOPE: CPT | Performed by: PHYSICIAN ASSISTANT

## 2022-11-13 PROCEDURE — 94640 AIRWAY INHALATION TREATMENT: CPT

## 2022-11-13 PROCEDURE — 87637 SARSCOV2&INF A&B&RSV AMP PRB: CPT | Performed by: PHYSICIAN ASSISTANT

## 2022-11-13 PROCEDURE — 84484 ASSAY OF TROPONIN QUANT: CPT | Performed by: PHYSICIAN ASSISTANT

## 2022-11-13 PROCEDURE — 93005 ELECTROCARDIOGRAM TRACING: CPT

## 2022-11-13 PROCEDURE — 82803 BLOOD GASES ANY COMBINATION: CPT

## 2022-11-13 PROCEDURE — 82077 ASSAY SPEC XCP UR&BREATH IA: CPT | Performed by: PHYSICIAN ASSISTANT

## 2022-11-13 PROCEDURE — 82962 GLUCOSE BLOOD TEST: CPT

## 2022-11-13 PROCEDURE — 87086 URINE CULTURE/COLONY COUNT: CPT | Performed by: PHYSICIAN ASSISTANT

## 2022-11-13 PROCEDURE — 85730 THROMBOPLASTIN TIME PARTIAL: CPT | Performed by: PHYSICIAN ASSISTANT

## 2022-11-13 PROCEDURE — 80306 DRUG TEST PRSMV INSTRMNT: CPT | Performed by: PHYSICIAN ASSISTANT

## 2022-11-13 PROCEDURE — 99285 EMERGENCY DEPT VISIT HI MDM: CPT

## 2022-11-13 PROCEDURE — 70450 CT HEAD/BRAIN W/O DYE: CPT

## 2022-11-13 PROCEDURE — 83605 ASSAY OF LACTIC ACID: CPT | Performed by: PHYSICIAN ASSISTANT

## 2022-11-13 PROCEDURE — 85025 COMPLETE CBC W/AUTO DIFF WBC: CPT | Performed by: PHYSICIAN ASSISTANT

## 2022-11-13 PROCEDURE — 63710000001 INSULIN LISPRO (HUMAN) PER 5 UNITS: Performed by: INTERNAL MEDICINE

## 2022-11-13 PROCEDURE — 36600 WITHDRAWAL OF ARTERIAL BLOOD: CPT

## 2022-11-13 PROCEDURE — 96365 THER/PROPH/DIAG IV INF INIT: CPT

## 2022-11-13 PROCEDURE — 93005 ELECTROCARDIOGRAM TRACING: CPT | Performed by: PHYSICIAN ASSISTANT

## 2022-11-13 PROCEDURE — C9803 HOPD COVID-19 SPEC COLLECT: HCPCS

## 2022-11-13 PROCEDURE — 71275 CT ANGIOGRAPHY CHEST: CPT

## 2022-11-13 PROCEDURE — 83735 ASSAY OF MAGNESIUM: CPT | Performed by: PHYSICIAN ASSISTANT

## 2022-11-13 PROCEDURE — 96372 THER/PROPH/DIAG INJ SC/IM: CPT

## 2022-11-13 PROCEDURE — 85379 FIBRIN DEGRADATION QUANT: CPT | Performed by: PHYSICIAN ASSISTANT

## 2022-11-13 RX ORDER — SODIUM CHLORIDE 0.9 % (FLUSH) 0.9 %
10 SYRINGE (ML) INJECTION AS NEEDED
Status: DISCONTINUED | OUTPATIENT
Start: 2022-11-13 | End: 2022-11-14 | Stop reason: HOSPADM

## 2022-11-13 RX ORDER — GUAIFENESIN 600 MG/1
1200 TABLET, EXTENDED RELEASE ORAL 2 TIMES DAILY
Status: DISCONTINUED | OUTPATIENT
Start: 2022-11-13 | End: 2022-11-14 | Stop reason: HOSPADM

## 2022-11-13 RX ORDER — IPRATROPIUM BROMIDE AND ALBUTEROL SULFATE 2.5; .5 MG/3ML; MG/3ML
3 SOLUTION RESPIRATORY (INHALATION)
Status: DISCONTINUED | OUTPATIENT
Start: 2022-11-13 | End: 2022-11-14 | Stop reason: HOSPADM

## 2022-11-13 RX ORDER — LORAZEPAM 0.5 MG/1
0.5 TABLET ORAL EVERY 8 HOURS PRN
Status: DISCONTINUED | OUTPATIENT
Start: 2022-11-13 | End: 2022-11-14 | Stop reason: HOSPADM

## 2022-11-13 RX ORDER — TAMSULOSIN HYDROCHLORIDE 0.4 MG/1
0.4 CAPSULE ORAL DAILY
Status: DISCONTINUED | OUTPATIENT
Start: 2022-11-13 | End: 2022-11-14 | Stop reason: HOSPADM

## 2022-11-13 RX ORDER — NAPROXEN 500 MG/1
500 TABLET ORAL 2 TIMES DAILY PRN
Status: DISCONTINUED | OUTPATIENT
Start: 2022-11-13 | End: 2022-11-14 | Stop reason: HOSPADM

## 2022-11-13 RX ORDER — SODIUM CHLORIDE 0.9 % (FLUSH) 0.9 %
10 SYRINGE (ML) INJECTION EVERY 12 HOURS SCHEDULED
Status: DISCONTINUED | OUTPATIENT
Start: 2022-11-13 | End: 2022-11-14 | Stop reason: HOSPADM

## 2022-11-13 RX ORDER — ENOXAPARIN SODIUM 100 MG/ML
40 INJECTION SUBCUTANEOUS EVERY 24 HOURS
Status: DISCONTINUED | OUTPATIENT
Start: 2022-11-13 | End: 2022-11-14 | Stop reason: HOSPADM

## 2022-11-13 RX ORDER — ONDANSETRON 2 MG/ML
4 INJECTION INTRAMUSCULAR; INTRAVENOUS EVERY 6 HOURS PRN
Status: DISCONTINUED | OUTPATIENT
Start: 2022-11-13 | End: 2022-11-14 | Stop reason: HOSPADM

## 2022-11-13 RX ORDER — GABAPENTIN 300 MG/1
600 CAPSULE ORAL EVERY 12 HOURS SCHEDULED
Status: DISCONTINUED | OUTPATIENT
Start: 2022-11-13 | End: 2022-11-14 | Stop reason: HOSPADM

## 2022-11-13 RX ORDER — ATORVASTATIN CALCIUM 10 MG/1
20 TABLET, FILM COATED ORAL DAILY
Status: DISCONTINUED | OUTPATIENT
Start: 2022-11-13 | End: 2022-11-14 | Stop reason: HOSPADM

## 2022-11-13 RX ORDER — BUDESONIDE AND FORMOTEROL FUMARATE DIHYDRATE 80; 4.5 UG/1; UG/1
2 AEROSOL RESPIRATORY (INHALATION)
Status: DISCONTINUED | OUTPATIENT
Start: 2022-11-13 | End: 2022-11-14 | Stop reason: HOSPADM

## 2022-11-13 RX ORDER — TRAZODONE HYDROCHLORIDE 50 MG/1
50 TABLET ORAL NIGHTLY
Status: DISCONTINUED | OUTPATIENT
Start: 2022-11-13 | End: 2022-11-14 | Stop reason: HOSPADM

## 2022-11-13 RX ORDER — CARVEDILOL 3.12 MG/1
3.12 TABLET ORAL 2 TIMES DAILY WITH MEALS
Status: DISCONTINUED | OUTPATIENT
Start: 2022-11-13 | End: 2022-11-14 | Stop reason: HOSPADM

## 2022-11-13 RX ORDER — DEXTROSE MONOHYDRATE 25 G/50ML
25 INJECTION, SOLUTION INTRAVENOUS
Status: DISCONTINUED | OUTPATIENT
Start: 2022-11-13 | End: 2022-11-14 | Stop reason: HOSPADM

## 2022-11-13 RX ORDER — NICOTINE POLACRILEX 4 MG
15 LOZENGE BUCCAL
Status: DISCONTINUED | OUTPATIENT
Start: 2022-11-13 | End: 2022-11-14 | Stop reason: HOSPADM

## 2022-11-13 RX ORDER — CYCLOBENZAPRINE HCL 10 MG
5 TABLET ORAL 3 TIMES DAILY PRN
Status: DISCONTINUED | OUTPATIENT
Start: 2022-11-13 | End: 2022-11-14 | Stop reason: HOSPADM

## 2022-11-13 RX ORDER — AMLODIPINE BESYLATE 5 MG/1
5 TABLET ORAL DAILY
Status: DISCONTINUED | OUTPATIENT
Start: 2022-11-13 | End: 2022-11-14 | Stop reason: HOSPADM

## 2022-11-13 RX ORDER — INSULIN LISPRO 100 [IU]/ML
0-9 INJECTION, SOLUTION INTRAVENOUS; SUBCUTANEOUS
Status: DISCONTINUED | OUTPATIENT
Start: 2022-11-13 | End: 2022-11-14 | Stop reason: HOSPADM

## 2022-11-13 RX ADMIN — GUAIFENESIN 1200 MG: 600 TABLET, EXTENDED RELEASE ORAL at 21:16

## 2022-11-13 RX ADMIN — GABAPENTIN 600 MG: 300 CAPSULE ORAL at 21:16

## 2022-11-13 RX ADMIN — IOPAMIDOL 100 ML: 755 INJECTION, SOLUTION INTRAVENOUS at 12:46

## 2022-11-13 RX ADMIN — CEFTRIAXONE 1 G: 1 INJECTION, POWDER, FOR SOLUTION INTRAMUSCULAR; INTRAVENOUS at 14:00

## 2022-11-13 RX ADMIN — AMLODIPINE BESYLATE 5 MG: 5 TABLET ORAL at 21:15

## 2022-11-13 RX ADMIN — ATORVASTATIN CALCIUM 20 MG: 10 TABLET, FILM COATED ORAL at 21:15

## 2022-11-13 RX ADMIN — VALSARTAN: 80 TABLET, FILM COATED ORAL at 21:15

## 2022-11-13 RX ADMIN — TRAZODONE HYDROCHLORIDE 50 MG: 50 TABLET ORAL at 21:16

## 2022-11-13 RX ADMIN — CARVEDILOL 3.12 MG: 3.12 TABLET, FILM COATED ORAL at 21:15

## 2022-11-13 RX ADMIN — ENOXAPARIN SODIUM 40 MG: 100 INJECTION SUBCUTANEOUS at 21:17

## 2022-11-13 RX ADMIN — TAMSULOSIN HYDROCHLORIDE 0.4 MG: 0.4 CAPSULE ORAL at 21:15

## 2022-11-13 RX ADMIN — BUDESONIDE AND FORMOTEROL FUMARATE DIHYDRATE 2 PUFF: 80; 4.5 AEROSOL RESPIRATORY (INHALATION) at 20:28

## 2022-11-13 RX ADMIN — SODIUM CHLORIDE 1000 ML: 9 INJECTION, SOLUTION INTRAVENOUS at 12:06

## 2022-11-13 RX ADMIN — IPRATROPIUM BROMIDE AND ALBUTEROL SULFATE 3 ML: 2.5; .5 SOLUTION RESPIRATORY (INHALATION) at 20:28

## 2022-11-13 RX ADMIN — Medication 10 ML: at 21:17

## 2022-11-13 RX ADMIN — INSULIN LISPRO 6 UNITS: 100 INJECTION, SOLUTION INTRAVENOUS; SUBCUTANEOUS at 21:25

## 2022-11-13 NOTE — PLAN OF CARE
Goal Outcome Evaluation:Pateint is admitted from ED. Patient is alert and oriented x 4, VALDEZ.  and tele in place. Call light is in within reach. [facility] on arrival to floor 0.

## 2022-11-13 NOTE — ED PROVIDER NOTES
"Subjective   History of Present Illness    Patient is a 72-year-old male presenting to ED with syncopal episode.  PMH significant for non-insulin-dependent diabetes, hypertension, COPD, Arthritis, history of TIA.  Brother at bedside to provide additional history.  Patient states over the past few days he has been well at his baseline and remembers waking up this morning feeling normal, deer hunting.  Patient states he remembers sitting on the tailgate of a truck to skin anterior and the next thing he remembers he was laying flat in the truck with his brother pushing on his chest.  Patient states that in route to the hospital he felt nauseous however upon arrival to the ED he is feeling only minimally tired and back to his baseline with no other symptoms including no chest pain/pressure/heaviness/tightness, troubles breathing, nausea, or local/focalized weakness.  Brother at bedside states that patient had been acting at his baseline all morning and they were skinning a deer when a friend with him abruptly called the brother over.  At that time patient was found leaning back on the tailgate with his eyes staring straight ahead, mouth wide open, and patient was not breathing.  Brother states \"it felt like 5 minutes\" patient was not breathing which they lowered him back and began pushing on his chest with compressions and patient woke up after which she had 1 to 2 minutes of confusion.  Brother did note the patient had an episode of urinary incontinence.  Mother denies any history of seizure disorder, any history of similar symptoms.  Patient states in 2016 he had a \"mini stroke\" for which she does not believe he has been on blood thinner since.  Patient states that over the past month he may have had the addition of a new anti-inflammatory medication for arthritis stiffness in his neck however he does not remember if it is narcotic.  Patient denies any other medication changes, sick contact.  Patient states that he " "chronically does not sleep well but denies any increases or changes to this.  Patient reports he is a 1 pack/day cigarette smoker and has been at his baseline.  Patient did try \"new marijuana\" last night however had been feeling well until this event.  Patient denies use of any further IV/recreational/illicit drugs.  Patient denies use of any medications since his symptoms and presents at this time for further evaluation.  Further reiterated that the event was witnessed and patient had no falls, no head injuries, and no other injury sustained.    Patient's last documented stress test 12/4/2019 with no cardiac stress test or echo outpatient.    Records reviewed show patient was last seen in the ED on 7/12/2022 for musculoskeletal chest pain.    Patient was recently seen outpatient the PCP office at bedside 14/22 for management of diabetes, arthritis, hypertension, GERD, and BPH.    Review of Systems   Constitutional: Positive for fatigue. Negative for fever.   HENT: Negative.    Eyes: Negative.    Respiratory: Positive for shortness of breath. Negative for cough.    Cardiovascular: Negative for chest pain and palpitations.   Gastrointestinal: Positive for nausea. Negative for abdominal pain and vomiting.   Genitourinary:        Reports + incontinence urine   Musculoskeletal: Negative.  Negative for arthralgias and myalgias.   Skin: Negative.    Allergic/Immunologic: Positive for immunocompromised state (DM).   Neurological: Positive for syncope and weakness. Negative for dizziness, numbness and headaches.        Denies head injury  Denies incontinence bowel   Psychiatric/Behavioral: Negative.    All other systems reviewed and are negative.      Past Medical History:   Diagnosis Date   • Asthma-COPD overlap syndrome ([facility]) 12/26/2020   • Centrilobular emphysema ([facility]) 12/26/2020   • Chronic respiratory failure with hypoxia ([facility]) 12/26/2020   • COPD (chronic obstructive pulmonary disease) ([facility])    • Diabetes mellitus " ([facility])    • Hx of colonic polyp    • Hx of TIA (transient ischemic attack) and stroke    • Hyperlipidemia    • Hypertension    • Lung nodule 12/26/2020   • Stage 4 very severe COPD by GOLD classification ([facility]) 12/26/2020       No Known Allergies    Past Surgical History:   Procedure Laterality Date   • BACK SURGERY     • COLONOSCOPY  08/15/2012    Poor prep repeat exam in 3 years   • COLONOSCOPY W/ POLYPECTOMY  07/16/2009    Tubular adenoma at 60 cm, Hyperplastic polyp rectum suboptimal prep repeat in 3 months   • SHOULDER SURGERY     • VERTEBROPLASTY      neck       Family History   Problem Relation Age of Onset   • Diabetes Father    • Heart disease Father    • Heart disease Sister    • Diabetes Sister    • Diabetes Brother    • Heart disease Brother        Social History     Socioeconomic History   • Marital status: Legally    Tobacco Use   • Smoking status: Some Days     Packs/day: 1.00     Years: 58.00     Pack years: 58.00     Types: Cigarettes     Start date: 1964   • Smokeless tobacco: Never   Vaping Use   • Vaping Use: Never used   Substance and Sexual Activity   • Alcohol use: Yes     Comment: OCC   • Drug use: No   • Sexual activity: Defer           Objective   Physical Exam  Vitals and nursing note reviewed.   Constitutional:       General: He is not in acute distress.     Appearance: Normal appearance. He is well-developed and well-groomed. He is not toxic-appearing or diaphoretic.   HENT:      Head: Normocephalic and atraumatic.      Mouth/Throat:      Mouth: Mucous membranes are moist.      Pharynx: Oropharynx is clear.   Eyes:      Conjunctiva/sclera: Conjunctivae normal.      Pupils: Pupils are equal, round, and reactive to light.   Cardiovascular:      Rate and Rhythm: Normal rate and regular rhythm.      Comments: No calf tenderness bilaterally  Pulmonary:      Effort: Pulmonary effort is normal. No respiratory distress.      Breath sounds: Normal breath sounds.   Chest:      Chest wall:  No tenderness.   Abdominal:      General: Bowel sounds are normal.      Palpations: Abdomen is soft.      Tenderness: There is no abdominal tenderness.   Musculoskeletal:         General: No tenderness or signs of injury. Normal range of motion.      Cervical back: Neck supple.   Skin:     General: Skin is warm and dry.      Findings: No signs of injury.   Neurological:      General: No focal deficit present.      Mental Status: He is alert and oriented to person, place, and time.      GCS: GCS eye subscore is 4. GCS verbal subscore is 5. GCS motor subscore is 6.      Cranial Nerves: Cranial nerves 2-12 are intact.      Sensory: Sensation is intact.      Motor: Motor function is intact.      Coordination: Coordination is intact.   Psychiatric:         Mood and Affect: Mood normal.         Speech: Speech normal.         Behavior: Behavior normal. Behavior is cooperative.         Procedures           ED Course                                               MDM  Number of Diagnoses or Management Options     Amount and/or Complexity of Data Reviewed  Clinical lab tests: reviewed and ordered  Tests in the radiology section of CPT®: reviewed and ordered  Tests in the medicine section of CPT®: ordered and reviewed  Decide to obtain previous medical records or to obtain history from someone other than the patient: yes  Obtain history from someone other than the patient: yes (Brother)  Review and summarize past medical records: yes  Discuss the patient with other providers: yes (Dr. Keith Sanchez (attending)  Dr. Layne (cardiology)  Dr. Jason (hosptialist))    Patient Progress  Patient progress: stable    Patient is a 72-year-old male presenting to ED with syncopal episode.  PMH significant for non-insulin-dependent diabetes, hypertension, COPD, Arthritis, history of TIA.  Further bedside to provide additional history.  Work-up revealed initial troponin elevation of 0.036 which upon repeat normalized to undetectable.   Initially patient had lactic acid elevation of 3 which after an IV fluid bolus improved to 1.4.  Urinalysis with small leukocytes, 6-12 WBC, and trace bacteria for which patient was given 1 g IV Rocephin and a culture was sent. Patient's ABG alkalotic at pH of 7.473 with no further acute abnormalities.  CBC with leukocytosis of 12.59 and otherwise unremarkable.  CMP reveals hyperglycemia 230, slight increase in patient's creatinine of 1.35 with a GFR 55.1.  Magnesium WNL.  PT/INR WNL.  Alcohol negative.  COVID, influenza, RSV testing negative.  UDS did reveal evidence of THC and cocaine.  Patient was admitting to use of marijuana but adamantly denies any intentional use of cocaine products.  EKGs found no acute changes or abnormalities.  Chest x-ray showed no acute findings.  Head CT showed: Mild cerebral and cerebellar atrophy with chronic microvascular disease and no further acute abnormalities.  D-dimer elevated at 0.59 in the setting of syncope and cocaine use chest CTA was ordered which showed: No acute abnormalities, bilateral gynecomastia.  Throughout evaluation patient remained asymptomatic with stable vital signs reporting he continued to feel well.  Discussed case with Dr. Layne, cardiology, who recommends admission under hospitalist services for further cardiac monitoring, possible echo in the morning.  Case was discussed with Dr. Jason, hospitalist, will kindly except patient for admission under his services.  Advised patient need for admission for further evaluation and monitoring for which she is amenable with no further questions, concerns, needs at this time.    Final diagnoses:   Syncope, unspecified syncope type   Marijuana use   Cocaine use   Urinary tract infection without hematuria, site unspecified       ED Disposition  ED Disposition     ED Disposition   Decision to Admit    Condition   --    Comment   Level of Care: Med/Surg [1]   Diagnosis: Syncope, unspecified syncope type [[id number]]    Admitting Physician: MARYANN ROBERTS [[id number]]               No follow-up provider specified.       Medication List      No changes were made to your prescriptions during this visit.          Raphael Briseno PA-C  11/13/22 1517

## 2022-11-13 NOTE — H&P
"4           [facility] Medicine Services  HISTORY AND PHYSICAL    Date of Admission: 11/13/2022  Primary Care Physician: Provider, No Known    Subjective     Chief Complaint: Syncope  History of Present Illness  PIETER Lim of ER asked me to admit this 72-year-old man who had \"an event\" described as \"went out\".  She was told that his  did chest compression.  I do not have further details on what actually happened but will verify from with this when available.  After the event, patient reportedly was confused.  On evaluation in the emergency room, vital signs were 167/71, heart rate 86.  Initially the blood pressure was 138/70.  He is maintaining saturation in the mid 90s in room air.    ABG showed pH of 7.47, PaO2 of 89.5 with PCO2 of 39  Patient had troponin of 0.036 but subsequently went down to 0.010.  D-dimer of 0.59 (followed by a CT angiogram that showed no PE), lactic acid of 3 (now back to normal), creatinine of 1.35 with BUN of 19  Patient urine drug screen positive for marijuana and cocaine    Patient was cleaning the deer they caught today.   He went to the tailgate of his truck because he felt weak, miserable, tired and has the feeling of needing to go to sleep.  The next thing he knew, his companions were trying to wake him up thumping on his chest.  There was not mention of any loss of pulse.  I am not certain how he arrived in the emergency room.  I did not did not get any endorsement from PIETER Lim that someone did BLS more than chest compression.    Patient was feeling well yesterday.  He was by his porch smoking marijuana.  He has a friend who he knew uses cocaine.  He told me that he left his marijuana in the ashtray.  Later, he smoked it.  He was not aware if it was laced  with cocaine.         Review of Systems   Denies any chest pain  Occasionally short of breath and reports history of COPD.  Not on any oxygen.  Occasionally uses inhaler for this.  Smokes 1 " pack of cigarettes per day but has had episodes of quitting but only to return smoking.  Denies any wheezing, denies any cough, denies any fever or chills  Denies any nausea vomiting  Denies any abdominal pain  Denies any urinary or bowel disturbance    Otherwise complete ROS reviewed and negative except as mentioned in the HPI.    Past Medical History:   Past Medical History:   Diagnosis Date   • Asthma-COPD overlap syndrome ([facility]) 12/26/2020   • Centrilobular emphysema ([facility]) 12/26/2020   • Chronic respiratory failure with hypoxia ([facility]) 12/26/2020   • COPD (chronic obstructive pulmonary disease) ([facility])    • Diabetes mellitus (HCC)    • Hx of colonic polyp    • Hx of TIA (transient ischemic attack) and stroke    • Hyperlipidemia    • Hypertension    • Lung nodule 12/26/2020   • Stage 4 very severe COPD by GOLD classification ([facility]) 12/26/2020     Past Surgical History:  Past Surgical History:   Procedure Laterality Date   • BACK SURGERY     • COLONOSCOPY  08/15/2012    Poor prep repeat exam in 3 years   • COLONOSCOPY W/ POLYPECTOMY  07/16/2009    Tubular adenoma at 60 cm, Hyperplastic polyp rectum suboptimal prep repeat in 3 months   • SHOULDER SURGERY     • VERTEBROPLASTY      neck     Social History:  reports that he has been smoking cigarettes. He started smoking about 58 years ago. He has a 58.00 pack-year smoking history. He has never used smokeless tobacco. He reports current alcohol use. He reports that he does not use drugs.    Family History: family history includes Diabetes in his brother, father, and sister; Heart disease in his brother, father, and sister.    Allergies:  No Known Allergies    Medications:  Prior to Admission medications    Medication Sig Start Date End Date Taking? Authorizing Provider   Accu-Chek Jennifer Plus test strip  11/28/20  Yes Provider, MD Greg   albuterol sulfate  (90 Base) MCG/ACT inhaler Inhale 2 puffs Every 4 (Four) Hours As Needed for Wheezing or Shortness of Air.  7/18/22  Yes Shaheen Varghese MD   amLODIPine (NORVASC) 5 MG tablet Take 5 mg by mouth Daily.   Yes ProviderGreg MD   atorvastatin (LIPITOR) 20 MG tablet Take 20 mg by mouth Daily.   Yes Provider, MD Greg   carvedilol (COREG) 3.125 MG tablet Take 3.125 mg by mouth 2 (Two) Times a Day With Meals.   Yes ProviderGreg MD   cyclobenzaprine (FLEXERIL) 5 MG tablet Take 1 tablet by mouth 3 (Three) Times a Day As Needed for Muscle Spasms. 7/12/22  Yes Krunal Cavazos MD   Dulaglutide 0.75 MG/0.5ML solution pen-injector Inject 0.75 mg under the skin into the appropriate area as directed 1 (One) Time Per Week. Thursday.   Yes ProviderGreg MD   gabapentin (NEURONTIN) 600 MG tablet 1 tablet 2 (Two) Times a Day. 9/20/22  Yes ProviderGreg MD   Soliqua 100-33 UNT-MCG/ML solution pen-injector injection  10/11/22  Yes Provider, MD Greg   tamsulosin (FLOMAX) 0.4 MG capsule 24 hr capsule Take 1 capsule by mouth Daily.   Yes Provider, MD Greg   traZODone (DESYREL) 50 MG tablet Take 50 mg by mouth Every Night.   Yes Provider, MD Greg   valsartan-hydrochlorothiazide (DIOVAN-HCT) 320-25 MG per tablet Take 1 tablet by mouth Daily.   Yes Provider, MD Greg   benzonatate (TESSALON) 200 MG capsule Take 1 capsule by mouth 3 (Three) Times a Day As Needed for Cough. 9/15/20   Magdi Page MD   buPROPion SR (WELLBUTRIN SR) 150 MG 12 hr tablet Take 150 mg by mouth. 8/24/21   ProviderGreg MD   fluticasone-salmeterol (Advair HFA) 45-21 MCG/ACT inhaler Inhale 2 puffs 2 (Two) Times a Day. 10/12/22   Raquel Sosa APRN   guaiFENesin (Mucinex) 600 MG 12 hr tablet Take 2 tablets by mouth 2 (Two) Times a Day. 8/13/20   Raquel Sosa APRN   ipratropium-albuterol (DUO-NEB) 0.5-2.5 mg/3 ml nebulizer Take 3 mL by nebulization Every 6 (Six) Hours. 9/15/20   Magdi Page MD   naproxen (NAPROSYN) 500 MG tablet Take 1 tablet by mouth 2 (Two) Times a Day  "As Needed for Mild Pain . 7/12/22   Krunal Cavazos MD   tiZANidine (ZANAFLEX) 2 MG tablet 1 tablet Every Night. 7/29/22   Provider, MD Greg     I have utilized all available immediate resources to obtain, update, and review the patient's current medications.    Objective     Vital Signs: /68 (BP Location: Right arm, Patient Position: Lying)   Pulse 91   Temp 98.1 °F (36.7 °C) (Oral)   Resp 18   Ht 175.3 cm (69\")   Wt 80.5 kg (177 lb 7.5 oz)   SpO2 98%   BMI 26.21 kg/m²   Physical Exam     GEN: Awake, alert, interactive, in NAD  HEENT: Atraumatic, PERRLA, EOMI, Anicteric, Trachea midline  Lungs: CTAB, no wheezing/rales/rhonchi  Heart: RRR, +S1/s2, no rub  ABD: soft, nt/nd, +BS, no guarding/rebound  Extremities: atraumatic, no cyanosis, no edema  Skin: no rashes or lesions  Neuro: AAOx3, no focal deficits  Psych: normal mood & affect        Results Reviewed:  Lab Results (last 24 hours)     Procedure Component Value Units Date/Time    [place] Draw [[id number]] Collected: 11/13/22 1108    Specimen: Blood Updated: 11/13/22 1517    Narrative:      The following orders were created for panel order [place] Draw.  Procedure                               Abnormality         Status                     ---------                               -----------         ------                     Green Top (Gel)[[id number]]                                  Final result               Lavender Top[[id number]]                                     Final result               Red Top[[id number]]                                          Final result               [place] Blood Culture Cesar...[[id number]]                      Final result               Kellogg Top[[id number]]                                         Final result               Light Blue Top[[id number]]                                   Final result                 Please view results for these tests on the individual orders.    Gray Top [[id number]] Collected: " 11/13/22 1108    Specimen: Blood Updated: 11/13/22 1517     Extra Tube Hold for add-ons.     Comment: Auto resulted.       STAT Lactic Acid, Reflex [[id number]]  (Normal) Collected: 11/13/22 1357    Specimen: Blood Updated: 11/13/22 1426     Lactate 1.4 mmol/L     Troponin [[id number]]  (Normal) Collected: 11/13/22 1311    Specimen: Blood Updated: 11/13/22 1352     Troponin T <0.010 ng/mL     Narrative:      Troponin T Reference Range:  <= 0.03 ng/mL-   Negative for AMI  >0.03 ng/mL-     Abnormal for myocardial necrosis.  Clinicians would have to utilize clinical acumen, EKG, Troponin and serial changes to determine if it is an Acute Myocardial Infarction or myocardial injury due to an underlying chronic condition.       Results may be falsely decreased if patient taking Biotin.      Urinalysis With Culture If Indicated - Urine, Clean Catch [[id number]]  (Abnormal) Collected: 11/13/22 1250    Specimen: Urine, Clean Catch Updated: 11/13/22 1326     Color, UA Yellow     Appearance, UA Clear     pH, UA 7.0     Specific Gravity, UA 1.028     Glucose,  mg/dL (1+)     Ketones, UA Negative     Bilirubin, UA Negative     Blood, UA Negative     Protein,  mg/dL (2+)     Leuk Esterase, UA Small (1+)     Nitrite, UA Negative     Urobilinogen, UA 1.0 E.U./dL    Narrative:      In absence of clinical symptoms, the presence of pyuria, bacteria, and/or nitrites on the urinalysis result does not correlate with infection.    Urinalysis, Microscopic Only - Urine, Clean Catch [[id number]]  (Abnormal) Collected: 11/13/22 1250    Specimen: Urine, Clean Catch Updated: 11/13/22 1326     RBC, UA None Seen /HPF      WBC, UA 6-12 /HPF      Bacteria, UA Trace /HPF      Squamous Epithelial Cells, UA 0-2 /HPF      Hyaline Casts, UA None Seen /LPF      Methodology Manual Light Microscopy    Urine Culture - Urine, Urine, Clean Catch [[id number]] Collected: 11/13/22 1250    Specimen: Urine, Clean Catch Updated: 11/13/22 1326    Urine Drug  Screen - Urine, Clean Catch [[id number]]  (Abnormal) Collected: 11/13/22 1250    Specimen: Urine, Clean Catch Updated: 11/13/22 1315     THC, Screen, Urine Positive     Phencyclidine (PCP), Urine Negative     Cocaine Screen, Urine Positive     Methamphetamine, Ur Negative     Opiate Screen Negative     Amphetamine Screen, Urine Negative     Benzodiazepine Screen, Urine Negative     Tricyclic Antidepressants Screen Negative     Methadone Screen, Urine Negative     Barbiturates Screen, Urine Negative     Oxycodone Screen, Urine Negative     Propoxyphene Screen Negative     Buprenorphine, Screen, Urine Negative    Narrative:      Cutoff For Drugs Screened:    Amphetamines               500 ng/ml  Barbiturates               200 ng/ml  Benzodiazepines            150 ng/ml  Cocaine                    150 ng/ml  Methadone                  200 ng/ml  Opiates                    100 ng/ml  Phencyclidine               25 ng/ml  THC                            50 ng/ml  Methamphetamine            500 ng/ml  Tricyclic Antidepressants  300 ng/ml  Oxycodone                  100 ng/ml  Propoxyphene               300 ng/ml  Buprenorphine               10 ng/ml    The normal value for all drugs tested is negative. This report includes unconfirmed screening results, with the cutoff values listed, to be used for medical treatment purposes only.  Unconfirmed results must not be used for non-medical purposes such as employment or legal testing.  Clinical consideration should be applied to any drug of abuse test, particularly when unconfirmed results are used.      [facility] Blood Culture Bottle Set [[id number]] Collected: 11/13/22 1108    Specimen: Blood from Arm, Right Updated: 11/13/22 1231     Extra Tube Hold for add-ons.     Comment: Auto resulted.       COVID-19, FLU A/B, RSV PCR - Swab, Nasopharynx [[id number]]  (Normal) Collected: 11/13/22 1141    Specimen: Swab from Nasopharynx Updated: 11/13/22 1230     COVID19 Not Detected      Influenza A PCR Not Detected     Influenza B PCR Not Detected     RSV, PCR Not Detected    Narrative:      Fact sheet for providers: https://www.fda.gov/media/328933/download    Fact sheet for patients: https://www.fda.gov/media/698917/download    Test performed by PCR.    Red Top [[id number]] Collected: 11/13/22 1108    Specimen: Blood Updated: 11/13/22 1217     Extra Tube Hold for add-ons.     Comment: Auto resulted.       Green Top (Gel) [[id number]] Collected: 11/13/22 1108    Specimen: Blood Updated: 11/13/22 1217     Extra Tube Hold for add-ons.     Comment: Auto resulted.       Lavender Top [[id number]] Collected: 11/13/22 1108    Specimen: Blood Updated: 11/13/22 1217     Extra Tube hold for add-on     Comment: Auto resulted       Light Blue Top [[id number]] Collected: 11/13/22 1108    Specimen: Blood Updated: 11/13/22 1217     Extra Tube Hold for add-ons.     Comment: Auto resulted       Blood Gas, Arterial - [[id number]]  (Abnormal) Collected: 11/13/22 1143    Specimen: Arterial Blood Updated: 11/13/22 1139     Site Right Radial     Nacho's Test N/A     pH, Arterial 7.473 pH units      Comment: 83 Value above reference range        pCO2, Arterial 39.0 mm Hg      pO2, Arterial 89.5 mm Hg      HCO3, Arterial 28.6 mmol/L      Comment: 83 Value above reference range        Base Excess, Arterial 4.7 mmol/L      Comment: 83 Value above reference range        O2 Saturation, Arterial 98.5 %      Temperature 37.0 C      Barometric Pressure for Blood Gas 760 mmHg      Modality Room Air     Ventilator Mode NA     Collected by [id number]     Comment: Meter: [id number]     :  [id number]        pCO2, Temperature Corrected 39.0 mm Hg      pH, Temp Corrected 7.473 pH Units      pO2, Temperature Corrected 89.5 mm Hg     Comprehensive Metabolic Panel [[id number]]  (Abnormal) Collected: 11/13/22 1108    Specimen: Blood Updated: 11/13/22 1135     Glucose 230 mg/dL      BUN 19 mg/dL      Creatinine 1.35 mg/dL      Sodium 136  mmol/L      Potassium 3.9 mmol/L      Chloride 96 mmol/L      CO2 28.0 mmol/L      Calcium 10.0 mg/dL      Total Protein 7.6 g/dL      Albumin 4.30 g/dL      ALT (SGPT) 20 U/L      AST (SGOT) 18 U/L      Alkaline Phosphatase 55 U/L      Total Bilirubin 0.6 mg/dL      Globulin 3.3 gm/dL      A/G Ratio 1.3 g/dL      BUN/Creatinine Ratio 14.1     Anion Gap 12.0 mmol/L      eGFR 55.1 mL/min/1.73      Comment: National Kidney Foundation and American Society of Nephrology (ASN) Task Force recommended calculation based on the Chronic Kidney Disease Epidemiology Collaboration (CKD-EPI) equation refit without adjustment for race.       Narrative:      GFR Normal >60  Chronic Kidney Disease <60  Kidney Failure <15    The GFR formula is only valid for adults with stable renal function between ages 18 and 70.    Troponin [[id number]]  (Abnormal) Collected: 11/13/22 1108    Specimen: Blood Updated: 11/13/22 1135     Troponin T 0.036 ng/mL     Narrative:      Troponin T Reference Range:  <= 0.03 ng/mL-   Negative for AMI  >0.03 ng/mL-     Abnormal for myocardial necrosis.  Clinicians would have to utilize clinical acumen, EKG, Troponin and serial changes to determine if it is an Acute Myocardial Infarction or myocardial injury due to an underlying chronic condition.       Results may be falsely decreased if patient taking Biotin.      Lactic Acid, Plasma [[id number]]  (Abnormal) Collected: 11/13/22 1108    Specimen: Blood Updated: 11/13/22 1135     Lactate 3.0 mmol/L     Ethanol [[id number]] Collected: 11/13/22 1108    Specimen: Blood Updated: 11/13/22 1130     Ethanol % <0.010 %     Narrative:      Not for legal purposes. Chain of Custody not followed.     Magnesium [[id number]]  (Normal) Collected: 11/13/22 1108    Specimen: Blood Updated: 11/13/22 1129     Magnesium 2.0 mg/dL     Protime-INR [[id number]]  (Normal) Collected: 11/13/22 1108    Specimen: Blood Updated: 11/13/22 1124     Protime 12.9 Seconds      INR 1.01    D-dimer,  Quantitative [[id number]]  (Abnormal) Collected: 11/13/22 1108    Specimen: Blood Updated: 11/13/22 1124     D-Dimer, Quantitative 0.59 MCGFEU/mL     Narrative:      Reference Range is 0-0.50 MCGFEU/mL. However, results <0.50 MCGFEU/mL tends to rule out DVT or PE. Results >0.50 MCGFEU/mL are not useful in predicting absence or presence of DVT or PE.      aPTT [[id number]]  (Normal) Collected: 11/13/22 1108    Specimen: Blood Updated: 11/13/22 1124     PTT 28.3 seconds     CBC & Differential [[id number]]  (Abnormal) Collected: 11/13/22 1108    Specimen: Blood Updated: 11/13/22 1117    Narrative:      The following orders were created for panel order CBC & Differential.  Procedure                               Abnormality         Status                     ---------                               -----------         ------                     CBC Auto Differential[[id number]]        Abnormal            Final result                 Please view results for these tests on the individual orders.    CBC Auto Differential [[id number]]  (Abnormal) Collected: 11/13/22 1108    Specimen: Blood Updated: 11/13/22 1117     WBC 12.59 10*3/mm3      RBC 4.58 10*6/mm3      Hemoglobin 13.7 g/dL      Hematocrit 41.9 %      MCV 91.5 fL      MCH 29.9 pg      MCHC 32.7 g/dL      RDW 13.0 %      RDW-SD 43.6 fl      MPV 8.8 fL      Platelets 361 10*3/mm3      Neutrophil % 61.6 %      Lymphocyte % 30.1 %      Monocyte % 6.7 %      Eosinophil % 0.8 %      Basophil % 0.5 %      Immature Grans % 0.3 %      Neutrophils, Absolute 7.76 10*3/mm3      Lymphocytes, Absolute 3.79 10*3/mm3      Monocytes, Absolute 0.84 10*3/mm3      Eosinophils, Absolute 0.10 10*3/mm3      Basophils, Absolute 0.06 10*3/mm3      Immature Grans, Absolute 0.04 10*3/mm3      nRBC 0.0 /100 WBC         Imaging Results (Last 24 Hours)     Procedure Component Value Units Date/Time    CT Angiogram Chest [[id number]] Collected: 11/13/22 1313     Updated: 11/13/22 1321    Narrative:       Exam: CT angiogram of the of the chest with intravenous contrast.     CLINICAL HISTORY:  Syncope. Elevated d-dimer.     DOSE:  188 mGycm. All CT scans are performed using dose optimization  techniques as appropriate to the performed exam and including at least  one of the following: Automated exposure control, adjustment of the mA  and/or kV according to size, and the use of the iterative reconstruction  technique.     TECHNIQUE: Contiguous axial images were obtained through the thorax  following intravenous contrast administration with reformatted images  obtained in the sagittal and coronal projections from the original data  set. Three-dimensional reconstructions are also obtained.     COMPARISON:  11/08/2021     FINDINGS:     Pulmonary arteries:  There is normal enhancement of the pulmonary  arteries with no evidence of pulmonary thromboembolic disease.     Aorta:  The thoracic aorta is normal in caliber with no dissection or  aneurysm. There is calcific plaquing of the thoracic aorta and proximal  great vessels without rate limiting stenosis.     LUNGS:  There are changes of centrilobular emphysema. There is a 5 mm  subpleural groundglass nodule in the posterior right lower lobe on image  127 of the axial sequence stable from the previous exam. Lungs are  otherwise clear. There is mild diffuse bronchial wall thickening. No  evidence of acute consolidative pneumonia.     Pleural spaces: Unremarkable. No evidence of pleural effusion or  pneumothorax.     HEART: No evidence of enlargement. No coronary artery calcifications are  present. There is no evidence of pericardial effusion. No evidence of RV  dysfunction.     Bones: No acute osseous abnormalities are demonstrated.     CHEST WALL: Bilateral gynecomastia is present.     Lymph nodes: No enlarged mediastinal or axillary lymphadenopathy is  present.     Upper abdomen: Limited images of the upper abdomen are unremarkable.       Impression:      1. No evidence of  pulmonary thromboembolic disease.  2. There is atheromatous calcification of the thoracic aorta and great  vessels without evidence of aneurysm or dissection.  3. Bronchial wall thickening. No evidence of acute consolidative  pneumonia or effusion. There is a stable groundglass nodule in the  posterior right lower lobe.  4. Bilateral gynecomastia.  This report was finalized on 11/13/2022 13:18 by Dr. Johnnie Arzola MD.    CT Head Without Contrast [[id number]] Collected: 11/13/22 1311     Updated: 11/13/22 1315    Narrative:      CT HEAD WO CONTRAST- 11/13/2022 12:35 PM CST     HISTORY: syncopal event     COMPARISON: None      DLP: 670 mGy cm. All CT scans are performed using dose optimization  techniques as appropriate to the performed exam and including at least  one of the following: Automated exposure control, adjustment of the mA  and/or kV according to size, and the use of the iterative reconstruction  technique.     TECHNIQUE: Serial axial tomographic images of the brain were obtained  without the use of intravenous contrast.      FINDINGS:   The midline structures are nondisplaced. There is mild cerebral and  cerebellar atrophy, with an associated increase in the prominence of the  ventricles and sulci. The basilar cisterns are normal in size and  configuration. There is no evidence of intracranial hemorrhage or  mass-effect. There is low attenuation in the periventricular white  matter, consistent with chronic ischemic change. There are no abnormal  extra-axial fluid collections. There is no evidence of tonsillar  herniation.      The included orbits and their contents are unremarkable. The visualized  paranasal sinuses, mastoid air cells and middle ear cavities are clear.  The visualized osseous structures and overlying soft tissues of the  skull and face are intact.        Impression:         1. Mild cerebral and cerebellar atrophy with chronic microvascular  disease but no evidence of acute intracranial  "process.        This report was finalized on 11/13/2022 13:12 by Dr. Johnnie Arzola MD.    XR Chest 1 View [[id number]] Collected: 11/13/22 1134     Updated: 11/13/22 1138    Narrative:      EXAMINATION: Chest 1 view 11/13/2022     HISTORY: Syncope     FINDINGS: Today's exam is compared to previous study of 07/12/2022. The  heart is normal in size. Lungs are clear with no evidence of  consolidative pneumonia or effusion. The patient's undergone previous  ACDF of the lower cervical spine.       Impression:      1. No active disease.  This report was finalized on 11/13/2022 11:34 by Dr. Johnnie Arzola MD.        I have personally reviewed and interpreted the radiology studies and ECG obtained at time of admission.     Assessment / Plan     Assessment:   Active Hospital Problems    Diagnosis    • **Syncope, unspecified syncope type      Problem list  · Syncope  · Urine drug screen positive for marijuana and cocaine  · Mildly elevated troponin that improved likely from chest compression.  Inconsistent with acute coronary syndrome. EKG showed sinus rhythm, wide complex QRS, bifascicular block. Patient has prior wide QRS complex, PVC, right bundle branch block from an EKG dating back to July 2022.  · Mild leukocytosis possibly reactive  · Lactic acidosis-unclear of significance  · Diabetes mellitus type 2  · Hypertension  · Mildly elevated D-dimer (nonspecific) with no evidence of PE by CT angiogram  · Abnormal urinalysis.  Asymptomatic.  Received Rocephin in the emergency room.  Monitor off antibiotic and await for any culture if qualified for culture      Plan:   Admit under observation on telemetry  Repeat EKG in the morning, echocardiogram  Continue carvedilol.  Will review home medications  Sliding scale insulin  Check modifiable risk factors    Patient's moral lesson from his experience: \"Do not use marijuana from someone else as it may contain cocaine.\"     Code Status/Advanced Care Plan: Full code  The " patient's surrogate decision maker is Tanner    I discussed my findings and recommendations with the patient  Estimated length of stay is to be determined    The patient was seen and examined by me on    Electronically signed by Joaquin Jason MD, 11/13/22, 17:26 CST.

## 2022-11-13 NOTE — PROGRESS NOTES
"Pharmacy Dosing Service  Anticoagulant   Lovenox      Assessment/Action/Plan:  Received a \"Pharmacy to Dose\" consult on Lovenox for the indication of VTE prophylaxis. Reviewed labs, patient's weight, and BMI. Initiated patient on Lovenox 40 mg SC Q24H for the above indication. Pharmacy will continue routine follow-up evaluation and make any necessary adjustments.     Subjective:  Naif Nick is a 72 y.o. male on  Lovenox 40 mg SC Q24H  for indication of  VTE prophylaxis .    Objective:  [Ht: 175.3 cm (69\"); Wt: 80.5 kg (177 lb 7.5 oz); BMI: Body mass index is 26.21 kg/m².]  Estimated Creatinine Clearance: 54.7 mL/min (A) (by C-G formula based on SCr of 1.35 mg/dL (H)).   Lab Results   Component Value Date    INR 1.01 11/13/2022    INR 0.93 09/11/2020    INR 0.92 06/16/2020    PROTIME 12.9 11/13/2022    PROTIME 12.1 09/11/2020    PROTIME 12.0 06/16/2020      Lab Results   Component Value Date    HGB 13.7 11/13/2022    HGB 13.8 07/12/2022    HGB 13.3 (L) 10/27/2021      Lab Results   Component Value Date     11/13/2022     07/12/2022     10/27/2021       Daniela Lott, PharmD  11/13/22 17:29 CST     "

## 2022-11-14 ENCOUNTER — APPOINTMENT (OUTPATIENT)
Dept: CARDIOLOGY | Facility: HOSPITAL | Age: 72
End: 2022-11-14

## 2022-11-14 VITALS
BODY MASS INDEX: 26.29 KG/M2 | DIASTOLIC BLOOD PRESSURE: 66 MMHG | HEIGHT: 69 IN | HEART RATE: 76 BPM | WEIGHT: 177.47 LBS | SYSTOLIC BLOOD PRESSURE: 155 MMHG | OXYGEN SATURATION: 96 % | TEMPERATURE: 98 F | RESPIRATION RATE: 17 BRPM

## 2022-11-14 PROBLEM — F12.90 MARIJUANA USE: Status: ACTIVE | Noted: 2022-11-14

## 2022-11-14 PROBLEM — F14.10 COCAINE ABUSE: Status: ACTIVE | Noted: 2022-11-14

## 2022-11-14 LAB
ANION GAP SERPL CALCULATED.3IONS-SCNC: 6 MMOL/L (ref 5–15)
BACTERIA SPEC AEROBE CULT: NO GROWTH
BH CV ECHO LEFT VENTRICLE GLOBAL LONGITUDINAL STRAIN: -13.3 %
BH CV ECHO MEAS - AO MAX PG: 8.6 MMHG
BH CV ECHO MEAS - AO MEAN PG: 4 MMHG
BH CV ECHO MEAS - AO ROOT DIAM: 3.3 CM
BH CV ECHO MEAS - AO V2 MAX: 147 CM/SEC
BH CV ECHO MEAS - AO V2 VTI: 30.7 CM
BH CV ECHO MEAS - AVA(I,D): 2.8 CM2
BH CV ECHO MEAS - EDV(CUBED): 61.6 ML
BH CV ECHO MEAS - EDV(MOD-SP2): 109 ML
BH CV ECHO MEAS - EDV(MOD-SP4): 75.3 ML
BH CV ECHO MEAS - EF(MOD-BP): 68.3 %
BH CV ECHO MEAS - EF(MOD-SP2): 67.4 %
BH CV ECHO MEAS - EF(MOD-SP4): 68.8 %
BH CV ECHO MEAS - ESV(CUBED): 22.2 ML
BH CV ECHO MEAS - ESV(MOD-SP2): 35.5 ML
BH CV ECHO MEAS - ESV(MOD-SP4): 23.5 ML
BH CV ECHO MEAS - FS: 28.9 %
BH CV ECHO MEAS - IVS/LVPW: 1.29 CM
BH CV ECHO MEAS - IVSD: 1.54 CM
BH CV ECHO MEAS - LA DIMENSION: 3.4 CM
BH CV ECHO MEAS - LAT PEAK E' VEL: 5.6 CM/SEC
BH CV ECHO MEAS - LV DIASTOLIC VOL/BSA (35-75): 38.4 CM2
BH CV ECHO MEAS - LV MASS(C)D: 197.3 GRAMS
BH CV ECHO MEAS - LV MAX PG: 5.7 MMHG
BH CV ECHO MEAS - LV MEAN PG: 3 MMHG
BH CV ECHO MEAS - LV SYSTOLIC VOL/BSA (12-30): 12 CM2
BH CV ECHO MEAS - LV V1 MAX: 119 CM/SEC
BH CV ECHO MEAS - LV V1 VTI: 25.1 CM
BH CV ECHO MEAS - LVIDD: 4 CM
BH CV ECHO MEAS - LVIDS: 2.8 CM
BH CV ECHO MEAS - LVOT AREA: 3.5 CM2
BH CV ECHO MEAS - LVOT DIAM: 2.1 CM
BH CV ECHO MEAS - LVPWD: 1.19 CM
BH CV ECHO MEAS - MED PEAK E' VEL: 6.2 CM/SEC
BH CV ECHO MEAS - MV A MAX VEL: 87.8 CM/SEC
BH CV ECHO MEAS - MV DEC SLOPE: 200.5 CM/SEC2
BH CV ECHO MEAS - MV DEC TIME: 0.32 MSEC
BH CV ECHO MEAS - MV E MAX VEL: 72.8 CM/SEC
BH CV ECHO MEAS - MV E/A: 0.83
BH CV ECHO MEAS - MV P1/2T: 110.1 MSEC
BH CV ECHO MEAS - MVA(P1/2T): 2 CM2
BH CV ECHO MEAS - PA V2 MAX: 142 CM/SEC
BH CV ECHO MEAS - RAP SYSTOLE: 5 MMHG
BH CV ECHO MEAS - RVSP: 51 MMHG
BH CV ECHO MEAS - SI(MOD-SP2): 37.5 ML/M2
BH CV ECHO MEAS - SI(MOD-SP4): 26.4 ML/M2
BH CV ECHO MEAS - SV(LVOT): 86.9 ML
BH CV ECHO MEAS - SV(MOD-SP2): 73.5 ML
BH CV ECHO MEAS - SV(MOD-SP4): 51.8 ML
BH CV ECHO MEAS - TR MAX PG: 46 MMHG
BH CV ECHO MEAS - TR MAX VEL: 339 CM/SEC
BH CV ECHO MEASUREMENTS AVERAGE E/E' RATIO: 12.34
BH CV VAS BP LEFT ARM: NORMAL MMHG
BH CV XLRA - TDI S': 15.2 CM/SEC
BUN SERPL-MCNC: 25 MG/DL (ref 8–23)
BUN/CREAT SERPL: 22.9 (ref 7–25)
CALCIUM SPEC-SCNC: 9.6 MG/DL (ref 8.6–10.5)
CHLORIDE SERPL-SCNC: 100 MMOL/L (ref 98–107)
CHOLEST SERPL-MCNC: 166 MG/DL (ref 0–200)
CO2 SERPL-SCNC: 31 MMOL/L (ref 22–29)
CREAT SERPL-MCNC: 1.09 MG/DL (ref 0.76–1.27)
EGFRCR SERPLBLD CKD-EPI 2021: 71.2 ML/MIN/1.73
GLUCOSE BLDC GLUCOMTR-MCNC: 182 MG/DL (ref 70–130)
GLUCOSE BLDC GLUCOMTR-MCNC: 195 MG/DL (ref 70–130)
GLUCOSE SERPL-MCNC: 146 MG/DL (ref 65–99)
HDLC SERPL-MCNC: 65 MG/DL (ref 40–60)
LDLC SERPL CALC-MCNC: 89 MG/DL (ref 0–100)
LDLC/HDLC SERPL: 1.36 {RATIO}
LEFT ATRIUM VOLUME INDEX: 17.4 ML/M2
LEFT ATRIUM VOLUME: 46.8 ML
MAXIMAL PREDICTED HEART RATE: 146 BPM
POTASSIUM SERPL-SCNC: 4.3 MMOL/L (ref 3.5–5.2)
QT INTERVAL: 434 MS
QT INTERVAL: 434 MS
QTC INTERVAL: 461 MS
QTC INTERVAL: 465 MS
SODIUM SERPL-SCNC: 137 MMOL/L (ref 136–145)
STRESS TARGET HR: 124 BPM
TRIGL SERPL-MCNC: 63 MG/DL (ref 0–150)
VLDLC SERPL-MCNC: 12 MG/DL (ref 5–40)

## 2022-11-14 PROCEDURE — 94799 UNLISTED PULMONARY SVC/PX: CPT

## 2022-11-14 PROCEDURE — 80061 LIPID PANEL: CPT | Performed by: INTERNAL MEDICINE

## 2022-11-14 PROCEDURE — 82962 GLUCOSE BLOOD TEST: CPT

## 2022-11-14 PROCEDURE — 93356 MYOCRD STRAIN IMG SPCKL TRCK: CPT | Performed by: INTERNAL MEDICINE

## 2022-11-14 PROCEDURE — 93306 TTE W/DOPPLER COMPLETE: CPT

## 2022-11-14 PROCEDURE — 63710000001 INSULIN LISPRO (HUMAN) PER 5 UNITS: Performed by: INTERNAL MEDICINE

## 2022-11-14 PROCEDURE — 93356 MYOCRD STRAIN IMG SPCKL TRCK: CPT

## 2022-11-14 PROCEDURE — G0378 HOSPITAL OBSERVATION PER HR: HCPCS

## 2022-11-14 PROCEDURE — 80048 BASIC METABOLIC PNL TOTAL CA: CPT | Performed by: INTERNAL MEDICINE

## 2022-11-14 PROCEDURE — 93306 TTE W/DOPPLER COMPLETE: CPT | Performed by: INTERNAL MEDICINE

## 2022-11-14 RX ADMIN — TAMSULOSIN HYDROCHLORIDE 0.4 MG: 0.4 CAPSULE ORAL at 08:20

## 2022-11-14 RX ADMIN — BUDESONIDE AND FORMOTEROL FUMARATE DIHYDRATE 2 PUFF: 80; 4.5 AEROSOL RESPIRATORY (INHALATION) at 07:09

## 2022-11-14 RX ADMIN — INSULIN LISPRO 2 UNITS: 100 INJECTION, SOLUTION INTRAVENOUS; SUBCUTANEOUS at 12:51

## 2022-11-14 RX ADMIN — ATORVASTATIN CALCIUM 20 MG: 10 TABLET, FILM COATED ORAL at 08:21

## 2022-11-14 RX ADMIN — CARVEDILOL 3.12 MG: 3.12 TABLET, FILM COATED ORAL at 08:22

## 2022-11-14 RX ADMIN — AMLODIPINE BESYLATE 5 MG: 5 TABLET ORAL at 08:21

## 2022-11-14 RX ADMIN — GUAIFENESIN 1200 MG: 600 TABLET, EXTENDED RELEASE ORAL at 08:21

## 2022-11-14 RX ADMIN — INSULIN LISPRO 2 UNITS: 100 INJECTION, SOLUTION INTRAVENOUS; SUBCUTANEOUS at 08:20

## 2022-11-14 RX ADMIN — GABAPENTIN 600 MG: 300 CAPSULE ORAL at 08:20

## 2022-11-14 RX ADMIN — IPRATROPIUM BROMIDE AND ALBUTEROL SULFATE 3 ML: 2.5; .5 SOLUTION RESPIRATORY (INHALATION) at 07:09

## 2022-11-14 RX ADMIN — Medication 10 ML: at 08:26

## 2022-11-14 RX ADMIN — VALSARTAN: 80 TABLET, FILM COATED ORAL at 08:21

## 2022-11-14 RX ADMIN — IPRATROPIUM BROMIDE AND ALBUTEROL SULFATE 3 ML: 2.5; .5 SOLUTION RESPIRATORY (INHALATION) at 10:51

## 2022-11-14 NOTE — DISCHARGE SUMMARY
[facility] Medicine Services  DISCHARGE SUMMARY       Date of Admission: 11/13/2022  Date of Discharge:  11/14/2022  Primary Care Physician: Provider, No Known    Discharge Diagnoses:  Active Hospital Problems    Diagnosis    • **Syncope and collapse    • Marijuana use    • Cocaine abuse (HCC)    • Stage 4 very severe COPD by GOLD classification ([facility])    • Type 2 diabetes mellitus, without long-term current use of insulin ([facility])          Presenting Problem/History of Present Illness:  Syncope, unspecified syncope type [R55]     Chief Complaint on Day of Discharge:   No complaint    History of Present Illness on Day of Discharge:   The patient remains at his typical baseline.  I believe the patient developed a syncopal episode secondary to a combination of cocaine and marijuana use combined with stage IV COPD and extraordinary exertion while he hold a deer out of the woods.  CT scan of the head was negative.  CT angiogram of the chest showed no acute change.  Labs are benign with hemoglobin A1c 6.9%.  UDS was positive for THC and cocaine which the patient admits to using just prior to dragging the deer out of the woods.  Creatinine is at baseline at 1.09.  The patient is stable for discharge home today.    Hospital Course  This 72-year-old male was admitted on 1/13 with a chief complaint of syncopal episode with collapse.  Apparently, the patient had completed dragging a deer out of the woods and was feel dressing the deer.  He had apparently also smoked marijuana laced with crack cocaine prior to dragging the deer out of the woods.  The patient is also noted to have stage IV COPD.  Apparently, chest compressions were provided on the scene prior to determining if the patient had a pulse.  Chest compressions were very brief and the patient aroused shortly after initiation.  The patient has had no further syncopal episodes overnight.  He is at his typical baseline today.  See HPI  "above.    Result Review    Result Review:  I have personally reviewed the results from the time of this admission to 11/14/2022 14:44 CST and agree with these findings:  []  Laboratory  []  Microbiology  []  Radiology  []  EKG/Telemetry   []  Cardiology/Vascular   []  Pathology  []  Old records  []  Other:      Condition on Discharge:    Stable    Physical Exam on Discharge:  /66 (BP Location: Right arm, Patient Position: Lying)   Pulse 76   Temp 98 °F (36.7 °C) (Oral)   Resp 17   Ht 175.3 cm (69\")   Wt 80.5 kg (177 lb 7.5 oz)   SpO2 96%   BMI 26.21 kg/m²   Physical Exam     GEN: Awake, alert, interactive, in NAD  HEENT: Atraumatic, PERRLA, EOMI, Anicteric, Trachea midline  Lungs: CTAB, no wheezing/rales/rhonchi  Heart: RRR, +S1/s2, no rub  ABD: soft, nt/nd, +BS, no guarding/rebound  Extremities: atraumatic, no cyanosis, no edema  Skin: no rashes or lesions  Neuro: AAOx3, no focal deficits  Psych: normal mood & affect    Discharge Disposition:  Home or Self Care    Discharge Medications:     Discharge Medications      Continue These Medications      Instructions Start Date   Accu-Chek Jennifer Plus test strip  Generic drug: glucose blood   No dose, route, or frequency recorded.      Advair HFA 45-21 MCG/ACT inhaler  Generic drug: fluticasone-salmeterol   2 puffs, Inhalation, 2 Times Daily - RT      albuterol sulfate  (90 Base) MCG/ACT inhaler  Commonly known as: PROVENTIL HFA;VENTOLIN HFA;PROAIR HFA   2 puffs, Inhalation, Every 4 Hours PRN      amLODIPine 5 MG tablet  Commonly known as: NORVASC   5 mg, Oral, Daily      atorvastatin 20 MG tablet  Commonly known as: LIPITOR   20 mg, Oral, Daily      carvedilol 3.125 MG tablet  Commonly known as: COREG   3.125 mg, Oral, 2 Times Daily With Meals      cyclobenzaprine 5 MG tablet  Commonly known as: FLEXERIL   5 mg, Oral, 3 Times Daily PRN      Dulaglutide 0.75 MG/0.5ML solution pen-injector  Notes to patient: weekly as per home dose   0.75 mg, " Subcutaneous, Weekly, Thursday.       gabapentin 600 MG tablet  Commonly known as: NEURONTIN   1 tablet, 2 Times Daily      guaiFENesin 600 MG 12 hr tablet  Commonly known as: Mucinex   1,200 mg, Oral, 2 Times Daily      ipratropium-albuterol 0.5-2.5 mg/3 ml nebulizer  Commonly known as: DUO-NEB   3 mL, Nebulization, Every 6 Hours - RT      naproxen 500 MG tablet  Commonly known as: NAPROSYN   500 mg, Oral, 2 Times Daily PRN      [place] 100-33 UNT-MCG/ML solution pen-injector injection  Generic drug: Insulin Glargine-Lixisenatide   No dose, route, or frequency recorded.      tamsulosin 0.4 MG capsule 24 hr capsule  Commonly known as: FLOMAX   1 capsule, Oral, Daily      tiZANidine 2 MG tablet  Commonly known as: ZANAFLEX   1 tablet, Nightly      traZODone 50 MG tablet  Commonly known as: DESYREL   50 mg, Oral, Nightly      valsartan-hydrochlorothiazide 320-25 MG per tablet  Commonly known as: DIOVAN-HCT   1 tablet, Oral, Daily             Discharge Diet:   Diet Instructions     Diet: Consistent Carbohydrate; Thin      Discharge Diet: Consistent Carbohydrate    Fluid Consistency: Thin          Discharge Care Plan / Instructions:   Discharge home    Activity at Discharge:   Activity Instructions     Activity as Tolerated            Follow-up Appointments:  Follow-up with PCP next week and pulmonology as already scheduled       Electronically signed by Paul Gordillo DO, 11/14/22, 14:44 CST.    Time: Discharge Less than 30 min    Part of this note may be an electronic transcription/translation of spoken language to printed text using the Dragon Dictation system.

## 2022-11-14 NOTE — PLAN OF CARE
Goal Outcome Evaluation:  Plan of Care Reviewed With: patient         Patient is alert and oriented x 4. VSS- high systolic. Room air. Denies pain or any dizziness. VALDEZ. Stand by assist. IV removed due to infiltration. Reviewed discharge instruction with patient. Education provided on medication, follow up appointments. Fall precautions . Verbalized understanding.

## 2022-11-14 NOTE — PLAN OF CARE
Goal Outcome Evaluation:  Plan of Care Reviewed With: patient        Progress: no change  Outcome Evaluation: Pt A&Ox4, vs stable. Pt c/o SOB however oxygen sat 96% RA. denies chest pain/discomfort. NIH 0. passed bedside swallow.

## 2022-11-14 NOTE — PLAN OF CARE
Goal Outcome Evaluation:  Plan of Care Reviewed With: patient           Outcome Evaluation: Pt A&Ox4, able to make needs known. BG checked per orders, given 6u per sliding scale. No episodes of syncope or c/o pain this shift. Tele running NSR with BBB. On RA saturationg at 95-96%, c/o SOB at beginning of shift although satting well, requesting albuterol treatment, he uses PRN at home, scheduled here in hospital. Breathing tx resolved symptoms of SOB. Voiding per urinal. Ambulating standby assist. Safety maintained, will continue to monitor.

## 2022-11-16 ENCOUNTER — TELEPHONE (OUTPATIENT)
Dept: INTERNAL MEDICINE | Age: 72
End: 2022-11-16

## 2022-11-16 ENCOUNTER — OFFICE VISIT (OUTPATIENT)
Dept: PRIMARY CARE CLINIC | Age: 72
End: 2022-11-16
Payer: MEDICARE

## 2022-11-16 VITALS
WEIGHT: 181.4 LBS | HEART RATE: 87 BPM | SYSTOLIC BLOOD PRESSURE: 168 MMHG | BODY MASS INDEX: 25.4 KG/M2 | TEMPERATURE: 97.6 F | HEIGHT: 71 IN | OXYGEN SATURATION: 98 % | DIASTOLIC BLOOD PRESSURE: 78 MMHG

## 2022-11-16 DIAGNOSIS — I10 ESSENTIAL HYPERTENSION: ICD-10-CM

## 2022-11-16 DIAGNOSIS — Z79.4 TYPE 2 DIABETES MELLITUS WITH STAGE 3A CHRONIC KIDNEY DISEASE, WITH LONG-TERM CURRENT USE OF INSULIN (HCC): ICD-10-CM

## 2022-11-16 DIAGNOSIS — F12.90 CANNABIS MISUSE: ICD-10-CM

## 2022-11-16 DIAGNOSIS — E11.22 TYPE 2 DIABETES MELLITUS WITH STAGE 3A CHRONIC KIDNEY DISEASE, WITH LONG-TERM CURRENT USE OF INSULIN (HCC): ICD-10-CM

## 2022-11-16 DIAGNOSIS — N18.31 TYPE 2 DIABETES MELLITUS WITH STAGE 3A CHRONIC KIDNEY DISEASE, WITH LONG-TERM CURRENT USE OF INSULIN (HCC): ICD-10-CM

## 2022-11-16 DIAGNOSIS — R55 VASOVAGAL SYNCOPE: ICD-10-CM

## 2022-11-16 DIAGNOSIS — F14.90: ICD-10-CM

## 2022-11-16 DIAGNOSIS — Z09 HOSPITAL DISCHARGE FOLLOW-UP: Primary | ICD-10-CM

## 2022-11-16 PROCEDURE — 1111F DSCHRG MED/CURRENT MED MERGE: CPT | Performed by: FAMILY MEDICINE

## 2022-11-16 PROCEDURE — 3044F HG A1C LEVEL LT 7.0%: CPT | Performed by: FAMILY MEDICINE

## 2022-11-16 PROCEDURE — G8484 FLU IMMUNIZE NO ADMIN: HCPCS | Performed by: FAMILY MEDICINE

## 2022-11-16 PROCEDURE — 3078F DIAST BP <80 MM HG: CPT | Performed by: FAMILY MEDICINE

## 2022-11-16 PROCEDURE — 1123F ACP DISCUSS/DSCN MKR DOCD: CPT | Performed by: FAMILY MEDICINE

## 2022-11-16 PROCEDURE — 4004F PT TOBACCO SCREEN RCVD TLK: CPT | Performed by: FAMILY MEDICINE

## 2022-11-16 PROCEDURE — 99214 OFFICE O/P EST MOD 30 MIN: CPT | Performed by: FAMILY MEDICINE

## 2022-11-16 PROCEDURE — G8417 CALC BMI ABV UP PARAM F/U: HCPCS | Performed by: FAMILY MEDICINE

## 2022-11-16 PROCEDURE — 2022F DILAT RTA XM EVC RTNOPTHY: CPT | Performed by: FAMILY MEDICINE

## 2022-11-16 PROCEDURE — 0124A COVID-19, PFIZER BIVALENT BOOSTER, (AGE 12Y+), IM, 30 MCG/0.3 ML: CPT | Performed by: FAMILY MEDICINE

## 2022-11-16 PROCEDURE — 3017F COLORECTAL CA SCREEN DOC REV: CPT | Performed by: FAMILY MEDICINE

## 2022-11-16 PROCEDURE — 3074F SYST BP LT 130 MM HG: CPT | Performed by: FAMILY MEDICINE

## 2022-11-16 PROCEDURE — 91312 COVID-19, PFIZER BIVALENT BOOSTER, (AGE 12Y+), IM, 30 MCG/0.3 ML: CPT | Performed by: FAMILY MEDICINE

## 2022-11-16 PROCEDURE — G8427 DOCREV CUR MEDS BY ELIG CLIN: HCPCS | Performed by: FAMILY MEDICINE

## 2022-11-16 NOTE — PROGRESS NOTES
After obtaining consent, and per orders of Dr. Yeyo Arias, injection of [address] given in Left deltoid by Romayne Jordan. Patient signed consent scanned into patients chart.
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
## 2022-11-16 NOTE — TELEPHONE ENCOUNTER
[facility] Transitions Initial Follow Up Call    Outreach made within 2 business days of discharge: Yes    Patient: Jolynn Terrell   Patient : 1950 MRN: [id number]    Reason for Admission: Syncope, unspecified syncope type     Discharge Date: 22      Discharge Diagnoses: Active Hospital Problems   Diagnosis    **Syncope and collapse    Marijuana use    Cocaine abuse (HCC)    Stage 4 very severe COPD by GOLD classification ([facility] [address]    Type 2 diabetes mellitus, without long-term current use of insulin ([facility] [address]       Spoke with: Attempted to make contact with patient/caregiver for an initial transitions of care follow up call post discharge without success. I will reach out again at a later time. Any previously scheduled hospital follow up appointments noted below.        Discharge department/facility: [address] [place] [address]    Pt is scheduled for HFU in office today    Scheduled appointment with PCP within 7-14 days    Follow Up  Future Appointments   Date Time Provider Bernadine Mendez   2022 12:30 PM Immanuel Nielson MD [id number]   2023  8:00 AM Immanuel Nielson MD [address] [place] [address]

## 2022-11-30 ENCOUNTER — OFFICE VISIT (OUTPATIENT)
Dept: CARDIOLOGY CLINIC | Age: 72
End: 2022-11-30
Payer: MEDICARE

## 2022-11-30 VITALS
SYSTOLIC BLOOD PRESSURE: 146 MMHG | DIASTOLIC BLOOD PRESSURE: 90 MMHG | WEIGHT: 188 LBS | OXYGEN SATURATION: 100 % | BODY MASS INDEX: 26.92 KG/M2 | HEART RATE: 69 BPM | HEIGHT: 70 IN

## 2022-11-30 DIAGNOSIS — R00.2 PALPITATION: Primary | ICD-10-CM

## 2022-11-30 DIAGNOSIS — R55 SYNCOPE AND COLLAPSE: ICD-10-CM

## 2022-11-30 PROCEDURE — 93246 EXT ECG>7D<15D RECORDING: CPT | Performed by: INTERNAL MEDICINE

## 2022-11-30 PROCEDURE — G8417 CALC BMI ABV UP PARAM F/U: HCPCS | Performed by: INTERNAL MEDICINE

## 2022-11-30 PROCEDURE — 4004F PT TOBACCO SCREEN RCVD TLK: CPT | Performed by: INTERNAL MEDICINE

## 2022-11-30 PROCEDURE — G8427 DOCREV CUR MEDS BY ELIG CLIN: HCPCS | Performed by: INTERNAL MEDICINE

## 2022-11-30 PROCEDURE — 3074F SYST BP LT 130 MM HG: CPT | Performed by: INTERNAL MEDICINE

## 2022-11-30 PROCEDURE — 3078F DIAST BP <80 MM HG: CPT | Performed by: INTERNAL MEDICINE

## 2022-11-30 PROCEDURE — 1123F ACP DISCUSS/DSCN MKR DOCD: CPT | Performed by: INTERNAL MEDICINE

## 2022-11-30 PROCEDURE — 3017F COLORECTAL CA SCREEN DOC REV: CPT | Performed by: INTERNAL MEDICINE

## 2022-11-30 PROCEDURE — 99205 OFFICE O/P NEW HI 60 MIN: CPT | Performed by: INTERNAL MEDICINE

## 2022-11-30 PROCEDURE — G8484 FLU IMMUNIZE NO ADMIN: HCPCS | Performed by: INTERNAL MEDICINE

## 2022-11-30 PROCEDURE — 93000 ELECTROCARDIOGRAM COMPLETE: CPT | Performed by: INTERNAL MEDICINE

## 2022-11-30 NOTE — PROGRESS NOTES
Claudetta Fallow is a 74 y.o. male presents with syncope. The patient reports he was deer hunting with a friend and smoked marijuana before dragging a large deer about 50 feet in the woods and loading it into a truck. He began to feel excessively tired and nauseous. He had no chest pain or shortness of breath. He developed some lightheadedness and then lost consciousness. He had no palpitations or other feeling in his chest.  He has had occasional dyspnea on exertion recently but he was able to ride his bike in the fall for more than a mile without problem. He does have COPD and this has been stable as of late. Review of Systems   Constitutional: Negative for fever, chills, diaphoresis, activity change, appetite change, fatigue and unexpected weight change. Eyes: Negative for photophobia, pain, redness and visual disturbance. Respiratory: Negative for apnea, cough, chest tightness, shortness of breath, wheezing and stridor. Cardiovascular: Negative for chest pain, palpitations and leg swelling. Gastrointestinal: Negative for abdominal distention. Genitourinary: Negative for dysuria, urgency and frequency. Musculoskeletal: Negative for myalgias, arthralgias and gait problem. Skin: Negative for color change, pallor, rash and wound. Neurological: Negative for dizziness, tremors, speech difficulty, weakness and numbness. Hematological: Does not bruise/bleed easily. Psychiatric/Behavioral: Negative.         Past Medical History:   Diagnosis Date    BPH (benign prostatic hyperplasia)     Dizziness     DM (diabetes mellitus) ([facility] [address]     type 2    Dysphagia     HTN (hypertension)     Hyperlipidemia     Hypogonadism male     Peripheral neuropathy     Tobacco dependence        Past Surgical History:   Procedure Laterality Date    BACK SURGERY      COLON SURGERY  2014    Done in [facility] of colon polyps with a 5 yr recall    COLONOSCOPY N/A 07/12/2019    Dr Whitney Diamond Adelakar-w/ablation-Internal hemorrhoids-Grade 2, suboptimal prep, diverticulosis-HP-3 yr recall    COLONOSCOPY N/A 08/09/2022    Dr Judith Ramirez, Int hem Gr 1 wo bleeding, mod diverticulosis, sub prep fair, 5 year recall    EYE SURGERY Right 07/2017    UPPER GASTROINTESTINAL ENDOSCOPY N/A 07/12/2019    Dr Adkins Second Rj-Mild gastritis and bulbar duodenitis, prominent major duodenal papilla, possible lipoma or GIST        Family History   Problem Relation Age of Onset    Colon Cancer Mother     Diabetes Type 1  Father     Diabetes Father     Cancer Sister     Cancer Brother     Stomach Cancer Brother     Esophageal Cancer Neg Hx     Liver Cancer Neg Hx     Liver Disease Neg Hx     Rectal Cancer Neg Hx        Social History     Socioeconomic History    Marital status:      Spouse name: Not on file    Number of children: Not on file    Years of education: Not on file    Highest education level: Not on file   Occupational History    Not on file   Tobacco Use    Smoking status: Every Day     Packs/day: 0.25     Years: 54.00     Pack years: 13.50     Types: Cigarettes     Start date: 1964    Smokeless tobacco: Never   Vaping Use    Vaping Use: Never used   Substance and Sexual Activity    Alcohol use: Yes     Comment: rare 2-3x yearly    Drug use: Yes     Types: Marijuana Julien Dixon)     Comment: occ    Sexual activity: Not on file   Other Topics Concern    Not on file   Social History Narrative    Not on file     Social Determinants of Health     Financial Resource Strain: Low Risk     Difficulty of Paying Living Expenses: Not very hard   Food Insecurity: No Food Insecurity    Worried About Running Out of Food in the Last Year: Never true    Ran Out of Food in the Last Year: Never true   Transportation Needs: Not on file   Physical Activity: Insufficiently Active    Days of Exercise per Week: 3 days    Minutes of Exercise per Session: 30 min   Stress: Not on file   Social Connections: Not on file Intimate Partner Violence: Not on file   Housing Stability: Not on file       No Known Allergies      Current Outpatient Medications:     tamsulosin (FLOMAX) 0.4 MG capsule, TAKE 1 CAPSULE EVERY DAY, Disp: 90 capsule, Rfl: 3    amLODIPine (NORVASC) 5 MG tablet, TAKE 1 TABLET EVERY DAY, Disp: 90 tablet, Rfl: 3    valsartan-hydroCHLOROthiazide (DIOVAN-HCT) 320-25 MG per tablet, TAKE 1 TABLET EVERY DAY, Disp: 90 tablet, Rfl: 3    naproxen (NAPROSYN) 500 MG tablet, Take 1 tablet by mouth Daily, Disp: 30 tablet, Rfl: 2    tiZANidine (ZANAFLEX) 2 MG tablet, Take 1 tablet by mouth nightly as needed (back spasm) Can take up to 2 tablets nightly, Disp: 30 tablet, Rfl: 2    albuterol sulfate HFA (PROVENTIL;VENTOLIN;PROAIR) 108 (90 Base) MCG/ACT inhaler, Inhale 2 puffs into the lungs every 6 hours as needed for Wheezing, Disp: 18 g, Rfl: 2    Insulin Glargine-Lixisenatide 100-33 UNT-MCG/ML SOPN, Inject 26 Units into the skin daily, Disp: 4 Adjustable Dose Pre-filled Pen Syringe, Rfl: 2    blood glucose monitor strips, Test 1 times a day & as needed for symptoms of irregular blood glucose. Dispense sufficient amount for indicated testing frequency plus additional to accommodate PRN testing needs. AccuCheck Guide strips, Disp: 100 strip, Rfl: 5    Lancets MISC, 1 each by Does not apply route daily, Disp: 100 each, Rfl: 5    atorvastatin (LIPITOR) 20 MG tablet, TAKE 1 TABLET EVERY DAY, Disp: 90 tablet, Rfl: 1    traZODone (DESYREL) 50 MG tablet, Take 1 tablet by mouth nightly, Disp: 90 tablet, Rfl: 3    blood glucose test strips (ACCU-CHEK FRANCISCO PLUS) strip, TEST 2 TIMES A DAY, Disp: 200 strip, Rfl: 5    omeprazole (PRILOSEC) 20 MG delayed release capsule, Take 1 tablet po BID ( on an empty stomach) x 14 days for treatment of h pylori infection. , Disp: 28 capsule, Rfl: 0    PE:  Vitals:    11/30/22 0815 11/30/22 0824 11/30/22 0825   BP: 128/82 (!) 142/84 (!) 146/90   Position: Supine Sitting Standing   Pulse: 69     SpO2: 100% Weight: 188 lb (85.3 kg)     Height: 5' 10\" (1.778 m)         Estimated body mass index is 26.98 kg/m² as calculated from the following:    Height as of this encounter: 5' 10\" (1.778 m). Weight as of this encounter: 188 lb (85.3 kg). Constitutional: He is oriented to person, place, and time. He appears well-developed and well-nourished in no acute distress. Neck:  Neck supple without JVD present. No trachea deviation present. No thyromegaly present. Eyes:Conjunctivae and EOM are normal. Pupils equal and reactive to light. ENT:Hearing appears normal.conjunctiva and lids are normal, ears and nose appear normal.  Cardiovascular: Normal rate, S1-S2 regular rhythm, normal heart sounds. No murmur ascultated. No gallop and no friction rub. No carotid bruits. No peripheral edema. Pulmonary/Chest:  Lungs clear to auscultation bilaterally without evidence of respiratory distress. He without wheezes. He without rales or ronchi. Musculoskeletal: Normal range of motion. Gait is normal no assitive device. Head is normocephalic and atraumatic. Skin: Skin is warm and dry without rash or pallor. Psychiatric:He is alert and oriented to person, place, and time. He has a normal mood and affect. His behavior is normal. Thought content normal.       Lab Results   Component Value Date/Time    CREATININE 1.1 10/27/2021 09:39 AM    CREATININE 1.0 12/17/2020 11:05 AM    HGB 13.3 10/27/2021 09:39 AM    HGB 14.1 11/24/2020 03:27 PM           ECG 11/30/22    Right bundle branch block and leftward axis       Assessment, Recommendations, & Plan:  74 y.o. male with syncope and obvious concerns for bradycardia. He does have a right bundle branch block on his ECG and it sounds like he may have had a bradycardic episode. We need to rule out ischemia and I will place a 2-week event monitor on him. I will check a 2D echo and further recommendations will be pending these results.         Disposition - RTC in 1 months or sooner if needed      Please do not hesitate to contact me for any questions or concerns. Dr. Isa Garza.  [place]  Electrophysiology and Cardiology  [facility] [facility] [place], Cardiology  [phone]

## 2022-12-08 ENCOUNTER — DOCUMENTATION (OUTPATIENT)
Dept: CT IMAGING | Facility: HOSPITAL | Age: 72
End: 2022-12-08

## 2022-12-20 PROCEDURE — 93248 EXT ECG>7D<15D REV&INTERPJ: CPT | Performed by: INTERNAL MEDICINE

## 2022-12-21 DIAGNOSIS — R55 SYNCOPE AND COLLAPSE: ICD-10-CM

## 2022-12-21 DIAGNOSIS — R00.1 BRADYCARDIA: Primary | ICD-10-CM

## 2022-12-21 DIAGNOSIS — R00.2 PALPITATION: ICD-10-CM

## 2023-01-06 ENCOUNTER — TELEPHONE (OUTPATIENT)
Dept: PRIMARY CARE CLINIC | Age: 73
End: 2023-01-06

## 2023-01-06 DIAGNOSIS — J44.9 CHRONIC OBSTRUCTIVE PULMONARY DISEASE, UNSPECIFIED COPD TYPE (HCC): ICD-10-CM

## 2023-01-06 RX ORDER — ALBUTEROL SULFATE 90 UG/1
2 AEROSOL, METERED RESPIRATORY (INHALATION) EVERY 6 HOURS PRN
Qty: 18 G | Refills: 2 | Status: SHIPPED | OUTPATIENT
Start: 2023-01-06 | End: 2023-01-16 | Stop reason: SDUPTHER

## 2023-01-06 NOTE — TELEPHONE ENCOUNTER
Mario Alberto Bennett called to request a refill on his medication.       Last office visit : 11/16/2022   Next office visit : 1/16/2023     Requested Prescriptions     Pending Prescriptions Disp Refills    albuterol sulfate HFA (PROVENTIL;VENTOLIN;PROAIR) 108 (90 Base) MCG/ACT inhaler 18 g 2     Sig: Inhale 2 puffs into the lungs every 6 hours as needed for Wheezing            Bibiana Henderson LPN

## 2023-01-11 DIAGNOSIS — M47.812 CERVICAL ARTHRITIS: ICD-10-CM

## 2023-01-13 RX ORDER — NAPROXEN 500 MG/1
TABLET ORAL
Qty: 90 TABLET | Refills: 0 | Status: SHIPPED | OUTPATIENT
Start: 2023-01-13

## 2023-01-13 NOTE — TELEPHONE ENCOUNTER
Rosanna Meng called to request a refill on his medication. Last office visit : 11/16/2022   Next office visit : 1/16/2023     Requested Prescriptions     Pending Prescriptions Disp Refills    atorvastatin (LIPITOR) 20 MG tablet 90 tablet 1     Sig: TAKE 1 TABLET EVERY DAY     Signed Prescriptions Disp Refills    naproxen (NAPROSYN) 500 MG tablet 90 tablet 0     Sig: TAKE 1 TABLET EVERY DAY     Authorizing Provider: Darrel Domínguez     Ordering User: Nisha Norwood     Received two notifications on patient for medications refills. Please approve.       Stephen Lee MA

## 2023-01-15 RX ORDER — ATORVASTATIN CALCIUM 20 MG/1
TABLET, FILM COATED ORAL
Qty: 90 TABLET | Refills: 1 | Status: SHIPPED | OUTPATIENT
Start: 2023-01-15

## 2023-01-16 ENCOUNTER — OFFICE VISIT (OUTPATIENT)
Dept: PRIMARY CARE CLINIC | Age: 73
End: 2023-01-16
Payer: MEDICARE

## 2023-01-16 VITALS
HEART RATE: 73 BPM | TEMPERATURE: 97.3 F | DIASTOLIC BLOOD PRESSURE: 66 MMHG | BODY MASS INDEX: 25.83 KG/M2 | WEIGHT: 180.4 LBS | HEIGHT: 70 IN | OXYGEN SATURATION: 97 % | SYSTOLIC BLOOD PRESSURE: 134 MMHG

## 2023-01-16 DIAGNOSIS — Z76.0 MEDICATION REFILL: ICD-10-CM

## 2023-01-16 DIAGNOSIS — E11.69 TYPE 2 DIABETES MELLITUS WITH OTHER SPECIFIED COMPLICATION, WITHOUT LONG-TERM CURRENT USE OF INSULIN (HCC): ICD-10-CM

## 2023-01-16 DIAGNOSIS — R33.9 URINARY RETENTION: ICD-10-CM

## 2023-01-16 DIAGNOSIS — M19.112 POST-TRAUMATIC OSTEOARTHRITIS OF LEFT SHOULDER: ICD-10-CM

## 2023-01-16 DIAGNOSIS — J44.9 CHRONIC OBSTRUCTIVE PULMONARY DISEASE, UNSPECIFIED COPD TYPE (HCC): ICD-10-CM

## 2023-01-16 DIAGNOSIS — Z72.89 ENGAGES IN VAPING: ICD-10-CM

## 2023-01-16 DIAGNOSIS — Z72.0 TOBACCO ABUSE: ICD-10-CM

## 2023-01-16 DIAGNOSIS — I10 PRIMARY HYPERTENSION: Primary | ICD-10-CM

## 2023-01-16 PROCEDURE — G8417 CALC BMI ABV UP PARAM F/U: HCPCS | Performed by: FAMILY MEDICINE

## 2023-01-16 PROCEDURE — 4004F PT TOBACCO SCREEN RCVD TLK: CPT | Performed by: FAMILY MEDICINE

## 2023-01-16 PROCEDURE — 2022F DILAT RTA XM EVC RTNOPTHY: CPT | Performed by: FAMILY MEDICINE

## 2023-01-16 PROCEDURE — 3017F COLORECTAL CA SCREEN DOC REV: CPT | Performed by: FAMILY MEDICINE

## 2023-01-16 PROCEDURE — 3075F SYST BP GE 130 - 139MM HG: CPT | Performed by: FAMILY MEDICINE

## 2023-01-16 PROCEDURE — G8484 FLU IMMUNIZE NO ADMIN: HCPCS | Performed by: FAMILY MEDICINE

## 2023-01-16 PROCEDURE — 3046F HEMOGLOBIN A1C LEVEL >9.0%: CPT | Performed by: FAMILY MEDICINE

## 2023-01-16 PROCEDURE — 99214 OFFICE O/P EST MOD 30 MIN: CPT | Performed by: FAMILY MEDICINE

## 2023-01-16 PROCEDURE — G8427 DOCREV CUR MEDS BY ELIG CLIN: HCPCS | Performed by: FAMILY MEDICINE

## 2023-01-16 PROCEDURE — 3023F SPIROM DOC REV: CPT | Performed by: FAMILY MEDICINE

## 2023-01-16 PROCEDURE — 1123F ACP DISCUSS/DSCN MKR DOCD: CPT | Performed by: FAMILY MEDICINE

## 2023-01-16 PROCEDURE — 3078F DIAST BP <80 MM HG: CPT | Performed by: FAMILY MEDICINE

## 2023-01-16 RX ORDER — ALBUTEROL SULFATE 90 UG/1
2 AEROSOL, METERED RESPIRATORY (INHALATION) EVERY 6 HOURS PRN
Qty: 3 EACH | Refills: 1 | Status: SHIPPED | OUTPATIENT
Start: 2023-01-16

## 2023-01-16 SDOH — ECONOMIC STABILITY: FOOD INSECURITY: WITHIN THE PAST 12 MONTHS, YOU WORRIED THAT YOUR FOOD WOULD RUN OUT BEFORE YOU GOT MONEY TO BUY MORE.: NEVER TRUE

## 2023-01-16 SDOH — ECONOMIC STABILITY: FOOD INSECURITY: WITHIN THE PAST 12 MONTHS, THE FOOD YOU BOUGHT JUST DIDN'T LAST AND YOU DIDN'T HAVE MONEY TO GET MORE.: NEVER TRUE

## 2023-01-16 ASSESSMENT — ENCOUNTER SYMPTOMS
SHORTNESS OF BREATH: 0
EYE PAIN: 0
ABDOMINAL PAIN: 0
EYE DISCHARGE: 0
SORE THROAT: 0
RHINORRHEA: 0
DIARRHEA: 0
COUGH: 0
NAUSEA: 0

## 2023-01-16 ASSESSMENT — SOCIAL DETERMINANTS OF HEALTH (SDOH): HOW HARD IS IT FOR YOU TO PAY FOR THE VERY BASICS LIKE FOOD, HOUSING, MEDICAL CARE, AND HEATING?: NOT HARD AT ALL

## 2023-01-16 ASSESSMENT — PATIENT HEALTH QUESTIONNAIRE - PHQ9
1. LITTLE INTEREST OR PLEASURE IN DOING THINGS: 0
SUM OF ALL RESPONSES TO PHQ QUESTIONS 1-9: 0
SUM OF ALL RESPONSES TO PHQ QUESTIONS 1-9: 0
2. FEELING DOWN, DEPRESSED OR HOPELESS: 0
SUM OF ALL RESPONSES TO PHQ QUESTIONS 1-9: 0
SUM OF ALL RESPONSES TO PHQ9 QUESTIONS 1 & 2: 0
SUM OF ALL RESPONSES TO PHQ QUESTIONS 1-9: 0

## 2023-01-16 NOTE — PROGRESS NOTES
[address] [facility]  [address] [address]  [address] [address]  Dept: [phone]  Dept Fax: [phone]  Loc: [phone]      Subjective:     Chief Complaint   Patient presents with    3 Month Follow-Up       HPI:  Frieda Rausch is a 74 y.o. male presenting today for his 3 month follow-up. PMHX reviewed. No significant change reported except that his L shoulder did not feel better on naproxen and muscle relaxer. He states that taking a hot shower helped the most.   He had some questions about medical marijuana. I told him that I am not prescribing that in my practice. His BP looks good today. His DM is fairly well controlled on current dose of his insulin. Overall, he states he feels well. Unfortunately, he continues to smoke and vape. He states that the vaping has cut back on the number of cigarettes he smokes a day. He was a 1 pack a day smoker for >50 years. ROS:   Review of Systems   Constitutional:  Negative for chills, fever and unexpected weight change. HENT:  Negative for congestion, ear discharge, ear pain, rhinorrhea and sore throat. Eyes:  Negative for pain and discharge. Respiratory:  Negative for cough and shortness of breath. Cardiovascular:  Negative for chest pain. Gastrointestinal:  Negative for abdominal pain, diarrhea and nausea. Endocrine: Negative for polydipsia, polyphagia and polyuria. Genitourinary:  Negative for dysuria and hematuria. Hesitancy   Musculoskeletal:  Positive for arthralgias (L shoulder pain). Negative for joint swelling. Neurological:  Negative for weakness, numbness and headaches. Psychiatric/Behavioral:  Negative for dysphoric mood. The patient is not nervous/anxious.       PMHx:  Past Medical History:   Diagnosis Date    BPH (benign prostatic hyperplasia)     Dizziness     [facility]     type 2    Dysphagia     HTN (hypertension)     Hyperlipidemia     Hypogonadism male Peripheral neuropathy     Tobacco dependence      Patient Active Problem List   Diagnosis    Benign prostatic hyperplasia with lower urinary tract symptoms    Tobacco abuse    Mixed hyperlipidemia    Testosterone deficiency    Essential hypertension    Diabetes mellitus (HCC)    GERD (gastroesophageal reflux disease)    Chronic obstructive pulmonary disease (HCC)       PSHx:  Past Surgical History:   Procedure Laterality Date    BACK SURGERY      COLON SURGERY  2014    Done in [place], hx of colon polyps with a 5 yr recall    COLONOSCOPY N/A 07/12/2019    Dr Sonia Bueno hemorrhoids-Grade 2, suboptimal prep, diverticulosis-HP-3 yr recall    COLONOSCOPY N/A 08/09/2022    Dr Sabrina Salazar, Int hem Gr 1 wo bleeding, mod diverticulosis, sub prep fair, 5 year recall    EYE SURGERY Right 07/2017    UPPER GASTROINTESTINAL ENDOSCOPY N/A 07/12/2019    Dr Betsy De La Rosa-Mild gastritis and bulbar duodenitis, prominent major duodenal papilla, possible lipoma or GIST        PFHx:  Family History   Problem Relation Age of Onset    Colon Cancer Mother     Diabetes Type 1  Father     Diabetes Father     Cancer Sister     Cancer Brother     Stomach Cancer Brother     Esophageal Cancer Neg Hx     Liver Cancer Neg Hx     Liver Disease Neg Hx     Rectal Cancer Neg Hx        SocialHx:  Social History     Tobacco Use    Smoking status: Every Day     Packs/day: 0.25     Years: 54.00     Pack years: 13.50     Types: Cigarettes     Start date: 1964    Smokeless tobacco: Never   Substance Use Topics    Alcohol use: Yes     Comment: rare 2-3x yearly       Allergies:  No Known Allergies    Medications:  Current Outpatient Medications   Medication Sig Dispense Refill    albuterol sulfate HFA (PROVENTIL;VENTOLIN;PROAIR) 108 (90 Base) MCG/ACT inhaler Inhale 2 puffs into the lungs every 6 hours as needed for Wheezing 3 each 1    Insulin Glargine-Lixisenatide 100-33 UNT-MCG/ML SOPN Inject 26 Units into the skin daily 12 Adjustable Dose Pre-filled Pen Syringe 1    atorvastatin (LIPITOR) 20 MG tablet TAKE 1 TABLET EVERY DAY 90 tablet 1    tamsulosin (FLOMAX) 0.4 MG capsule TAKE 1 CAPSULE EVERY DAY 90 capsule 3    amLODIPine (NORVASC) 5 MG tablet TAKE 1 TABLET EVERY DAY 90 tablet 3    valsartan-hydroCHLOROthiazide (DIOVAN-HCT) 320-25 MG per tablet TAKE 1 TABLET EVERY DAY 90 tablet 3    blood glucose monitor strips Test 1 times a day & as needed for symptoms of irregular blood glucose. Dispense sufficient amount for indicated testing frequency plus additional to accommodate PRN testing needs. AccuCheck Guide strips 100 strip 5    Lancets MISC 1 each by Does not apply route daily 100 each 5    traZODone (DESYREL) 50 MG tablet Take 1 tablet by mouth nightly 90 tablet 3    blood glucose test strips (ACCU-CHEK FRANCISCO PLUS) strip TEST 2 TIMES A  strip 5    omeprazole (PRILOSEC) 20 MG delayed release capsule Take 1 tablet po BID ( on an empty stomach) x 14 days for treatment of h pylori infection. 28 capsule 0     No current facility-administered medications for this visit. Objective:   PE:  /66   Pulse 73   Temp 97.3 °F (36.3 °C) (Temporal)   Ht 5' 10\" (1.778 m)   Wt 180 lb 6.4 oz (81.8 kg)   SpO2 97%   BMI 25.88 kg/m²   Physical Exam  Vitals and nursing note reviewed. Constitutional:       General: He is not in acute distress. Appearance: Normal appearance. HENT:      Head: Normocephalic. Right Ear: External ear normal.      Left Ear: External ear normal.      Nose: Nose normal.      Mouth/Throat:      Pharynx: Oropharynx is clear. No oropharyngeal exudate or posterior oropharyngeal erythema. Eyes:      General: No scleral icterus. Extraocular Movements: Extraocular movements intact. Conjunctiva/sclera: Conjunctivae normal.      Pupils: Pupils are equal, round, and reactive to light. Cardiovascular:      Rate and Rhythm: Normal rate and regular rhythm.       Pulses: Normal pulses. Heart sounds: No murmur heard. Pulmonary:      Breath sounds: Normal breath sounds. No wheezing, rhonchi or rales. Abdominal:      Tenderness: There is no abdominal tenderness. There is no guarding. Musculoskeletal:         General: Normal range of motion. Cervical back: Normal range of motion. Skin:     General: Skin is warm and dry. Capillary Refill: Capillary refill takes less than 2 seconds. Neurological:      General: No focal deficit present. Mental Status: He is alert and oriented to person, place, and time. Psychiatric:         Mood and Affect: Mood normal.         Behavior: Behavior normal.          Assessment & Plan   Michelle Fisher was seen today for 3 month follow-up. Diagnoses and all orders for this visit:    Primary hypertension  -     Basic Metabolic Panel; Future    Type 2 diabetes mellitus with other specified complication, without long-term current use of insulin ([facility])  -     Insulin Glargine-Lixisenatide 100-33 UNT-MCG/ML SOPN; Inject 26 Units into the skin daily  -     Hemoglobin A1C; Future  -     Microalbumin, Ur; Future    Chronic obstructive pulmonary disease, unspecified COPD type ([facility])  -     albuterol sulfate HFA (PROVENTIL;VENTOLIN;PROAIR) 108 (90 Base) MCG/ACT inhaler; Inhale 2 puffs into the lungs every 6 hours as needed for Wheezing    Post-traumatic osteoarthritis of left shoulder    Urinary retention    Tobacco abuse    Engages in vaping    Medication refill  -     albuterol sulfate HFA (PROVENTIL;VENTOLIN;PROAIR) 108 (90 Base) MCG/ACT inhaler;  Inhale 2 puffs into the lungs every 6 hours as needed for Wheezing  -     Insulin Glargine-Lixisenatide 100-33 UNT-MCG/ML SOPN; Inject 26 Units into the skin daily    Continue all maintenance medications as prescribed  Continue diabetic diet  Continue to monitor BS and keep a record  Engaged in regular exercise as tolerated - at least 30 minutes/day  Low fat/low calorie diet  Daily foot care  Recommend annual diabetic eye exam  Encouraged to d/c smoking and vaping  Stay well hydrated - 64 oz of water a day  Offered cortisone shot on shoulder - pt refused  Labs as ordered - to be drawn prior to next offfice vist  Keep scheduled follow-up visit with me   Call with new concerns     Return for chronic care management, routine follow-up with me. All questions were answered. Medications, including possible adverse effects, and instructions were reviewed and  understanding was confirmed. Follow-up recommendations, including when to contact or return to office (ie; if symptoms worsen or fail to improve), were discussed and acknowledged.     Electronically signed by Curry Rosales MD on 1/16/23 at 1:10 PM CST [Follow-Up: _____] : a [unfilled] follow-up visit [FreeTextEntry1] : dermatomyositis

## 2023-01-17 DIAGNOSIS — M47.812 CERVICAL ARTHRITIS: ICD-10-CM

## 2023-01-18 RX ORDER — NAPROXEN 500 MG/1
TABLET ORAL
Qty: 90 TABLET | OUTPATIENT
Start: 2023-01-18

## 2023-01-23 DIAGNOSIS — M47.812 CERVICAL ARTHRITIS: ICD-10-CM

## 2023-01-24 RX ORDER — TIZANIDINE 2 MG/1
TABLET ORAL
Qty: 30 TABLET | Refills: 2 | OUTPATIENT
Start: 2023-01-24

## 2023-02-03 ENCOUNTER — TELEPHONE (OUTPATIENT)
Dept: CARDIOLOGY CLINIC | Age: 73
End: 2023-02-03

## 2023-02-03 ENCOUNTER — TELEPHONE (OUTPATIENT)
Dept: PRIMARY CARE CLINIC | Age: 73
End: 2023-02-03

## 2023-02-03 RX ORDER — TRAZODONE HYDROCHLORIDE 50 MG/1
50 TABLET ORAL NIGHTLY
Qty: 90 TABLET | Refills: 0 | Status: SHIPPED | OUTPATIENT
Start: 2023-02-03

## 2023-02-03 NOTE — TELEPHONE ENCOUNTER
Jack Rhodes called to request a refill on his medication.       Last office visit : 1/16/2023   Next office visit : 4/17/2023     Requested Prescriptions     Pending Prescriptions Disp Refills    traZODone (DESYREL) 50 MG tablet 90 tablet 3     Sig: Take 1 tablet by mouth nightly            Jillian Carrel, MA

## 2023-02-03 NOTE — TELEPHONE ENCOUNTER
Called and discussed him no showing for his ECHO and Cristal. Pt stated he is still agreeable to testing. I then provided Pt the number to call [facility] when ever he returned home to [facility] those appts. Pt voiced his understanding.

## 2023-02-03 NOTE — TELEPHONE ENCOUNTER
[facility] called and asked for a 90 day refill of Trazodone. This was already taken care of today.

## 2023-02-16 ENCOUNTER — HOSPITAL ENCOUNTER (OUTPATIENT)
Dept: CT IMAGING | Facility: HOSPITAL | Age: 73
Discharge: HOME | End: 2023-02-16

## 2023-02-16 ENCOUNTER — TRANSCRIBE ORDERS (OUTPATIENT)
Dept: ADMINISTRATIVE | Facility: HOSPITAL | Age: 73
End: 2023-02-16
Payer: MEDICARE

## 2023-02-16 DIAGNOSIS — Z13.9 SCREENING FOR UNSPECIFIED CONDITION: Primary | ICD-10-CM

## 2023-02-16 PROCEDURE — 71271 CT THORAX LUNG CANCER SCR C-: CPT

## 2023-02-19 ENCOUNTER — HOSPITAL ENCOUNTER (OUTPATIENT)
Facility: HOSPITAL | Age: 73
Setting detail: OBSERVATION
Discharge: HOME | End: 2023-02-20
Attending: STUDENT IN AN ORGANIZED HEALTH CARE EDUCATION/TRAINING PROGRAM | Admitting: INTERNAL MEDICINE
Payer: MEDICARE

## 2023-02-19 ENCOUNTER — APPOINTMENT (OUTPATIENT)
Dept: GENERAL RADIOLOGY | Facility: HOSPITAL | Age: 73
End: 2023-02-19
Payer: MEDICARE

## 2023-02-19 DIAGNOSIS — Z87.09 HISTORY OF COPD: ICD-10-CM

## 2023-02-19 DIAGNOSIS — R55 SYNCOPE AND COLLAPSE: Primary | ICD-10-CM

## 2023-02-19 DIAGNOSIS — I45.10 RIGHT BUNDLE BRANCH BLOCK: ICD-10-CM

## 2023-02-19 DIAGNOSIS — R55 VASOVAGAL SYNCOPE: ICD-10-CM

## 2023-02-19 DIAGNOSIS — I95.1 ORTHOSTATIC HYPOTENSION: ICD-10-CM

## 2023-02-19 DIAGNOSIS — Z87.898 HISTORY OF SYNCOPE: ICD-10-CM

## 2023-02-19 LAB
ALBUMIN SERPL-MCNC: 4 G/DL (ref 3.5–5.2)
ALBUMIN/GLOB SERPL: 1.3 G/DL
ALP SERPL-CCNC: 51 U/L (ref 39–117)
ALT SERPL W P-5'-P-CCNC: 42 U/L (ref 1–41)
ANION GAP SERPL CALCULATED.3IONS-SCNC: 12 MMOL/L (ref 5–15)
AST SERPL-CCNC: 34 U/L (ref 1–40)
BASOPHILS # BLD AUTO: 0.04 10*3/MM3 (ref 0–0.2)
BASOPHILS NFR BLD AUTO: 0.3 % (ref 0–1.5)
BILIRUB SERPL-MCNC: 0.3 MG/DL (ref 0–1.2)
BUN SERPL-MCNC: 29 MG/DL (ref 8–23)
BUN/CREAT SERPL: 27.6 (ref 7–25)
CALCIUM SPEC-SCNC: 9.4 MG/DL (ref 8.6–10.5)
CHLORIDE SERPL-SCNC: 100 MMOL/L (ref 98–107)
CO2 SERPL-SCNC: 25 MMOL/L (ref 22–29)
CREAT SERPL-MCNC: 1.05 MG/DL (ref 0.76–1.27)
DEPRECATED RDW RBC AUTO: 43.5 FL (ref 37–54)
EGFRCR SERPLBLD CKD-EPI 2021: 74.5 ML/MIN/1.73
EOSINOPHIL # BLD AUTO: 0.2 10*3/MM3 (ref 0–0.4)
EOSINOPHIL NFR BLD AUTO: 1.7 % (ref 0.3–6.2)
ERYTHROCYTE [DISTWIDTH] IN BLOOD BY AUTOMATED COUNT: 12.7 % (ref 12.3–15.4)
GLOBULIN UR ELPH-MCNC: 3 GM/DL
GLUCOSE SERPL-MCNC: 146 MG/DL (ref 65–99)
HCT VFR BLD AUTO: 41 % (ref 37.5–51)
HGB BLD-MCNC: 12.9 G/DL (ref 13–17.7)
IMM GRANULOCYTES # BLD AUTO: 0.04 10*3/MM3 (ref 0–0.05)
IMM GRANULOCYTES NFR BLD AUTO: 0.3 % (ref 0–0.5)
LYMPHOCYTES # BLD AUTO: 4.16 10*3/MM3 (ref 0.7–3.1)
LYMPHOCYTES NFR BLD AUTO: 35.5 % (ref 19.6–45.3)
MAGNESIUM SERPL-MCNC: 2 MG/DL (ref 1.6–2.4)
MCH RBC QN AUTO: 29.3 PG (ref 26.6–33)
MCHC RBC AUTO-ENTMCNC: 31.5 G/DL (ref 31.5–35.7)
MCV RBC AUTO: 93.2 FL (ref 79–97)
MONOCYTES # BLD AUTO: 0.94 10*3/MM3 (ref 0.1–0.9)
MONOCYTES NFR BLD AUTO: 8 % (ref 5–12)
NEUTROPHILS NFR BLD AUTO: 54.2 % (ref 42.7–76)
NEUTROPHILS NFR BLD AUTO: 6.33 10*3/MM3 (ref 1.7–7)
NRBC BLD AUTO-RTO: 0 /100 WBC (ref 0–0.2)
NT-PROBNP SERPL-MCNC: 56.9 PG/ML (ref 0–900)
PLATELET # BLD AUTO: 360 10*3/MM3 (ref 140–450)
PMV BLD AUTO: 9.3 FL (ref 6–12)
POTASSIUM SERPL-SCNC: 3.6 MMOL/L (ref 3.5–5.2)
PROT SERPL-MCNC: 7 G/DL (ref 6–8.5)
RBC # BLD AUTO: 4.4 10*6/MM3 (ref 4.14–5.8)
SODIUM SERPL-SCNC: 137 MMOL/L (ref 136–145)
TROPONIN T SERPL HS-MCNC: 19 NG/L
WBC NRBC COR # BLD: 11.71 10*3/MM3 (ref 3.4–10.8)

## 2023-02-19 PROCEDURE — 63710000001 INSULIN LISPRO (HUMAN) PER 5 UNITS: Performed by: INTERNAL MEDICINE

## 2023-02-19 PROCEDURE — 93005 ELECTROCARDIOGRAM TRACING: CPT

## 2023-02-19 PROCEDURE — 83880 ASSAY OF NATRIURETIC PEPTIDE: CPT | Performed by: STUDENT IN AN ORGANIZED HEALTH CARE EDUCATION/TRAINING PROGRAM

## 2023-02-19 PROCEDURE — 85025 COMPLETE CBC W/AUTO DIFF WBC: CPT | Performed by: STUDENT IN AN ORGANIZED HEALTH CARE EDUCATION/TRAINING PROGRAM

## 2023-02-19 PROCEDURE — 71045 X-RAY EXAM CHEST 1 VIEW: CPT

## 2023-02-19 PROCEDURE — 84484 ASSAY OF TROPONIN QUANT: CPT | Performed by: STUDENT IN AN ORGANIZED HEALTH CARE EDUCATION/TRAINING PROGRAM

## 2023-02-19 PROCEDURE — G0378 HOSPITAL OBSERVATION PER HR: HCPCS

## 2023-02-19 PROCEDURE — 93010 ELECTROCARDIOGRAM REPORT: CPT | Performed by: INTERNAL MEDICINE

## 2023-02-19 PROCEDURE — 99285 EMERGENCY DEPT VISIT HI MDM: CPT

## 2023-02-19 PROCEDURE — 80053 COMPREHEN METABOLIC PANEL: CPT | Performed by: STUDENT IN AN ORGANIZED HEALTH CARE EDUCATION/TRAINING PROGRAM

## 2023-02-19 PROCEDURE — 93005 ELECTROCARDIOGRAM TRACING: CPT | Performed by: STUDENT IN AN ORGANIZED HEALTH CARE EDUCATION/TRAINING PROGRAM

## 2023-02-19 PROCEDURE — 83735 ASSAY OF MAGNESIUM: CPT | Performed by: STUDENT IN AN ORGANIZED HEALTH CARE EDUCATION/TRAINING PROGRAM

## 2023-02-19 PROCEDURE — 82962 GLUCOSE BLOOD TEST: CPT

## 2023-02-19 RX ORDER — INSULIN LISPRO 100 [IU]/ML
2-7 INJECTION, SOLUTION INTRAVENOUS; SUBCUTANEOUS
Status: DISCONTINUED | OUTPATIENT
Start: 2023-02-19 | End: 2023-02-20 | Stop reason: HOSPADM

## 2023-02-19 RX ORDER — TAMSULOSIN HYDROCHLORIDE 0.4 MG/1
0.4 CAPSULE ORAL DAILY
Status: DISCONTINUED | OUTPATIENT
Start: 2023-02-20 | End: 2023-02-20 | Stop reason: HOSPADM

## 2023-02-19 RX ORDER — DEXTROSE MONOHYDRATE 25 G/50ML
25 INJECTION, SOLUTION INTRAVENOUS
Status: DISCONTINUED | OUTPATIENT
Start: 2023-02-19 | End: 2023-02-20 | Stop reason: HOSPADM

## 2023-02-19 RX ORDER — SODIUM CHLORIDE 0.9 % (FLUSH) 0.9 %
10 SYRINGE (ML) INJECTION EVERY 12 HOURS SCHEDULED
Status: DISCONTINUED | OUTPATIENT
Start: 2023-02-19 | End: 2023-02-20 | Stop reason: HOSPADM

## 2023-02-19 RX ORDER — TRAZODONE HYDROCHLORIDE 50 MG/1
50 TABLET ORAL NIGHTLY
Status: DISCONTINUED | OUTPATIENT
Start: 2023-02-19 | End: 2023-02-20 | Stop reason: HOSPADM

## 2023-02-19 RX ORDER — ATORVASTATIN CALCIUM 10 MG/1
20 TABLET, FILM COATED ORAL DAILY
Status: DISCONTINUED | OUTPATIENT
Start: 2023-02-20 | End: 2023-02-20 | Stop reason: HOSPADM

## 2023-02-19 RX ORDER — AMLODIPINE BESYLATE 5 MG/1
5 TABLET ORAL DAILY
Status: DISCONTINUED | OUTPATIENT
Start: 2023-02-20 | End: 2023-02-20 | Stop reason: HOSPADM

## 2023-02-19 RX ORDER — SODIUM CHLORIDE 9 MG/ML
40 INJECTION, SOLUTION INTRAVENOUS AS NEEDED
Status: DISCONTINUED | OUTPATIENT
Start: 2023-02-19 | End: 2023-02-20 | Stop reason: HOSPADM

## 2023-02-19 RX ORDER — ACETAMINOPHEN 325 MG/1
650 TABLET ORAL EVERY 4 HOURS PRN
Status: DISCONTINUED | OUTPATIENT
Start: 2023-02-19 | End: 2023-02-20 | Stop reason: HOSPADM

## 2023-02-19 RX ORDER — ONDANSETRON 2 MG/ML
4 INJECTION INTRAMUSCULAR; INTRAVENOUS EVERY 6 HOURS PRN
Status: DISCONTINUED | OUTPATIENT
Start: 2023-02-19 | End: 2023-02-20 | Stop reason: HOSPADM

## 2023-02-19 RX ORDER — IPRATROPIUM BROMIDE AND ALBUTEROL SULFATE 2.5; .5 MG/3ML; MG/3ML
3 SOLUTION RESPIRATORY (INHALATION) EVERY 6 HOURS PRN
Status: DISCONTINUED | OUTPATIENT
Start: 2023-02-19 | End: 2023-02-20 | Stop reason: HOSPADM

## 2023-02-19 RX ORDER — NICOTINE POLACRILEX 4 MG
15 LOZENGE BUCCAL
Status: DISCONTINUED | OUTPATIENT
Start: 2023-02-19 | End: 2023-02-20 | Stop reason: HOSPADM

## 2023-02-19 RX ORDER — ALBUTEROL SULFATE 90 UG/1
2 AEROSOL, METERED RESPIRATORY (INHALATION) EVERY 4 HOURS PRN
Status: DISCONTINUED | OUTPATIENT
Start: 2023-02-19 | End: 2023-02-19

## 2023-02-19 RX ORDER — GABAPENTIN 300 MG/1
600 CAPSULE ORAL EVERY 12 HOURS SCHEDULED
Status: DISCONTINUED | OUTPATIENT
Start: 2023-02-19 | End: 2023-02-20 | Stop reason: HOSPADM

## 2023-02-19 RX ORDER — SODIUM CHLORIDE 0.9 % (FLUSH) 0.9 %
10 SYRINGE (ML) INJECTION AS NEEDED
Status: DISCONTINUED | OUTPATIENT
Start: 2023-02-19 | End: 2023-02-20 | Stop reason: HOSPADM

## 2023-02-19 RX ORDER — HYDROCHLOROTHIAZIDE 25 MG/1
25 TABLET ORAL
Status: DISCONTINUED | OUTPATIENT
Start: 2023-02-20 | End: 2023-02-20 | Stop reason: HOSPADM

## 2023-02-19 RX ORDER — VALSARTAN 80 MG/1
320 TABLET ORAL
Status: DISCONTINUED | OUTPATIENT
Start: 2023-02-20 | End: 2023-02-20 | Stop reason: HOSPADM

## 2023-02-19 RX ADMIN — Medication 10 ML: at 23:38

## 2023-02-19 RX ADMIN — INSULIN LISPRO 4 UNITS: 100 INJECTION, SOLUTION INTRAVENOUS; SUBCUTANEOUS at 23:40

## 2023-02-19 RX ADMIN — TRAZODONE HYDROCHLORIDE 50 MG: 50 TABLET ORAL at 23:37

## 2023-02-19 RX ADMIN — GABAPENTIN 600 MG: 300 CAPSULE ORAL at 23:37

## 2023-02-20 ENCOUNTER — APPOINTMENT (OUTPATIENT)
Dept: CARDIOLOGY | Facility: HOSPITAL | Age: 73
End: 2023-02-20
Payer: MEDICARE

## 2023-02-20 VITALS
BODY MASS INDEX: 24.5 KG/M2 | SYSTOLIC BLOOD PRESSURE: 120 MMHG | OXYGEN SATURATION: 95 % | HEART RATE: 82 BPM | RESPIRATION RATE: 18 BRPM | DIASTOLIC BLOOD PRESSURE: 49 MMHG | WEIGHT: 175 LBS | HEIGHT: 71 IN | TEMPERATURE: 97.4 F

## 2023-02-20 LAB
BH CV NUCLEAR PRIOR STUDY: 3
BH CV REST NUCLEAR ISOTOPE DOSE: 10.6 MCI
BH CV STRESS BP STAGE 1: NORMAL
BH CV STRESS COMMENTS STAGE 1: NORMAL
BH CV STRESS DOSE REGADENOSON STAGE 1: 0.4
BH CV STRESS DURATION MIN STAGE 1: 0
BH CV STRESS DURATION SEC STAGE 1: 10
BH CV STRESS HR STAGE 1: 88
BH CV STRESS NUCLEAR ISOTOPE DOSE: 33.6 MCI
BH CV STRESS PROTOCOL 1: NORMAL
BH CV STRESS RECOVERY BP: NORMAL MMHG
BH CV STRESS RECOVERY HR: 74 BPM
BH CV STRESS STAGE 1: 1
GEN 5 2HR TROPONIN T REFLEX: 19 NG/L
GLUCOSE BLDC GLUCOMTR-MCNC: 154 MG/DL (ref 70–130)
GLUCOSE BLDC GLUCOMTR-MCNC: 214 MG/DL (ref 70–130)
GLUCOSE BLDC GLUCOMTR-MCNC: 228 MG/DL (ref 70–130)
GLUCOSE BLDC GLUCOMTR-MCNC: 283 MG/DL (ref 70–130)
LV EF NUC BP: 74 %
MAXIMAL PREDICTED HEART RATE: 146 BPM
PERCENT MAX PREDICTED HR: 60.27 %
QT INTERVAL: 420 MS
QTC INTERVAL: 466 MS
STRESS BASELINE BP: NORMAL MMHG
STRESS BASELINE HR: 73 BPM
STRESS PERCENT HR: 71 %
STRESS POST EXERCISE DUR MIN: 0 MIN
STRESS POST EXERCISE DUR SEC: 10 SEC
STRESS POST PEAK BP: NORMAL MMHG
STRESS POST PEAK HR: 88 BPM
STRESS TARGET HR: 124 BPM
TROPONIN T DELTA: 0 NG/L
TROPONIN T SERPL HS-MCNC: 19 NG/L

## 2023-02-20 PROCEDURE — 94640 AIRWAY INHALATION TREATMENT: CPT

## 2023-02-20 PROCEDURE — G0378 HOSPITAL OBSERVATION PER HR: HCPCS

## 2023-02-20 PROCEDURE — 78452 HT MUSCLE IMAGE SPECT MULT: CPT

## 2023-02-20 PROCEDURE — 93018 CV STRESS TEST I&R ONLY: CPT | Performed by: INTERNAL MEDICINE

## 2023-02-20 PROCEDURE — A9502 TC99M TETROFOSMIN: HCPCS | Performed by: FAMILY MEDICINE

## 2023-02-20 PROCEDURE — 0 TECHNETIUM TETROFOSMIN KIT: Performed by: FAMILY MEDICINE

## 2023-02-20 PROCEDURE — 94799 UNLISTED PULMONARY SVC/PX: CPT

## 2023-02-20 PROCEDURE — 82962 GLUCOSE BLOOD TEST: CPT

## 2023-02-20 PROCEDURE — 25010000002 REGADENOSON 0.4 MG/5ML SOLUTION: Performed by: INTERNAL MEDICINE

## 2023-02-20 PROCEDURE — 36415 COLL VENOUS BLD VENIPUNCTURE: CPT | Performed by: INTERNAL MEDICINE

## 2023-02-20 PROCEDURE — 93017 CV STRESS TEST TRACING ONLY: CPT

## 2023-02-20 PROCEDURE — 78452 HT MUSCLE IMAGE SPECT MULT: CPT | Performed by: INTERNAL MEDICINE

## 2023-02-20 PROCEDURE — 84484 ASSAY OF TROPONIN QUANT: CPT | Performed by: INTERNAL MEDICINE

## 2023-02-20 PROCEDURE — 63710000001 INSULIN LISPRO (HUMAN) PER 5 UNITS: Performed by: INTERNAL MEDICINE

## 2023-02-20 RX ORDER — NAPROXEN 500 MG/1
500 TABLET ORAL DAILY
COMMUNITY

## 2023-02-20 RX ADMIN — VALSARTAN 320 MG: 80 TABLET, FILM COATED ORAL at 13:33

## 2023-02-20 RX ADMIN — TETROFOSMIN 1 DOSE: 1.38 INJECTION, POWDER, LYOPHILIZED, FOR SOLUTION INTRAVENOUS at 08:46

## 2023-02-20 RX ADMIN — ATORVASTATIN CALCIUM 20 MG: 10 TABLET, FILM COATED ORAL at 13:33

## 2023-02-20 RX ADMIN — INSULIN LISPRO 3 UNITS: 100 INJECTION, SOLUTION INTRAVENOUS; SUBCUTANEOUS at 13:33

## 2023-02-20 RX ADMIN — TAMSULOSIN HYDROCHLORIDE 0.4 MG: 0.4 CAPSULE ORAL at 13:32

## 2023-02-20 RX ADMIN — REGADENOSON 0.4 MG: 0.08 INJECTION, SOLUTION INTRAVENOUS at 09:58

## 2023-02-20 RX ADMIN — GABAPENTIN 600 MG: 300 CAPSULE ORAL at 13:32

## 2023-02-20 RX ADMIN — IPRATROPIUM BROMIDE AND ALBUTEROL SULFATE 3 ML: .5; 2.5 SOLUTION RESPIRATORY (INHALATION) at 07:14

## 2023-02-20 RX ADMIN — AMLODIPINE BESYLATE 5 MG: 5 TABLET ORAL at 13:33

## 2023-02-20 RX ADMIN — HYDROCHLOROTHIAZIDE 25 MG: 25 TABLET ORAL at 13:33

## 2023-02-20 NOTE — ED NOTES
"Pt family is adamant that they want the pt to be \"checked for seizures\". Pt and pt family state pt had a seizure \"in the past\". Informed Dr Ram of family request  "

## 2023-02-20 NOTE — DISCHARGE SUMMARY
[facility]  DISCHARGE SUMMARY       Date of Admission: 2/19/2023  Date of Discharge:  2/20/2023  Primary Care Physician: Provider, No Known    Presenting Problem/History of Present Illness:  syncope and collapse    Final Discharge Diagnoses:  Syncope and collapse    Consults: none    Procedures Performed: stress echocardiogram.   •  Myocardial perfusion imaging indicates a normal myocardial perfusion study with no evidence of ischemia.  •  Left ventricular ejection fraction is hyperdynamic (Calculated EF > 70%).  •  Diaphragmatic attenuation artifact is present.  •  Impressions are consistent with a low risk study.        Pertinent Test Results:   Imaging Results (Last 7 Days)     Procedure Component Value Units Date/Time    XR Chest 1 View [[id number]] Collected: 02/19/23 1935     Updated: 02/19/23 1939    Narrative:      XR CHEST 1 VW- 2/19/2023 7:22 PM CST     HISTORY: syncope; R55-Syncope and collapse; Z87.898-Personal history of  other specified conditions; Z87.09-Personal history of other diseases of  the respiratory system; I45.10-Unspecified right bundle-branch block     COMPARISON: Chest exam dated 11/13/2022.     FINDINGS:      No lung consolidation. No pleural effusion or pneumothorax. The  cardiomediastinal silhouette and pulmonary vascularity are within normal  limits. The osseous structures and surrounding soft tissues demonstrate  no acute abnormality.       Impression:      1. No radiographic evidence of acute cardiopulmonary process.        This report was finalized on 02/19/2023 19:36 by Dr Pacheco Carson, .        Lab Results (last 7 days)     Procedure Component Value Units Date/Time    High Sensitivity Troponin T 2Hr [[id number]]  (Abnormal) Collected: 02/20/23 1201    Specimen: Blood Updated: 02/20/23 1226     HS Troponin T 19 ng/L      Troponin T Delta 0 ng/L     Narrative:      High Sensitive Troponin T Reference Range:  <10.0 ng/L-  Negative Female for AMI  <15.0 ng/L- Negative Male for AMI  >=10 - Abnormal Female indicating possible myocardial injury.  >=15 - Abnormal Male indicating possible myocardial injury.   Clinicians would have to utilize clinical acumen, EKG, Troponin, and serial changes to determine if it is an Acute Myocardial Infarction or myocardial injury due to an underlying chronic condition.         POC Glucose Once [[id number]]  (Abnormal) Collected: 02/20/23 1127    Specimen: Blood Updated: 02/20/23 1137     Glucose 214 mg/dL      Comment: : [id number] Rob JobinasecondiaMeter ID: [id number]       High Sensitivity Troponin T [[id number]]  (Abnormal) Collected: 02/20/23 0734    Specimen: Blood Updated: 02/20/23 0823     HS Troponin T 19 ng/L     Narrative:      High Sensitive Troponin T Reference Range:  <10.0 ng/L- Negative Female for AMI  <15.0 ng/L- Negative Male for AMI  >=10 - Abnormal Female indicating possible myocardial injury.  >=15 - Abnormal Male indicating possible myocardial injury.   Clinicians would have to utilize clinical acumen, EKG, Troponin, and serial changes to determine if it is an Acute Myocardial Infarction or myocardial injury due to an underlying chronic condition.         POC Glucose Once [[id number]]  (Abnormal) Collected: 02/20/23 0740    Specimen: Blood Updated: 02/20/23 0750     Glucose 154 mg/dL      Comment: : [id number] Rob JobinasecondiaMeter ID: [id number]       POC Glucose Once [[id number]]  (Abnormal) Collected: 02/19/23 2336    Specimen: Blood Updated: 02/20/23 0023     Glucose 283 mg/dL      Comment: : [id number] Wendy KristaMeter ID: [id number]       Comprehensive Metabolic Panel [[id number]]  (Abnormal) Collected: 02/19/23 1922    Specimen: Blood Updated: 02/19/23 1959     Glucose 146 mg/dL      BUN 29 mg/dL      Creatinine 1.05 mg/dL      Sodium 137 mmol/L      Potassium 3.6 mmol/L      Chloride 100 mmol/L      CO2 25.0 mmol/L      Calcium 9.4 mg/dL      Total Protein 7.0 g/dL      Albumin 4.0  g/dL      ALT (SGPT) 42 U/L      AST (SGOT) 34 U/L      Alkaline Phosphatase 51 U/L      Total Bilirubin 0.3 mg/dL      Globulin 3.0 gm/dL      A/G Ratio 1.3 g/dL      BUN/Creatinine Ratio 27.6     Anion Gap 12.0 mmol/L      eGFR 74.5 mL/min/1.73     Narrative:      GFR Normal >60  Chronic Kidney Disease <60  Kidney Failure <15    The GFR formula is only valid for adults with stable renal function between ages 18 and 70.    BNP [[id number]]  (Normal) Collected: 02/19/23 1922    Specimen: Blood Updated: 02/19/23 1956     proBNP 56.9 pg/mL     Narrative:      Among patients with dyspnea, NT-proBNP is highly sensitive for the detection of acute congestive heart failure. In addition NT-proBNP of <300 pg/ml effectively rules out acute congestive heart failure with 99% negative predictive value.    Results may be falsely decreased if patient taking Biotin.      Single High Sensitivity Troponin T [[id number]]  (Abnormal) Collected: 02/19/23 1922    Specimen: Blood Updated: 02/19/23 1956     HS Troponin T 19 ng/L     Narrative:      High Sensitive Troponin T Reference Range:  <10.0 ng/L- Negative Female for AMI  <15.0 ng/L- Negative Male for AMI  >=10 - Abnormal Female indicating possible myocardial injury.  >=15 - Abnormal Male indicating possible myocardial injury.   Clinicians would have to utilize clinical acumen, EKG, Troponin, and serial changes to determine if it is an Acute Myocardial Infarction or myocardial injury due to an underlying chronic condition.         Magnesium [[id number]]  (Normal) Collected: 02/19/23 1922    Specimen: Blood Updated: 02/19/23 1953     Magnesium 2.0 mg/dL     CBC & Differential [[id number]]  (Abnormal) Collected: 02/19/23 1922    Specimen: Blood Updated: 02/19/23 1933    Narrative:      The following orders were created for panel order CBC & Differential.  Procedure                               Abnormality         Status                     ---------                                -----------         ------                     CBC Auto Differential[[id number]]        Abnormal            Final result                 Please view results for these tests on the individual orders.    CBC Auto Differential [[id number]]  (Abnormal) Collected: 02/19/23 1922    Specimen: Blood Updated: 02/19/23 1933     WBC 11.71 10*3/mm3      RBC 4.40 10*6/mm3      Hemoglobin 12.9 g/dL      Hematocrit 41.0 %      MCV 93.2 fL      MCH 29.3 pg      MCHC 31.5 g/dL      RDW 12.7 %      RDW-SD 43.5 fl      MPV 9.3 fL      Platelets 360 10*3/mm3      Neutrophil % 54.2 %      Lymphocyte % 35.5 %      Monocyte % 8.0 %      Eosinophil % 1.7 %      Basophil % 0.3 %      Immature Grans % 0.3 %      Neutrophils, Absolute 6.33 10*3/mm3      Lymphocytes, Absolute 4.16 10*3/mm3      Monocytes, Absolute 0.94 10*3/mm3      Eosinophils, Absolute 0.20 10*3/mm3      Basophils, Absolute 0.04 10*3/mm3      Immature Grans, Absolute 0.04 10*3/mm3      nRBC 0.0 /100 WBC         Hospital Course:  The patient is a 72 y.o. male who presented to [facility] with an episode of passing out.  He was evaluated emergency room troponin high-sensitivity was 19 on recheck it was also 19 there was no trend upward down during his stay.  He was admitted to the telemetry floor he was monitored overnight serial lab data was obtained.  It was negative.  He was sent for stress echocardiogram.  This was negative.  His blood pressures were documented orthostatically as being positive.  Patient relates that he has passed at least 1 time before and that been when he was trying to lift a heavy deer after that shot during hunting season in November or December.  Currently he is without complaint he is feeling better.  Discussion regarding support hose was undertaken.  Patient verbalized understanding.  We will discharge him home he needs to follow-up with his primary care doctor in 1 week.  We did obtain the Zio patch report from Emma..      Physical  "Exam on Discharge:  /49 (BP Location: Right arm, Patient Position: Lying)   Pulse 82   Temp 97.4 °F (36.3 °C) (Oral)   Resp 18   Ht 180.3 cm (70.98\")   Wt 79.4 kg (175 lb)   SpO2 95%   BMI 24.42 kg/m²   Physical Exam  Vitals and nursing note reviewed.   Constitutional:       Appearance: Normal appearance.   HENT:      Head: Normocephalic and atraumatic.      Right Ear: External ear normal.      Left Ear: External ear normal.      Nose: Nose normal.      Mouth/Throat:      Mouth: Mucous membranes are moist.   Eyes:      Extraocular Movements: Extraocular movements intact.      Conjunctiva/sclera: Conjunctivae normal.      Pupils: Pupils are equal, round, and reactive to light.   Cardiovascular:      Rate and Rhythm: Normal rate and regular rhythm.      Pulses: Normal pulses.      Heart sounds: No murmur heard.    No friction rub. No gallop.   Pulmonary:      Effort: Pulmonary effort is normal.      Breath sounds: Normal breath sounds.   Abdominal:      General: Bowel sounds are normal.      Palpations: Abdomen is soft.   Musculoskeletal:         General: Normal range of motion.      Cervical back: Normal range of motion and neck supple.   Skin:     General: Skin is warm and dry.      Capillary Refill: Capillary refill takes less than 2 seconds.   Neurological:      General: No focal deficit present.      Mental Status: He is alert and oriented to person, place, and time.      Cranial Nerves: No cranial nerve deficit.   Psychiatric:         Mood and Affect: Mood normal.         Behavior: Behavior normal.           Condition on Discharge: stable improved    Discharge Disposition:  Home or Self Care    Discharge Medications:     Discharge Medications      Continue These Medications      Instructions Start Date   albuterol sulfate  (90 Base) MCG/ACT inhaler  Commonly known as: PROVENTIL HFA;VENTOLIN HFA;PROAIR HFA   2 puffs, Inhalation, Every 4 Hours PRN      amLODIPine 5 MG tablet  Commonly known as: " NORVASC   5 mg, Oral, Daily      atorvastatin 20 MG tablet  Commonly known as: LIPITOR   20 mg, Oral, Daily      gabapentin 600 MG tablet  Commonly known as: NEURONTIN   1 tablet, 2 Times Daily      guaiFENesin 600 MG 12 hr tablet  Commonly known as: Mucinex   1,200 mg, Oral, 2 Times Daily      naproxen 500 MG tablet  Commonly known as: NAPROSYN   500 mg, Oral, Daily      Soliqua 100-33 UNT-MCG/ML solution pen-injector injection  Generic drug: Insulin Glargine-Lixisenatide   27 Units, Daily      tamsulosin 0.4 MG capsule 24 hr capsule  Commonly known as: FLOMAX   1 capsule, Oral, Daily      traZODone 50 MG tablet  Commonly known as: DESYREL   50 mg, Oral, Nightly      valsartan-hydrochlorothiazide 320-25 MG per tablet  Commonly known as: DIOVAN-HCT   1 tablet, Oral, Daily           Discharge Diet:   Diet Instructions     Diet: Consistent Carbohydrate; Thin      Discharge Diet: Consistent Carbohydrate    Fluid Consistency: Thin        Activity at Discharge:   Activity Instructions     Activity as Tolerated            Follow-up Appointments:   Future Appointments   Date Time Provider Department Center   4/12/2023 10:30 AM Raquel Sosa APRN [facility]       Test Results Pending at Discharge: none    Yaquelin Muniz  02/20/23  16:24 CST    Time: 35 minutes.

## 2023-02-20 NOTE — PLAN OF CARE
Goal Outcome Evaluation:  Plan of Care Reviewed With: patient        Progress: improving  Outcome Evaluation: No c/o dizzyness.  Slept since midnight.  NPO since midnight for stress test today.  SCDs and telemetry on. No c/o pain.

## 2023-02-20 NOTE — ED PROVIDER NOTES
"EMERGENCY DEPARTMENT ATTENDING NOTE    Patient Name: Naif Nick    Chief Complaint   Patient presents with   • Syncope       PATIENT PRESENTATION:  Naif Nick is a very pleasant 72 y.o. male with a history of COPD as well as prior episodes of syncope who presents to the emergency department brought in by EMS after syncopal episode.    Per EMS report patient had a syncopal episode at [employer].  He was not complaining of any injuries.  Stated that he fell onto his wife.  Probably had a similar episode in November and is supposed to follow-up with cardiology in April.    His wife who witnessed the event is currently not present and we attempted call by phone but no answer.  Patient states he remembers most the event.  States he was sitting in the [employer] he suddenly felt lightheaded dizzy and felt like a warm sensation came over his whole body and then states he fell to the side onto his wife.  He woke up laying against his wife.  States he feels still felt somewhat weak after the event.  He states he had a very similar event in November reportedly was observation admitted overnight that did not find an exact cause.  He was referred to cardiology as an outpatient has been following with his primary care provider.  He denies any chest pain or shortness of breath this time denies any recent fevers or chills.  Denies any decreased p.o. intake.      PHYSICAL EXAM:   VS: /46 (BP Location: Left arm, Patient Position: Lying)   Pulse 84   Temp 98.4 °F (36.9 °C) (Oral)   Resp 18   Ht 180.3 cm (71\")   Wt 79.4 kg (175 lb)   SpO2 93%   BMI 24.41 kg/m²   GENERAL: Well-appearing well-dressed elderly gentleman sitting up stretcher no acute distress; well-nourished, well-developed, awake, alert, no acute distress, nontoxic appearing, comfortable  RESPIRATORY: Unlabored respiratory effort, clear to auscultation bilaterally, good air movement  CARDIOVASCULAR: No murmurs or gallops, peripheral pulses 2+ and equal in all " extremities  MUSCULOSKELETAL/EXTREMITIES: Extremities without obvious deformity, no cyanosis or clubbing  SKIN: warm and dry with no obvious rashes  NEUROLOGIC: moving all 4 extremities symmetrically, CN II-XII grossly intact  PSYCHIATRIC: alert, pleasant and cooperative. Appropriate mood and affect.      MEDICAL DECISION MAKING:    Naif Nick is a 72 y.o. male with history of COPD presents emerged department after witnessed syncopal episode at [employer].    Differential Diagnosis Considered: Vasovagal syncope, cardiac arrhythmia, SVT, ventricular tachycardia, ventricular fibrillation, dehydration    Labs Ordered:  Labs Reviewed   COMPREHENSIVE METABOLIC PANEL - Abnormal; Notable for the following components:       Result Value    Glucose 146 (*)     BUN 29 (*)     ALT (SGPT) 42 (*)     BUN/Creatinine Ratio 27.6 (*)     All other components within normal limits    Narrative:     GFR Normal >60  Chronic Kidney Disease <60  Kidney Failure <15    The GFR formula is only valid for adults with stable renal function between ages 18 and 70.   SINGLE HSTROPONIN T - Abnormal; Notable for the following components:    HS Troponin T 19 (*)     All other components within normal limits    Narrative:     High Sensitive Troponin T Reference Range:  <10.0 ng/L- Negative Female for AMI  <15.0 ng/L- Negative Male for AMI  >=10 - Abnormal Female indicating possible myocardial injury.  >=15 - Abnormal Male indicating possible myocardial injury.   Clinicians would have to utilize clinical acumen, EKG, Troponin, and serial changes to determine if it is an Acute Myocardial Infarction or myocardial injury due to an underlying chronic condition.        CBC WITH AUTO DIFFERENTIAL - Abnormal; Notable for the following components:    WBC 11.71 (*)     Hemoglobin 12.9 (*)     Lymphocytes, Absolute 4.16 (*)     Monocytes, Absolute 0.94 (*)     All other components within normal limits   POCT GLUCOSE FINGERSTICK - Abnormal; Notable for the following  components:    Glucose 283 (*)     All other components within normal limits   MAGNESIUM - Normal   BNP (IN-HOUSE) - Normal    Narrative:     Among patients with dyspnea, NT-proBNP is highly sensitive for the detection of acute congestive heart failure. In addition NT-proBNP of <300 pg/ml effectively rules out acute congestive heart failure with 99% negative predictive value.    Results may be falsely decreased if patient taking Biotin.     TROPONIN   POCT GLUCOSE FINGERSTICK   POCT GLUCOSE FINGERSTICK   POCT GLUCOSE FINGERSTICK   POCT GLUCOSE FINGERSTICK   CBC AND DIFFERENTIAL    Narrative:     The following orders were created for panel order CBC & Differential.  Procedure                               Abnormality         Status                     ---------                               -----------         ------                     CBC Auto Differential[[id number]]        Abnormal            Final result                 Please view results for these tests on the individual orders.        Imaging Ordered:   XR Chest 1 View   Final Result   1. No radiographic evidence of acute cardiopulmonary process.           This report was finalized on 02/19/2023 19:36 by Dr Pacheco Carson, .          My EKG interpretation: Right bundle branch block with a rate of 74.  No overlying evidence of ischemia.    My lab interpretation: Slightly elevated high sensitive troponin 19.  Elevated blood count 11.71.  Deviation was 1.9.  CMP with no electrolyte derangements.  Normal BNP.    My imaging interpretation: Chest x-ray with no acute findings.    ED Course and Re-evaluation: 73yo M with history of COPD presents emergency department due to syncopal episode.  History is highly consistent with vasovagal syncope with myoclonic jerks falling after corroborating history from patient's wife given to nursing staff.  I went to talk with the wife upon arrival but by the time admitted to the room again she had already left the emergency  department.  Given patient's right bundle branch block like in the setting of COPD I am concerned that this could be due to an underlying cardiac arrhythmia rather than simple vasovagal syncope.  He has an elevated high sensitive by 19.  Lower suspicion for ventricular tachycardia. For this reason patient was admitted for observation.      ED Diagnosis:  Syncope and collapse; History of syncope; History of COPD; Right bundle branch block; Vasovagal syncope    Disposition: admit to hospitalist          Signed:  Nestor Ram MD  Emergency Medicine Physician    Please note that portions of this note were completed with a voice recognition program.      Nestor Ram MD  02/20/23 0344

## 2023-02-20 NOTE — CASE MANAGEMENT/SOCIAL WORK
Discharge Planning Assessment  [facility]     Patient Name: Naif Nick  MRN: [id number]  Today's Date: 2/20/2023    Admit Date: 2/19/2023        Discharge Needs Assessment     Row Name 02/20/23 1101       Living Environment    People in Home alone    Current Living Arrangements home    Primary Care Provided by self    Provides Primary Care For no one    Family Caregiver if Needed spouse    Family Caregiver Names Maria L    Able to Return to Prior Arrangements yes       Resource/Environmental Concerns    Resource/Environmental Concerns none       Transition Planning    Patient/Family Anticipates Transition to home with family    Transportation Anticipated family or friend will provide       Discharge Needs Assessment    Readmission Within the Last 30 Days no previous admission in last 30 days    Equipment Currently Used at Home none    Concerns to be Addressed no discharge needs identified    Equipment Needed After Discharge none    Discharge Coordination/Progress spoke to spouse patient lives alone; has RX coverage and PCP; independent at home prior to illness will follow for DC needs               Discharge Plan    No documentation.               Continued Care and Services - Admitted Since 2/19/2023    Coordination has not been started for this encounter.          Demographic Summary    No documentation.                Functional Status    No documentation.                Psychosocial    No documentation.                Abuse/Neglect    No documentation.                Legal    No documentation.                Substance Abuse    No documentation.                Patient Forms    No documentation.                   Alexus Howell RN

## 2023-02-20 NOTE — H&P
[facility] Medicine Services  HISTORY AND PHYSICAL    Date of Admission: 2/19/2023  Primary Care Physician: Provider, No Known    Subjective   Primary Historian: Patient     Chief Complaint: Syncope and collapse    History of Present Illness  72-year-old male who presents to the emergency department after an episode of syncope and collapse.  On epic review it appears that the patient had similar episodes on 12/21/2022.  The patient also had a cardiac echo on 11/14/2022.  He tells me that he follows with a cardiologist, and was post to have a stress test in January, but was unable to do so and it is now been rescheduled to April.  The patient was found to have COPD and a right bundle branch block.  The patient was also admitted on 11/13/2022 for syncope and collapse.  The note for that admission is following.  The patient states that he was at [employer] this evening.  He states that he passed out and was down for about 30 seconds.  He states that he knew the episode was coming.  He got hot and it was hard for him to breathe.  He states he got tight in his whole body and was dizzy.  He told his wife that he did not feel good, he took of breath and that was a lasting that he remembers.  His wife was concerned about seizure disorder.  The patient did state that he urinated on himself.   earlier in the day, the patient reports no chest pain, shortness of breath, acute bowel or kidney changes.  He has no lower extremity edema.    Date of Admission: 11/13/2022  Date of Discharge:  11/14/2022  Primary Care Physician: Provider, No Known     Discharge Diagnoses:       Active Hospital Problems     Diagnosis     • **Syncope and collapse     • Marijuana use     • Cocaine abuse ([facility])     • Stage 4 very severe COPD by GOLD classification ([facility])     • Type 2 diabetes mellitus, without long-term current use of insulin ([facility])          Review of Systems   Otherwise complete ROS reviewed and negative  except as mentioned in the HPI.    Past Medical History:   Past Medical History:   Diagnosis Date   • Asthma-COPD overlap syndrome (HCC) 12/26/2020   • Centrilobular emphysema (HCC) 12/26/2020   • Chronic respiratory failure with hypoxia ([facility]) 12/26/2020   • COPD (chronic obstructive pulmonary disease) (HCC)    • Diabetes mellitus (HCC)    • Hx of colonic polyp    • Hx of TIA (transient ischemic attack) and stroke    • Hyperlipidemia    • Hypertension    • Lung nodule 12/26/2020   • Stage 4 very severe COPD by GOLD classification ([facility]) 12/26/2020     Past Surgical History:  Past Surgical History:   Procedure Laterality Date   • BACK SURGERY     • COLONOSCOPY  08/15/2012    Poor prep repeat exam in 3 years   • COLONOSCOPY W/ POLYPECTOMY  07/16/2009    Tubular adenoma at 60 cm, Hyperplastic polyp rectum suboptimal prep repeat in 3 months   • SHOULDER SURGERY     • VERTEBROPLASTY      neck     Social History:  reports that he has been smoking cigarettes. He started smoking about 59 years ago. He has a 58.00 pack-year smoking history. He has never used smokeless tobacco. He reports current alcohol use. He reports that he does not use drugs.    Family History: family history includes Diabetes in his brother, father, and sister; Heart disease in his brother, father, and sister.       Allergies:  No Known Allergies    Medications:  Prior to Admission medications    Medication Sig Start Date End Date Taking? Authorizing Provider   Accu-Chek Jennifer Plus test strip  11/28/20   Greg Tavares MD   albuterol sulfate  (90 Base) MCG/ACT inhaler Inhale 2 puffs Every 4 (Four) Hours As Needed for Wheezing or Shortness of Air. 7/18/22   Shaheen Varghese MD   amLODIPine (NORVASC) 5 MG tablet Take 5 mg by mouth Daily.    Greg Tavares MD   atorvastatin (LIPITOR) 20 MG tablet Take 20 mg by mouth Daily.    Greg Tavares MD   carvedilol (COREG) 3.125 MG tablet Take 3.125 mg by mouth 2 (Two) Times a Day With  "Meals.    Greg Tavares MD   cyclobenzaprine (FLEXERIL) 5 MG tablet Take 1 tablet by mouth 3 (Three) Times a Day As Needed for Muscle Spasms. 7/12/22   Krunal Cavazos MD   Dulaglutide 0.75 MG/0.5ML solution pen-injector Inject 0.75 mg under the skin into the appropriate area as directed 1 (One) Time Per Week. Thursday.    Greg Tavares MD   fluticasone-salmeterol (Advair HFA) 45-21 MCG/ACT inhaler Inhale 2 puffs 2 (Two) Times a Day. 10/12/22   Raquel Sosa APRN   gabapentin (NEURONTIN) 600 MG tablet 1 tablet 2 (Two) Times a Day. 9/20/22   Greg Tavares MD   guaiFENesin (Mucinex) 600 MG 12 hr tablet Take 2 tablets by mouth 2 (Two) Times a Day. 8/13/20   Raquel Sosa APRN   ipratropium-albuterol (DUO-NEB) 0.5-2.5 mg/3 ml nebulizer Take 3 mL by nebulization Every 6 (Six) Hours. 9/15/20   Magdi Page MD   naproxen (NAPROSYN) 500 MG tablet Take 1 tablet by mouth 2 (Two) Times a Day As Needed for Mild Pain . 7/12/22   Krunal Cavazos MD   Soliqua 100-33 UNT-MCG/ML solution pen-injector injection  10/11/22   Greg Tavares MD   tamsulosin (FLOMAX) 0.4 MG capsule 24 hr capsule Take 1 capsule by mouth Daily.    Greg Tavares MD   tiZANidine (ZANAFLEX) 2 MG tablet 1 tablet Every Night. 7/29/22   Greg Tavares MD   traZODone (DESYREL) 50 MG tablet Take 50 mg by mouth Every Night.    Greg Tavares MD   valsartan-hydrochlorothiazide (DIOVAN-HCT) 320-25 MG per tablet Take 1 tablet by mouth Daily.    Greg Tavares MD     I have utilized all available immediate resources to obtain, update, or review the patient's current medications (including all prescriptions, over-the-counter products, herbals, cannabis/cannabidiol products, and vitamin/mineral/dietary (nutritional) supplements).    Objective     Vital Signs: /56   Pulse 81   Temp 99 °F (37.2 °C) (Oral)   Resp 13   Ht 180.3 cm (71\")   Wt 79.4 kg (175 lb)   SpO2 91%  "  BMI 24.41 kg/m²   Physical Exam  Vitals reviewed.   Constitutional:       Appearance: Normal appearance.   HENT:      Head: Normocephalic and atraumatic.      Right Ear: External ear normal.      Left Ear: External ear normal.      Nose: Nose normal.   Eyes:      General: No scleral icterus.     Conjunctiva/sclera: Conjunctivae normal.   Cardiovascular:      Rate and Rhythm: Normal rate and regular rhythm.      Heart sounds: Normal heart sounds.   Pulmonary:      Effort: Pulmonary effort is normal.      Breath sounds: Normal breath sounds.   Abdominal:      General: Bowel sounds are normal.      Palpations: Abdomen is soft.   Musculoskeletal:         General: No swelling or tenderness.      Cervical back: Normal range of motion and neck supple.   Skin:     General: Skin is warm and dry.   Neurological:      General: No focal deficit present.      Mental Status: He is alert.      Cranial Nerves: No cranial nerve deficit.   Psychiatric:         Mood and Affect: Mood normal.         Behavior: Behavior normal.          Results Reviewed:  Lab Results (last 24 hours)     Procedure Component Value Units Date/Time    Comprehensive Metabolic Panel [[id number]]  (Abnormal) Collected: 02/19/23 1922    Specimen: Blood Updated: 02/19/23 1959     Glucose 146 mg/dL      BUN 29 mg/dL      Creatinine 1.05 mg/dL      Sodium 137 mmol/L      Potassium 3.6 mmol/L      Chloride 100 mmol/L      CO2 25.0 mmol/L      Calcium 9.4 mg/dL      Total Protein 7.0 g/dL      Albumin 4.0 g/dL      ALT (SGPT) 42 U/L      AST (SGOT) 34 U/L      Alkaline Phosphatase 51 U/L      Total Bilirubin 0.3 mg/dL      Globulin 3.0 gm/dL      A/G Ratio 1.3 g/dL      BUN/Creatinine Ratio 27.6     Anion Gap 12.0 mmol/L      eGFR 74.5 mL/min/1.73     Narrative:      GFR Normal >60  Chronic Kidney Disease <60  Kidney Failure <15    The GFR formula is only valid for adults with stable renal function between ages 18 and 70.    BNP [[id number]]  (Normal) Collected:  02/19/23 1922    Specimen: Blood Updated: 02/19/23 1956     proBNP 56.9 pg/mL     Narrative:      Among patients with dyspnea, NT-proBNP is highly sensitive for the detection of acute congestive heart failure. In addition NT-proBNP of <300 pg/ml effectively rules out acute congestive heart failure with 99% negative predictive value.    Results may be falsely decreased if patient taking Biotin.      Single High Sensitivity Troponin T [[id number]]  (Abnormal) Collected: 02/19/23 1922    Specimen: Blood Updated: 02/19/23 1956     HS Troponin T 19 ng/L     Narrative:      High Sensitive Troponin T Reference Range:  <10.0 ng/L- Negative Female for AMI  <15.0 ng/L- Negative Male for AMI  >=10 - Abnormal Female indicating possible myocardial injury.  >=15 - Abnormal Male indicating possible myocardial injury.   Clinicians would have to utilize clinical acumen, EKG, Troponin, and serial changes to determine if it is an Acute Myocardial Infarction or myocardial injury due to an underlying chronic condition.         Magnesium [[id number]]  (Normal) Collected: 02/19/23 1922    Specimen: Blood Updated: 02/19/23 1953     Magnesium 2.0 mg/dL     CBC & Differential [[id number]]  (Abnormal) Collected: 02/19/23 1922    Specimen: Blood Updated: 02/19/23 1933    Narrative:      The following orders were created for panel order CBC & Differential.  Procedure                               Abnormality         Status                     ---------                               -----------         ------                     CBC Auto Differential[[id number]]        Abnormal            Final result                 Please view results for these tests on the individual orders.    CBC Auto Differential [[id number]]  (Abnormal) Collected: 02/19/23 1922    Specimen: Blood Updated: 02/19/23 1933     WBC 11.71 10*3/mm3      RBC 4.40 10*6/mm3      Hemoglobin 12.9 g/dL      Hematocrit 41.0 %      MCV 93.2 fL      MCH 29.3 pg      MCHC 31.5 g/dL      RDW  12.7 %      RDW-SD 43.5 fl      MPV 9.3 fL      Platelets 360 10*3/mm3      Neutrophil % 54.2 %      Lymphocyte % 35.5 %      Monocyte % 8.0 %      Eosinophil % 1.7 %      Basophil % 0.3 %      Immature Grans % 0.3 %      Neutrophils, Absolute 6.33 10*3/mm3      Lymphocytes, Absolute 4.16 10*3/mm3      Monocytes, Absolute 0.94 10*3/mm3      Eosinophils, Absolute 0.20 10*3/mm3      Basophils, Absolute 0.04 10*3/mm3      Immature Grans, Absolute 0.04 10*3/mm3      nRBC 0.0 /100 WBC         Imaging Results (Last 24 Hours)     Procedure Component Value Units Date/Time    XR Chest 1 View [[id number]] Collected: 02/19/23 1935     Updated: 02/19/23 1939    Narrative:      XR CHEST 1 VW- 2/19/2023 7:22 PM CST     HISTORY: syncope; R55-Syncope and collapse; Z87.898-Personal history of  other specified conditions; Z87.09-Personal history of other diseases of  the respiratory system; I45.10-Unspecified right bundle-branch block     COMPARISON: Chest exam dated 11/13/2022.     FINDINGS:      No lung consolidation. No pleural effusion or pneumothorax. The  cardiomediastinal silhouette and pulmonary vascularity are within normal  limits. The osseous structures and surrounding soft tissues demonstrate  no acute abnormality.       Impression:      1. No radiographic evidence of acute cardiopulmonary process.        This report was finalized on 02/19/2023 19:36 by Dr Pacheco Carson, .        I have personally reviewed and interpreted the radiology studies and ECG obtained at time of admission.     Assessment / Plan   Assessment:   Active Hospital Problems    Diagnosis    • **Syncope and collapse    • Syncope      Impression:  1.  Syncope and collapse  2.  Right bundle branch block that was present back in November  3.  Non-insulin-dependent diabetes  4.  Stage IV COPD  5.  Hypertension    Treatment Plan  1.  Admit to observation  2.  Stress test in the morning  3.  Sliding scale insulin with Accu-Cheks   4.  As needed DuoNebs   5.   Resume appropriate home medications   6.  Follow-up labs in the morning to include troponin, as it was slightly elevated in the emergency department   7.  Neurochecks  8.  Orthostatic blood pressures       The patient will be admitted to my service here at [facility].  Primary team to take over in the morning    Medical Decision Making  Number and Complexity of problems: 4, high  Differential Diagnosis: Hypoxia    Conditions and Status        Condition is worsening.     MDM Data  External documents reviewed: None  Cardiac tracing (EKG, telemetry) interpretation: None  Radiology interpretation: None  Labs reviewed: All  Any tests that were considered but not ordered: None     Decision rules/scores evaluated (example [id number], Wells, etc): None     Discussed with: Patient and ED staff     Care Planning  Shared decision making: Patient and ED staff  Code status and discussions: Full    Disposition  Social Determinants of Health that impact treatment or disposition: None  Estimated length of stay is overnight.     I confirmed that the patient's advanced care plan is present, code status is documented, and a surrogate decision maker is listed in the patient's medical record.     The patient's surrogate decision maker is family.     The patient was seen and examined by me on 2/19/2023 at seen before 10 PM.    Electronically signed by Carol Albarran DO, 02/19/23, 21:54 CST.

## 2023-02-22 ENCOUNTER — TELEPHONE (OUTPATIENT)
Dept: INTERNAL MEDICINE | Age: 73
End: 2023-02-22

## 2023-02-22 NOTE — TELEPHONE ENCOUNTER
[facility] Transitions Initial Follow Up Call    Outreach made within 2 business days of discharge: Yes    Patient: Armand Astudillo   Patient : 1950 MRN: [id number]    Reason for Admission: syncope and collapse     Discharge Date: 23      Discharge Diagnoses:  Syncope and collapse      Spoke with: patient    Discharge department/facility: [facility] Interactive Patient Contact:  Was patient able to fill all prescriptions: Yes  Was patient instructed to bring all medications to the follow-up visit: Yes  Is patient taking all medications as directed in the discharge summary? Yes  Does patient understand their discharge instructions: Yes  Does patient have questions or concerns that need addressed prior to 7-14 day follow up office visit: no    Spoke to the patient he states he is doing well. He said that he wants to see about getting his brain checked he said he wonders if he is having seizures and this all the sudden started in November. He said that when it happens he starts feeling hot, and dizzy and nauseated with SOB. He said from the time it starts to him passing out is about 3 seconds. He has not had any medication changes and said that the ambulance checked his BP and sugar when they got there and both were normal.     Scheduled appointment with PCP within 7-14 days    Follow Up  Future Appointments   Date Time Provider Department Center   2023 10:30 AM Elise C Masni, MD [facility]   2023  9:00 AM [facility]   2023  9:00 AM [facility] E[facility] ROOM 1 [facility]   2023 10:00 AM [facility] NUC MED RM 1 [facility] NUC MED L [facility] Charlie Chavarria MA

## 2023-02-27 ENCOUNTER — TELEPHONE (OUTPATIENT)
Dept: CARDIOLOGY CLINIC | Age: 73
End: 2023-02-27

## 2023-02-27 ENCOUNTER — OFFICE VISIT (OUTPATIENT)
Dept: PRIMARY CARE CLINIC | Age: 73
End: 2023-02-27
Payer: MEDICARE

## 2023-02-27 VITALS
WEIGHT: 176.8 LBS | HEART RATE: 84 BPM | TEMPERATURE: 97.5 F | DIASTOLIC BLOOD PRESSURE: 68 MMHG | SYSTOLIC BLOOD PRESSURE: 120 MMHG | BODY MASS INDEX: 25.31 KG/M2 | OXYGEN SATURATION: 96 % | HEIGHT: 70 IN

## 2023-02-27 DIAGNOSIS — Z72.0 TOBACCO ABUSE: ICD-10-CM

## 2023-02-27 DIAGNOSIS — Z79.4 TYPE 2 DIABETES MELLITUS WITH STAGE 3A CHRONIC KIDNEY DISEASE, WITH LONG-TERM CURRENT USE OF INSULIN (HCC): ICD-10-CM

## 2023-02-27 DIAGNOSIS — R55 SYNCOPE, UNSPECIFIED SYNCOPE TYPE: Primary | ICD-10-CM

## 2023-02-27 DIAGNOSIS — N18.31 TYPE 2 DIABETES MELLITUS WITH STAGE 3A CHRONIC KIDNEY DISEASE, WITH LONG-TERM CURRENT USE OF INSULIN (HCC): ICD-10-CM

## 2023-02-27 DIAGNOSIS — I10 ESSENTIAL HYPERTENSION: ICD-10-CM

## 2023-02-27 DIAGNOSIS — E11.22 TYPE 2 DIABETES MELLITUS WITH STAGE 3A CHRONIC KIDNEY DISEASE, WITH LONG-TERM CURRENT USE OF INSULIN (HCC): ICD-10-CM

## 2023-02-27 PROCEDURE — 3078F DIAST BP <80 MM HG: CPT | Performed by: FAMILY MEDICINE

## 2023-02-27 PROCEDURE — G8427 DOCREV CUR MEDS BY ELIG CLIN: HCPCS | Performed by: FAMILY MEDICINE

## 2023-02-27 PROCEDURE — 3017F COLORECTAL CA SCREEN DOC REV: CPT | Performed by: FAMILY MEDICINE

## 2023-02-27 PROCEDURE — 1123F ACP DISCUSS/DSCN MKR DOCD: CPT | Performed by: FAMILY MEDICINE

## 2023-02-27 PROCEDURE — 99214 OFFICE O/P EST MOD 30 MIN: CPT | Performed by: FAMILY MEDICINE

## 2023-02-27 PROCEDURE — 3074F SYST BP LT 130 MM HG: CPT | Performed by: FAMILY MEDICINE

## 2023-02-27 PROCEDURE — 4004F PT TOBACCO SCREEN RCVD TLK: CPT | Performed by: FAMILY MEDICINE

## 2023-02-27 PROCEDURE — G8417 CALC BMI ABV UP PARAM F/U: HCPCS | Performed by: FAMILY MEDICINE

## 2023-02-27 PROCEDURE — 3046F HEMOGLOBIN A1C LEVEL >9.0%: CPT | Performed by: FAMILY MEDICINE

## 2023-02-27 PROCEDURE — 2022F DILAT RTA XM EVC RTNOPTHY: CPT | Performed by: FAMILY MEDICINE

## 2023-02-27 PROCEDURE — G8484 FLU IMMUNIZE NO ADMIN: HCPCS | Performed by: FAMILY MEDICINE

## 2023-02-27 SDOH — ECONOMIC STABILITY: FOOD INSECURITY: WITHIN THE PAST 12 MONTHS, YOU WORRIED THAT YOUR FOOD WOULD RUN OUT BEFORE YOU GOT MONEY TO BUY MORE.: NEVER TRUE

## 2023-02-27 SDOH — ECONOMIC STABILITY: INCOME INSECURITY: HOW HARD IS IT FOR YOU TO PAY FOR THE VERY BASICS LIKE FOOD, HOUSING, MEDICAL CARE, AND HEATING?: NOT HARD AT ALL

## 2023-02-27 SDOH — ECONOMIC STABILITY: FOOD INSECURITY: WITHIN THE PAST 12 MONTHS, THE FOOD YOU BOUGHT JUST DIDN'T LAST AND YOU DIDN'T HAVE MONEY TO GET MORE.: NEVER TRUE

## 2023-02-27 SDOH — ECONOMIC STABILITY: HOUSING INSECURITY
IN THE LAST 12 MONTHS, WAS THERE A TIME WHEN YOU DID NOT HAVE A STEADY PLACE TO SLEEP OR SLEPT IN A SHELTER (INCLUDING NOW)?: NO

## 2023-02-27 ASSESSMENT — ENCOUNTER SYMPTOMS
EYES NEGATIVE: 1
NAUSEA: 1
RESPIRATORY NEGATIVE: 1

## 2023-02-27 NOTE — PROGRESS NOTES
[address] [facility]  [address] [address] [address]  Dept: [phone]  Dept Fax: [phone]: [phone]      Subjective:     Chief Complaint   Patient presents with    Follow-Up from [facility]        HPI:  Kamilah Benítez is a 74 y.o. male presents today for follow-up of recent hospitalization at [facility] a week ago after he had another passing out spell in [place] at a  service. He felt fine going to the service. He describes that he suddenly felt nauseated,and very sleepy . He also experienced a warm sensation all over him and it was harder to breathe. When he came around, several people were around him, fanning him and checking his BP and BS. A similar episode happened November of last year during deer season. He states that he had the same symptoms precede the \"syncopal episode\". He states that he felt something was coming on. He leaned forward to rest his head on his pick-up truck. When he came around, he states a friend was trying to wake him up. No CP or sob reported. Pt is not diabetic. On both instances, his BS was fd to be normal.   He did have a cardiac evaluation by Dr Fawn Deng. He is scheduled  for an echo and Lexiscan stress test on  but he had both of these done already at [facility] during his last admission. ROS:   Review of Systems   Constitutional: Negative. HENT: Negative. Eyes: Negative. Respiratory: Negative. Cardiovascular: Negative. Gastrointestinal:  Positive for nausea (prior to syncopal episode). Genitourinary: Negative. Skin: Negative. Neurological:  Positive for syncope. Hematological: Negative. Psychiatric/Behavioral: Negative.        PMHx:  Past Medical History:   Diagnosis Date    BPH (benign prostatic hyperplasia)     Dizziness     DM (diabetes mellitus) ([facility] [address]     type 2    Dysphagia     HTN (hypertension)     Hyperlipidemia     Hypogonadism male     Peripheral neuropathy     Tobacco dependence      Patient Active Problem List   Diagnosis    Benign prostatic hyperplasia with lower urinary tract symptoms    Tobacco abuse    Mixed hyperlipidemia    Testosterone deficiency    Essential hypertension    Diabetes mellitus (HCC)    GERD (gastroesophageal reflux disease)    Chronic obstructive pulmonary disease (HCC)       PSHx:  Past Surgical History:   Procedure Laterality Date    BACK SURGERY      COLON SURGERY  2014    Done in [place], hx of colon polyps with a 5 yr recall    COLONOSCOPY N/A 07/12/2019    Dr Laura Loving hemorrhoids-Grade 2, suboptimal prep, diverticulosis-HP-3 yr recall    COLONOSCOPY N/A 08/09/2022    Dr Kaia Brooks, Int hem Gr 1 wo bleeding, mod diverticulosis, sub prep fair, 5 year recall    EYE SURGERY Right 07/2017    UPPER GASTROINTESTINAL ENDOSCOPY N/A 07/12/2019    Dr Charlotte De La Rosa-Mild gastritis and bulbar duodenitis, prominent major duodenal papilla, possible lipoma or GIST        PFHx:  Family History   Problem Relation Age of Onset    Colon Cancer Mother     Diabetes Type 1  Father     Diabetes Father     Cancer Sister     Cancer Brother     Stomach Cancer Brother     Esophageal Cancer Neg Hx     Liver Cancer Neg Hx     Liver Disease Neg Hx     Rectal Cancer Neg Hx        SocialHx:  Social History     Tobacco Use    Smoking status: Every Day     Packs/day: 0.25     Years: 54.00     Pack years: 13.50     Types: Cigarettes     Start date: 1964    Smokeless tobacco: Never   Substance Use Topics    Alcohol use: Yes     Comment: rare 2-3x yearly       Allergies:  No Known Allergies    Medications:  Current Outpatient Medications   Medication Sig Dispense Refill    traZODone (DESYREL) 50 MG tablet Take 1 tablet by mouth nightly 90 tablet 0    albuterol sulfate HFA (PROVENTIL;VENTOLIN;PROAIR) 108 (90 Base) MCG/ACT inhaler Inhale 2 puffs into the lungs every 6 hours as needed for Wheezing 3 each 1    Insulin Glargine-Lixisenatide 100-33 UNT-MCG/ML SOPN Inject 26 Units into the skin daily 12 Adjustable Dose Pre-filled Pen Syringe 1    atorvastatin (LIPITOR) 20 MG tablet TAKE 1 TABLET EVERY DAY 90 tablet 1    tamsulosin (FLOMAX) 0.4 MG capsule TAKE 1 CAPSULE EVERY DAY 90 capsule 3    amLODIPine (NORVASC) 5 MG tablet TAKE 1 TABLET EVERY DAY 90 tablet 3    valsartan-hydroCHLOROthiazide (DIOVAN-HCT) 320-25 MG per tablet TAKE 1 TABLET EVERY DAY 90 tablet 3    blood glucose monitor strips Test 1 times a day & as needed for symptoms of irregular blood glucose. Dispense sufficient amount for indicated testing frequency plus additional to accommodate PRN testing needs. AccuCheck Guide strips 100 strip 5    Lancets MISC 1 each by Does not apply route daily 100 each 5    blood glucose test strips (ACCU-CHEK FRANCISCO PLUS) strip TEST 2 TIMES A  strip 5    omeprazole (PRILOSEC) 20 MG delayed release capsule Take 1 tablet po BID ( on an empty stomach) x 14 days for treatment of h pylori infection. 28 capsule 0     No current facility-administered medications for this visit. Objective:   PE:  /68   Pulse 84   Temp 97.5 °F (36.4 °C) (Temporal)   Ht 5' 10\" (1.778 m)   Wt 176 lb 12.8 oz (80.2 kg)   SpO2 96%   BMI 25.37 kg/m²   Physical Exam  Vitals and nursing note reviewed. Constitutional:       General: He is not in acute distress. Appearance: Normal appearance. He is not ill-appearing, toxic-appearing or diaphoretic. Comments: Smells of cigarette smoke   HENT:      Head: Normocephalic. Right Ear: External ear normal.      Left Ear: External ear normal.      Nose: Nose normal.      Mouth/Throat:      Pharynx: Oropharynx is clear. No oropharyngeal exudate or posterior oropharyngeal erythema. Eyes:      General: No scleral icterus. Extraocular Movements: Extraocular movements intact.       Conjunctiva/sclera: Conjunctivae normal.      Pupils: Pupils are equal, round, and reactive to light. Neck:      Vascular: No carotid bruit. Cardiovascular:      Rate and Rhythm: Normal rate and regular rhythm. Pulses: Normal pulses. Heart sounds: No murmur heard. Pulmonary:      Effort: Pulmonary effort is normal.      Breath sounds: Normal breath sounds. No rales. Abdominal:      General: Bowel sounds are normal.      Palpations: Abdomen is soft. Tenderness: There is no abdominal tenderness. There is no guarding. Musculoskeletal:         General: Normal range of motion. Lymphadenopathy:      Cervical: No cervical adenopathy. Skin:     General: Skin is warm and dry. Capillary Refill: Capillary refill takes less than 2 seconds. Neurological:      General: No focal deficit present. Mental Status: He is alert and oriented to person, place, and time. Psychiatric:         Mood and Affect: Mood normal.         Behavior: Behavior normal.         Thought Content: Thought content normal.          Assessment & Plan   Sj Reeves was seen today for follow-up from hospital.    Diagnoses and all orders for this visit:    Syncope, unspecified syncope type  -     CT HEAD W WO CONTRAST; Future  -     Julianna Betancourt MD, Neurology, [place]    Type 2 diabetes mellitus with stage 3a chronic kidney disease, with long-term current use of insulin ([facility] [address]    Essential hypertension    Tobacco abuse    Do not drive until cleared   Continue all maintenance medications  Stay well hydrated  Call with new concerns    Return in about 7 weeks (around 4/17/2023) for chronic care management. All questions were answered. Medications, including possible adverse effects, and instructions were reviewed and  understanding was confirmed. Follow-up recommendations, including when to contact or return to office (ie; if symptoms worsen or fail to improve), were discussed and acknowledged.     Electronically signed by Nat Quintana MD on 2/27/23 at 10:51 AM CST

## 2023-02-27 NOTE — TELEPHONE ENCOUNTER
Pt called and said that he just had a stress test this past weekend and he passed out again he said. He has two more tests set for end of April and he said he probably doesn't need another one, he had this last one at [facility]. Please call patient to let him know if he needs this additional test. Thanks !

## 2023-02-27 NOTE — TELEPHONE ENCOUNTER
Requested all cardiac testing from [facility] on 2/19/23. Also in [facility], are you okay with canceling his Cristal and ECHO in ECHO?

## 2023-02-27 NOTE — TELEPHONE ENCOUNTER
Per  called Pt and let him know that I would cancel his testing appts. And once we have received the Lexiscan from [facility] will advice when the Pt needs to f/u .

## 2023-03-21 ENCOUNTER — TELEPHONE (OUTPATIENT)
Dept: NEUROLOGY | Age: 73
End: 2023-03-21

## 2023-03-21 DIAGNOSIS — R39.11 BENIGN PROSTATIC HYPERPLASIA WITH URINARY HESITANCY: ICD-10-CM

## 2023-03-21 DIAGNOSIS — N40.1 BENIGN PROSTATIC HYPERPLASIA WITH URINARY HESITANCY: ICD-10-CM

## 2023-03-21 RX ORDER — TRAZODONE HYDROCHLORIDE 50 MG/1
50 TABLET ORAL NIGHTLY
Qty: 90 TABLET | Refills: 0 | Status: SHIPPED | OUTPATIENT
Start: 2023-03-21

## 2023-03-21 RX ORDER — TAMSULOSIN HYDROCHLORIDE 0.4 MG/1
0.4 CAPSULE ORAL DAILY
Qty: 90 CAPSULE | Refills: 3 | Status: SHIPPED | OUTPATIENT
Start: 2023-03-21

## 2023-03-21 NOTE — TELEPHONE ENCOUNTER
Latisha Silveira called to request a refill on his medication.       Last office visit : 2/27/2023   Next office visit : 4/17/2023     Requested Prescriptions     Pending Prescriptions Disp Refills    traZODone (DESYREL) 50 MG tablet 90 tablet 0     Sig: Take 1 tablet by mouth nightly    tamsulosin (FLOMAX) 0.4 MG capsule 90 capsule 3     Sig: Take 1 capsule by mouth daily            Burgess Haseeb MA

## 2023-03-21 NOTE — TELEPHONE ENCOUNTER
Called pt left vm explaining we had to move his appointment to 3/30 at 830. Call back number left requesting him to call back.

## 2023-03-28 DIAGNOSIS — E11.69 TYPE 2 DIABETES MELLITUS WITH OTHER SPECIFIED COMPLICATION, WITHOUT LONG-TERM CURRENT USE OF INSULIN (HCC): ICD-10-CM

## 2023-03-30 ENCOUNTER — OFFICE VISIT (OUTPATIENT)
Dept: NEUROLOGY | Age: 73
End: 2023-03-30
Payer: MEDICARE

## 2023-03-30 VITALS
WEIGHT: 176 LBS | OXYGEN SATURATION: 100 % | HEART RATE: 89 BPM | SYSTOLIC BLOOD PRESSURE: 149 MMHG | HEIGHT: 71 IN | BODY MASS INDEX: 24.64 KG/M2 | DIASTOLIC BLOOD PRESSURE: 77 MMHG

## 2023-03-30 DIAGNOSIS — R55 SYNCOPE, UNSPECIFIED SYNCOPE TYPE: ICD-10-CM

## 2023-03-30 DIAGNOSIS — Z82.49 FAMILY HISTORY OF CEREBRAL ANEURYSM: ICD-10-CM

## 2023-03-30 DIAGNOSIS — R56.9 CONVULSIONS, UNSPECIFIED CONVULSION TYPE (HCC): Primary | ICD-10-CM

## 2023-03-30 PROCEDURE — G8484 FLU IMMUNIZE NO ADMIN: HCPCS | Performed by: NURSE PRACTITIONER

## 2023-03-30 PROCEDURE — G8420 CALC BMI NORM PARAMETERS: HCPCS | Performed by: NURSE PRACTITIONER

## 2023-03-30 PROCEDURE — 4004F PT TOBACCO SCREEN RCVD TLK: CPT | Performed by: NURSE PRACTITIONER

## 2023-03-30 PROCEDURE — 1123F ACP DISCUSS/DSCN MKR DOCD: CPT | Performed by: NURSE PRACTITIONER

## 2023-03-30 PROCEDURE — 3017F COLORECTAL CA SCREEN DOC REV: CPT | Performed by: NURSE PRACTITIONER

## 2023-03-30 PROCEDURE — 3078F DIAST BP <80 MM HG: CPT | Performed by: NURSE PRACTITIONER

## 2023-03-30 PROCEDURE — 3077F SYST BP >= 140 MM HG: CPT | Performed by: NURSE PRACTITIONER

## 2023-03-30 PROCEDURE — G8427 DOCREV CUR MEDS BY ELIG CLIN: HCPCS | Performed by: NURSE PRACTITIONER

## 2023-03-30 PROCEDURE — 99204 OFFICE O/P NEW MOD 45 MIN: CPT | Performed by: NURSE PRACTITIONER

## 2023-03-30 RX ORDER — GABAPENTIN 300 MG/1
300 CAPSULE ORAL DAILY
COMMUNITY

## 2023-03-30 RX ORDER — GABAPENTIN 600 MG/1
600 TABLET ORAL DAILY
Qty: 180 TABLET | Refills: 0 | OUTPATIENT
Start: 2023-03-30 | End: 2023-06-28

## 2023-03-30 NOTE — PROGRESS NOTES
[facility] Neurology Office Note      Patient:   Marietta Gardner  MR#:    [id number]  Account Number:                         YOB: 1950  Date of Evaluation:  3/30/2023  Time of Note:                          9:16 AM  Primary/Referring Physician:  April Diaz MD   Consulting Physician:  Jennifer Porter DNP, ADI    NEW PATIENT CONSULTATION    Chief Complaint   Patient presents with    New Patient    Loss of Consciousness     C/o syncope twice since nov. Last episode was 3 weeks ago      HISTORY OF PRESENT ILLNESS    Marietta Gardner is a 74y.o. year old male here for evaluation of syncopal episodes. He has had 2 episodes since November 2022 now. Typical episode described as sudden onset of nausea, extreme fatigue. He then has LOC, brief in nature. No clear GTC activity. No tongue biting. He did lose control of his bladder with both events now. Feels confused afterwards. First event occurred during deer season but did not correlate with heavy lifting or straining. Other event was sudden while at [employer]. Denies stress/anxiety surrounding the events. Blood sugar has been normal with episodes. He was admitted to [facility] after both episodes. Extensive cardiac work up completed, negative. Reports that he has worn a Zio patch. No TBI history. No encephalitis or meningitis history. No family history of neurologic disease or seizures.       Past Medical History:   Diagnosis Date    BPH (benign prostatic hyperplasia)     Dizziness     DM (diabetes mellitus) ([facility] [address]     type 2    Dysphagia     HTN (hypertension)     Hyperlipidemia     Hypogonadism male     Peripheral neuropathy     Tobacco dependence        Past Surgical History:   Procedure Laterality Date    BACK SURGERY      COLON SURGERY  2014    Done in [facility] of colon polyps with a 5 yr recall    COLONOSCOPY N/A 07/12/2019    Dr Diann Mckeon hemorrhoids-Grade 2, suboptimal prep, diverticulosis-HP-3 yr recall    COLONOSCOPY N/A

## 2023-04-03 RX ORDER — BLOOD SUGAR DIAGNOSTIC
STRIP MISCELLANEOUS
Qty: 300 STRIP | Refills: 1 | Status: SHIPPED | OUTPATIENT
Start: 2023-04-03 | End: 2023-04-07

## 2023-04-03 RX ORDER — LANCETS 30 GAUGE
1 EACH MISCELLANEOUS DAILY
Qty: 300 EACH | Refills: 1 | Status: SHIPPED | OUTPATIENT
Start: 2023-04-03 | End: 2023-04-07

## 2023-04-03 NOTE — TELEPHONE ENCOUNTER
Danna Robin called to request a refill on his medication.       Last office visit : 2023   Next office visit : 2023     Requested Prescriptions     Pending Prescriptions Disp Refills    blood glucose test strips (ACCU-CHEK FRANCISCO PLUS) strip 200 strip 5     Sig: TEST 2 TIMES A DAY    Lancets MISC 100 each 5     Si each by Does not apply route daily            Glory Kimball MA

## 2023-04-07 DIAGNOSIS — E11.69 TYPE 2 DIABETES MELLITUS WITH OTHER SPECIFIED COMPLICATION, WITHOUT LONG-TERM CURRENT USE OF INSULIN (HCC): Primary | ICD-10-CM

## 2023-04-07 RX ORDER — BLOOD-GLUCOSE METER
1 EACH MISCELLANEOUS DAILY
Qty: 1 KIT | Refills: 0 | Status: SHIPPED | OUTPATIENT
Start: 2023-04-07

## 2023-04-07 RX ORDER — BLOOD SUGAR DIAGNOSTIC
1 STRIP MISCELLANEOUS DAILY
Qty: 300 EACH | Refills: 3 | Status: SHIPPED | OUTPATIENT
Start: 2023-04-07

## 2023-04-07 RX ORDER — LANCETS 30 GAUGE
1 EACH MISCELLANEOUS DAILY
Qty: 300 EACH | Refills: 1 | Status: SHIPPED | OUTPATIENT
Start: 2023-04-07

## 2023-04-10 NOTE — PROGRESS NOTES
" TAMAR Sorenson  [facility]   Pulmonary and Critical Care  [address]  [place] [address]  Phone: [phone]  Fax: [phone]           Chief Complaint  COPD (Patient is here for 6 month follow-up on COPD) and Med Refill (Needs refill on inhaler)    Subjective    History of Present Illness     Naif Nick presents to [facility] PULMONARY & CRITICAL CARE MEDICINE   History of Present Illness  Mr. Nick is a pleasant 72 year old male with known very severe stage 4 COPD, Former smoker, centrolobular emphysema, TB exposure, Lung nodule and Asthma/ copd overlap syndrome. He is on Mucinex, Nebulizer prn, Albuterol PRN. Albuterol HFA is used 3 times a day.  Out for the last two days and short of breath. Last Nebulizer treatment was this am. He uses Advair HFA and finds benefit. He uses Flonase as needed for nasal drainage. His cough is more pronounced and productive. He denies fever, chills, night sweats. CT in Feb 2023 through [facility] no report available and per patient ok but has another spot. He is due to have a complete physical with them. He has been blacking out and has been told by PCP not to drive. PFT in Oct 2022 with FEV1 of 65% predicted. Normal diffusion.        Objective   Vital Signs:   /72 (BP Location: Left arm, Patient Position: Sitting, Cuff Size: Adult)   Pulse 86   Resp 18   Ht 177.8 cm (70\")   Wt 77.5 kg (170 lb 12.8 oz)   SpO2 97%   BMI 24.51 kg/m²     Physical Exam  Vitals reviewed.   Constitutional:       Appearance: Normal appearance.   Cardiovascular:      Rate and Rhythm: Normal rate and regular rhythm.   Pulmonary:      Effort: Pulmonary effort is normal.      Breath sounds: Normal breath sounds.   Neurological:      General: No focal deficit present.      Mental Status: He is alert and oriented to person, place, and time.   Psychiatric:         Mood and Affect: Mood normal.         Behavior: Behavior normal.      "     Result Review :  The following data was reviewed by: TAMAR Sorenson on 04/12/2023:    Data reviewed: Radiologic studies LDCT Feb [facility]   My interpretation of imaging:  As in HPI   My interpretation of labs: none   CT Chest Low Dose Wo (02/16/2023 12:42)    PFT Values        10/12/2022    11:00   Pre Drug PFT Results   FVC 79   FEV1 65   FEF 25-75% 54   FEV1/FVC 62   Other Tests PFT Results   DLCO 83   D/VAsb 83     My interpretation of the PFT: No new     Results for orders placed in visit on 10/12/22    Pulmonary Function Test    Narrative  Pulmonary Function Test  Performed by: Hamida Hartman, RRT  Authorized by: Raquel Sosa APRN    Pre Drug % Predicted  FVC: 79%  FEV1: 65%  FEF 25-75%: 54%  FEV1/FVC: 62%  DLCO: 83%  D/VAsb: 83%    Interpretation  Spirometry  Spirometry shows moderate obstruction. There is reduced midflow suggesting small airway/airflow obstruction.  Diffusion Capacity  The patient's diffusion capacity is normal.  Diffusion capacity is normal when corrected for alveolar volume.      Results for orders placed during the hospital encounter of 08/10/20    Full Pulmonary Function Test With Bronchodilator    Narrative  [facility] - Pulmonary Function Test    [address] [place] [address]  [address]  [phone]    Patient : Naif Nick  MRN : [id number]  YOB: 1950  :  Nacho Vaughan MD  Date : 8/10/2020    ______________________________________________________________________    Interpretation :  1. Spirometry shows very severe airflow obstruction prebronchodilator  2. Following bronchodilator there is minimal not significant improvement in FEV1 and FVC, but postbronchodilator spirometry meet criteria for severe rather than very severe obstruction.  3. Mid flow is reduced.  4. Lung volume measurements show reduction in inspiratory capacity and elevations in expiratory reserve volume residual volume and total lung capacity  suggesting hyperinflation and very significant gas trapping.  5. Airway resistance is increased and conductance is decreased.      Nacho Vaughan MD          Assessment and Plan   Diagnoses and all orders for this visit:    1. Stage 4 very severe COPD by GOLD classification (HCC) (Primary)    2. Centrilobular emphysema    3. Tobacco abuse, in remission    4. Lung nodule    5. Asthma-COPD overlap syndrome    6. Chronic respiratory failure with hypoxia      He will continue his albuterol nebulizer until he receives his HFA from mail order pharmacy. He never had his repeat Eye exam or eye pressure done after last visit. He is provided a script of Breo to see if this is of benefit and cost effective. In the mean time he will reach out to Dr. Howie Wei to see about getting his eyes / pressure checked and ask about utilizing an anticholinergic. If approved and Breo too expensive, we could potentially give him a sample to use. Follow up in 4 weeks. If his symptoms worsen at all he is to call the office and he may need a script for steroids and antibiotic. In the meantime he is to also continue to use his Flonase.       Follow Up   No follow-ups on file.  Patient was given instructions and counseling regarding his condition or for health maintenance advice. Please see specific information pulled into the AVS if appropriate.     Raquel Sosa, TAMAR  4/12/2023  10:55 CDT

## 2023-04-11 RX ORDER — TRAZODONE HYDROCHLORIDE 50 MG/1
50 TABLET ORAL NIGHTLY
Qty: 90 TABLET | Refills: 0 | OUTPATIENT
Start: 2023-04-11

## 2023-04-12 ENCOUNTER — OFFICE VISIT (OUTPATIENT)
Dept: PULMONOLOGY | Facility: CLINIC | Age: 73
End: 2023-04-12
Payer: MEDICARE

## 2023-04-12 VITALS
DIASTOLIC BLOOD PRESSURE: 72 MMHG | SYSTOLIC BLOOD PRESSURE: 126 MMHG | WEIGHT: 170.8 LBS | RESPIRATION RATE: 18 BRPM | OXYGEN SATURATION: 97 % | HEART RATE: 86 BPM | BODY MASS INDEX: 24.45 KG/M2 | HEIGHT: 70 IN

## 2023-04-12 DIAGNOSIS — F17.201 TOBACCO ABUSE, IN REMISSION: ICD-10-CM

## 2023-04-12 DIAGNOSIS — J44.9 ASTHMA-COPD OVERLAP SYNDROME: ICD-10-CM

## 2023-04-12 DIAGNOSIS — R91.1 LUNG NODULE: ICD-10-CM

## 2023-04-12 DIAGNOSIS — J43.2 CENTRILOBULAR EMPHYSEMA: ICD-10-CM

## 2023-04-12 DIAGNOSIS — J44.9 STAGE 4 VERY SEVERE COPD BY GOLD CLASSIFICATION: Primary | ICD-10-CM

## 2023-04-12 DIAGNOSIS — J96.11 CHRONIC RESPIRATORY FAILURE WITH HYPOXIA: ICD-10-CM

## 2023-04-12 PROBLEM — I10 HYPERTENSIVE DISORDER: Status: ACTIVE | Noted: 2019-12-04

## 2023-04-12 PROBLEM — I10 ESSENTIAL HYPERTENSION: Status: ACTIVE | Noted: 2020-12-18

## 2023-04-12 PROBLEM — E78.00 HYPERCHOLESTEROLEMIA: Status: ACTIVE | Noted: 2019-12-04

## 2023-04-12 PROBLEM — K21.9 GERD (GASTROESOPHAGEAL REFLUX DISEASE): Status: ACTIVE | Noted: 2020-12-18

## 2023-04-12 PROBLEM — E34.9 TESTOSTERONE DEFICIENCY: Status: ACTIVE | Noted: 2020-12-18

## 2023-04-12 PROBLEM — Z72.0 TOBACCO ABUSE: Status: ACTIVE | Noted: 2020-12-18

## 2023-04-12 PROBLEM — E11.9 DIABETES MELLITUS: Status: ACTIVE | Noted: 2019-12-04

## 2023-04-12 PROBLEM — N40.1 BENIGN PROSTATIC HYPERPLASIA WITH LOWER URINARY TRACT SYMPTOMS: Status: ACTIVE | Noted: 2020-12-18

## 2023-04-12 PROBLEM — G47.00 INSOMNIA: Status: ACTIVE | Noted: 2020-03-02

## 2023-04-12 PROBLEM — E78.2 MIXED HYPERLIPIDEMIA: Status: ACTIVE | Noted: 2020-06-16

## 2023-04-12 PROBLEM — E55.9 VITAMIN D DEFICIENCY: Status: ACTIVE | Noted: 2020-03-02

## 2023-04-12 PROCEDURE — 99214 OFFICE O/P EST MOD 30 MIN: CPT | Performed by: NURSE PRACTITIONER

## 2023-04-12 PROCEDURE — 1160F RVW MEDS BY RX/DR IN RCRD: CPT | Performed by: NURSE PRACTITIONER

## 2023-04-12 PROCEDURE — 3074F SYST BP LT 130 MM HG: CPT | Performed by: NURSE PRACTITIONER

## 2023-04-12 PROCEDURE — 1159F MED LIST DOCD IN RCRD: CPT | Performed by: NURSE PRACTITIONER

## 2023-04-12 PROCEDURE — 3078F DIAST BP <80 MM HG: CPT | Performed by: NURSE PRACTITIONER

## 2023-04-12 RX ORDER — BLOOD SUGAR DIAGNOSTIC
STRIP MISCELLANEOUS
COMMUNITY
Start: 2023-04-07

## 2023-04-12 RX ORDER — AMLODIPINE BESYLATE 5 MG/1
1 TABLET ORAL DAILY
COMMUNITY
Start: 2022-11-02

## 2023-04-12 RX ORDER — ALBUTEROL SULFATE 90 UG/1
2 AEROSOL, METERED RESPIRATORY (INHALATION) EVERY 4 HOURS PRN
Qty: 54 G | Refills: 3 | Status: SHIPPED | OUTPATIENT
Start: 2023-04-12

## 2023-04-12 RX ORDER — ATORVASTATIN CALCIUM 20 MG/1
1 TABLET, FILM COATED ORAL DAILY
COMMUNITY
Start: 2023-01-15

## 2023-04-12 RX ORDER — LANCETS
EACH MISCELLANEOUS
COMMUNITY
Start: 2023-04-07

## 2023-04-12 RX ORDER — FLUTICASONE FUROATE AND VILANTEROL 100; 25 UG/1; UG/1
1 POWDER RESPIRATORY (INHALATION) DAILY
Qty: 1 EACH | Refills: 2 | Status: SHIPPED | OUTPATIENT
Start: 2023-04-12 | End: 2023-04-13 | Stop reason: ALTCHOICE

## 2023-04-12 RX ORDER — GABAPENTIN 300 MG/1
300 CAPSULE ORAL DAILY
COMMUNITY

## 2023-04-12 RX ORDER — BLOOD-GLUCOSE METER
EACH MISCELLANEOUS
COMMUNITY
Start: 2023-04-07

## 2023-04-12 NOTE — LETTER
"April 12, 2023       No Recipients    Patient: Naif Nick   YOB: 1950   Date of Visit: 4/12/2023     Dear Dr. Lopez Recipients:    Thank you for referring Naif Nick to me for evaluation. Below are the relevant portions of my assessment and plan of care.    If you have questions, please do not hesitate to call me. I look forward to following Naif along with you.         Sincerely,        TAMAR Sorenson        CC:   No Recipients      Progress Notes:   TAMAR Sorenson  [facility]   Pulmonary and Critical Care  [address] [place] [address]  Phone: [phone]  Fax: [phone]           Chief Complaint  COPD (Patient is here for 6 month follow-up on COPD) and Med Refill (Needs refill on inhaler)    Subjective     History of Present Illness     Naif Nick presents to [facility] PULMONARY & CRITICAL CARE MEDICINE   History of Present Illness  Mr. Nick is a pleasant 72 year old male with known very severe stage 4 COPD, Former smoker, centrolobular emphysema, TB exposure, Lung nodule and Asthma/ copd overlap syndrome. He is on Mucinex, Nebulizer prn, Albuterol PRN. Albuterol HFA is used 3 times a day.  Out for the last two days and short of breath. Last Nebulizer treatment was this am. He uses Advair HFA and finds benefit. He uses Flonase as needed for nasal drainage. His cough is more pronounced and productive. He denies fever, chills, night sweats. CT in Feb 2023 through [facility] no report available and per patient ok but has another spot. He is due to have a complete physical with them. He has been blacking out and has been told by PCP not to drive. PFT in Oct 2022 with FEV1 of 65% predicted. Normal diffusion.        Objective    Vital Signs:   /72 (BP Location: Left arm, Patient Position: Sitting, Cuff Size: Adult)   Pulse 86   Resp 18   Ht 177.8 cm (70\")   Wt 77.5 kg (170 lb 12.8 oz)   SpO2 97%   BMI 24.51 kg/m²   "   Physical Exam  Vitals reviewed.   Constitutional:       Appearance: Normal appearance.   Cardiovascular:      Rate and Rhythm: Normal rate and regular rhythm.   Pulmonary:      Effort: Pulmonary effort is normal.      Breath sounds: Normal breath sounds.   Neurological:      General: No focal deficit present.      Mental Status: He is alert and oriented to person, place, and time.   Psychiatric:         Mood and Affect: Mood normal.         Behavior: Behavior normal.          Result Review :  The following data was reviewed by: TAMAR Sorenson on 04/12/2023:    Data reviewed: Radiologic studies LDCT Feb [facility]   My interpretation of imaging:  As in HPI   My interpretation of labs: none   CT Chest Low Dose Wo (02/16/2023 12:42)    PFT Values          10/12/2022    11:00   Pre Drug PFT Results   FVC 79   FEV1 65   FEF 25-75% 54   FEV1/FVC 62   Other Tests PFT Results   DLCO 83   D/VAsb 83     My interpretation of the PFT: No new     Results for orders placed in visit on 10/12/22    Pulmonary Function Test    Narrative  Pulmonary Function Test  Performed by: Hamida Hartman, RRT  Authorized by: Raquel Sosa APRN    Pre Drug % Predicted  FVC: 79%  FEV1: 65%  FEF 25-75%: 54%  FEV1/FVC: 62%  DLCO: 83%  D/VAsb: 83%    Interpretation  Spirometry  Spirometry shows moderate obstruction. There is reduced midflow suggesting small airway/airflow obstruction.  Diffusion Capacity  The patient's diffusion capacity is normal.  Diffusion capacity is normal when corrected for alveolar volume.      Results for orders placed during the hospital encounter of 08/10/20    Full Pulmonary Function Test With Bronchodilator    Narrative  [facility] - Pulmonary Function Test    [address] [place] [address]  [address]  [phone]    Patient : Naif Nick  MRN : [id number]  YOB: 1950  :  Nacho Vaughan MD  Date :  8/10/2020    ______________________________________________________________________    Interpretation :  1. Spirometry shows very severe airflow obstruction prebronchodilator  2. Following bronchodilator there is minimal not significant improvement in FEV1 and FVC, but postbronchodilator spirometry meet criteria for severe rather than very severe obstruction.  3. Mid flow is reduced.  4. Lung volume measurements show reduction in inspiratory capacity and elevations in expiratory reserve volume residual volume and total lung capacity suggesting hyperinflation and very significant gas trapping.  5. Airway resistance is increased and conductance is decreased.      Nacho Vaughan MD          Assessment and Plan   Diagnoses and all orders for this visit:    1. Stage 4 very severe COPD by GOLD classification (HCC) (Primary)    2. Centrilobular emphysema    3. Tobacco abuse, in remission    4. Lung nodule    5. Asthma-COPD overlap syndrome    6. Chronic respiratory failure with hypoxia      He will continue his albuterol nebulizer until he receives his HFA from mail order pharmacy. He never had his repeat Eye exam or eye pressure done after last visit. He is provided a script of Breo to see if this is of benefit and cost effective. In the mean time he will reach out to Dr. Howie Wei to see about getting his eyes / pressure checked and ask about utilizing an anticholinergic. If approved and Breo too expensive, we could potentially give him a sample to use. Follow up in 4 weeks. If his symptoms worsen at all he is to call the office and he may need a script for steroids and antibiotic. In the meantime he is to also continue to use his Flonase.       Follow Up   No follow-ups on file.  Patient was given instructions and counseling regarding his condition or for health maintenance advice. Please see specific information pulled into the AVS if appropriate.     Raquel Sosa, TAMAR  4/12/2023  10:55 CDT

## 2023-04-12 NOTE — Clinical Note
Please contact his eye doctor and let them know he should be reaching out for an appointment to have his eye pressures done. We would like to see if they are ok for him to be on an anticholinergic inhaler. Thank you.

## 2023-04-13 ENCOUNTER — TELEPHONE (OUTPATIENT)
Dept: PULMONOLOGY | Facility: CLINIC | Age: 73
End: 2023-04-13
Payer: MEDICARE

## 2023-04-13 RX ORDER — TIOTROPIUM BROMIDE AND OLODATEROL 3.124; 2.736 UG/1; UG/1
2 SPRAY, METERED RESPIRATORY (INHALATION)
Qty: 2 EACH | Refills: 0 | COMMUNITY
Start: 2023-04-13

## 2023-04-13 NOTE — TELEPHONE ENCOUNTER
Dolores  Patient called and states he had his eye appointment and ok for anticholinergic inhaler. Please get something in writing from the eye doctor. Then let patient know he can  two samples of stiolto. Thank you.           Annika King  Spoke to [facility] and was told Dr. Wei will have something written stating Mr. Nick can use the inhaler after making sure his eye pressures were in a good range. Will fax over.     Called Mr. Nick and let him know he could come pick those samples up today. Verbalized understanding and stated he will come today. Placed patient's name on samples up front for .         Letter in chart from [facility]

## 2023-04-13 NOTE — TELEPHONE ENCOUNTER
Patient was seen in their office today and had no eye issues that would prevent the patient from starting an inhaler.

## 2023-04-17 ENCOUNTER — OFFICE VISIT (OUTPATIENT)
Dept: PRIMARY CARE CLINIC | Age: 73
End: 2023-04-17
Payer: MEDICARE

## 2023-04-17 VITALS
WEIGHT: 174.4 LBS | OXYGEN SATURATION: 93 % | TEMPERATURE: 97.3 F | HEART RATE: 70 BPM | BODY MASS INDEX: 24.97 KG/M2 | HEIGHT: 70 IN | SYSTOLIC BLOOD PRESSURE: 138 MMHG | DIASTOLIC BLOOD PRESSURE: 88 MMHG

## 2023-04-17 DIAGNOSIS — N40.1 BENIGN PROSTATIC HYPERPLASIA WITH URINARY FREQUENCY: ICD-10-CM

## 2023-04-17 DIAGNOSIS — N32.81 OVERACTIVE BLADDER: ICD-10-CM

## 2023-04-17 DIAGNOSIS — G89.29 CHRONIC PAIN OF BOTH SHOULDERS: ICD-10-CM

## 2023-04-17 DIAGNOSIS — F17.200 SMOKER: ICD-10-CM

## 2023-04-17 DIAGNOSIS — M25.512 CHRONIC PAIN OF BOTH SHOULDERS: ICD-10-CM

## 2023-04-17 DIAGNOSIS — G89.29 CHRONIC NECK PAIN: Primary | ICD-10-CM

## 2023-04-17 DIAGNOSIS — E78.00 PURE HYPERCHOLESTEROLEMIA: ICD-10-CM

## 2023-04-17 DIAGNOSIS — N18.31 TYPE 2 DIABETES MELLITUS WITH STAGE 3A CHRONIC KIDNEY DISEASE, WITH LONG-TERM CURRENT USE OF INSULIN (HCC): ICD-10-CM

## 2023-04-17 DIAGNOSIS — M54.2 CHRONIC NECK PAIN: Primary | ICD-10-CM

## 2023-04-17 DIAGNOSIS — E11.22 TYPE 2 DIABETES MELLITUS WITH STAGE 3A CHRONIC KIDNEY DISEASE, WITH LONG-TERM CURRENT USE OF INSULIN (HCC): ICD-10-CM

## 2023-04-17 DIAGNOSIS — Z76.0 MEDICATION REFILL: ICD-10-CM

## 2023-04-17 DIAGNOSIS — G62.9 NEUROPATHY: ICD-10-CM

## 2023-04-17 DIAGNOSIS — M25.511 CHRONIC PAIN OF BOTH SHOULDERS: ICD-10-CM

## 2023-04-17 DIAGNOSIS — R35.0 BENIGN PROSTATIC HYPERPLASIA WITH URINARY FREQUENCY: ICD-10-CM

## 2023-04-17 DIAGNOSIS — G47.01 INSOMNIA DUE TO MEDICAL CONDITION: ICD-10-CM

## 2023-04-17 DIAGNOSIS — Z79.4 TYPE 2 DIABETES MELLITUS WITH STAGE 3A CHRONIC KIDNEY DISEASE, WITH LONG-TERM CURRENT USE OF INSULIN (HCC): ICD-10-CM

## 2023-04-17 DIAGNOSIS — I10 ESSENTIAL HYPERTENSION: ICD-10-CM

## 2023-04-17 PROCEDURE — G8417 CALC BMI ABV UP PARAM F/U: HCPCS | Performed by: FAMILY MEDICINE

## 2023-04-17 PROCEDURE — 3046F HEMOGLOBIN A1C LEVEL >9.0%: CPT | Performed by: FAMILY MEDICINE

## 2023-04-17 PROCEDURE — 2022F DILAT RTA XM EVC RTNOPTHY: CPT | Performed by: FAMILY MEDICINE

## 2023-04-17 PROCEDURE — G8427 DOCREV CUR MEDS BY ELIG CLIN: HCPCS | Performed by: FAMILY MEDICINE

## 2023-04-17 PROCEDURE — 99215 OFFICE O/P EST HI 40 MIN: CPT | Performed by: FAMILY MEDICINE

## 2023-04-17 PROCEDURE — 4004F PT TOBACCO SCREEN RCVD TLK: CPT | Performed by: FAMILY MEDICINE

## 2023-04-17 PROCEDURE — 1123F ACP DISCUSS/DSCN MKR DOCD: CPT | Performed by: FAMILY MEDICINE

## 2023-04-17 PROCEDURE — 3017F COLORECTAL CA SCREEN DOC REV: CPT | Performed by: FAMILY MEDICINE

## 2023-04-17 PROCEDURE — 3079F DIAST BP 80-89 MM HG: CPT | Performed by: FAMILY MEDICINE

## 2023-04-17 PROCEDURE — 3075F SYST BP GE 130 - 139MM HG: CPT | Performed by: FAMILY MEDICINE

## 2023-04-17 RX ORDER — TRAZODONE HYDROCHLORIDE 50 MG/1
50 TABLET ORAL NIGHTLY
Qty: 90 TABLET | Refills: 0 | Status: SHIPPED | OUTPATIENT
Start: 2023-04-17

## 2023-04-17 RX ORDER — GABAPENTIN 300 MG/1
300 CAPSULE ORAL DAILY
Qty: 30 CAPSULE | Refills: 2 | Status: SHIPPED | OUTPATIENT
Start: 2023-04-17 | End: 2023-07-16

## 2023-04-17 RX ORDER — MIRABEGRON 8 MG/8ML
8 GRANULE, FOR SUSPENSION, EXTENDED RELEASE ORAL DAILY
Qty: 30 ML | Refills: 0 | Status: SHIPPED | OUTPATIENT
Start: 2023-04-17

## 2023-04-17 ASSESSMENT — ENCOUNTER SYMPTOMS
NAUSEA: 0
SHORTNESS OF BREATH: 0
COUGH: 0
EYE DISCHARGE: 0
DIARRHEA: 0
RHINORRHEA: 0
EYE PAIN: 0
ABDOMINAL PAIN: 0
SORE THROAT: 0

## 2023-04-17 NOTE — PROGRESS NOTES
each by In Vitro route daily As needed. 300 each 3    Blood Glucose Monitoring Suppl (ACCU-CHEK GUIDE) w/Device KIT 1 Device by Does not apply route daily 1 kit 0    Lancets MISC 1 each by Does not apply route daily ICD:10 E11.9 300 each 1    tamsulosin (FLOMAX) 0.4 MG capsule Take 1 capsule by mouth daily 90 capsule 3    albuterol sulfate HFA (PROVENTIL;VENTOLIN;PROAIR) 108 (90 Base) MCG/ACT inhaler Inhale 2 puffs into the lungs every 6 hours as needed for Wheezing 3 each 1    Insulin Glargine-Lixisenatide 100-33 UNT-MCG/ML SOPN Inject 26 Units into the skin daily 12 Adjustable Dose Pre-filled Pen Syringe 1    atorvastatin (LIPITOR) 20 MG tablet TAKE 1 TABLET EVERY DAY 90 tablet 1    amLODIPine (NORVASC) 5 MG tablet TAKE 1 TABLET EVERY DAY 90 tablet 3    valsartan-hydroCHLOROthiazide (DIOVAN-HCT) 320-25 MG per tablet TAKE 1 TABLET EVERY DAY 90 tablet 3     No current facility-administered medications for this visit. Objective:   PE:  /88   Pulse 70   Temp 97.3 °F (36.3 °C) (Temporal)   Ht 5' 10\" (1.778 m)   Wt 174 lb 6.4 oz (79.1 kg)   SpO2 93%   BMI 25.02 kg/m²   Physical Exam  Vitals and nursing note reviewed. Exam conducted with a chaperone present (wife and grand daughter). Constitutional:       General: He is in acute distress. Appearance: Normal appearance. He is not ill-appearing. HENT:      Head: Normocephalic. Right Ear: External ear normal.      Left Ear: External ear normal.      Nose: Nose normal.      Mouth/Throat:      Pharynx: Oropharynx is clear. No oropharyngeal exudate or posterior oropharyngeal erythema. Eyes:      General: No scleral icterus. Extraocular Movements: Extraocular movements intact. Conjunctiva/sclera: Conjunctivae normal.      Pupils: Pupils are equal, round, and reactive to light. Cardiovascular:      Rate and Rhythm: Normal rate and regular rhythm. Pulses: Normal pulses. Heart sounds: No murmur heard.   Pulmonary:      Breath

## 2023-05-01 ENCOUNTER — HOSPITAL ENCOUNTER (OUTPATIENT)
Dept: GENERAL RADIOLOGY | Age: 73
Discharge: HOME | End: 2023-05-01
Payer: MEDICARE

## 2023-05-01 DIAGNOSIS — G89.29 CHRONIC NECK PAIN: ICD-10-CM

## 2023-05-01 DIAGNOSIS — I10 ESSENTIAL HYPERTENSION: ICD-10-CM

## 2023-05-01 DIAGNOSIS — M25.511 CHRONIC PAIN OF BOTH SHOULDERS: ICD-10-CM

## 2023-05-01 DIAGNOSIS — E11.22 TYPE 2 DIABETES MELLITUS WITH STAGE 3A CHRONIC KIDNEY DISEASE, WITH LONG-TERM CURRENT USE OF INSULIN (HCC): ICD-10-CM

## 2023-05-01 DIAGNOSIS — G89.29 CHRONIC PAIN OF BOTH SHOULDERS: ICD-10-CM

## 2023-05-01 DIAGNOSIS — N18.31 TYPE 2 DIABETES MELLITUS WITH STAGE 3A CHRONIC KIDNEY DISEASE, WITH LONG-TERM CURRENT USE OF INSULIN (HCC): ICD-10-CM

## 2023-05-01 DIAGNOSIS — Z79.4 TYPE 2 DIABETES MELLITUS WITH STAGE 3A CHRONIC KIDNEY DISEASE, WITH LONG-TERM CURRENT USE OF INSULIN (HCC): ICD-10-CM

## 2023-05-01 DIAGNOSIS — M54.2 CHRONIC NECK PAIN: ICD-10-CM

## 2023-05-01 DIAGNOSIS — M25.512 CHRONIC PAIN OF BOTH SHOULDERS: ICD-10-CM

## 2023-05-01 DIAGNOSIS — E78.00 PURE HYPERCHOLESTEROLEMIA: ICD-10-CM

## 2023-05-01 DIAGNOSIS — N32.81 OVERACTIVE BLADDER: ICD-10-CM

## 2023-05-01 LAB
BASOPHILS # BLD: 0.1 K/UL (ref 0–0.2)
BASOPHILS NFR BLD: 0.9 % (ref 0–1)
CHOLEST SERPL-MCNC: 208 MG/DL (ref 160–199)
EOSINOPHIL # BLD: 0.2 K/UL (ref 0–0.6)
EOSINOPHIL NFR BLD: 2.3 % (ref 0–5)
ERYTHROCYTE [DISTWIDTH] IN BLOOD BY AUTOMATED COUNT: 12.9 % (ref 11.5–14.5)
HBA1C MFR BLD: 8.7 % (ref 4–6)
HCT VFR BLD AUTO: 43.1 % (ref 42–52)
HDLC SERPL-MCNC: 65 MG/DL (ref 55–121)
HGB BLD-MCNC: 13.5 G/DL (ref 14–18)
IMM GRANULOCYTES # BLD: 0 K/UL
LDLC SERPL CALC-MCNC: 119 MG/DL
LYMPHOCYTES # BLD: 2.8 K/UL (ref 1.1–4.5)
LYMPHOCYTES NFR BLD: 32.5 % (ref 20–40)
MCH RBC QN AUTO: 29.4 PG (ref 27–31)
MCHC RBC AUTO-ENTMCNC: 31.3 G/DL (ref 33–37)
MCV RBC AUTO: 93.9 FL (ref 80–94)
MONOCYTES # BLD: 0.8 K/UL (ref 0–0.9)
MONOCYTES NFR BLD: 8.9 % (ref 0–10)
NEUTROPHILS # BLD: 4.8 K/UL (ref 1.5–7.5)
NEUTS SEG NFR BLD: 55.3 % (ref 50–65)
PLATELET # BLD AUTO: 426 K/UL (ref 130–400)
PMV BLD AUTO: 9.5 FL (ref 9.4–12.4)
RBC # BLD AUTO: 4.59 M/UL (ref 4.7–6.1)
TRIGL SERPL-MCNC: 118 MG/DL (ref 0–149)
WBC # BLD AUTO: 8.7 K/UL (ref 4.8–10.8)

## 2023-05-01 PROCEDURE — 72040 X-RAY EXAM NECK SPINE 2-3 VW: CPT | Performed by: RADIOLOGY

## 2023-05-01 PROCEDURE — 72040 X-RAY EXAM NECK SPINE 2-3 VW: CPT

## 2023-05-02 ENCOUNTER — TELEPHONE (OUTPATIENT)
Dept: PRIMARY CARE CLINIC | Age: 73
End: 2023-05-02

## 2023-05-02 ENCOUNTER — HOSPITAL ENCOUNTER (OUTPATIENT)
Dept: NEUROLOGY | Age: 73
Discharge: HOME | End: 2023-05-02
Payer: MEDICARE

## 2023-05-02 ENCOUNTER — HOSPITAL ENCOUNTER (OUTPATIENT)
Dept: MRI IMAGING | Age: 73
Discharge: HOME | End: 2023-05-02
Payer: MEDICARE

## 2023-05-02 DIAGNOSIS — R56.9 CONVULSIONS, UNSPECIFIED CONVULSION TYPE (HCC): ICD-10-CM

## 2023-05-02 DIAGNOSIS — Z82.49 FAMILY HISTORY OF CEREBRAL ANEURYSM: ICD-10-CM

## 2023-05-02 DIAGNOSIS — R55 SYNCOPE, UNSPECIFIED SYNCOPE TYPE: ICD-10-CM

## 2023-05-02 PROCEDURE — 70553 MRI BRAIN STEM W/O & W/DYE: CPT

## 2023-05-02 PROCEDURE — 95816 EEG AWAKE AND DROWSY: CPT

## 2023-05-02 PROCEDURE — 95816 EEG AWAKE AND DROWSY: CPT | Performed by: PSYCHIATRY & NEUROLOGY

## 2023-05-02 PROCEDURE — 70553 MRI BRAIN STEM W/O & W/DYE: CPT | Performed by: RADIOLOGY

## 2023-05-02 PROCEDURE — 6360000004 HC RX CONTRAST MEDICATION: Performed by: NURSE PRACTITIONER

## 2023-05-02 PROCEDURE — 70544 MR ANGIOGRAPHY HEAD W/O DYE: CPT

## 2023-05-02 PROCEDURE — 70544 MR ANGIOGRAPHY HEAD W/O DYE: CPT | Performed by: RADIOLOGY

## 2023-05-02 PROCEDURE — A9577 INJ MULTIHANCE: HCPCS | Performed by: NURSE PRACTITIONER

## 2023-05-02 RX ADMIN — GADOBENATE DIMEGLUMINE 15 ML: 529 INJECTION, SOLUTION INTRAVENOUS at 13:17

## 2023-05-02 NOTE — PROCEDURES
ADULT OUTPATIENT ELECTROENCEPHALOGRAM REPORT    Patient:   Sofy Allison  MR#:    [id number]  YOB: 1950  Date of Evaluation:  5/2/2023  Primary Physician:     Everton Baer MD   Referring Physician:   ADI Vasquez      CLINICAL INFORMATION:     This patient is a 74 y.o. male with a history of convulsions, syncope. MEDICATIONS:     See MAR. RECORDING CONDITIONS:     This EEG was performed utilizing standard International 10-20 System of electrode placement, with additional channels monitored for eye movement. One channel electrocardiogram was monitored. Data was obtained, stored, and interpreted according to ACNS guidelines (J Clin Neurophysiol 2006;23(2):) utilizing referential montage recording, with reformatting to longitudinal, transverse bipolar, and referential montages as necessary for interpretation, along with the digital/automated EEG analysis. Patient tolerated entire procedure well. Photic stimulation and hyperventilation were utilized as activation procedures unless otherwise specified below. E.E.G. DESCRIPTION:     The resting predominant posterior background frequency is a 9-10 Hz 30-40 uV rhythm. No overt focal, lateralizing, or paroxysmal abnormalities were noted through the recording. Drowsiness and sleep were not demonstrated. Hyperventilation was not performed. Photic stimulation was performed and had little change on the recording. Muscle, motion, and eye movement artifacts were noted. EEG INTERPRETATION:    Normal EEG for age in the awake state. CLINICAL CORRELATION:     The absence of epileptiform abnormalities does not preclude a clinical diagnosis of seizures.        Arpan San DO  Board Certified Neurologist    Date reported: 5/2/2023  Date signed: 5/2/2023

## 2023-05-02 NOTE — TELEPHONE ENCOUNTER
Spoke with patient regarding labs. He voice understanding. An appointment was made to f/u regarding dm and cholesterol.

## 2023-05-03 ENCOUNTER — OFFICE VISIT (OUTPATIENT)
Dept: PRIMARY CARE CLINIC | Age: 73
End: 2023-05-03
Payer: MEDICARE

## 2023-05-03 ENCOUNTER — TELEPHONE (OUTPATIENT)
Dept: PRIMARY CARE CLINIC | Age: 73
End: 2023-05-03

## 2023-05-03 VITALS
TEMPERATURE: 97.1 F | WEIGHT: 173.6 LBS | HEIGHT: 70 IN | SYSTOLIC BLOOD PRESSURE: 154 MMHG | DIASTOLIC BLOOD PRESSURE: 72 MMHG | OXYGEN SATURATION: 97 % | BODY MASS INDEX: 24.85 KG/M2 | HEART RATE: 71 BPM

## 2023-05-03 DIAGNOSIS — Z76.0 MEDICATION REFILL: ICD-10-CM

## 2023-05-03 DIAGNOSIS — E78.00 PURE HYPERCHOLESTEROLEMIA: ICD-10-CM

## 2023-05-03 DIAGNOSIS — E11.65 UNCONTROLLED TYPE 2 DIABETES MELLITUS WITH HYPERGLYCEMIA (HCC): Primary | ICD-10-CM

## 2023-05-03 DIAGNOSIS — R55 SYNCOPE, UNSPECIFIED SYNCOPE TYPE: ICD-10-CM

## 2023-05-03 DIAGNOSIS — J44.9 CHRONIC OBSTRUCTIVE PULMONARY DISEASE, UNSPECIFIED COPD TYPE (HCC): ICD-10-CM

## 2023-05-03 PROCEDURE — G8427 DOCREV CUR MEDS BY ELIG CLIN: HCPCS | Performed by: FAMILY MEDICINE

## 2023-05-03 PROCEDURE — 3078F DIAST BP <80 MM HG: CPT | Performed by: FAMILY MEDICINE

## 2023-05-03 PROCEDURE — G8420 CALC BMI NORM PARAMETERS: HCPCS | Performed by: FAMILY MEDICINE

## 2023-05-03 PROCEDURE — 3023F SPIROM DOC REV: CPT | Performed by: FAMILY MEDICINE

## 2023-05-03 PROCEDURE — 4004F PT TOBACCO SCREEN RCVD TLK: CPT | Performed by: FAMILY MEDICINE

## 2023-05-03 PROCEDURE — 2022F DILAT RTA XM EVC RTNOPTHY: CPT | Performed by: FAMILY MEDICINE

## 2023-05-03 PROCEDURE — 1123F ACP DISCUSS/DSCN MKR DOCD: CPT | Performed by: FAMILY MEDICINE

## 2023-05-03 PROCEDURE — 99214 OFFICE O/P EST MOD 30 MIN: CPT | Performed by: FAMILY MEDICINE

## 2023-05-03 PROCEDURE — 3017F COLORECTAL CA SCREEN DOC REV: CPT | Performed by: FAMILY MEDICINE

## 2023-05-03 PROCEDURE — 3052F HG A1C>EQUAL 8.0%<EQUAL 9.0%: CPT | Performed by: FAMILY MEDICINE

## 2023-05-03 PROCEDURE — 3077F SYST BP >= 140 MM HG: CPT | Performed by: FAMILY MEDICINE

## 2023-05-03 RX ORDER — FLUTICASONE FUROATE AND VILANTEROL TRIFENATATE 100; 25 UG/1; UG/1
POWDER RESPIRATORY (INHALATION)
COMMUNITY
Start: 2023-04-12

## 2023-05-03 RX ORDER — ALBUTEROL SULFATE 90 UG/1
2 AEROSOL, METERED RESPIRATORY (INHALATION) EVERY 6 HOURS PRN
Qty: 3 EACH | Refills: 1 | Status: SHIPPED | OUTPATIENT
Start: 2023-05-03

## 2023-05-03 ASSESSMENT — ENCOUNTER SYMPTOMS
RESPIRATORY NEGATIVE: 1
NAUSEA: 0
EYES NEGATIVE: 1

## 2023-05-03 NOTE — TELEPHONE ENCOUNTER
----- Message from Perlita Redd MD sent at 5/2/2023 12:13 PM CDT -----  Please inform pt that CT scan show moderate arthritis in the cervical spine - this will explain his pain in the neck

## 2023-05-03 NOTE — PROGRESS NOTES
(ie; if symptoms worsen or fail to improve), were discussed and acknowledged.     Electronically signed by Francoise Toussaint MD on 5/3/23 at 9:59 AM CDT

## 2023-05-03 NOTE — TELEPHONE ENCOUNTER
----- Message from Hollis Salinas MD sent at 5/2/2023 11:53 AM CDT -----  A1c shows poor control of DM - His A1c 8 months ago was 6.8%  Cholesterol is not quite at goal  Blood count shows high platelet    I would like to have a follow-up visit with this pt to discuss treatment options for fine tuning his DM and Hyperlipidemia

## 2023-05-08 ENCOUNTER — OFFICE VISIT (OUTPATIENT)
Dept: NEUROLOGY | Age: 73
End: 2023-05-08
Payer: MEDICARE

## 2023-05-08 VITALS
DIASTOLIC BLOOD PRESSURE: 63 MMHG | OXYGEN SATURATION: 100 % | BODY MASS INDEX: 24.77 KG/M2 | HEART RATE: 68 BPM | WEIGHT: 173 LBS | SYSTOLIC BLOOD PRESSURE: 144 MMHG | HEIGHT: 70 IN

## 2023-05-08 DIAGNOSIS — R55 SYNCOPE, UNSPECIFIED SYNCOPE TYPE: ICD-10-CM

## 2023-05-08 DIAGNOSIS — R56.9 CONVULSIONS, UNSPECIFIED CONVULSION TYPE (HCC): Primary | ICD-10-CM

## 2023-05-08 PROCEDURE — 99213 OFFICE O/P EST LOW 20 MIN: CPT | Performed by: NURSE PRACTITIONER

## 2023-05-08 PROCEDURE — 4004F PT TOBACCO SCREEN RCVD TLK: CPT | Performed by: NURSE PRACTITIONER

## 2023-05-08 PROCEDURE — 1123F ACP DISCUSS/DSCN MKR DOCD: CPT | Performed by: NURSE PRACTITIONER

## 2023-05-08 PROCEDURE — G8420 CALC BMI NORM PARAMETERS: HCPCS | Performed by: NURSE PRACTITIONER

## 2023-05-08 PROCEDURE — 3017F COLORECTAL CA SCREEN DOC REV: CPT | Performed by: NURSE PRACTITIONER

## 2023-05-08 PROCEDURE — 3078F DIAST BP <80 MM HG: CPT | Performed by: NURSE PRACTITIONER

## 2023-05-08 PROCEDURE — G8427 DOCREV CUR MEDS BY ELIG CLIN: HCPCS | Performed by: NURSE PRACTITIONER

## 2023-05-08 PROCEDURE — 3077F SYST BP >= 140 MM HG: CPT | Performed by: NURSE PRACTITIONER

## 2023-05-08 NOTE — PROGRESS NOTES
REVIEW OF SYSTEMS    Constitutional: []Fever []Sweats []Chills [] Recent Injury [x] Denies all unless marked  HEENT:[]Headache  [] Head Injury [] Hearing Loss  [] Sore Throat  [] Ear Ache [x] Denies all unless marked  Spine:  [x] Neck pain  [] Back pain  [] Sciaticia  [x] Denies all unless marked  Cardiovascular:[]Heart Disease []Palpitations [] Chest Pain   [x] Denies all unless marked  Pulmonary: []Shortness of Breath []Cough   [x] Denies all unless marked  Psychiatric/Behavioral:[] Depression [] Anxiety [x] Denies all unless marked  Gastrointestinal: []Nausea  []Vomiting  []Abdominal Pain  []Constipation  []Diarrhea  [x] Denies all unless marked  Genitourinary:   [] Frequency  [] Urgency  [] Dysuria [] Incontinence  [x] Denies all unless marked  Extremities: []Pain  []Swelling  [x] Denies all unless marked  Musculoskeletal: [] Myalgias  [] Joint Pain  [] Arthritis [] Muscle Cramps [] Muscle Twitches  [x] Denies all unless marked  Sleep: []Insomnia[]Snoring []Restless Legs  []Sleep Apnea  []Daytime Sleepiness  [x] Denies all unless marked  Skin:[] Rash [] Color Change [x] Denies all unless marked   Neurological:[]Visual Disturbance [] Memory Loss []Loss of Balance []Slurred Speech []Weakness []Seizures  [] Dizziness [x] Denies all unless marked
+------------------------------------+----------+---------------+----------+ ! Peroneal                            !Yes       ! Yes            ! None      ! +------------------------------------+----------+---------------+----------+    Zio patch (12/2022)- sinus rhythm with sinus jessica and sinus tachy. Non sustained ventricular tachycardia. Rare ectopy. Echocardiogram (11/2022)- EF 65-70%; normal valvular function     CT head (11/2022)- mild cerebral and cerebellar atrophy with chronic microvascular disease, nothing acute     MRI brain (5/2023)-mild small vessel disease    MRA head (5/2023)- normal     EEG (5/2023)- normal     Reviewed referral records     ASSESSMENT:    Costa Ramsey is a 74y.o. year old male here for follow up of syncopal episodes. Denies further events, last event was February 2023 now. Exam is nonfocal.  He has had a fairly extensive cardiac work-up that has been unremarkable. MRI brain with mild small vessel disease. MRA head normal. EEG normal.  He has not had a carotid artery ultrasound completed yet. Question vasovagal type response, seizures not completely excluded. Off on AED therapy, consider with any recurrent events. Could consider longer-term monitoring if events recur. ICD-10-CM    1. Convulsions, unspecified convulsion type ([facility] [address]  R56.9       2. Syncope, unspecified syncope type  R55         PLAN:  Re try carotid artery ultrasound   Hold off on AED therapy for now, follow along clinically. Consider longer term monitoring if events recur. Consider adding AED with any further events   Event precautions discussed. No heights, swimming, tub baths, open flames, or heavy machinery. Driving per [employer] Return in about 3 months (around 8/8/2023) for follow up, sooner if worsening.     Desi Chow DNP, APRN

## 2023-07-13 RX ORDER — ATORVASTATIN CALCIUM 20 MG/1
TABLET, FILM COATED ORAL
Qty: 90 TABLET | Refills: 1 | Status: SHIPPED | OUTPATIENT
Start: 2023-07-13

## 2023-07-13 NOTE — TELEPHONE ENCOUNTER
Shi Gardner called to request a refill on his medication.       Last office visit : 5/3/2023   Next office visit : 7/17/2023     Requested Prescriptions     Pending Prescriptions Disp Refills    atorvastatin (LIPITOR) 20 MG tablet [Pharmacy Med Name: ATORVASTATIN CALCIUM 20 MG Tablet] 90 tablet 1     Sig: TAKE 1 TABLET EVERY DAY            Natasha Sanchez LPN

## 2023-08-21 ENCOUNTER — HOSPITAL ENCOUNTER (OUTPATIENT)
Dept: CT IMAGING | Facility: HOSPITAL | Age: 73
Discharge: HOME | End: 2023-08-21

## 2023-08-21 ENCOUNTER — TRANSCRIBE ORDERS (OUTPATIENT)
Dept: ADMINISTRATIVE | Facility: HOSPITAL | Age: 73
End: 2023-08-21
Payer: MEDICARE

## 2023-08-21 DIAGNOSIS — Z13.9 SCREENING FOR UNSPECIFIED CONDITION: ICD-10-CM

## 2023-08-21 DIAGNOSIS — Z13.9 SCREENING FOR UNSPECIFIED CONDITION: Primary | ICD-10-CM

## 2023-08-21 PROCEDURE — 71271 CT THORAX LUNG CANCER SCR C-: CPT

## 2023-08-25 DIAGNOSIS — M47.812 CERVICAL ARTHRITIS: ICD-10-CM

## 2023-08-25 RX ORDER — NAPROXEN 500 MG/1
TABLET ORAL
Qty: 90 TABLET | OUTPATIENT
Start: 2023-08-25

## 2023-08-25 NOTE — TELEPHONE ENCOUNTER
Jorden De La Garza called to request a refill on his medication.       Last office visit : 5/3/2023   Next office visit : Visit date not found     Requested Prescriptions     Pending Prescriptions Disp Refills    naproxen (NAPROSYN) 500 MG tablet [Pharmacy Med Name: NAPROXEN 500 MG Tablet] 90 tablet      Sig: TAKE 1 TABLET EVERY DAY            Vincent Esquivel LPN

## 2023-08-27 DIAGNOSIS — M54.2 CHRONIC NECK PAIN: ICD-10-CM

## 2023-08-27 DIAGNOSIS — G47.01 INSOMNIA DUE TO MEDICAL CONDITION: ICD-10-CM

## 2023-08-27 DIAGNOSIS — G62.9 NEUROPATHY: ICD-10-CM

## 2023-08-27 DIAGNOSIS — Z76.0 MEDICATION REFILL: ICD-10-CM

## 2023-08-27 DIAGNOSIS — G89.29 CHRONIC NECK PAIN: ICD-10-CM

## 2023-08-28 RX ORDER — TRAZODONE HYDROCHLORIDE 50 MG/1
TABLET ORAL
Qty: 90 TABLET | Refills: 0 | Status: SHIPPED | OUTPATIENT
Start: 2023-08-28

## 2023-08-28 RX ORDER — GABAPENTIN 300 MG/1
CAPSULE ORAL
Qty: 30 CAPSULE | Refills: 0 | Status: SHIPPED | OUTPATIENT
Start: 2023-08-28 | End: 2023-09-27

## 2023-08-28 NOTE — TELEPHONE ENCOUNTER
Rob Ruth called to request a refill on his medication. Last office visit : 5/3/2023   Next office visit : Visit date not found     Last UDS: No results found for: Anders Nelli, LABBENZ, [address], COCAIMETSCRU, GABAPENTIN, MDMA, METAMPU, [address], OXTCOSU, [address], [place], THCSCREENUR, TRICYUR    Last Mehdi Tate: 08-  Medication Contract: Not found will call to have him come in for uds and medication contract  Last Fill: 04-    Requested Prescriptions     Pending Prescriptions Disp Refills    gabapentin (NEURONTIN) 300 MG capsule [Pharmacy Med Name: GABAPENTIN 300 MG Capsule] 30 capsule      Sig: TAKE 1 CAPSULE BY MOUTH DAILY. MAX DAILY AMOUNT: 300 MG    traZODone (DESYREL) 50 MG tablet [Pharmacy Med Name: TRAZODONE HYDROCHLORIDE 50 MG Tablet] 90 tablet 0     Sig: TAKE 1 TABLET EVERY NIGHT         Please approve or refuse this medication.    Ashwin Morales MA

## 2023-11-01 ENCOUNTER — OFFICE VISIT (OUTPATIENT)
Dept: PRIMARY CARE CLINIC | Age: 73
End: 2023-11-01
Payer: MEDICARE

## 2023-11-01 VITALS
HEART RATE: 81 BPM | SYSTOLIC BLOOD PRESSURE: 126 MMHG | DIASTOLIC BLOOD PRESSURE: 68 MMHG | HEIGHT: 70 IN | BODY MASS INDEX: 24.08 KG/M2 | WEIGHT: 168.2 LBS | TEMPERATURE: 97.2 F | OXYGEN SATURATION: 97 %

## 2023-11-01 DIAGNOSIS — Z11.59 NEED FOR HEPATITIS C SCREENING TEST: ICD-10-CM

## 2023-11-01 DIAGNOSIS — E11.69 TYPE 2 DIABETES MELLITUS WITH OTHER SPECIFIED COMPLICATION, WITHOUT LONG-TERM CURRENT USE OF INSULIN (HCC): ICD-10-CM

## 2023-11-01 DIAGNOSIS — I10 ESSENTIAL HYPERTENSION: ICD-10-CM

## 2023-11-01 DIAGNOSIS — Z23 NEED FOR INFLUENZA VACCINATION: Primary | ICD-10-CM

## 2023-11-01 DIAGNOSIS — Z00.00 MEDICARE ANNUAL WELLNESS VISIT, SUBSEQUENT: ICD-10-CM

## 2023-11-01 DIAGNOSIS — R29.898 LEFT LEG WEAKNESS: ICD-10-CM

## 2023-11-01 PROCEDURE — G0008 ADMIN INFLUENZA VIRUS VAC: HCPCS | Performed by: FAMILY MEDICINE

## 2023-11-01 PROCEDURE — 3052F HG A1C>EQUAL 8.0%<EQUAL 9.0%: CPT | Performed by: FAMILY MEDICINE

## 2023-11-01 PROCEDURE — G0439 PPPS, SUBSEQ VISIT: HCPCS | Performed by: FAMILY MEDICINE

## 2023-11-01 PROCEDURE — G8484 FLU IMMUNIZE NO ADMIN: HCPCS | Performed by: FAMILY MEDICINE

## 2023-11-01 PROCEDURE — 3017F COLORECTAL CA SCREEN DOC REV: CPT | Performed by: FAMILY MEDICINE

## 2023-11-01 PROCEDURE — 1123F ACP DISCUSS/DSCN MKR DOCD: CPT | Performed by: FAMILY MEDICINE

## 2023-11-01 PROCEDURE — 90694 VACC AIIV4 NO PRSRV 0.5ML IM: CPT | Performed by: FAMILY MEDICINE

## 2023-11-01 PROCEDURE — 3074F SYST BP LT 130 MM HG: CPT | Performed by: FAMILY MEDICINE

## 2023-11-01 PROCEDURE — 3078F DIAST BP <80 MM HG: CPT | Performed by: FAMILY MEDICINE

## 2023-11-01 ASSESSMENT — LIFESTYLE VARIABLES
HOW OFTEN DO YOU HAVE A DRINK CONTAINING ALCOHOL: MONTHLY OR LESS
HOW MANY STANDARD DRINKS CONTAINING ALCOHOL DO YOU HAVE ON A TYPICAL DAY: 1 OR 2

## 2023-11-01 ASSESSMENT — PATIENT HEALTH QUESTIONNAIRE - PHQ9
SUM OF ALL RESPONSES TO PHQ QUESTIONS 1-9: 0
SUM OF ALL RESPONSES TO PHQ9 QUESTIONS 1 & 2: 0
SUM OF ALL RESPONSES TO PHQ QUESTIONS 1-9: 0
2. FEELING DOWN, DEPRESSED OR HOPELESS: 0
1. LITTLE INTEREST OR PLEASURE IN DOING THINGS: 0
SUM OF ALL RESPONSES TO PHQ QUESTIONS 1-9: 0
SUM OF ALL RESPONSES TO PHQ QUESTIONS 1-9: 0

## 2023-11-01 ASSESSMENT — ENCOUNTER SYMPTOMS
GASTROINTESTINAL NEGATIVE: 1
COUGH: 1
SHORTNESS OF BREATH: 1

## 2023-11-01 NOTE — PROGRESS NOTES
[facility] PRIMARY CARE  [facility],[address] [place] [address]  Dept: [phone]  Dept Fax: [phone]  Loc: [phone]      Subjective:     Chief Complaint   Patient presents with    Medicare AWV       HPI:  Jazmin Espinal is a 74 y.o. male presents today for his AMV but is also here with complaints of weakness on the L side and has trouble walking on his L leg. He states he has trouble picking up his leg and he is dragging his feet. His L knee gives out on him sometimes. He states that he also noted numbness in his L arm but now he has lingering numbness in his 2 fingers  No chest pains reported. He has exertional dyspnea from chronic smoking but he states it is not any worse. He continues to smoke 1/2 pack a day. ROS:   Review of Systems   Constitutional:  Positive for activity change and fatigue. HENT: Negative. Respiratory:  Positive for cough (smoker's cough) and shortness of breath (at baseline). Gastrointestinal: Negative. Genitourinary: Negative. Musculoskeletal:  Positive for arthralgias (at baseline). Skin: Negative. Neurological:  Positive for weakness (L leg) and numbness (L arm). Hematological:  Negative for adenopathy. Psychiatric/Behavioral: Negative.          PMHx:  Past Medical History:   Diagnosis Date    BPH (benign prostatic hyperplasia)     Dizziness     DM (diabetes mellitus) ([address] )     type 2    Dysphagia     HTN (hypertension)     Hyperlipidemia     Hypogonadism male     Peripheral neuropathy     Tobacco dependence      Patient Active Problem List   Diagnosis    Benign prostatic hyperplasia with lower urinary tract symptoms    Tobacco abuse    Mixed hyperlipidemia    Testosterone deficiency    Essential hypertension    Diabetes mellitus (HCC)    GERD (gastroesophageal reflux disease)    Chronic obstructive pulmonary disease (HCC)       PSHx:  Past Surgical History:   Procedure Laterality Date    BACK SURGERY

## 2023-11-01 NOTE — PROGRESS NOTES
After obtaining consent, and per orders of Dr. Kristi Caro, injection of Fluad influenza injection given in Left deltoid by Manju Rai. Patient signed consent scanned into patients chart.

## 2023-11-03 DIAGNOSIS — Z76.0 MEDICATION REFILL: ICD-10-CM

## 2023-11-03 DIAGNOSIS — E11.69 TYPE 2 DIABETES MELLITUS WITH OTHER SPECIFIED COMPLICATION, WITHOUT LONG-TERM CURRENT USE OF INSULIN (HCC): ICD-10-CM

## 2023-11-03 RX ORDER — INSULIN GLARGINE AND LIXISENATIDE 100; 33 U/ML; UG/ML
INJECTION, SOLUTION SUBCUTANEOUS
Qty: 30 ML | Refills: 10 | Status: SHIPPED | OUTPATIENT
Start: 2023-11-03

## 2023-11-03 NOTE — TELEPHONE ENCOUNTER
Nilo Hernandez called to request a refill on his medication.       Last office visit : 11/1/2023   Next office visit : 12/13/2023     Requested Prescriptions     Pending Prescriptions Disp Refills    SOLIQUA 100-33 UNT-MCG/ML SOPN [Pharmacy Med Name: Nini Magallanes 100/33 100-33 UNT-MCG/ML Solution Pen-injector] 30 mL 10     Sig: INJECT 26 UNITS UNDER THE SKIN DAILY            Nelly Burns LPN

## 2023-11-07 ENCOUNTER — HOSPITAL ENCOUNTER (OUTPATIENT)
Dept: PHYSICAL THERAPY | Age: 73
Setting detail: THERAPIES SERIES
Discharge: HOME | End: 2023-11-07
Payer: MEDICARE

## 2023-11-07 VITALS — OXYGEN SATURATION: 96 % | HEART RATE: 75 BPM | DIASTOLIC BLOOD PRESSURE: 59 MMHG | SYSTOLIC BLOOD PRESSURE: 137 MMHG

## 2023-11-07 PROCEDURE — 97162 PT EVAL MOD COMPLEX 30 MIN: CPT

## 2023-11-07 PROCEDURE — 97110 THERAPEUTIC EXERCISES: CPT

## 2023-11-07 ASSESSMENT — 10 METER WALK TEST (10METWT)
SCORE (M/S): 0.79
TRIAL 1: TIME TO WALK 10 METERS: 12.68
AVERAGE: 12.7

## 2023-11-07 NOTE — PROGRESS NOTES
hip abd with t band; add with ball, 10 reps each  Exercise 11: Standing, hip abd, ext ext, heel raise, mini squat, 10 reps each  Exercise 12: //, walling forw.back/Side-side, 10 reps  Exercise 13: //, hurdles forw/back; side to side, 10 reps  Exercise 14: Standing, box steps, CW, CCW, 5 reps  Exercise 15: Standing, step ups, 6\", 10 reps each  Exercise 16: //, feet together, EO, EC, 30 sec each                          Therapy Time  Individual Time In: 0900       Individual Time Out: 1000  Minutes: 60  Timed Code Treatment Minutes: 60 Minutes     Therapist Signature: Elham Swenson, PT    Date: 86/7/8229     I certify that the above Therapy Services are being furnished while the patient is under my care. I agree with the treatment plan and certify that this therapy is necessary. Physician's Signature:  ___________________________   Date:_______                                                                   Judith Green MD        Physician Comments: _______________________________________________    Please sign and return to [facility] PHYS[facility]ER[facility]. Please fax to the location listed below.  [facility] for this referral!    [address]  [facility]YS[facility]  [address] [place] [address]  Dept: [phone]  Dept Fax: Frieda Lin: [phone]       POC NOTE

## 2023-11-14 ENCOUNTER — HOSPITAL ENCOUNTER (OUTPATIENT)
Dept: PHYSICAL THERAPY | Age: 73
Setting detail: THERAPIES SERIES
Discharge: HOME | End: 2023-11-14
Payer: MEDICARE

## 2023-11-14 DIAGNOSIS — Z76.0 MEDICATION REFILL: ICD-10-CM

## 2023-11-14 DIAGNOSIS — G62.9 NEUROPATHY: ICD-10-CM

## 2023-11-14 PROCEDURE — 97110 THERAPEUTIC EXERCISES: CPT

## 2023-11-14 RX ORDER — GABAPENTIN 300 MG/1
CAPSULE ORAL
Qty: 30 CAPSULE | Refills: 0 | Status: SHIPPED | OUTPATIENT
Start: 2023-11-14 | End: 2023-12-14

## 2023-11-14 NOTE — TELEPHONE ENCOUNTER
Melisa Cheng called to request a refill on his medication. Last office visit : 11/1/2023   Next office visit : 12/13/2023     Last UDS: No results found for: \"LABAMPH\", \"LABBARB\", \"LABBENZ\", \"BUPRENUR\", \"COCAIMETSCRU\", \"GABAPENTIN\", \"MDMA\", \"METAMPU\", \"OPIATESCREENURINE\", \"OXTCOSU\", \"PHENCYCLIDINESCREENURINE\", \"PROPOXYPHENE\", \"THCSCREENUR\", \"TRICYUR\"    Last Marietta Lean: 11/14/2023  Medication Contract: 11/14/2023  Last Fill: 08/28/2023    Requested Prescriptions     Pending Prescriptions Disp Refills    gabapentin (NEURONTIN) 300 MG capsule [Pharmacy Med Name: GABAPENTIN 300 MG Capsule] 30 capsule      Sig: TAKE 1 CAPSULE ONE TIME DAILY. MAX DAILY AMOUNT: 300MG. Please approve or refuse this medication.    Angelo Salazar MA

## 2023-11-15 ENCOUNTER — NURSE ONLY (OUTPATIENT)
Dept: PRIMARY CARE CLINIC | Age: 73
End: 2023-11-15

## 2023-11-15 DIAGNOSIS — Z79.899 MEDICATION MANAGEMENT: Primary | ICD-10-CM

## 2023-11-15 LAB
ALCOHOL URINE: NEGATIVE
AMPHETAMINE SCREEN, URINE: NEGATIVE
BARBITURATE SCREEN, URINE: NEGATIVE
BENZODIAZEPINE SCREEN, URINE: NEGATIVE
BUPRENORPHINE URINE: NEGATIVE
COCAINE METABOLITE SCREEN URINE: NEGATIVE
FENTANYL SCREEN, URINE: NEGATIVE
GABAPENTIN SCREEN, URINE: NORMAL
MDMA URINE: NEGATIVE
METHADONE SCREEN, URINE: NEGATIVE
METHAMPHETAMINE, URINE: NEGATIVE
OPIATE SCREEN URINE: NEGATIVE
OXYCODONE SCREEN URINE: NEGATIVE
PHENCYCLIDINE SCREEN URINE: NEGATIVE
PROPOXYPHENE SCREEN, URINE: NEGATIVE
SYNTHETIC CANNABINOIDS(K2) SCREEN, URINE: NEGATIVE
THC SCREEN, URINE: NORMAL
TRAMADOL SCREEN URINE: NEGATIVE
TRICYCLIC ANTIDEPRESSANTS, UR: NEGATIVE

## 2023-11-15 PROCEDURE — 99999 PR OFFICE/OUTPT VISIT,PROCEDURE ONLY: CPT | Performed by: FAMILY MEDICINE

## 2023-11-15 NOTE — PROGRESS NOTES
Physical Therapy: Daily Note   Patient: Arvjulia Loser (56 y.o. male)   Examination Date:   Plan of Care/Certification Expiration Date: 23    No data recorded   :  1950 # of Visits since [facility]:   2   MRN: [id number]  CSN: [id number] Start of Care Date:   2023   Insurance: Payor: Tobias Fruit / Plan: Kristi Salazar / Product Type: *No Product type* /   Insurance ID: [id number] - (Medicare Managed) Secondary Insurance (if applicable):    Referring Physician: Baylee Coreas MD [address] [place] [address]   PCP: Baylee Coreas MD Visits to Date/Visits Approved:     No Show/Cancelled Appts:   /       Medical Diagnosis: Other symptoms and signs involving the musculoskeletal system [R29.898]    Treatment Diagnosis: LLE weakness        SUBJECTIVE EXAMINATION   Pain Level: Pain Screening  Patient Currently in Pain: Denies    Patient Comments: Subjective: Doing well and has no new complaints since initial evaluation.     OBJECTIVE EXAMINATION   Restrictions:  No data recorded No data recorded No data recorded      TREATMENT     Exercises:      Treatment Reasoning    Exercise 1: LBE, 5 min- not today  Exercise 2: Needs multiple pillows or wedge for supine activities------ Supine, TA series, alone 5\" x 10 reps, UE x 10, LE x 10, UE/LE x 5 ea  Exercise 3: Supine, lower trunk rotation, 5 x 5\"  Exercise 4: Supine, bridges, 10 reps  Exercise 5: Supine, SLR, hip abd, 10 reps  Exercise 6: Supine, hip abd with t band (red); add with ball, 10 re-s  Exercise 7: Sitting, t band leg curls, 10 reps (red)  Exercise 8: Sitting, sit to stand, mat to chair progression, 10  reps  Exercise 9: Sitting, LAQ, 3#, 10 reps- not today  Exercise 10: Sitting, hip abd with t band (red); add with ball, 10 reps each  Exercise 11: Standing, hip abd, ext ext, heel raise, mini squat, 10 reps each  Exercise 12: //, walling forw.back/Side-side, 10 reps- not today  Exercise 13: //, hurdles forw/back; side to side, 10 reps- not

## 2023-11-16 ENCOUNTER — HOSPITAL ENCOUNTER (OUTPATIENT)
Dept: PHYSICAL THERAPY | Age: 73
Setting detail: THERAPIES SERIES
Discharge: HOME | End: 2023-11-16
Payer: MEDICARE

## 2023-11-16 PROCEDURE — 97110 THERAPEUTIC EXERCISES: CPT

## 2023-11-16 NOTE — PROGRESS NOTES
Physical Therapy  Daily Treatment Note  Date: 2023  Patient Name: Osmar Jackson  MRN: [id number]     :   1950      Subjective:   General  Additional Pertinent Hx: 74year old male has been referred for PT eval and treatment for LLE weakness. He had onset of weakness about 6 months ago. He relates onset of LUE weakness and numbness a few weeks ago. The numbness has resolved except for residual numbness of thumb and index fingers. PMH: T2DM; Peripherial Neuropathy; HTN; HLD. PT Visit Information  PT Insurance Information: Mey Quiroz  Policy#: [id number]  Total # of Visits Approved: 11  Total # of Visits to Date: 3  Plan of Care/Certification Expiration Date: 23  Progress Note Due Date: 23  Referring Provider (secondary): Mansi  Subjective: I think I need a chriopractor. My neck hurts every morning when I wake up.     Pain Screening  Patient Currently in Pain: Denies         Treatment Activities:    Exercises  Exercise 1: LBE, 5 min- not today  Exercise 2: Needs multiple pillows or wedge for supine activities------ Supine, TA series, alone 5\" x 10 reps, UE x 10, LE x 10, UE/LE x 5 ea  Exercise 3: Supine, lower trunk rotation, 5 x 5\"  Exercise 4: Supine, bridges, 10 reps  Exercise 5: Supine, SLR, hip abd, 10 reps  Exercise 6: Supine, hip abd with t band (red); add with ball, 10  Exercise 7: Sitting, t band leg curls, 10 reps (red)  Exercise 8: Sitting, sit to stand, mat to chair progression, 10  reps  Exercise 9: Sitting, LAQ, 3#, 10 reps- not today  Exercise 10: Sitting, hip abd with t band (red); add with ball, 10 reps each  Exercise 11: Standing, hip abd, ext ext, heel raise, mini squat, 10 reps each  Exercise 12: walling forw.back/Side-side on line, x 2  Exercise 13: //, hurdles forw/back; side to side, 10 reps- not today  Exercise 14: Standing, box steps, CW, CCW, 5 reps  Exercise 15: Standing, step ups, 6\", 10 reps each  Exercise 16: feet together, EO, EC, 1 minute each

## 2023-11-29 DIAGNOSIS — Z76.0 MEDICATION REFILL: ICD-10-CM

## 2023-11-29 DIAGNOSIS — J44.9 CHRONIC OBSTRUCTIVE PULMONARY DISEASE, UNSPECIFIED COPD TYPE (HCC): ICD-10-CM

## 2023-11-29 RX ORDER — ALBUTEROL SULFATE 90 UG/1
2 AEROSOL, METERED RESPIRATORY (INHALATION) EVERY 6 HOURS PRN
Qty: 3 EACH | Refills: 0 | Status: SHIPPED | OUTPATIENT
Start: 2023-11-29

## 2023-11-29 NOTE — TELEPHONE ENCOUNTER
Doris Crissy called to request a refill on his medication.       Last office visit : 11/1/2023   Next office visit : 12/13/2023     Requested Prescriptions     Pending Prescriptions Disp Refills    albuterol sulfate HFA (PROVENTIL;VENTOLIN;PROAIR) 108 (90 Base) MCG/ACT inhaler [Pharmacy Med Name: ALBUTEROL SULFATE  (90 Base) MCG/ACT Aerosol Solution] 3 each 3     Sig: INHALE 2 PUFFS EVERY 6 HOURS AS NEEDED FOR WHEEZING            Vidya Myles LPN

## 2023-11-30 ENCOUNTER — APPOINTMENT (OUTPATIENT)
Dept: PHYSICAL THERAPY | Age: 73
End: 2023-11-30
Payer: MEDICARE

## 2023-12-13 ENCOUNTER — OFFICE VISIT (OUTPATIENT)
Dept: PRIMARY CARE CLINIC | Age: 73
End: 2023-12-13
Payer: MEDICARE

## 2023-12-13 ENCOUNTER — HOSPITAL ENCOUNTER (OUTPATIENT)
Dept: PHYSICAL THERAPY | Age: 73
Setting detail: THERAPIES SERIES
Discharge: HOME | End: 2023-12-13
Payer: MEDICARE

## 2023-12-13 VITALS
WEIGHT: 170.4 LBS | SYSTOLIC BLOOD PRESSURE: 174 MMHG | HEART RATE: 53 BPM | HEIGHT: 71 IN | BODY MASS INDEX: 23.85 KG/M2 | DIASTOLIC BLOOD PRESSURE: 68 MMHG | TEMPERATURE: 97.1 F | OXYGEN SATURATION: 99 %

## 2023-12-13 DIAGNOSIS — J44.9 CHRONIC OBSTRUCTIVE PULMONARY DISEASE, UNSPECIFIED COPD TYPE (HCC): ICD-10-CM

## 2023-12-13 DIAGNOSIS — E11.69 TYPE 2 DIABETES MELLITUS WITH OTHER SPECIFIED COMPLICATION, WITHOUT LONG-TERM CURRENT USE OF INSULIN (HCC): ICD-10-CM

## 2023-12-13 DIAGNOSIS — Z11.59 NEED FOR HEPATITIS C SCREENING TEST: ICD-10-CM

## 2023-12-13 DIAGNOSIS — I10 ESSENTIAL HYPERTENSION: Primary | ICD-10-CM

## 2023-12-13 DIAGNOSIS — I10 ESSENTIAL HYPERTENSION: ICD-10-CM

## 2023-12-13 LAB
ALBUMIN SERPL-MCNC: 4.2 G/DL (ref 3.5–5.2)
ALP SERPL-CCNC: 59 U/L (ref 40–130)
ALT SERPL-CCNC: 23 U/L (ref 5–41)
ANION GAP SERPL CALCULATED.3IONS-SCNC: 7 MMOL/L (ref 7–19)
AST SERPL-CCNC: 21 U/L (ref 5–40)
BILIRUB SERPL-MCNC: 0.6 MG/DL (ref 0.2–1.2)
BUN SERPL-MCNC: 14 MG/DL (ref 8–23)
CALCIUM SERPL-MCNC: 9.8 MG/DL (ref 8.8–10.2)
CHLORIDE SERPL-SCNC: 101 MMOL/L (ref 98–111)
CHOLEST SERPL-MCNC: 220 MG/DL (ref 160–199)
CO2 SERPL-SCNC: 34 MMOL/L (ref 22–29)
CREAT SERPL-MCNC: 0.9 MG/DL (ref 0.5–1.2)
CREAT UR-MCNC: 83.3 MG/DL (ref 39–259)
GLUCOSE SERPL-MCNC: 74 MG/DL (ref 74–109)
HBA1C MFR BLD: 6.3 % (ref 4–6)
HCV AB SERPL QL IA: NORMAL
HDLC SERPL-MCNC: 88 MG/DL (ref 55–121)
LDLC SERPL CALC-MCNC: 122 MG/DL
MICROALBUMIN UR-MCNC: 157.4 MG/DL (ref 0–19)
MICROALBUMIN/CREAT UR-RTO: 1889.6 MG/G
POTASSIUM SERPL-SCNC: 4.1 MMOL/L (ref 3.5–5)
PROT SERPL-MCNC: 7.4 G/DL (ref 6.6–8.7)
SODIUM SERPL-SCNC: 142 MMOL/L (ref 136–145)
TRIGL SERPL-MCNC: 52 MG/DL (ref 0–149)

## 2023-12-13 PROCEDURE — G8427 DOCREV CUR MEDS BY ELIG CLIN: HCPCS | Performed by: FAMILY MEDICINE

## 2023-12-13 PROCEDURE — 1123F ACP DISCUSS/DSCN MKR DOCD: CPT | Performed by: FAMILY MEDICINE

## 2023-12-13 PROCEDURE — G8484 FLU IMMUNIZE NO ADMIN: HCPCS | Performed by: FAMILY MEDICINE

## 2023-12-13 PROCEDURE — 99213 OFFICE O/P EST LOW 20 MIN: CPT | Performed by: FAMILY MEDICINE

## 2023-12-13 PROCEDURE — 4004F PT TOBACCO SCREEN RCVD TLK: CPT | Performed by: FAMILY MEDICINE

## 2023-12-13 PROCEDURE — 97530 THERAPEUTIC ACTIVITIES: CPT

## 2023-12-13 PROCEDURE — 3023F SPIROM DOC REV: CPT | Performed by: FAMILY MEDICINE

## 2023-12-13 PROCEDURE — G8420 CALC BMI NORM PARAMETERS: HCPCS | Performed by: FAMILY MEDICINE

## 2023-12-13 PROCEDURE — 3078F DIAST BP <80 MM HG: CPT | Performed by: FAMILY MEDICINE

## 2023-12-13 PROCEDURE — 3017F COLORECTAL CA SCREEN DOC REV: CPT | Performed by: FAMILY MEDICINE

## 2023-12-13 PROCEDURE — 3077F SYST BP >= 140 MM HG: CPT | Performed by: FAMILY MEDICINE

## 2023-12-13 ASSESSMENT — ENCOUNTER SYMPTOMS
COUGH: 0
CHEST TIGHTNESS: 0
DIARRHEA: 0
SHORTNESS OF BREATH: 0
SORE THROAT: 0
VOMITING: 0
TROUBLE SWALLOWING: 0
ABDOMINAL PAIN: 0
WHEEZING: 0
NAUSEA: 0
CONSTIPATION: 0
EYE PAIN: 0
BACK PAIN: 0

## 2023-12-13 ASSESSMENT — 10 METER WALK TEST (10METWT)
TEST DIRECTIONS: SELF-SELECTED OR FAST-VELOCITY: SELF, NO AD
AVERAGE: 9.5
SCORE (M/S): 1.05
TRIAL 1: TIME TO WALK 10 METERS: 9.45

## 2023-12-13 ASSESSMENT — PAIN DESCRIPTION - DESCRIPTORS: DESCRIPTORS: ACHING;SORE

## 2023-12-13 ASSESSMENT — PAIN DESCRIPTION - LOCATION: LOCATION: SHOULDER;NECK;KNEE

## 2023-12-13 ASSESSMENT — PAIN SCALES - GENERAL: PAINLEVEL_OUTOF10: 5

## 2023-12-13 NOTE — PROGRESS NOTES
Vascular: No carotid bruit. Cardiovascular:      Rate and Rhythm: Normal rate and regular rhythm. Pulses: Normal pulses. Heart sounds: No murmur heard. Pulmonary:      Effort: Pulmonary effort is normal.      Breath sounds: Normal breath sounds. Abdominal:      General: Bowel sounds are normal.      Palpations: Abdomen is soft. Tenderness: There is no abdominal tenderness. There is no guarding. Lymphadenopathy:      Cervical: No cervical adenopathy. Skin:     General: Skin is warm and dry. Capillary Refill: Capillary refill takes less than 2 seconds. Neurological:      Mental Status: He is alert and oriented to person, place, and time. Motor: No weakness. Coordination: Coordination abnormal.      Gait: Gait abnormal (improving). Psychiatric:         Mood and Affect: Mood normal.         Behavior: Behavior normal.            Assessment & Plan   Alf Chen was seen today for follow-up. Diagnoses and all orders for this visit:    Essential hypertension    Chronic obstructive pulmonary disease, unspecified COPD type ([address])      Increase Amlodipine to 7.5 mg  daily  Continue to monitor BP a home and keep a record  Pt to inform me if BP is still above 140/90  Watch salt intake  Encouraged to d/c smoking    Return in about 3 months (around 3/13/2024) for routine follow-up with me. All questions were answered. Medications, including possible adverse effects, and instructions were reviewed and  understanding was confirmed. Follow-up recommendations, including when to contact or return to office (ie; if symptoms worsen or fail to improve), were discussed and acknowledged.     Electronically signed by John Sullivan MD on 12/13/23 at 10:39 AM CST

## 2023-12-13 NOTE — PROGRESS NOTES
Physical Therapy: Daily Note/Reassessment   Patient: Sandy Rosen (36 y.o. male)   Examination Date:   Plan of Care/Certification Expiration Date: 23    No data recorded   :  1950 # of Visits since [facility]:   4   MRN: [id number]  CSN: [id number] Start of Care Date:   2023   Insurance: Payor: Nic Oliveros / Plan: [employer] Jean / Product Type: *No Product type* /   Insurance ID: [id number] - (Medicare Managed) Secondary Insurance (if applicable):    Referring Physician: Mariela Green MD [facility]   PCP: Mariela Green MD Visits to Date/Visits Approved:     No Show/Cancelled Appts:   /       Medical Diagnosis: Other symptoms and signs involving the musculoskeletal system [R29.898] LLE weakness. Treatment Diagnosis: LLE weakness        SUBJECTIVE EXAMINATION   Pain Level: Pain Screening  Patient Currently in Pain: Yes  Pain Assessment: 0-10  Pain Level: 5  Best Pain Level: 10  Pain Location: Shoulder, Neck, Knee  Pain Descriptors: Aching, Sore    Patient Comments: Subjective: He continues to have knee pain, back pain, and neck pain. HEP Compliance: Good        OBJECTIVE EXAMINATION   Restrictions:  No data recorded No data recorded No data recorded           Balance Screen:   Romberg/Narrow Base of Support  Eyes Open: 60 seconds  Sway: Minimal  Strategy: Ankle  Eyes Closed: 10 seconds  Sway: Minimal  Strategy: Ankle  Single Leg Stance  Right Leg Eyes Open: 5 seconds      Balance/Gait Assessment(s) Performed:   Ricks Balance Scale   1. Sitting to Standing: Able to stand without using hands and stabilize independently  2. Standing Unsupported: Able to stand safely for 2 minutes  3. Sitting with Back Unsupported but Feet Supported on Floor or on a Stool: Able to sit safely and securely for 2 minutes  4. Standing to Sitting: Sits safely with minimal use of hands  5. Transfers: Able to transfer safely with minor use of hands  6.  Standing Unsupported with Eyes Closed: Able to

## 2023-12-19 ENCOUNTER — APPOINTMENT (OUTPATIENT)
Dept: PHYSICAL THERAPY | Age: 73
End: 2023-12-19
Payer: MEDICARE

## 2023-12-21 ENCOUNTER — APPOINTMENT (OUTPATIENT)
Dept: PHYSICAL THERAPY | Age: 73
End: 2023-12-21
Payer: MEDICARE

## 2023-12-29 DIAGNOSIS — G62.9 NEUROPATHY: ICD-10-CM

## 2023-12-29 DIAGNOSIS — Z76.0 MEDICATION REFILL: ICD-10-CM

## 2023-12-29 NOTE — TELEPHONE ENCOUNTER
Armand Astudillo called to request a refill on his medication.      Last office visit : 12/13/2023   Next office visit : 3/13/2024     Last UDS:   Amphetamine Screen, Urine   Date Value Ref Range Status   11/15/2023 Negative  Final     Barbiturate Screen, Urine   Date Value Ref Range Status   11/15/2023 Negative  Final     Benzodiazepine Screen, Urine   Date Value Ref Range Status   11/15/2023 Negative  Final     Buprenorphine Urine   Date Value Ref Range Status   11/15/2023 Negative  Final     Cocaine Metabolite Screen, Urine   Date Value Ref Range Status   11/15/2023 Negative  Final     MDMA, Urine   Date Value Ref Range Status   11/15/2023 Negative  Final     Methamphetamine, Urine   Date Value Ref Range Status   11/15/2023 Negative  Final     Opiate Scrn, Ur   Date Value Ref Range Status   11/15/2023 Negative  Final     Oxycodone Screen, Ur   Date Value Ref Range Status   11/15/2023 Negative  Final     PCP Screen, Urine   Date Value Ref Range Status   11/15/2023 Negative  Final     Propoxyphene Screen, Urine   Date Value Ref Range Status   11/15/2023 Negative  Final     THC Screen, Urine   Date Value Ref Range Status   11/15/2023 Postive  Final     Tricyclic Antidepressants, Urine   Date Value Ref Range Status   11/15/2023 Negative  Final       Last Artemio: 11/14/23  Medication Contract: 11/15/23   Last Fill: 11/14/23    Requested Prescriptions     Pending Prescriptions Disp Refills    gabapentin (NEURONTIN) 300 MG capsule [Pharmacy Med Name: GABAPENTIN 300 MG Capsule] 30 capsule      Sig: TAKE 1 CAPSULE ONE TIME DAILY. MAX DAILY AMOUNT 300MG.                       Please approve or refuse this medication.   Anabel Ornelas LPN

## 2024-01-02 RX ORDER — GABAPENTIN 300 MG/1
CAPSULE ORAL
Qty: 30 CAPSULE | Refills: 0 | Status: SHIPPED | OUTPATIENT
Start: 2024-01-02 | End: 2024-02-01

## 2024-01-03 RX ORDER — VALSARTAN AND HYDROCHLOROTHIAZIDE 320; 25 MG/1; MG/1
1 TABLET, FILM COATED ORAL DAILY
Qty: 90 TABLET | Refills: 1 | Status: SHIPPED | OUTPATIENT
Start: 2024-01-03

## 2024-01-03 NOTE — TELEPHONE ENCOUNTER
Armand Astudillo called to request a refill on his medication.      Last office visit : 12/13/2023   Next office visit : 3/13/2024     Requested Prescriptions     Pending Prescriptions Disp Refills    valsartan-hydroCHLOROthiazide (DIOVAN-HCT) 320-25 MG per tablet 90 tablet 3     Sig: Take 1 tablet by mouth daily            Anabel Ornelas LPN

## 2024-01-30 DIAGNOSIS — M54.2 CHRONIC NECK PAIN: ICD-10-CM

## 2024-01-30 DIAGNOSIS — G47.01 INSOMNIA DUE TO MEDICAL CONDITION: ICD-10-CM

## 2024-01-30 DIAGNOSIS — G89.29 CHRONIC NECK PAIN: ICD-10-CM

## 2024-01-30 DIAGNOSIS — Z76.0 MEDICATION REFILL: ICD-10-CM

## 2024-01-30 DIAGNOSIS — J44.9 CHRONIC OBSTRUCTIVE PULMONARY DISEASE, UNSPECIFIED COPD TYPE (HCC): ICD-10-CM

## 2024-01-30 NOTE — TELEPHONE ENCOUNTER
Armand Astudillo called to request a refill on his medication.      Last office visit : 12/13/2023   Next office visit : 3/13/2024     Requested Prescriptions     Pending Prescriptions Disp Refills    traZODone (DESYREL) 50 MG tablet [Pharmacy Med Name: TRAZODONE HYDROCHLORIDE 50 MG Tablet] 90 tablet 3     Sig: TAKE 1 TABLET EVERY NIGHT    albuterol sulfate HFA (PROVENTIL;VENTOLIN;PROAIR) 108 (90 Base) MCG/ACT inhaler [Pharmacy Med Name: ALBUTEROL SULFATE  (90 Base) MCG/ACT Aerosol Solution] 3 each 3     Sig: INHALE 2 PUFFS EVERY 6 HOURS AS NEEDED FOR WHEEZING            Anabel Ornelas, HELION

## 2024-01-31 ENCOUNTER — TELEPHONE (OUTPATIENT)
Dept: PRIMARY CARE CLINIC | Age: 74
End: 2024-01-31

## 2024-01-31 RX ORDER — ALBUTEROL SULFATE 90 UG/1
2 AEROSOL, METERED RESPIRATORY (INHALATION) EVERY 6 HOURS PRN
Qty: 3 EACH | Refills: 3 | Status: SHIPPED | OUTPATIENT
Start: 2024-01-31

## 2024-01-31 RX ORDER — TRAZODONE HYDROCHLORIDE 50 MG/1
TABLET ORAL
Qty: 90 TABLET | Refills: 1 | Status: SHIPPED | OUTPATIENT
Start: 2024-01-31

## 2024-01-31 NOTE — TELEPHONE ENCOUNTER
----- Message from Elise Nazario MD sent at 1/29/2024  6:17 PM CST -----  Please schedule a follow-up appt with pt to discuss his recent blood work results

## 2024-01-31 NOTE — TELEPHONE ENCOUNTER
Called and spoke with patient and advised the provider wanted to discuss his results with him. Scheduled patient to come in on Thursday 02- at 8:15

## 2024-02-01 ENCOUNTER — OFFICE VISIT (OUTPATIENT)
Dept: PRIMARY CARE CLINIC | Age: 74
End: 2024-02-01
Payer: MEDICARE

## 2024-02-01 VITALS
SYSTOLIC BLOOD PRESSURE: 136 MMHG | DIASTOLIC BLOOD PRESSURE: 74 MMHG | BODY MASS INDEX: 23.96 KG/M2 | OXYGEN SATURATION: 98 % | HEART RATE: 76 BPM | HEIGHT: 70 IN | TEMPERATURE: 97.4 F | WEIGHT: 167.4 LBS

## 2024-02-01 DIAGNOSIS — N18.31 TYPE 2 DIABETES MELLITUS WITH STAGE 3A CHRONIC KIDNEY DISEASE, WITH LONG-TERM CURRENT USE OF INSULIN (HCC): ICD-10-CM

## 2024-02-01 DIAGNOSIS — I10 ESSENTIAL HYPERTENSION: ICD-10-CM

## 2024-02-01 DIAGNOSIS — E11.22 TYPE 2 DIABETES MELLITUS WITH STAGE 3A CHRONIC KIDNEY DISEASE, WITH LONG-TERM CURRENT USE OF INSULIN (HCC): ICD-10-CM

## 2024-02-01 DIAGNOSIS — E78.2 MIXED HYPERLIPIDEMIA: ICD-10-CM

## 2024-02-01 DIAGNOSIS — N40.1 BENIGN PROSTATIC HYPERPLASIA WITH LOWER URINARY TRACT SYMPTOMS, SYMPTOM DETAILS UNSPECIFIED: ICD-10-CM

## 2024-02-01 DIAGNOSIS — Z72.0 TOBACCO ABUSE: ICD-10-CM

## 2024-02-01 DIAGNOSIS — Z79.4 TYPE 2 DIABETES MELLITUS WITH STAGE 3A CHRONIC KIDNEY DISEASE, WITH LONG-TERM CURRENT USE OF INSULIN (HCC): ICD-10-CM

## 2024-02-01 DIAGNOSIS — R80.9 MICROALBUMINURIA: Primary | ICD-10-CM

## 2024-02-01 PROCEDURE — 3078F DIAST BP <80 MM HG: CPT | Performed by: FAMILY MEDICINE

## 2024-02-01 PROCEDURE — 3046F HEMOGLOBIN A1C LEVEL >9.0%: CPT | Performed by: FAMILY MEDICINE

## 2024-02-01 PROCEDURE — G8420 CALC BMI NORM PARAMETERS: HCPCS | Performed by: FAMILY MEDICINE

## 2024-02-01 PROCEDURE — 1123F ACP DISCUSS/DSCN MKR DOCD: CPT | Performed by: FAMILY MEDICINE

## 2024-02-01 PROCEDURE — 2022F DILAT RTA XM EVC RTNOPTHY: CPT | Performed by: FAMILY MEDICINE

## 2024-02-01 PROCEDURE — G8427 DOCREV CUR MEDS BY ELIG CLIN: HCPCS | Performed by: FAMILY MEDICINE

## 2024-02-01 PROCEDURE — 4004F PT TOBACCO SCREEN RCVD TLK: CPT | Performed by: FAMILY MEDICINE

## 2024-02-01 PROCEDURE — 3075F SYST BP GE 130 - 139MM HG: CPT | Performed by: FAMILY MEDICINE

## 2024-02-01 PROCEDURE — G8484 FLU IMMUNIZE NO ADMIN: HCPCS | Performed by: FAMILY MEDICINE

## 2024-02-01 PROCEDURE — 99213 OFFICE O/P EST LOW 20 MIN: CPT | Performed by: FAMILY MEDICINE

## 2024-02-01 PROCEDURE — 3017F COLORECTAL CA SCREEN DOC REV: CPT | Performed by: FAMILY MEDICINE

## 2024-02-01 ASSESSMENT — ENCOUNTER SYMPTOMS
ABDOMINAL PAIN: 0
EYE PAIN: 0
TROUBLE SWALLOWING: 0
CONSTIPATION: 0
WHEEZING: 0
NAUSEA: 0
CHEST TIGHTNESS: 0
COUGH: 0
SORE THROAT: 0
SHORTNESS OF BREATH: 0
VOMITING: 0
DIARRHEA: 0
BACK PAIN: 0

## 2024-02-01 ASSESSMENT — PATIENT HEALTH QUESTIONNAIRE - PHQ9
SUM OF ALL RESPONSES TO PHQ QUESTIONS 1-9: 0
1. LITTLE INTEREST OR PLEASURE IN DOING THINGS: 0
SUM OF ALL RESPONSES TO PHQ QUESTIONS 1-9: 0
SUM OF ALL RESPONSES TO PHQ9 QUESTIONS 1 & 2: 0
2. FEELING DOWN, DEPRESSED OR HOPELESS: 0
SUM OF ALL RESPONSES TO PHQ QUESTIONS 1-9: 0
SUM OF ALL RESPONSES TO PHQ QUESTIONS 1-9: 0

## 2024-02-01 NOTE — PROGRESS NOTES
Medications   Medication Sig Dispense Refill    traZODone (DESYREL) 50 MG tablet TAKE 1 TABLET EVERY NIGHT 90 tablet 1    albuterol sulfate HFA (PROVENTIL;VENTOLIN;PROAIR) 108 (90 Base) MCG/ACT inhaler INHALE 2 PUFFS EVERY 6 HOURS AS NEEDED FOR WHEEZING 3 each 3    valsartan-hydroCHLOROthiazide (DIOVAN-HCT) 320-25 MG per tablet Take 1 tablet by mouth daily 90 tablet 1    gabapentin (NEURONTIN) 300 MG capsule TAKE 1 CAPSULE ONE TIME DAILY. MAX DAILY AMOUNT 300MG. 30 capsule 0    SOLIQUA 100-33 UNT-MCG/ML SOPN INJECT 26 UNITS UNDER THE SKIN DAILY 30 mL 10    atorvastatin (LIPITOR) 20 MG tablet TAKE 1 TABLET EVERY DAY 90 tablet 1    BREO ELLIPTA 100-25 MCG/ACT inhaler       tiotropium-olodaterol (STIOLTO) 2.5-2.5 MCG/ACT AERS Inhale 2 puffs into the lungs in the morning.      Mirabegron ER (MYRBETRIQ) 8 MG/ML SRER Take 8 mg by mouth daily 30 mL 0    blood glucose test strips (ACCU-CHEK GUIDE) strip 1 each by In Vitro route daily As needed. 300 each 3    Blood Glucose Monitoring Suppl (ACCU-CHEK GUIDE) w/Device KIT 1 Device by Does not apply route daily 1 kit 0    Lancets MISC 1 each by Does not apply route daily ICD:10 E11.9 300 each 1    tamsulosin (FLOMAX) 0.4 MG capsule Take 1 capsule by mouth daily 90 capsule 3    amLODIPine (NORVASC) 5 MG tablet TAKE 1 TABLET EVERY DAY 90 tablet 3     No current facility-administered medications for this visit.       Objective:   PE:  /74   Pulse 76   Temp 97.4 °F (36.3 °C) (Temporal)   Ht 1.778 m (5' 10\")   Wt 75.9 kg (167 lb 6.4 oz)   SpO2 98%   BMI 24.02 kg/m²   Physical Exam  Vitals and nursing note reviewed.   Constitutional:       Comments: Smells of cigarette smoke  Repeat BP in L arm 150/78   HENT:      Head: Normocephalic.   Eyes:      General: No scleral icterus.     Extraocular Movements: Extraocular movements intact.      Conjunctiva/sclera: Conjunctivae normal.   Neck:      Vascular: No carotid bruit.   Cardiovascular:      Rate and Rhythm: Normal rate and

## 2024-02-05 DIAGNOSIS — N40.1 BENIGN PROSTATIC HYPERPLASIA WITH URINARY HESITANCY: ICD-10-CM

## 2024-02-05 DIAGNOSIS — R39.11 BENIGN PROSTATIC HYPERPLASIA WITH URINARY HESITANCY: ICD-10-CM

## 2024-02-05 DIAGNOSIS — G62.9 NEUROPATHY: ICD-10-CM

## 2024-02-05 DIAGNOSIS — Z76.0 MEDICATION REFILL: ICD-10-CM

## 2024-02-05 RX ORDER — TAMSULOSIN HYDROCHLORIDE 0.4 MG/1
0.4 CAPSULE ORAL DAILY
Qty: 90 CAPSULE | Refills: 1 | Status: SHIPPED | OUTPATIENT
Start: 2024-02-05

## 2024-02-05 RX ORDER — ATORVASTATIN CALCIUM 20 MG/1
20 TABLET, FILM COATED ORAL DAILY
Qty: 90 TABLET | Refills: 1 | Status: SHIPPED | OUTPATIENT
Start: 2024-02-05

## 2024-02-05 RX ORDER — GABAPENTIN 300 MG/1
CAPSULE ORAL
Qty: 30 CAPSULE | Refills: 0 | Status: SHIPPED | OUTPATIENT
Start: 2024-02-05 | End: 2024-03-06

## 2024-02-05 NOTE — TELEPHONE ENCOUNTER
Armand Astudillo called to request a refill on his medication.      Last office visit : 2/1/2024   Next office visit : Visit date not found     Last UDS:   Amphetamine Screen, Urine   Date Value Ref Range Status   11/15/2023 Negative  Final     Barbiturate Screen, Urine   Date Value Ref Range Status   11/15/2023 Negative  Final     Benzodiazepine Screen, Urine   Date Value Ref Range Status   11/15/2023 Negative  Final     Buprenorphine Urine   Date Value Ref Range Status   11/15/2023 Negative  Final     Cocaine Metabolite Screen, Urine   Date Value Ref Range Status   11/15/2023 Negative  Final     MDMA, Urine   Date Value Ref Range Status   11/15/2023 Negative  Final     Methamphetamine, Urine   Date Value Ref Range Status   11/15/2023 Negative  Final     Opiate Scrn, Ur   Date Value Ref Range Status   11/15/2023 Negative  Final     Oxycodone Screen, Ur   Date Value Ref Range Status   11/15/2023 Negative  Final     PCP Screen, Urine   Date Value Ref Range Status   11/15/2023 Negative  Final     Propoxyphene Screen, Urine   Date Value Ref Range Status   11/15/2023 Negative  Final     THC Screen, Urine   Date Value Ref Range Status   11/15/2023 Postive  Final     Tricyclic Antidepressants, Urine   Date Value Ref Range Status   11/15/2023 Negative  Final       Last Artemio: 2/5/24  Medication Contract: 11/15/23   Last Fill: 1/2/24    Requested Prescriptions     Pending Prescriptions Disp Refills    atorvastatin (LIPITOR) 20 MG tablet 90 tablet 1     Sig: Take 1 tablet by mouth daily    gabapentin (NEURONTIN) 300 MG capsule 30 capsule 0     Sig: TAKE 1 CAPSULE ONE TIME DAILY. MAX DAILY AMOUNT 300MG.    tamsulosin (FLOMAX) 0.4 MG capsule 90 capsule 3     Sig: Take 1 capsule by mouth daily                               Please approve or refuse this medication.   Anabel Ornelas LPN

## 2024-02-10 DIAGNOSIS — G62.9 NEUROPATHY: ICD-10-CM

## 2024-02-10 DIAGNOSIS — Z76.0 MEDICATION REFILL: ICD-10-CM

## 2024-02-12 RX ORDER — GABAPENTIN 300 MG/1
CAPSULE ORAL
Qty: 30 CAPSULE | OUTPATIENT
Start: 2024-02-12

## 2024-02-12 NOTE — TELEPHONE ENCOUNTER
Armand Astudillo called to request a refill on his medication.      Last office visit : 2/1/2024   Next office visit : 6/3/2024     Last UDS:   Amphetamine Screen, Urine   Date Value Ref Range Status   11/15/2023 Negative  Final     Barbiturate Screen, Urine   Date Value Ref Range Status   11/15/2023 Negative  Final     Benzodiazepine Screen, Urine   Date Value Ref Range Status   11/15/2023 Negative  Final     Buprenorphine Urine   Date Value Ref Range Status   11/15/2023 Negative  Final     Cocaine Metabolite Screen, Urine   Date Value Ref Range Status   11/15/2023 Negative  Final     MDMA, Urine   Date Value Ref Range Status   11/15/2023 Negative  Final     Methamphetamine, Urine   Date Value Ref Range Status   11/15/2023 Negative  Final     Opiate Scrn, Ur   Date Value Ref Range Status   11/15/2023 Negative  Final     Oxycodone Screen, Ur   Date Value Ref Range Status   11/15/2023 Negative  Final     PCP Screen, Urine   Date Value Ref Range Status   11/15/2023 Negative  Final     Propoxyphene Screen, Urine   Date Value Ref Range Status   11/15/2023 Negative  Final     THC Screen, Urine   Date Value Ref Range Status   11/15/2023 Postive  Final     Tricyclic Antidepressants, Urine   Date Value Ref Range Status   11/15/2023 Negative  Final       Last Artemio:   Medication Contract:    Last Fill: 2/5/24    Requested Prescriptions     Pending Prescriptions Disp Refills    gabapentin (NEURONTIN) 300 MG capsule [Pharmacy Med Name: GABAPENTIN 300 MG Capsule] 30 capsule      Sig: TAKE 1 CAPSULE ONE TIME DAILY. MAX DAILY AMOUNT 300MG.         Please approve or refuse this medication.   Anabel Ornelas LPN

## 2024-03-19 DIAGNOSIS — Z76.0 MEDICATION REFILL: ICD-10-CM

## 2024-03-19 DIAGNOSIS — G62.9 NEUROPATHY: ICD-10-CM

## 2024-03-19 NOTE — TELEPHONE ENCOUNTER
Armand Astudillo called to request a refill on his medication.      Last office visit : 2/1/2024   Next office visit : 6/3/2024     Last UDS:   Amphetamine Screen, Urine   Date Value Ref Range Status   11/15/2023 Negative  Final     Barbiturate Screen, Urine   Date Value Ref Range Status   11/15/2023 Negative  Final     Benzodiazepine Screen, Urine   Date Value Ref Range Status   11/15/2023 Negative  Final     Buprenorphine Urine   Date Value Ref Range Status   11/15/2023 Negative  Final     Cocaine Metabolite Screen, Urine   Date Value Ref Range Status   11/15/2023 Negative  Final     MDMA, Urine   Date Value Ref Range Status   11/15/2023 Negative  Final     Methamphetamine, Urine   Date Value Ref Range Status   11/15/2023 Negative  Final     Opiate Scrn, Ur   Date Value Ref Range Status   11/15/2023 Negative  Final     Oxycodone Screen, Ur   Date Value Ref Range Status   11/15/2023 Negative  Final     PCP Screen, Urine   Date Value Ref Range Status   11/15/2023 Negative  Final     Propoxyphene Screen, Urine   Date Value Ref Range Status   11/15/2023 Negative  Final     THC Screen, Urine   Date Value Ref Range Status   11/15/2023 Postive  Final     Tricyclic Antidepressants, Urine   Date Value Ref Range Status   11/15/2023 Negative  Final       Last Artemio: 02-  Medication Contract: 11-  Last Fill: 02-    Requested Prescriptions     Pending Prescriptions Disp Refills    gabapentin (NEURONTIN) 300 MG capsule 30 capsule 0     Sig: TAKE 1 CAPSULE ONE TIME DAILY. MAX DAILY AMOUNT 300MG.         Please approve or refuse this medication.   Lyndsay Hinkle MA

## 2024-03-20 RX ORDER — GABAPENTIN 300 MG/1
CAPSULE ORAL
Qty: 30 CAPSULE | Refills: 0 | Status: SHIPPED | OUTPATIENT
Start: 2024-03-20 | End: 2024-04-18

## 2024-04-25 ENCOUNTER — TELEPHONE (OUTPATIENT)
Dept: HEMATOLOGY | Age: 74
End: 2024-04-25

## 2024-04-26 ENCOUNTER — HOSPITAL ENCOUNTER (OUTPATIENT)
Dept: CT IMAGING | Facility: HOSPITAL | Age: 74
Discharge: HOME | End: 2024-04-26

## 2024-04-26 ENCOUNTER — TRANSCRIBE ORDERS (OUTPATIENT)
Dept: ADMINISTRATIVE | Facility: HOSPITAL | Age: 74
End: 2024-04-26
Payer: MEDICARE

## 2024-04-26 DIAGNOSIS — Z13.9 SCREENING FOR UNSPECIFIED CONDITION: Primary | ICD-10-CM

## 2024-04-26 DIAGNOSIS — Z13.9 SCREENING FOR UNSPECIFIED CONDITION: ICD-10-CM

## 2024-04-26 PROCEDURE — 71271 CT THORAX LUNG CANCER SCR C-: CPT

## 2024-05-29 RX ORDER — VALSARTAN AND HYDROCHLOROTHIAZIDE 320; 25 MG/1; MG/1
1 TABLET, FILM COATED ORAL DAILY
Qty: 90 TABLET | Refills: 1 | Status: SHIPPED | OUTPATIENT
Start: 2024-05-29

## 2024-05-29 NOTE — TELEPHONE ENCOUNTER
Armand Astudillo called to request a refill on his medication.      Last office visit : 2/1/2024   Next office visit : 6/3/2024     Requested Prescriptions     Pending Prescriptions Disp Refills    valsartan-hydroCHLOROthiazide (DIOVAN-HCT) 320-25 MG per tablet [Pharmacy Med Name: VALSARTAN/HYDROCHLOROTHIAZIDE 320-25 MG Tablet] 90 tablet 3     Sig: TAKE 1 TABLET EVERY DAY            Lyndsay Hinkle MA

## 2024-06-03 ENCOUNTER — OFFICE VISIT (OUTPATIENT)
Dept: PRIMARY CARE CLINIC | Age: 74
End: 2024-06-03
Payer: MEDICARE

## 2024-06-03 VITALS
HEART RATE: 83 BPM | TEMPERATURE: 98 F | WEIGHT: 160 LBS | BODY MASS INDEX: 22.9 KG/M2 | HEIGHT: 70 IN | SYSTOLIC BLOOD PRESSURE: 106 MMHG | DIASTOLIC BLOOD PRESSURE: 68 MMHG | OXYGEN SATURATION: 95 %

## 2024-06-03 DIAGNOSIS — J41.0 SIMPLE CHRONIC BRONCHITIS (HCC): ICD-10-CM

## 2024-06-03 DIAGNOSIS — Z72.0 TOBACCO ABUSE: ICD-10-CM

## 2024-06-03 DIAGNOSIS — N18.31 TYPE 2 DIABETES MELLITUS WITH STAGE 3A CHRONIC KIDNEY DISEASE, WITH LONG-TERM CURRENT USE OF INSULIN (HCC): ICD-10-CM

## 2024-06-03 DIAGNOSIS — E78.2 MIXED HYPERLIPIDEMIA: ICD-10-CM

## 2024-06-03 DIAGNOSIS — Z79.4 TYPE 2 DIABETES MELLITUS WITH STAGE 3A CHRONIC KIDNEY DISEASE, WITH LONG-TERM CURRENT USE OF INSULIN (HCC): ICD-10-CM

## 2024-06-03 DIAGNOSIS — I10 ESSENTIAL HYPERTENSION: Primary | ICD-10-CM

## 2024-06-03 DIAGNOSIS — E11.22 TYPE 2 DIABETES MELLITUS WITH STAGE 3A CHRONIC KIDNEY DISEASE, WITH LONG-TERM CURRENT USE OF INSULIN (HCC): ICD-10-CM

## 2024-06-03 LAB — HBA1C MFR BLD: 6 %

## 2024-06-03 PROCEDURE — 3017F COLORECTAL CA SCREEN DOC REV: CPT | Performed by: FAMILY MEDICINE

## 2024-06-03 PROCEDURE — G8427 DOCREV CUR MEDS BY ELIG CLIN: HCPCS | Performed by: FAMILY MEDICINE

## 2024-06-03 PROCEDURE — 3044F HG A1C LEVEL LT 7.0%: CPT | Performed by: FAMILY MEDICINE

## 2024-06-03 PROCEDURE — 3078F DIAST BP <80 MM HG: CPT | Performed by: FAMILY MEDICINE

## 2024-06-03 PROCEDURE — G2211 COMPLEX E/M VISIT ADD ON: HCPCS | Performed by: FAMILY MEDICINE

## 2024-06-03 PROCEDURE — 83036 HEMOGLOBIN GLYCOSYLATED A1C: CPT | Performed by: FAMILY MEDICINE

## 2024-06-03 PROCEDURE — 99214 OFFICE O/P EST MOD 30 MIN: CPT | Performed by: FAMILY MEDICINE

## 2024-06-03 PROCEDURE — G8420 CALC BMI NORM PARAMETERS: HCPCS | Performed by: FAMILY MEDICINE

## 2024-06-03 PROCEDURE — 2022F DILAT RTA XM EVC RTNOPTHY: CPT | Performed by: FAMILY MEDICINE

## 2024-06-03 PROCEDURE — 3074F SYST BP LT 130 MM HG: CPT | Performed by: FAMILY MEDICINE

## 2024-06-03 PROCEDURE — 3023F SPIROM DOC REV: CPT | Performed by: FAMILY MEDICINE

## 2024-06-03 PROCEDURE — 1123F ACP DISCUSS/DSCN MKR DOCD: CPT | Performed by: FAMILY MEDICINE

## 2024-06-03 PROCEDURE — 4004F PT TOBACCO SCREEN RCVD TLK: CPT | Performed by: FAMILY MEDICINE

## 2024-06-03 RX ORDER — EMPAGLIFLOZIN 10 MG/1
TABLET, FILM COATED ORAL
COMMUNITY
Start: 2024-05-23

## 2024-06-03 SDOH — ECONOMIC STABILITY: INCOME INSECURITY: HOW HARD IS IT FOR YOU TO PAY FOR THE VERY BASICS LIKE FOOD, HOUSING, MEDICAL CARE, AND HEATING?: NOT VERY HARD

## 2024-06-03 SDOH — ECONOMIC STABILITY: FOOD INSECURITY: WITHIN THE PAST 12 MONTHS, YOU WORRIED THAT YOUR FOOD WOULD RUN OUT BEFORE YOU GOT MONEY TO BUY MORE.: NEVER TRUE

## 2024-06-03 SDOH — ECONOMIC STABILITY: FOOD INSECURITY: WITHIN THE PAST 12 MONTHS, THE FOOD YOU BOUGHT JUST DIDN'T LAST AND YOU DIDN'T HAVE MONEY TO GET MORE.: NEVER TRUE

## 2024-06-03 ASSESSMENT — ENCOUNTER SYMPTOMS
BACK PAIN: 0
COUGH: 1
TROUBLE SWALLOWING: 0
EYE PAIN: 0
VOMITING: 0
NAUSEA: 0
CONSTIPATION: 0
CHEST TIGHTNESS: 0
SHORTNESS OF BREATH: 1
DIARRHEA: 0
ABDOMINAL PAIN: 0
WHEEZING: 0
SORE THROAT: 0

## 2024-06-03 NOTE — PROGRESS NOTES
[facility]  [address]  [address]  [place] [address]  Dept: [phone]  Dept Fax: [phone]  Loc: [phone]      Subjective:     Chief Complaint   Patient presents with    Follow-up       HPI:  Armand Astudillo is a 73 y.o. male presents today for his routine follow-up, med check, refill and chronic care coordination. PMHx and problem list reviewed and updated. Chronic meds reviewed and reconciled  BP is well controlled today  A1c today  6.0%. He states his BS has been in the low 100s at home  He continues to smoke but is seriously considering quitting since his GF already quit smoking 3 weeks ago. His O2 initially upon arrival at the exam room  was 90%. He has a rescue inhaler that he states he is using more around summer time. He also states that he will getting a new inhaler but he does not remember the name  Overall, he states he feels well. He is reminded that he is du for Shingles and RSV vaccine      ROS:   Review of Systems   Constitutional:  Negative for appetite change, chills, fatigue and fever.   HENT:  Negative for congestion, ear pain, hearing loss, nosebleeds, sore throat and trouble swallowing.    Eyes:  Negative for pain and visual disturbance.   Respiratory:  Positive for cough (occ - sec to chronic smoking) and shortness of breath (occ). Negative for chest tightness and wheezing.    Cardiovascular:  Negative for chest pain, palpitations and leg swelling.   Gastrointestinal:  Negative for abdominal pain, constipation, diarrhea, nausea and vomiting.   Endocrine: Negative for cold intolerance, heat intolerance, polydipsia, polyphagia and polyuria.   Genitourinary:  Negative for difficulty urinating, dysuria, frequency, hematuria and urgency.   Musculoskeletal:  Positive for arthralgias (at baseline). Negative for back pain, gait problem, joint swelling, myalgias, neck pain and neck stiffness.   Skin:  Negative for pallor and rash.

## 2024-06-04 ENCOUNTER — TELEPHONE (OUTPATIENT)
Dept: HEMATOLOGY | Age: 74
End: 2024-06-04

## 2024-06-04 NOTE — TELEPHONE ENCOUNTER
Called Patient and reminded patient of their appointment on 06/7/2024 and patient stated he would have to call us back to let us know if he can make it.

## 2024-06-28 ENCOUNTER — TELEPHONE (OUTPATIENT)
Dept: PULMONOLOGY | Facility: CLINIC | Age: 74
End: 2024-06-28
Payer: MEDICARE

## 2024-06-28 DIAGNOSIS — G47.01 INSOMNIA DUE TO MEDICAL CONDITION: ICD-10-CM

## 2024-06-28 DIAGNOSIS — G89.29 CHRONIC NECK PAIN: ICD-10-CM

## 2024-06-28 DIAGNOSIS — N40.1 BENIGN PROSTATIC HYPERPLASIA WITH URINARY HESITANCY: ICD-10-CM

## 2024-06-28 DIAGNOSIS — M54.2 CHRONIC NECK PAIN: ICD-10-CM

## 2024-06-28 DIAGNOSIS — R39.11 BENIGN PROSTATIC HYPERPLASIA WITH URINARY HESITANCY: ICD-10-CM

## 2024-06-28 DIAGNOSIS — Z76.0 MEDICATION REFILL: ICD-10-CM

## 2024-06-28 NOTE — TELEPHONE ENCOUNTER
The patient called to request the Amlodipine 5 mg to be sent to [facility].

## 2024-06-28 NOTE — TELEPHONE ENCOUNTER
Selena Gay RegSched Rep Walquist, Emily, MA  Caller: Unspecified (Today,  2:54 PM)  TRIED TO CALL PT AND PT DOES NOT HAVE VOICEMAIL, UNABLE TO SCHEDULE AN APPOINTMENT

## 2024-06-28 NOTE — TELEPHONE ENCOUNTER
Patient called inquiring about medications he needed refilled by Dolores, let him know that since he has not been seen in over a year, he will need to schedule a follow up and be seen. Advised patient I would send a message to the  to have them call and get him scheduled with Dolores.

## 2024-07-01 ENCOUNTER — TELEPHONE (OUTPATIENT)
Dept: PULMONOLOGY | Facility: CLINIC | Age: 74
End: 2024-07-01
Payer: MEDICARE

## 2024-07-01 RX ORDER — ATORVASTATIN CALCIUM 20 MG/1
20 TABLET, FILM COATED ORAL DAILY
Qty: 90 TABLET | Refills: 1 | Status: SHIPPED | OUTPATIENT
Start: 2024-07-01

## 2024-07-01 RX ORDER — TAMSULOSIN HYDROCHLORIDE 0.4 MG/1
0.4 CAPSULE ORAL DAILY
Qty: 90 CAPSULE | Refills: 1 | Status: SHIPPED | OUTPATIENT
Start: 2024-07-01

## 2024-07-01 RX ORDER — TRAZODONE HYDROCHLORIDE 50 MG/1
TABLET ORAL
Qty: 90 TABLET | Refills: 1 | Status: SHIPPED | OUTPATIENT
Start: 2024-07-01

## 2024-07-01 NOTE — TELEPHONE ENCOUNTER
hub staff attempted to follow warm transfer process and was unsuccessful     Caller: GIOVANNI VELA    Relationship to patient: SELF    Best call back number: [phone]    Patient is needing: PT RETURNED CALL TO REBECCA.  PLEASE REACH OUT TO PT AGAIN.

## 2024-07-02 RX ORDER — MIRABEGRON 8 MG/8ML
GRANULE, FOR SUSPENSION, EXTENDED RELEASE ORAL
COMMUNITY
End: 2024-07-08 | Stop reason: ALTCHOICE

## 2024-07-02 RX ORDER — FLUTICASONE FUROATE AND VILANTEROL 200; 25 UG/1; UG/1
1 POWDER RESPIRATORY (INHALATION) EVERY 24 HOURS
COMMUNITY
End: 2024-07-08 | Stop reason: ALTCHOICE

## 2024-07-02 RX ORDER — EMPAGLIFLOZIN 10 MG/1
TABLET, FILM COATED ORAL
COMMUNITY
Start: 2024-05-23

## 2024-07-05 NOTE — PROGRESS NOTES
" TAMAR Sorenson  [facility]   Pulmonary and Critical Care  [address] [place] [address]  Phone: [phone]  Fax: [phone]           Chief Complaint  COPD    Subjective    History of Present Illness     Naif Nick presents to [facility] PULMONARY & CRITICAL CARE MEDICINE   History of Present Illness  Mr. Nick is a pleasant 73 year old male with known very severe stage 4 COPD, Former smoker, centrolobular emphysema, TB exposure, Lung nodule and Asthma/ copd overlap syndrome. He is on Mucinex, Nebulizer prn, Albuterol HFA is used 3 times a day.He used Stiolto in past but has been out. He uses Flonase as needed for nasal drainage. He has dyspnea that is worse over the last moth with summer and humdity. His cough is productive at times and daily. He denies fever, chills, night sweats. CT in April 2024 through the [facility] program. I am unable to see imaging or report. PFT in Oct 2022 with FEV1 of 65% predicted. Normal diffusion. He is on no home oxygen.               Objective   Vital Signs:   /60   Pulse 95   Ht 177.8 cm (70\")   Wt 75.4 kg (166 lb 3.2 oz)   SpO2 93% Comment: RA  BMI 23.85 kg/m²     Physical Exam  Vitals reviewed.   Constitutional:       Appearance: Normal appearance.   Cardiovascular:      Rate and Rhythm: Normal rate and regular rhythm.   Pulmonary:      Effort: Pulmonary effort is normal.      Breath sounds: Normal breath sounds.   Neurological:      General: No focal deficit present.      Mental Status: He is alert and oriented to person, place, and time.   Psychiatric:         Mood and Affect: Mood normal.         Behavior: Behavior normal.          Result Review :  The following data was reviewed by: TAMAR Sorenson on 07/08/2024:    My interpretation of imaging:  April 2024 CT chest, do not have results to review   My interpretation of labs: none    PFT Values          10/12/2022    11:00   Pre Drug PFT Results   FVC " 79   FEV1 65   FEF 25-75% 54   FEV1/FVC 62   Other Tests PFT Results   DLCO 83   D/VAsb 83     My interpretation of the PFT : no new     Results for orders placed in visit on 10/12/22    Pulmonary Function Test    Narrative  Pulmonary Function Test  Performed by: Hamida Hartman, RRT  Authorized by: Raquel Sosa APRN    Pre Drug % Predicted  FVC: 79%  FEV1: 65%  FEF 25-75%: 54%  FEV1/FVC: 62%  DLCO: 83%  D/VAsb: 83%    Interpretation  Spirometry  Spirometry shows moderate obstruction. There is reduced midflow suggesting small airway/airflow obstruction.  Diffusion Capacity  The patient's diffusion capacity is normal.  Diffusion capacity is normal when corrected for alveolar volume.      Results for orders placed during the hospital encounter of 08/10/20    Full Pulmonary Function Test With Bronchodilator    Narrative  [facility] - Pulmonary Function Test    [address] [facility]  [address]  [phone]    Patient : Naif Nick  MRN : [id number]  YOB: 1950  :  Nacho Vaughan MD  Date : 8/10/2020    ______________________________________________________________________    Interpretation :  1. Spirometry shows very severe airflow obstruction prebronchodilator  2. Following bronchodilator there is minimal not significant improvement in FEV1 and FVC, but postbronchodilator spirometry meet criteria for severe rather than very severe obstruction.  3. Mid flow is reduced.  4. Lung volume measurements show reduction in inspiratory capacity and elevations in expiratory reserve volume residual volume and total lung capacity suggesting hyperinflation and very significant gas trapping.  5. Airway resistance is increased and conductance is decreased.      Nacho Vaughan MD            Assessment and Plan   Diagnoses and all orders for this visit:    1. Asthma-COPD overlap syndrome (Primary)  Overview:  Stage IV COPD      2. Centrilobular emphysema    3. Lung  nodule    Other orders  -     Discontinue: tiotropium bromide-olodaterol (Stiolto Respimat) 2.5-2.5 MCG/ACT aerosol solution inhaler; Inhale 2 puffs Daily.  Dispense: 12 g; Refill: 3  -     tiotropium bromide-olodaterol (Stiolto Respimat) 2.5-2.5 MCG/ACT aerosol solution inhaler; Inhale 2 puffs Daily.  Dispense: 12 g; Refill: 3  -     RSVPreF3 Vac Recomb Adjuvanted (AREXVY) 120 MCG/0.5ML reconstituted suspension injection; Inject 0.5 mL into the appropriate muscle as directed by prescriber 1 (One) Time for 1 dose.  Dispense: 0.5 mL; Refill: 0      Is been a little over a year since patient was last seen.  He is out of his inhalers however he notes today that his insurance has coverage that should cost him 0 out-of-pocket dollars for inhalers.  He has been on Stiolto in the past with benefit.  He is provided a refill.  He will continue his as needed albuterol and Flonase.  He will continue his as needed Mucinex.  He is encouraged to work on complete smoking cessation as he has accomplished this in the past.  He is currently smoking around a half a pack a day.  He is provided a prescription and handout for the RSV vaccination.  He is otherwise up-to-date on his vaccinations.  I have asked him to return in 3 months with a flow-volume loop and diffusion capacity in the office.    Alpha 1:   LDCT: Gets annual through [facility] program, last on April 2024  Smoking Cessation: Encouraged  Vaccinations: Flu: Due fall PNA: Completed RSV: hand out and RX provided         Follow Up   Return in about 3 months (around 10/8/2024) for FVL w DIF.  Patient was given instructions and counseling regarding his condition or for health maintenance advice. Please see specific information pulled into the AVS if appropriate.     Raquel Sosa, APRN  7/8/2024  11:36 CDT

## 2024-07-08 ENCOUNTER — OFFICE VISIT (OUTPATIENT)
Dept: PULMONOLOGY | Facility: CLINIC | Age: 74
End: 2024-07-08
Payer: MEDICARE

## 2024-07-08 VITALS
SYSTOLIC BLOOD PRESSURE: 122 MMHG | BODY MASS INDEX: 23.79 KG/M2 | WEIGHT: 166.2 LBS | DIASTOLIC BLOOD PRESSURE: 60 MMHG | HEIGHT: 70 IN | HEART RATE: 95 BPM | OXYGEN SATURATION: 93 %

## 2024-07-08 DIAGNOSIS — J43.2 CENTRILOBULAR EMPHYSEMA: ICD-10-CM

## 2024-07-08 DIAGNOSIS — J44.89 ASTHMA-COPD OVERLAP SYNDROME: Primary | ICD-10-CM

## 2024-07-08 DIAGNOSIS — R91.1 LUNG NODULE: ICD-10-CM

## 2024-07-08 PROCEDURE — 99214 OFFICE O/P EST MOD 30 MIN: CPT | Performed by: NURSE PRACTITIONER

## 2024-07-08 PROCEDURE — 1159F MED LIST DOCD IN RCRD: CPT | Performed by: NURSE PRACTITIONER

## 2024-07-08 PROCEDURE — 3078F DIAST BP <80 MM HG: CPT | Performed by: NURSE PRACTITIONER

## 2024-07-08 PROCEDURE — 3074F SYST BP LT 130 MM HG: CPT | Performed by: NURSE PRACTITIONER

## 2024-07-08 PROCEDURE — 1160F RVW MEDS BY RX/DR IN RCRD: CPT | Performed by: NURSE PRACTITIONER

## 2024-07-08 RX ORDER — TIOTROPIUM BROMIDE AND OLODATEROL 3.124; 2.736 UG/1; UG/1
2 SPRAY, METERED RESPIRATORY (INHALATION)
Qty: 12 G | Refills: 3 | Status: SHIPPED | OUTPATIENT
Start: 2024-07-08

## 2024-07-08 RX ORDER — TIOTROPIUM BROMIDE AND OLODATEROL 3.124; 2.736 UG/1; UG/1
2 SPRAY, METERED RESPIRATORY (INHALATION)
Qty: 12 G | Refills: 3 | COMMUNITY
Start: 2024-07-08 | End: 2024-07-08

## 2024-07-15 ENCOUNTER — TELEPHONE (OUTPATIENT)
Dept: HEMATOLOGY | Age: 74
End: 2024-07-15

## 2024-07-15 RX ORDER — AMLODIPINE BESYLATE 5 MG/1
5 TABLET ORAL DAILY
Qty: 90 TABLET | Refills: 1 | Status: SHIPPED | OUTPATIENT
Start: 2024-07-15

## 2024-07-15 NOTE — TELEPHONE ENCOUNTER
Called pt. to remind them of appointment on 07/18/2024 and had to leave a detailed voicemail with appointment date and time. Reminded patient to just come at appointment time, and to not come at the lab appointment time. Reminded patient that we will not check them in any more than 30 minutes before appointment time.

## 2024-07-16 ENCOUNTER — TELEPHONE (OUTPATIENT)
Dept: HEMATOLOGY | Age: 74
End: 2024-07-16

## 2024-07-16 NOTE — TELEPHONE ENCOUNTER
Pt called to cancel new patient appt with Tim Villagomez scheduled for 7/18/24.  Pt does not want to reschedule and stated he will call office at later date if he feels he needs to be seen.  Above info shared with Tim Villagomez and staff.

## 2024-08-05 ENCOUNTER — TELEPHONE (OUTPATIENT)
Dept: PRIMARY CARE CLINIC | Age: 74
End: 2024-08-05

## 2024-08-05 DIAGNOSIS — R33.9 URINARY RETENTION: ICD-10-CM

## 2024-08-05 DIAGNOSIS — Z79.4 TYPE 2 DIABETES MELLITUS WITH STAGE 3A CHRONIC KIDNEY DISEASE, WITH LONG-TERM CURRENT USE OF INSULIN (HCC): Primary | ICD-10-CM

## 2024-08-05 DIAGNOSIS — E34.9 TESTOSTERONE DEFICIENCY: ICD-10-CM

## 2024-08-05 DIAGNOSIS — I10 ESSENTIAL HYPERTENSION: ICD-10-CM

## 2024-08-05 DIAGNOSIS — E11.22 TYPE 2 DIABETES MELLITUS WITH STAGE 3A CHRONIC KIDNEY DISEASE, WITH LONG-TERM CURRENT USE OF INSULIN (HCC): Primary | ICD-10-CM

## 2024-08-05 DIAGNOSIS — I10 PRIMARY HYPERTENSION: ICD-10-CM

## 2024-08-05 DIAGNOSIS — N18.31 TYPE 2 DIABETES MELLITUS WITH STAGE 3A CHRONIC KIDNEY DISEASE, WITH LONG-TERM CURRENT USE OF INSULIN (HCC): Primary | ICD-10-CM

## 2024-08-05 DIAGNOSIS — E78.2 MIXED HYPERLIPIDEMIA: ICD-10-CM

## 2024-08-05 DIAGNOSIS — N40.1 BENIGN PROSTATIC HYPERPLASIA WITH LOWER URINARY TRACT SYMPTOMS, SYMPTOM DETAILS UNSPECIFIED: ICD-10-CM

## 2024-08-05 NOTE — TELEPHONE ENCOUNTER
Patient brought lab request by from [facility] kidney office that has to be completed by 10-. He wanted to make sure that Dr. Nazario did not want to add a few labs since he was coming in for medicare AWV. Placed a few additional along with requested lab orders for patient to complete.

## 2024-09-17 DIAGNOSIS — G62.9 NEUROPATHY: ICD-10-CM

## 2024-09-17 DIAGNOSIS — Z76.0 MEDICATION REFILL: ICD-10-CM

## 2024-09-18 RX ORDER — GABAPENTIN 300 MG/1
CAPSULE ORAL
Qty: 90 CAPSULE | Refills: 0 | Status: SHIPPED | OUTPATIENT
Start: 2024-09-18 | End: 2024-10-18

## 2024-10-04 PROBLEM — J44.9 CHRONIC OBSTRUCTIVE PULMONARY DISEASE: Status: RESOLVED | Noted: 2020-04-17 | Resolved: 2024-10-04

## 2024-10-04 PROBLEM — J96.02 ACUTE RESPIRATORY FAILURE WITH HYPOXIA AND HYPERCAPNIA: Status: RESOLVED | Noted: 2020-09-11 | Resolved: 2024-10-04

## 2024-10-04 PROBLEM — J44.1 COPD EXACERBATION: Status: RESOLVED | Noted: 2020-06-16 | Resolved: 2024-10-04

## 2024-10-04 PROBLEM — J44.9 STAGE 4 VERY SEVERE COPD BY GOLD CLASSIFICATION: Status: RESOLVED | Noted: 2020-12-26 | Resolved: 2024-10-04

## 2024-10-04 PROBLEM — Z72.0 TOBACCO ABUSE: Status: RESOLVED | Noted: 2020-12-18 | Resolved: 2024-10-04

## 2024-10-04 PROBLEM — J44.1 COPD EXACERBATION: Status: RESOLVED | Noted: 2020-09-11 | Resolved: 2024-10-04

## 2024-10-04 PROBLEM — J96.22 ACUTE ON CHRONIC RESPIRATORY FAILURE WITH HYPERCAPNIA: Status: RESOLVED | Noted: 2020-09-11 | Resolved: 2024-10-04

## 2024-10-04 PROBLEM — R09.02 HYPOXIA: Status: RESOLVED | Noted: 2020-06-16 | Resolved: 2024-10-04

## 2024-10-04 PROBLEM — J96.01 ACUTE RESPIRATORY FAILURE WITH HYPOXIA AND HYPERCAPNIA: Status: RESOLVED | Noted: 2020-09-11 | Resolved: 2024-10-04

## 2024-10-04 PROBLEM — Z87.891 PERSONAL HISTORY OF NICOTINE DEPENDENCE: Chronic | Status: ACTIVE | Noted: 2024-10-04

## 2024-10-04 PROBLEM — R06.02 SHORTNESS OF BREATH: Status: RESOLVED | Noted: 2020-07-07 | Resolved: 2024-10-04

## 2024-10-04 PROBLEM — F17.201 TOBACCO ABUSE, IN REMISSION: Status: RESOLVED | Noted: 2020-06-16 | Resolved: 2024-10-04

## 2024-10-04 NOTE — PROGRESS NOTES
" TAMAR Sorenson  [facility]   Pulmonary and Critical Care  [address] [place] [address]  Phone: [phone]  Fax: [phone]           Chief Complaint  Asthma and COPD    Subjective    History of Present Illness     Naif Nick presents to [facility] PULMONARY & CRITICAL CARE MEDICINE     History of Present Illness  Mr. Nick is a pleasant 74 year old male with known very severe stage 4 COPD, Former smoker, centrolobular emphysema, TB exposure, Lung nodule and Asthma/ copd overlap syndrome.     He experiences occasional bouts of COPD, which he describes as feeling like he can't exhale properly. His smoking habits remain unchanged. He uses Stiolto, taking 2 puffs twice a day, and also uses a rescue inhaler. He has filed a claim with the Department of Labor due to his respiratory issues.  He feels this may have been approved.    He received an influenza vaccine yesterday but did not fill the prescription for the RSV vaccine provided during his last visit here    He reports no issues with his ears, nose, throat, or swallowing. He has a history  occasional hearing difficulties.      Objective   Vital Signs:   /84   Pulse 74   Ht 177.8 cm (70\")   Wt 78.7 kg (173 lb 6.4 oz)   SpO2 95% Comment: RA  BMI 24.88 kg/m²     Physical Exam  Vitals reviewed.   Constitutional:       Appearance: Normal appearance.   Cardiovascular:      Rate and Rhythm: Normal rate and regular rhythm.   Pulmonary:      Effort: Pulmonary effort is normal.      Breath sounds: Normal breath sounds.   Neurological:      General: No focal deficit present.      Mental Status: He is alert and oriented to person, place, and time.   Psychiatric:         Mood and Affect: Mood normal.         Behavior: Behavior normal.          Result Review :  The following data was reviewed by: TAMAR Sorenson on 10/09/2024:    My interpretation of imaging:  no new   My interpretation of labs: no " new     PFT Values          10/12/2022    11:00 10/9/2024    10:15   Pre Drug PFT Results   FVC 79 100   FEV1 65 76   FEF 25-75% 54 34   FEV1/FVC 62 58   Other Tests PFT Results   DLCO 83 75   D/VAsb 83 82     My interpretation of the PFT : As below    Results for orders placed in visit on 10/09/24    Spirometry with Diffusion Capacity    Narrative  Spirometry with Diffusion Capacity    Performed by: Hamida Hartman, JENNA  Authorized by: Raquel Sosa APRN  Pre Drug % Predicted  FVC: 100%  FEV1: 76%  FEF 25-75%: 34%  FEV1/FVC: 58%  DLCO: 75%  D/VAsb: 82%    Interpretation  Spirometry  Spirometry shows moderate obstruction. There is reduced midflow suggesting small airway/airflow obstruction.  Review of FVL curve  There is a flattening of the inspiratory curve, suggesting variable extrathoracic obstruction.  Diffusion Capacity  The patient's diffusion capacity is normal.  Diffusion capacity is normal when corrected for alveolar volume.  Overall comments: Paired to October 2022 he has had improvement in his FEV1 from 65 to 76% predicted, improvement in FVC from 79 to 100% predicted.  Diffusion capacity is normal.  He does have flattening of the inspiratory curve.  This is a change.      Results for orders placed in visit on 10/12/22    Pulmonary Function Test    Narrative  Pulmonary Function Test  Performed by: Hamida Hartman, JENNA  Authorized by: Raquel Sosa APRN    Pre Drug % Predicted  FVC: 79%  FEV1: 65%  FEF 25-75%: 54%  FEV1/FVC: 62%  DLCO: 83%  D/VAsb: 83%    Interpretation  Spirometry  Spirometry shows moderate obstruction. There is reduced midflow suggesting small airway/airflow obstruction.  Diffusion Capacity  The patient's diffusion capacity is normal.  Diffusion capacity is normal when corrected for alveolar volume.      Results for orders placed during the hospital encounter of 08/10/20    Full Pulmonary Function Test With Bronchodilator    Narrative  [facility]  - Pulmonary Function Test    [facility]  [place]  [address]  [phone]    Patient : Naif Nick  MRN : [id number]  YOB: 1950  :  Nacho Vaughan MD  Date : 8/10/2020    ______________________________________________________________________    Interpretation :  1. Spirometry shows very severe airflow obstruction prebronchodilator  2. Following bronchodilator there is minimal not significant improvement in FEV1 and FVC, but postbronchodilator spirometry meet criteria for severe rather than very severe obstruction.  3. Mid flow is reduced.  4. Lung volume measurements show reduction in inspiratory capacity and elevations in expiratory reserve volume residual volume and total lung capacity suggesting hyperinflation and very significant gas trapping.  5. Airway resistance is increased and conductance is decreased.      Nacho Vaughan MD          Assessment and Plan   Diagnoses and all orders for this visit:    1. Asthma-COPD overlap syndrome (Primary)  Overview:  10/2024 Stage II COPD, FEV1 76% predicted    Assessment & Plan:  Continue Stiolto but reminded it is 2 puffs once daily and not 2 puffs twice daily.  He is reminded to use his rescue inhaler as needed.          Orders:  -     Spirometry with Diffusion Capacity  -     Alpha - 1 - Antitrypsin; Future    2. Centrilobular emphysema  -     Alpha - 1 - Antitrypsin; Future    3. Chronic respiratory failure with hypoxia  -     Alpha - 1 - Antitrypsin; Future    4. Lung nodule  -     Alpha - 1 - Antitrypsin; Future    5. Personal history of nicotine dependence  Overview:  10/4/24 Some day smoker, 60 pack year history     Orders:  -     Alpha - 1 - Antitrypsin; Future    6. Abnormal PFT  Comments:  Flattening of the inspiratory curve which is a new finding.  Compared to October 2022  Orders:  -     Ambulatory Referral to ENT (Otolaryngology)  -     Alpha - 1 - Antitrypsin; Future            Alpha 1: Still smoking, tested today    LDCT: Gets annual through [facility] program, last on April 2024  Smoking Cessation: Encouraged  Vaccinations: Flu: yesterday at PCP PNA: Completed RSV: hand out and RX provided at last visit.  Encouraged to obtain.       Follow Up   Return in about 6 months (around 4/9/2025).  Patient was given instructions and counseling regarding his condition or for health maintenance advice. Please see specific information pulled into the AVS if appropriate.     TAMAR Sorenson  10/9/2024  11:09 CDT    Please note that portions of this note were completed with a voice recognition program.    Patient or patient representative verbalized consent for the use of Ambient Listening during the visit with  TAMAR Sorenson for chart documentation. 10/9/2024  11:09 CDT

## 2024-10-08 ENCOUNTER — OFFICE VISIT (OUTPATIENT)
Dept: PRIMARY CARE CLINIC | Age: 74
End: 2024-10-08
Payer: MEDICARE

## 2024-10-08 VITALS
HEART RATE: 66 BPM | SYSTOLIC BLOOD PRESSURE: 124 MMHG | TEMPERATURE: 97.2 F | DIASTOLIC BLOOD PRESSURE: 68 MMHG | OXYGEN SATURATION: 99 % | WEIGHT: 171.3 LBS | HEIGHT: 71 IN | BODY MASS INDEX: 23.98 KG/M2

## 2024-10-08 DIAGNOSIS — Z23 NEED FOR INFLUENZA VACCINATION: ICD-10-CM

## 2024-10-08 DIAGNOSIS — I10 PRIMARY HYPERTENSION: ICD-10-CM

## 2024-10-08 DIAGNOSIS — Z00.00 MEDICARE ANNUAL WELLNESS VISIT, SUBSEQUENT: Primary | ICD-10-CM

## 2024-10-08 DIAGNOSIS — E34.9 TESTOSTERONE DEFICIENCY: ICD-10-CM

## 2024-10-08 DIAGNOSIS — N18.31 TYPE 2 DIABETES MELLITUS WITH STAGE 3A CHRONIC KIDNEY DISEASE, WITH LONG-TERM CURRENT USE OF INSULIN (HCC): ICD-10-CM

## 2024-10-08 DIAGNOSIS — Z79.4 TYPE 2 DIABETES MELLITUS WITH STAGE 3A CHRONIC KIDNEY DISEASE, WITH LONG-TERM CURRENT USE OF INSULIN (HCC): ICD-10-CM

## 2024-10-08 DIAGNOSIS — R33.9 URINARY RETENTION: ICD-10-CM

## 2024-10-08 DIAGNOSIS — E11.22 TYPE 2 DIABETES MELLITUS WITH STAGE 3A CHRONIC KIDNEY DISEASE, WITH LONG-TERM CURRENT USE OF INSULIN (HCC): ICD-10-CM

## 2024-10-08 DIAGNOSIS — I10 ESSENTIAL HYPERTENSION: ICD-10-CM

## 2024-10-08 DIAGNOSIS — E78.2 MIXED HYPERLIPIDEMIA: ICD-10-CM

## 2024-10-08 DIAGNOSIS — N40.1 BENIGN PROSTATIC HYPERPLASIA WITH LOWER URINARY TRACT SYMPTOMS, SYMPTOM DETAILS UNSPECIFIED: ICD-10-CM

## 2024-10-08 LAB
25(OH)D3 SERPL-MCNC: 30.9 NG/ML
ALBUMIN SERPL-MCNC: 4 G/DL (ref 3.5–5.2)
ALP SERPL-CCNC: 59 U/L (ref 40–129)
ALT SERPL-CCNC: 14 U/L (ref 5–41)
ANION GAP SERPL CALCULATED.3IONS-SCNC: 7 MMOL/L (ref 7–19)
AST SERPL-CCNC: 14 U/L (ref 5–40)
BACTERIA URNS QL MICRO: NEGATIVE /HPF
BASOPHILS # BLD: 0.1 K/UL (ref 0–0.2)
BASOPHILS NFR BLD: 0.7 % (ref 0–1)
BILIRUB SERPL-MCNC: 0.3 MG/DL (ref 0.2–1.2)
BILIRUB UR QL STRIP: NEGATIVE
BUN SERPL-MCNC: 22 MG/DL (ref 8–23)
CALCIUM SERPL-MCNC: 9.8 MG/DL (ref 8.8–10.2)
CHLORIDE SERPL-SCNC: 100 MMOL/L (ref 98–111)
CHOLEST SERPL-MCNC: 182 MG/DL (ref 0–199)
CLARITY UR: CLEAR
CO2 SERPL-SCNC: 32 MMOL/L (ref 22–29)
COLOR UR: YELLOW
CREAT SERPL-MCNC: 1 MG/DL (ref 0.7–1.2)
CRYSTALS URNS MICRO: NORMAL /HPF
EOSINOPHIL # BLD: 0.3 K/UL (ref 0–0.6)
EOSINOPHIL NFR BLD: 3 % (ref 0–5)
EPI CELLS #/AREA URNS AUTO: 0 /HPF (ref 0–5)
ERYTHROCYTE [DISTWIDTH] IN BLOOD BY AUTOMATED COUNT: 13.1 % (ref 11.5–14.5)
GLUCOSE SERPL-MCNC: 97 MG/DL (ref 70–99)
GLUCOSE UR STRIP.AUTO-MCNC: =>1000 MG/DL
HBA1C MFR BLD: 5.9 % (ref 4–5.6)
HCT VFR BLD AUTO: 46.7 % (ref 42–52)
HDLC SERPL-MCNC: 66 MG/DL (ref 40–60)
HGB BLD-MCNC: 14.3 G/DL (ref 14–18)
HGB UR STRIP.AUTO-MCNC: NEGATIVE MG/L
HYALINE CASTS #/AREA URNS AUTO: 0 /HPF (ref 0–8)
IMM GRANULOCYTES # BLD: 0 K/UL
KETONES UR STRIP.AUTO-MCNC: NEGATIVE MG/DL
LDLC SERPL CALC-MCNC: 77 MG/DL
LEUKOCYTE ESTERASE UR QL STRIP.AUTO: NEGATIVE
LYMPHOCYTES # BLD: 3 K/UL (ref 1.1–4.5)
LYMPHOCYTES NFR BLD: 34.3 % (ref 20–40)
MAGNESIUM SERPL-MCNC: 2.2 MG/DL (ref 1.6–2.4)
MCH RBC QN AUTO: 29.5 PG (ref 27–31)
MCHC RBC AUTO-ENTMCNC: 30.6 G/DL (ref 33–37)
MCV RBC AUTO: 96.3 FL (ref 80–94)
MONOCYTES # BLD: 0.8 K/UL (ref 0–0.9)
MONOCYTES NFR BLD: 9.2 % (ref 0–10)
NEUTROPHILS # BLD: 4.5 K/UL (ref 1.5–7.5)
NEUTS SEG NFR BLD: 52.5 % (ref 50–65)
NITRITE UR QL STRIP.AUTO: NEGATIVE
PH UR STRIP.AUTO: 6.5 [PH] (ref 5–8)
PLATELET # BLD AUTO: 400 K/UL (ref 130–400)
PMV BLD AUTO: 9.6 FL (ref 9.4–12.4)
POTASSIUM SERPL-SCNC: 4.1 MMOL/L (ref 3.5–5)
PROT SERPL-MCNC: 7.5 G/DL (ref 6.4–8.3)
PROT UR STRIP.AUTO-MCNC: 100 MG/DL
RBC # BLD AUTO: 4.85 M/UL (ref 4.7–6.1)
RBC #/AREA URNS AUTO: 2 /HPF (ref 0–4)
SODIUM SERPL-SCNC: 139 MMOL/L (ref 136–145)
SP GR UR STRIP.AUTO: 1.02 (ref 1–1.03)
TESTOST SERPL-MCNC: 266.9 NG/DL (ref 193–740)
TRIGL SERPL-MCNC: 193 MG/DL (ref 0–149)
UROBILINOGEN UR STRIP.AUTO-MCNC: 0.2 E.U./DL
WBC # BLD AUTO: 8.7 K/UL (ref 4.8–10.8)
WBC #/AREA URNS AUTO: 1 /HPF (ref 0–5)

## 2024-10-08 PROCEDURE — 3078F DIAST BP <80 MM HG: CPT | Performed by: FAMILY MEDICINE

## 2024-10-08 PROCEDURE — G0439 PPPS, SUBSEQ VISIT: HCPCS | Performed by: FAMILY MEDICINE

## 2024-10-08 PROCEDURE — G8482 FLU IMMUNIZE ORDER/ADMIN: HCPCS | Performed by: FAMILY MEDICINE

## 2024-10-08 PROCEDURE — 1123F ACP DISCUSS/DSCN MKR DOCD: CPT | Performed by: FAMILY MEDICINE

## 2024-10-08 PROCEDURE — 3074F SYST BP LT 130 MM HG: CPT | Performed by: FAMILY MEDICINE

## 2024-10-08 PROCEDURE — G0008 ADMIN INFLUENZA VIRUS VAC: HCPCS | Performed by: FAMILY MEDICINE

## 2024-10-08 PROCEDURE — 90653 IIV ADJUVANT VACCINE IM: CPT | Performed by: FAMILY MEDICINE

## 2024-10-08 ASSESSMENT — PATIENT HEALTH QUESTIONNAIRE - PHQ9
SUM OF ALL RESPONSES TO PHQ QUESTIONS 1-9: 0
2. FEELING DOWN, DEPRESSED OR HOPELESS: NOT AT ALL
SUM OF ALL RESPONSES TO PHQ QUESTIONS 1-9: 0
SUM OF ALL RESPONSES TO PHQ9 QUESTIONS 1 & 2: 0
1. LITTLE INTEREST OR PLEASURE IN DOING THINGS: NOT AT ALL

## 2024-10-08 ASSESSMENT — LIFESTYLE VARIABLES
HOW MANY STANDARD DRINKS CONTAINING ALCOHOL DO YOU HAVE ON A TYPICAL DAY: 1 OR 2
HOW OFTEN DO YOU HAVE A DRINK CONTAINING ALCOHOL: 2-4 TIMES A MONTH

## 2024-10-08 NOTE — PROGRESS NOTES
Medicare Annual Wellness Visit    Armand Astudillo is here for Medicare AWV    Assessment & Plan   Medicare annual wellness visit, subsequent  Need for influenza vaccination  -     Influenza, FLUAD Trivalent, (age 65 y+), IM, Preservative Free, 0.5mL    Recommendations for Preventive Services Due: see orders and patient instructions/AVS.  Recommended screening schedule for the next 5-10 years is provided to the patient in written form: see Patient Instructions/AVS.     Return in 1 year (on 10/8/2025) for Medicare AWV.     Subjective       Patient's complete Health Risk Assessment and screening values have been reviewed and are found in Flowsheets. The following problems were reviewed today and where indicated follow up appointments were made and/or referrals ordered.    Positive Risk Factor Screenings with Interventions:    Fall Risk:  Do you feel unsteady or are you worried about falling? : (!) yes  2 or more falls in past year?: no  Fall with injury in past year?: no     Interventions:    Reviewed medications, home hazards, visual acuity, and co-morbidities that can increase risk for falls  No recent fall. He state that he last time he fell, his feet got tangled up  See AVS for additional education material       Drug Use:   Substance and Sexual Activity   Drug Use Yes    Types: Marijuana (Weed)    Comment: occ     DAST-10 Score: 1  Interpretation: 1-2 indicates low level use. Recommendation: monitor and re-assess at a later date  Interventions:   , See AVS for additional education material  Pt states he still using weed every now and then when he is in pain.  He states that he feel he cannot smoke them anymore and plans to quit smoking cannabis for good        General HRA Questions:  Select all that apply: (!) New or Increased Pain  Interventions - Pain:  See AVS for additional education material  Pain is mostly from arthritis - de does not want strong pain medication. He was on narcotics before and weaned himself

## 2024-10-08 NOTE — PROGRESS NOTES
After obtaining consent, and per orders of Dr. Elise Nazario, injection of Fluad influenza injection given in Left deltoid by Lyndsay Hinkle [facility]. Patient signed consent scanned into patients chart.

## 2024-10-08 NOTE — PATIENT INSTRUCTIONS
Foods: Care Instructions  With every meal, you can make healthy food choices. Try to eat a variety of fruits, vegetables, whole grains, lean proteins, and low-fat dairy products. This can help you get the right balance of nutrients, including vitamins and minerals. Small changes add up over time. You can start by adding one healthy food to your meals each day.    Try to make half your plate fruits and vegetables, one-fourth whole grains, and one-fourth lean proteins. Try including dairy with your meals.   Eat more fruits and vegetables. Try to have them with most meals and snacks.   Foods for healthy eating        Fruits   These can be fresh, frozen, canned, or dried.  Try to choose whole fruit rather than fruit juice.  Eat a variety of colors.        Vegetables   These can be fresh, frozen, canned, or dried.  Beans, peas, and lentils count too.        Whole grains   Choose whole-grain breads, cereals, and noodles.  Try brown rice.        Lean proteins   These can include lean meat, poultry, fish, and eggs.  You can also have tofu, beans, peas, lentils, nuts, and seeds.        Dairy   Try milk, yogurt, and cheese.  Choose low-fat or fat-free when you can.  If you need to, use lactose-free milk or fortified plant-based milk products, such as soy milk.        Water   Drink water when you're thirsty.  Limit sugar-sweetened drinks, including soda, fruit drinks, and sports drinks.  Where can you learn more?  Go to https://www.[employer].net/patientEd and enter T756 to learn more about \"Eating Healthy Foods: Care Instructions.\"  Current as of: September 20, 2023  Content Version: 14.2  © 2024 [employer].   Care instructions adapted under license by "[employer] If you have questions about a medical condition or this instruction, always ask your healthcare professional. [employer] Inc[employer] disclaims any warranty or liability for your use of this information.           Advance Directives: Care

## 2024-10-09 ENCOUNTER — OFFICE VISIT (OUTPATIENT)
Dept: PULMONOLOGY | Facility: CLINIC | Age: 74
End: 2024-10-09
Payer: MEDICARE

## 2024-10-09 ENCOUNTER — PROCEDURE VISIT (OUTPATIENT)
Dept: PULMONOLOGY | Facility: CLINIC | Age: 74
End: 2024-10-09
Payer: MEDICARE

## 2024-10-09 VITALS
WEIGHT: 173.4 LBS | BODY MASS INDEX: 24.82 KG/M2 | HEIGHT: 70 IN | OXYGEN SATURATION: 95 % | SYSTOLIC BLOOD PRESSURE: 168 MMHG | DIASTOLIC BLOOD PRESSURE: 84 MMHG | HEART RATE: 74 BPM

## 2024-10-09 DIAGNOSIS — R94.2 ABNORMAL PFT: ICD-10-CM

## 2024-10-09 DIAGNOSIS — J44.89 ASTHMA-COPD OVERLAP SYNDROME: Primary | ICD-10-CM

## 2024-10-09 DIAGNOSIS — J96.11 CHRONIC RESPIRATORY FAILURE WITH HYPOXIA: ICD-10-CM

## 2024-10-09 DIAGNOSIS — R91.1 LUNG NODULE: ICD-10-CM

## 2024-10-09 DIAGNOSIS — Z87.891 PERSONAL HISTORY OF NICOTINE DEPENDENCE: Chronic | ICD-10-CM

## 2024-10-09 DIAGNOSIS — J43.2 CENTRILOBULAR EMPHYSEMA: ICD-10-CM

## 2024-10-09 NOTE — ASSESSMENT & PLAN NOTE
Continue Stiolto but reminded it is 2 puffs once daily and not 2 puffs twice daily.  He is reminded to use his rescue inhaler as needed.

## 2024-10-09 NOTE — PROCEDURES
Spirometry with Diffusion Capacity    Performed by: Hamida Hartman, RRT  Authorized by: Raquel Sosa APRN     Pre Drug % Predicted    FVC: 100%   FEV1: 76%   FEF 25-75%: 34%   FEV1/FVC: 58%   DLCO: 75%   D/VAsb: 82%    Interpretation   Spirometry   Spirometry shows moderate obstruction. There is reduced midflow suggesting small airway/airflow obstruction.   Review of FVL curve   There is a flattening of the inspiratory curve, suggesting variable extrathoracic obstruction.   Diffusion Capacity  The patient's diffusion capacity is normal.  Diffusion capacity is normal when corrected for alveolar volume.   Overall comments: Paired to October 2022 he has had improvement in his FEV1 from 65 to 76% predicted, improvement in FVC from 79 to 100% predicted.  Diffusion capacity is normal.  He does have flattening of the inspiratory curve.  This is a change.

## 2024-10-22 DIAGNOSIS — J44.89 ASTHMA-COPD OVERLAP SYNDROME: ICD-10-CM

## 2024-10-22 DIAGNOSIS — R91.1 LUNG NODULE: ICD-10-CM

## 2024-10-22 DIAGNOSIS — J43.2 CENTRILOBULAR EMPHYSEMA: ICD-10-CM

## 2024-10-22 DIAGNOSIS — Z87.891 PERSONAL HISTORY OF NICOTINE DEPENDENCE: Chronic | ICD-10-CM

## 2024-10-22 DIAGNOSIS — J96.11 CHRONIC RESPIRATORY FAILURE WITH HYPOXIA: ICD-10-CM

## 2024-10-22 DIAGNOSIS — R94.2 ABNORMAL PFT: ICD-10-CM

## 2024-10-23 ENCOUNTER — TELEPHONE (OUTPATIENT)
Dept: PULMONOLOGY | Facility: CLINIC | Age: 74
End: 2024-10-23
Payer: MEDICARE

## 2024-10-23 RX ORDER — VALSARTAN AND HYDROCHLOROTHIAZIDE 320; 25 MG/1; MG/1
1 TABLET, FILM COATED ORAL DAILY
Qty: 90 TABLET | Refills: 0 | Status: SHIPPED | OUTPATIENT
Start: 2024-10-23

## 2024-10-23 NOTE — TELEPHONE ENCOUNTER
Armand Astudillo called to request a refill on his medication.      Last office visit : 10/8/2024   Next office visit : 1/8/2025     Requested Prescriptions     Pending Prescriptions Disp Refills    valsartan-hydroCHLOROthiazide (DIOVAN-HCT) 320-25 MG per tablet [Pharmacy Med Name: Valsartan-hydroCHLOROthiazide Oral Tablet 320-25 MG] 90 tablet 0     Sig: TAKE 1 TABLET EVERY DAY            Anabel Ornelas LPN

## 2024-10-23 NOTE — TELEPHONE ENCOUNTER
----- Message from Raquel Sosa sent at 10/23/2024  7:55 AM CDT -----  Please let patient know their alpha 1 results are normal with a MM result.

## 2024-11-13 DIAGNOSIS — E11.69 TYPE 2 DIABETES MELLITUS WITH OTHER SPECIFIED COMPLICATION, WITHOUT LONG-TERM CURRENT USE OF INSULIN (HCC): ICD-10-CM

## 2024-11-13 DIAGNOSIS — Z76.0 MEDICATION REFILL: ICD-10-CM

## 2024-11-13 NOTE — TELEPHONE ENCOUNTER
Armand Astudillo called to request a refill on his medication.      Last office visit : 10/8/2024   Next office visit : 1/8/2025     Requested Prescriptions     Pending Prescriptions Disp Refills    Insulin Glargine-Lixisenatide (SOLIQUA) 100-33 UNT-MCG/ML SOPN 10 Adjustable Dose Pre-filled Pen Syringe 2     Sig: INJECT 26 UNITS UNDER THE SKIN DAILY            Lyndsay Hinkle MA

## 2024-11-14 RX ORDER — INSULIN GLARGINE AND LIXISENATIDE 100; 33 U/ML; UG/ML
INJECTION, SOLUTION SUBCUTANEOUS
Qty: 10 ADJUSTABLE DOSE PRE-FILLED PEN SYRINGE | Refills: 2 | Status: SHIPPED | OUTPATIENT
Start: 2024-11-14

## 2024-11-18 NOTE — PROGRESS NOTES
YOB: 1950  Location: [facility]  Location Address: [address] [place] [address], [facility], [address] [place] [address]  Location Phone: [phone]    Chief Complaint   Patient presents with    Abnormal PFT       History of Present Illness  Naif Nick is a 74 y.o. male.  Naif Nick is here for evaluation of ENT complaints. The patient has had problems with abnormal PFT. He admits intermittent shortness of breath with activity. He has a history of asthma. He denies dysphagia and hoarseness.  He admits globus sensation. He is not currently taking reflux medication. He smokes 1 PPD.       Reviewed:      Past Medical History:   Diagnosis Date    Asthma-COPD overlap syndrome 2020    Centrilobular emphysema 2020    Chronic respiratory failure with hypoxia 2020    COPD (chronic obstructive pulmonary disease)     Diabetes mellitus     Hx of colonic polyp     Hx of TIA (transient ischemic attack) and stroke     Hyperlipidemia     Hypertension     Lung nodule 2020    Stage 4 very severe COPD by GOLD classification 2020       Past Surgical History:   Procedure Laterality Date    BACK SURGERY      COLONOSCOPY  08/15/2012    Poor prep repeat exam in 3 years    COLONOSCOPY W/ POLYPECTOMY  2009    Tubular adenoma at 60 cm, Hyperplastic polyp rectum suboptimal prep repeat in 3 months    SHOULDER SURGERY      VERTEBROPLASTY      neck       No outpatient medications have been marked as taking for the 24 encounter (Office Visit) with Sherwin Sawant MD.       Patient has no known allergies.    Family History   Problem Relation Age of Onset    Diabetes Father     Heart disease Father     Heart disease Sister     Diabetes Sister     Diabetes Brother     Heart disease Brother        Social History     Socioeconomic History    Marital status: Legally    Tobacco Use    Smoking status: Some Days     Current packs/day: 1.00     Average packs/day: 1 pack/day for 60.9 years (60.9 ttl  pk-yrs)     Types: Cigarettes     Start date: 1964     Passive exposure: Current    Smokeless tobacco: Never    Tobacco comments:     Patient states that he smokes about a half a pack a day. 10/9/24   Vaping Use    Vaping status: Former    Substances: Nicotine   Substance and Sexual Activity    Alcohol use: Yes     Comment: OCC    Drug use: No    Sexual activity: Defer       Review of Systems   Constitutional: Negative.    HENT:          Admits globus sensation       Vitals:    11/19/24 1308   BP: 155/73   Pulse: 79   Temp: 98.2 °F (36.8 °C)       Body mass index is 24.97 kg/m².    Objective     Physical Exam  Vitals reviewed.   Constitutional:       Appearance: Normal appearance. He is obese.   HENT:      Head: Normocephalic.      Right Ear: External ear normal.      Left Ear: External ear normal.      Nose: Nose normal.      Mouth/Throat:      Lips: Pink.      Mouth: Mucous membranes are moist.      Dentition: Has dentures.      Pharynx: Oropharynx is clear.      Comments: See endoscopy  Neurological:      Mental Status: He is alert.   Psychiatric:         Behavior: Behavior is cooperative.         Assessment & Plan   Diagnoses and all orders for this visit:    1. Abnormal PFT (Primary)    2. Laryngopharyngeal reflux (LPR)    3. Globus sensation    4. Tobacco use    Other orders  -     Omeprazole 20 MG tablet delayed-release; Take 20 mg by mouth 2 (Two) Times a Day for 30 days.  Dispense: 60 each; Refill: 3  -     varenicline (Chantix) 0.5 MG tablet; Take 1 tablet by mouth 2 (Two) Times a Day for 30 days.  Dispense: 60 tablet; Refill: 0  -     varenicline (Chantix) 0.5 MG tablet; Take 2 tablets by mouth 2 (Two) Times a Day for 30 days.  Dispense: 120 tablet; Refill: 5      * Surgery not found *  No orders of the defined types were placed in this encounter.    Omeprazole before breakfast and dinner  Reflux precautions  Return for problems    [place]  reports that he has been smoking cigarettes. He started  smoking about 60 years ago. He has a 60.9 pack-year smoking history. He has been exposed to tobacco smoke. He has never used smokeless tobacco. I have educated him on the risk of diseases from using tobacco products such as cancer, COPD, and heart disease.     I advised him to quit and he is willing to quit. We have discussed the following method/s for tobacco cessation:  Prescription Medication.  Together we have set a quit date for 1 month from today.  He will follow up with me in 3 months or sooner to check on his progress.    I spent 3  minutes counseling the patient.          Dr. Sawant examined and discussed care with patient and agrees with treatment plan.     Return in about 3 months (around 2/19/2025).       Patient Instructions   Gastroesophageal Reflux Disease (Laryngopharyngeal Reflux), Adult  Gastroesophageal reflux disease (GERD) and/or Laryngopharyngeal Reflux, (LPR) happens when acid from your stomach flows up into the esophagus and/or throat and voicebox or larynx. When acid comes in contact with the these organs, the acid can cause soreness (inflammation). Over time, GERD may create small holes (ulcers) in the lining of the esophagus and may lead to the development of hoarseness, difficulty swallowing,   feeling of something stuck in the throat, increased mucous or drainage and even predispose to the development of malignancies, (cancer).    CAUSES   Increased body weight. This puts pressure on the stomach, making acid rise from the stomach into the esophagus.  Smoking. This increases acid production in the stomach.  Drinking alcohol. This causes decreased pressure in the lower esophageal sphincter (valve or ring of muscle between the esophagus and stomach), allowing acid from the stomach into the esophagus.  Late evening meals and a full stomach. This increases pressure and acid production in the stomach.  A malformed lower esophageal sphincter  Diet which can include avoidance of gluten and dairy  products  Age  SYMPTOMS   Burning pain in the lower part of the mid-chest behind the breastbone and in the mid-stomach area. This may occur twice a week or more often.  Trouble swallowing.  Sore throat.  Dry cough.  Asthma-like symptoms including chest tightness, shortness of breath, or wheezing.  Globus sensation-something stuck in the throat/fullness  Hoarseness  DIAGNOSIS   Your caregiver may be able to diagnose GERD based on your symptoms. In some cases, X-rays and other tests may be done to check for complications or to check the condition of your stomach and esophagus.  You may need to see another doctor.  TREATMENT   Over-the-counter or prescription medicines to help decrease acid production.   Dietary and behavioral modifications or changes may be also recommended.  HOME CARE INSTRUCTIONS   Change the factors that you can control. Ask your caregiver for guidance concerning weight loss, quitting smoking, and alcohol consumption.  Avoid foods and drinks that make your symptoms worse, and MAY include such as:  Caffeine or alcoholic drinks.  Chocolate.  Gluten containing foods  Dairy  Peppermint or mint flavorings.  Garlic and onions.  Spicy foods.  Citrus fruits, such as oranges, karyn, or limes.  Tomato-based foods such as sauce, chili, salsa, and pizza.  Fried and fatty foods.  Avoid lying down for the 3 hours prior to your bedtime or prior to taking a nap.  Eat small, frequent meals instead of large meals.  Wear loose-fitting clothing. Do not wear anything tight around your waist that causes pressure on your stomach.  Raise the head of your bed 6 to 8 inches with wood blocks to help you sleep. Extra pillows will not help.  Only take over-the-counter or prescription medicines for pain, discomfort, or fever as directed by your caregiver.  Do not take aspirin, ibuprofen, or other nonsteroidal anti-inflammatory drugs if possible (NSAIDs).  SEEK IMMEDIATE MEDICAL CARE IF:   You have pain in your arms, neck, jaw,  teeth, or back.  Your pain increases or changes in intensity or duration.  You develop nausea, vomiting, or sweating (diaphoresis).  You develop shortness of breath, or you faint.  Your vomit is green, yellow, black, or looks like coffee grounds or blood.  Your stool is red, bloody, or black.  These symptoms could be signs of other problems, such as heart disease, gastric bleeding, or esophageal bleeding.  MAKE SURE YOU:   Understand these instructions.  Will watch your condition.  Will get help right away if you are not doing well or get worse.     This information is not intended to replace advice given to you by your physician. Make sure you discuss any questions you have with your health care provider.     Modified by Sherwin Sawant MD, FACS 2016.  Document Released: 2006 Document Revised: 2016 Document Reviewed: 2016  [employer] Interactive Patient Education © Elsevier Inc.     CONTACT INFORMATION:  The main office phone number is [phone]. For emergencies after hours and on weekends, this number will convert over to our answering service and the on call provider will answer. Please try to keep non emergent phone calls/ questions to office hours 9am-5pm Monday through Friday.      [employer]  As an alternative, you can sign up and use the Epic MyChart system for more direct and quicker access for non emergent questions/ problems.  [employer] allows you to send messages to your doctor, view your test results, renew your prescriptions, schedule appointments, and more. To sign up, go to [employer] and click on the Sign Up Now link in the New User? box. Enter your [employer] Activation Code exactly as it appears below along with the last four digits of your Social Security Number and your Date of Birth () to complete the sign-up process. If you do not sign up before the expiration date, you must request a new code.     [employer] Activation Code: Activation code not  generated  Current [employer] Status: Active     If you have questions, you can email [email or web address] or call [phone] to talk to our MyChart staff. Remember, MyChart is NOT to be used for urgent needs. For medical emergencies, dial 911.     IF YOU SMOKE OR USE TOBACCO PLEASE READ THE FOLLOWING:  Why is smoking bad for me?  Smoking increases the risk of heart disease, lung disease, vascular disease, stroke, and cancer. If you smoke, STOP!        IF YOU SMOKE OR USE TOBACCO PLEASE READ THE FOLLOWING:  Why is smoking bad for me?  Smoking increases the risk of heart disease, lung disease, vascular disease, stroke, and cancer. If you smoke, STOP!     For more information:  Quit Now [facility]  1-800-QUIT-NOW  [email or web address]

## 2024-11-19 ENCOUNTER — OFFICE VISIT (OUTPATIENT)
Dept: OTOLARYNGOLOGY | Facility: CLINIC | Age: 74
End: 2024-11-19
Payer: MEDICARE

## 2024-11-19 VITALS
HEIGHT: 70 IN | HEART RATE: 79 BPM | BODY MASS INDEX: 24.91 KG/M2 | SYSTOLIC BLOOD PRESSURE: 155 MMHG | DIASTOLIC BLOOD PRESSURE: 73 MMHG | WEIGHT: 174 LBS | TEMPERATURE: 98.2 F

## 2024-11-19 DIAGNOSIS — Z72.0 TOBACCO USE: ICD-10-CM

## 2024-11-19 DIAGNOSIS — R94.2 ABNORMAL PFT: Primary | ICD-10-CM

## 2024-11-19 DIAGNOSIS — K21.9 LARYNGOPHARYNGEAL REFLUX (LPR): ICD-10-CM

## 2024-11-19 DIAGNOSIS — Z76.0 MEDICATION REFILL: ICD-10-CM

## 2024-11-19 DIAGNOSIS — R09.A2 GLOBUS SENSATION: ICD-10-CM

## 2024-11-19 DIAGNOSIS — E11.69 TYPE 2 DIABETES MELLITUS WITH OTHER SPECIFIED COMPLICATION, WITHOUT LONG-TERM CURRENT USE OF INSULIN (HCC): ICD-10-CM

## 2024-11-19 PROCEDURE — 3077F SYST BP >= 140 MM HG: CPT | Performed by: NURSE PRACTITIONER

## 2024-11-19 PROCEDURE — 31575 DIAGNOSTIC LARYNGOSCOPY: CPT | Performed by: OTOLARYNGOLOGY

## 2024-11-19 PROCEDURE — 99406 BEHAV CHNG SMOKING 3-10 MIN: CPT | Performed by: NURSE PRACTITIONER

## 2024-11-19 PROCEDURE — 1160F RVW MEDS BY RX/DR IN RCRD: CPT | Performed by: NURSE PRACTITIONER

## 2024-11-19 PROCEDURE — 1159F MED LIST DOCD IN RCRD: CPT | Performed by: NURSE PRACTITIONER

## 2024-11-19 PROCEDURE — 3078F DIAST BP <80 MM HG: CPT | Performed by: NURSE PRACTITIONER

## 2024-11-19 PROCEDURE — 99213 OFFICE O/P EST LOW 20 MIN: CPT | Performed by: NURSE PRACTITIONER

## 2024-11-19 RX ORDER — VARENICLINE TARTRATE 0.5 MG/1
0.5 TABLET, FILM COATED ORAL 2 TIMES DAILY
Qty: 60 TABLET | Refills: 0 | Status: SHIPPED | OUTPATIENT
Start: 2024-11-19 | End: 2024-12-19

## 2024-11-19 RX ORDER — VARENICLINE TARTRATE 0.5 MG/1
1 TABLET, FILM COATED ORAL 2 TIMES DAILY
Qty: 120 TABLET | Refills: 5 | Status: SHIPPED | OUTPATIENT
Start: 2024-11-19 | End: 2024-12-19

## 2024-11-19 RX ORDER — INSULIN GLARGINE AND LIXISENATIDE 100; 33 U/ML; UG/ML
INJECTION, SOLUTION SUBCUTANEOUS
Qty: 30 ML | Refills: 3 | OUTPATIENT
Start: 2024-11-19

## 2024-11-19 RX ORDER — NICOTINE POLACRILEX 4 MG/1
20 GUM, CHEWING ORAL 2 TIMES DAILY
Qty: 60 EACH | Refills: 3 | Status: SHIPPED | OUTPATIENT
Start: 2024-11-19 | End: 2024-12-19

## 2024-11-19 NOTE — PATIENT INSTRUCTIONS
Gastroesophageal Reflux Disease (Laryngopharyngeal Reflux), Adult  Gastroesophageal reflux disease (GERD) and/or Laryngopharyngeal Reflux, (LPR) happens when acid from your stomach flows up into the esophagus and/or throat and voicebox or larynx. When acid comes in contact with the these organs, the acid can cause soreness (inflammation). Over time, GERD may create small holes (ulcers) in the lining of the esophagus and may lead to the development of hoarseness, difficulty swallowing,   feeling of something stuck in the throat, increased mucous or drainage and even predispose to the development of malignancies, (cancer).    CAUSES   Increased body weight. This puts pressure on the stomach, making acid rise from the stomach into the esophagus.  Smoking. This increases acid production in the stomach.  Drinking alcohol. This causes decreased pressure in the lower esophageal sphincter (valve or ring of muscle between the esophagus and stomach), allowing acid from the stomach into the esophagus.  Late evening meals and a full stomach. This increases pressure and acid production in the stomach.  A malformed lower esophageal sphincter  Diet which can include avoidance of gluten and dairy products  Age  SYMPTOMS   Burning pain in the lower part of the mid-chest behind the breastbone and in the mid-stomach area. This may occur twice a week or more often.  Trouble swallowing.  Sore throat.  Dry cough.  Asthma-like symptoms including chest tightness, shortness of breath, or wheezing.  Globus sensation-something stuck in the throat/fullness  Hoarseness  DIAGNOSIS   Your caregiver may be able to diagnose GERD based on your symptoms. In some cases, X-rays and other tests may be done to check for complications or to check the condition of your stomach and esophagus.  You may need to see another doctor.  TREATMENT   Over-the-counter or prescription medicines to help decrease acid production.   Dietary and behavioral modifications  or changes may be also recommended.  HOME CARE INSTRUCTIONS   Change the factors that you can control. Ask your caregiver for guidance concerning weight loss, quitting smoking, and alcohol consumption.  Avoid foods and drinks that make your symptoms worse, and MAY include such as:  Caffeine or alcoholic drinks.  Chocolate.  Gluten containing foods  Dairy  Peppermint or mint flavorings.  Garlic and onions.  Spicy foods.  Citrus fruits, such as oranges, karyn, or limes.  Tomato-based foods such as sauce, chili, salsa, and pizza.  Fried and fatty foods.  Avoid lying down for the 3 hours prior to your bedtime or prior to taking a nap.  Eat small, frequent meals instead of large meals.  Wear loose-fitting clothing. Do not wear anything tight around your waist that causes pressure on your stomach.  Raise the head of your bed 6 to 8 inches with wood blocks to help you sleep. Extra pillows will not help.  Only take over-the-counter or prescription medicines for pain, discomfort, or fever as directed by your caregiver.  Do not take aspirin, ibuprofen, or other nonsteroidal anti-inflammatory drugs if possible (NSAIDs).  SEEK IMMEDIATE MEDICAL CARE IF:   You have pain in your arms, neck, jaw, teeth, or back.  Your pain increases or changes in intensity or duration.  You develop nausea, vomiting, or sweating (diaphoresis).  You develop shortness of breath, or you faint.  Your vomit is green, yellow, black, or looks like coffee grounds or blood.  Your stool is red, bloody, or black.  These symptoms could be signs of other problems, such as heart disease, gastric bleeding, or esophageal bleeding.  MAKE SURE YOU:   Understand these instructions.  Will watch your condition.  Will get help right away if you are not doing well or get worse.     This information is not intended to replace advice given to you by your physician. Make sure you discuss any questions you have with your health care provider.     Modified by Sherwin Sawant,  MD, MICHELLE 2016.  Document Released: 2006 Document Revised: 2016 Document Reviewed: 2016  [email or web address] Interactive Patient Education  [employer]     CONTACT INFORMATION:  The main office phone number is [phone]. For emergencies after hours and on weekends, this number will convert over to our answering service and the on call provider will answer. Please try to keep non emergent phone calls/ questions to office hours 9am-5pm Monday through Friday.      MOWGLI  As an alternative, you can sign up and use the Epic MyChart system for more direct and quicker access for non emergent questions/ problems.  [employer] allows you to send messages to your doctor, view your test results, renew your prescriptions, schedule appointments, and more. To sign up, go to [employer] and click on the Sign Up Now link in the New User? box. Enter your MOWGLI Activation Code exactly as it appears below along with the last four digits of your Social Security Number and your Date of Birth () to complete the sign-up process. If you do not sign up before the expiration date, you must request a new code.     MOWGLI Activation Code: Activation code not generated  Current MOWGLI Status: Active     If you have questions, you can email [email or web address] or call [phone] to talk to our MOWGLI staff. Remember, MOWGLI is NOT to be used for urgent needs. For medical emergencies, dial 911.     IF YOU SMOKE OR USE TOBACCO PLEASE READ THE FOLLOWING:  Why is smoking bad for me?  Smoking increases the risk of heart disease, lung disease, vascular disease, stroke, and cancer. If you smoke, STOP!        IF YOU SMOKE OR USE TOBACCO PLEASE READ THE FOLLOWING:  Why is smoking bad for me?  Smoking increases the risk of heart disease, lung disease, vascular disease, stroke, and cancer. If you smoke, STOP!     For more information:  Quit Now  [place]  1-800-QUIT-NOW  https://[place].quitlogix.org/en-[place]/

## 2024-11-19 NOTE — PROGRESS NOTES
OPERATIVE NOTE:  Naif Nick    DATE OF PROCEDURE: 11/19/2024    PROCEDURE:   Flexible Fiberoptic Laryngoscopy    ANESTHESIA:  None    REASON FOR PROCEDURE:  Procedure was recommended for suspicious clinical behavior and abnormal PFTs  Risks, benefits and alternatives were discussed.      DETAILS of OPERATION:  The patient was seated in the exam chair.  A flexible fiberoptic laryngoscopy was performed through the oral cavity.  The scope was introduced into the oral cavity and directed to the level of the glottis, examining the structures of the oropharynx, base of tongue, vallecula, supraglottic larynx, glottic larynx, and hypopharynx.      FINDINGS:  Mucosal surfaces:   The mucosal surfaces demonstrated normal mucosa surfaces with moderate inflammation    Base of tongue:  The base of tongue was found to have no mass or lesion.    Epiglottis:  The epiglottis was found to have no mass or lesion.    Aryepiglottic fold:  The AE folds were found to have no mass or lesion.    False Vocal Fold:  The false cords were found to have no mass or lesion.    True Vocal Cord:  The true vocal cords were found to have no mass or lesion. Both true vocal cords adduct and abduct normally    Arytenoid:   The arytenoids were found to have moderate inter-arytenoid edema with erythema.    Hypopharynx:  The hypopharynx was found to have no mass or lesion.    The patient tolerated procedure well.

## 2024-11-19 NOTE — TELEPHONE ENCOUNTER
Armand Astudillo called to request a refill on his medication.      Last office visit : 10/8/2024   Next office visit : 1/8/2025     Requested Prescriptions     Refused Prescriptions Disp Refills    SOLIQUA 100-33 UNT-MCG/ML SOPN [Pharmacy Med Name: Soliqua Subcutaneous Solution Pen-injector 100-33 UNT-MCG/ML] 30 mL 3     Sig: INJECT 26 UNITS UNDER THE SKIN DAILY            Anabel Ornelas LPN

## 2024-11-21 ENCOUNTER — TELEPHONE (OUTPATIENT)
Dept: OTOLARYNGOLOGY | Facility: CLINIC | Age: 74
End: 2024-11-21

## 2024-11-21 NOTE — TELEPHONE ENCOUNTER
[facility] received a fax that requires your attention. The document has been indexed to the patient’s chart for your review.      Reason for sending: PLEASE REVIEW CLARIFICATION REQUEST    Documents Description: CLARIFICATION FOR VARENICLINE 0.5MG TABS    Name of Sender: [facility]    Date Indexed: 11.21.24    Notes (if needed): PLEASE NOTE CLARIFICATION REQUEST BEING SENT DUE TO [facility] ON 11.19.24 BOTH CONTAINING DIFFERENT DIRECTIONS

## 2024-11-24 DIAGNOSIS — N40.1 BENIGN PROSTATIC HYPERPLASIA WITH URINARY HESITANCY: ICD-10-CM

## 2024-11-24 DIAGNOSIS — R39.11 BENIGN PROSTATIC HYPERPLASIA WITH URINARY HESITANCY: ICD-10-CM

## 2024-11-25 RX ORDER — TAMSULOSIN HYDROCHLORIDE 0.4 MG/1
0.4 CAPSULE ORAL DAILY
Qty: 90 CAPSULE | Refills: 0 | Status: SHIPPED | OUTPATIENT
Start: 2024-11-25

## 2024-11-25 RX ORDER — ATORVASTATIN CALCIUM 20 MG/1
20 TABLET, FILM COATED ORAL DAILY
Qty: 90 TABLET | Refills: 0 | Status: SHIPPED | OUTPATIENT
Start: 2024-11-25

## 2024-11-25 NOTE — TELEPHONE ENCOUNTER
Armand Astudillo called to request a refill on his medication.      Last office visit : 10/8/2024   Next office visit : 1/8/2025     Requested Prescriptions     Pending Prescriptions Disp Refills    atorvastatin (LIPITOR) 20 MG tablet [Pharmacy Med Name: Atorvastatin Calcium Oral Tablet 20 MG] 90 tablet 3     Sig: TAKE 1 TABLET EVERY DAY    tamsulosin (FLOMAX) 0.4 MG capsule [Pharmacy Med Name: Tamsulosin HCl Oral Capsule 0.4 MG] 90 capsule 3     Sig: TAKE 1 CAPSULE EVERY DAY            Anabel Ornelas LPN

## 2024-11-26 DIAGNOSIS — Z76.0 MEDICATION REFILL: ICD-10-CM

## 2024-11-26 DIAGNOSIS — J44.9 CHRONIC OBSTRUCTIVE PULMONARY DISEASE, UNSPECIFIED COPD TYPE (HCC): ICD-10-CM

## 2024-11-26 RX ORDER — ALBUTEROL SULFATE 90 UG/1
2 INHALANT RESPIRATORY (INHALATION) EVERY 6 HOURS PRN
Qty: 3 EACH | Refills: 3 | Status: SHIPPED | OUTPATIENT
Start: 2024-11-26

## 2024-11-26 NOTE — TELEPHONE ENCOUNTER
Armand Astudillo called to request a refill on his medication.      Last office visit : 10/8/2024   Next office visit : 1/8/2025     Requested Prescriptions     Pending Prescriptions Disp Refills    albuterol sulfate HFA (PROVENTIL;VENTOLIN;PROAIR) 108 (90 Base) MCG/ACT inhaler [Pharmacy Med Name: Albuterol Sulfate HFA Inhalation Aerosol Solution 108 (90 Base) MCG/ACT] 3 each 3     Sig: INHALE 2 PUFFS EVERY 6 HOURS AS NEEDED FOR WHEEZING            Delphine Oliveira LPN

## 2024-12-09 DIAGNOSIS — Z76.0 MEDICATION REFILL: ICD-10-CM

## 2024-12-09 DIAGNOSIS — G47.01 INSOMNIA DUE TO MEDICAL CONDITION: ICD-10-CM

## 2024-12-09 DIAGNOSIS — M54.2 CHRONIC NECK PAIN: ICD-10-CM

## 2024-12-09 DIAGNOSIS — G89.29 CHRONIC NECK PAIN: ICD-10-CM

## 2024-12-09 RX ORDER — TRAZODONE HYDROCHLORIDE 50 MG/1
TABLET, FILM COATED ORAL
Qty: 90 TABLET | Refills: 0 | Status: SHIPPED | OUTPATIENT
Start: 2024-12-09

## 2024-12-09 NOTE — TELEPHONE ENCOUNTER
Armand Astudillo called to request a refill on his medication.      Last office visit : 10/8/2024   Next office visit : 1/8/2025     Requested Prescriptions     Pending Prescriptions Disp Refills    traZODone (DESYREL) 50 MG tablet [Pharmacy Med Name: traZODone HCl Oral Tablet 50 MG] 90 tablet 1     Sig: TAKE 1 TABLET EVERY NIGHT            Anabel Ornelas, HELION

## 2024-12-16 RX ORDER — AMLODIPINE BESYLATE 5 MG/1
5 TABLET ORAL DAILY
Qty: 90 TABLET | Refills: 3 | OUTPATIENT
Start: 2024-12-16

## 2024-12-16 NOTE — TELEPHONE ENCOUNTER
Armand Astudillo called to request a refill on his medication.      Last office visit : 10/8/2024   Next office visit : 1/8/2025     Requested Prescriptions     Pending Prescriptions Disp Refills    amLODIPine (NORVASC) 5 MG tablet [Pharmacy Med Name: amLODIPine Besylate Oral Tablet 5 MG] 90 tablet 3     Sig: TAKE 1 TABLET EVERY DAY            Anabel Ornelas LPN

## 2024-12-20 RX ORDER — AMLODIPINE BESYLATE 5 MG/1
5 TABLET ORAL DAILY
Qty: 90 TABLET | Refills: 3 | Status: SHIPPED | OUTPATIENT
Start: 2024-12-20

## 2024-12-20 RX ORDER — ATORVASTATIN CALCIUM 20 MG/1
20 TABLET, FILM COATED ORAL DAILY
Qty: 90 TABLET | Refills: 1 | Status: SHIPPED | OUTPATIENT
Start: 2024-12-20

## 2024-12-20 NOTE — TELEPHONE ENCOUNTER
Armand Astudillo called to request a refill on his medication.      Last office visit : 10/8/2024   Next office visit : 1/8/2025     Requested Prescriptions     Pending Prescriptions Disp Refills    atorvastatin (LIPITOR) 20 MG tablet [Pharmacy Med Name: Atorvastatin Calcium Oral Tablet 20 MG] 90 tablet 1     Sig: TAKE 1 TABLET EVERY DAY            Anabel Ornelas LPN

## 2024-12-31 DIAGNOSIS — G62.9 NEUROPATHY: ICD-10-CM

## 2024-12-31 DIAGNOSIS — Z76.0 MEDICATION REFILL: ICD-10-CM

## 2024-12-31 NOTE — TELEPHONE ENCOUNTER
Armand Astudillo called to request a refill on his medication.      Last office visit : 10/8/2024   Next office visit : 1/8/2025     Last UDS:   Benzodiazepine Screen, Urine   Date Value Ref Range Status   11/15/2023 Negative  Final     Buprenorphine Urine   Date Value Ref Range Status   11/15/2023 Negative  Final     Cocaine Metabolite Screen, Urine   Date Value Ref Range Status   11/15/2023 Negative  Final     Oxycodone Screen, Ur   Date Value Ref Range Status   11/15/2023 Negative  Final     Propoxyphene Screen, Urine   Date Value Ref Range Status   11/15/2023 Negative  Final     THC Screen, Urine   Date Value Ref Range Status   11/15/2023 Postive  Final     Tricyclic Antidepressants, Urine   Date Value Ref Range Status   11/15/2023 Negative  Final       Last Artemio: 12/31/24  Medication Contract: 11/15/23   Last Fill: 9/20/24    Requested Prescriptions     Pending Prescriptions Disp Refills    gabapentin (NEURONTIN) 300 MG capsule [Pharmacy Med Name: Gabapentin Oral Capsule 300 MG] 90 capsule      Sig: TAKE 1 CAPSULE THREE TIMES A DAY AS NEEDED                   Please approve or refuse this medication.   Anabel Ornelas LPN

## 2025-01-06 RX ORDER — GABAPENTIN 300 MG/1
CAPSULE ORAL
Qty: 90 CAPSULE | Refills: 0 | Status: SHIPPED | OUTPATIENT
Start: 2025-01-06 | End: 2025-02-06

## 2025-01-06 RX ORDER — VALSARTAN AND HYDROCHLOROTHIAZIDE 320; 25 MG/1; MG/1
1 TABLET, FILM COATED ORAL DAILY
Qty: 90 TABLET | Refills: 0 | Status: SHIPPED | OUTPATIENT
Start: 2025-01-06

## 2025-01-06 NOTE — TELEPHONE ENCOUNTER
Armand Astudillo called to request a refill on his medication.      Last office visit : 10/8/2024   Next office visit : 1/8/2025     Requested Prescriptions     Pending Prescriptions Disp Refills    valsartan-hydroCHLOROthiazide (DIOVAN-HCT) 320-25 MG per tablet [Pharmacy Med Name: Valsartan-hydroCHLOROthiazide Oral Tablet 320-25 MG] 90 tablet 3     Sig: TAKE 1 TABLET EVERY DAY            Delphine Oliveira LPN

## 2025-01-08 ENCOUNTER — OFFICE VISIT (OUTPATIENT)
Dept: PRIMARY CARE CLINIC | Age: 75
End: 2025-01-08
Payer: MEDICARE

## 2025-01-08 VITALS
SYSTOLIC BLOOD PRESSURE: 130 MMHG | HEART RATE: 66 BPM | TEMPERATURE: 97.7 F | WEIGHT: 173 LBS | OXYGEN SATURATION: 99 % | BODY MASS INDEX: 24.22 KG/M2 | DIASTOLIC BLOOD PRESSURE: 60 MMHG | HEIGHT: 71 IN

## 2025-01-08 DIAGNOSIS — E11.22 TYPE 2 DIABETES MELLITUS WITH STAGE 3A CHRONIC KIDNEY DISEASE, WITH LONG-TERM CURRENT USE OF INSULIN (HCC): ICD-10-CM

## 2025-01-08 DIAGNOSIS — Z72.0 TOBACCO ABUSE: ICD-10-CM

## 2025-01-08 DIAGNOSIS — N18.31 TYPE 2 DIABETES MELLITUS WITH STAGE 3A CHRONIC KIDNEY DISEASE, WITH LONG-TERM CURRENT USE OF INSULIN (HCC): ICD-10-CM

## 2025-01-08 DIAGNOSIS — I10 ESSENTIAL HYPERTENSION: Primary | ICD-10-CM

## 2025-01-08 DIAGNOSIS — J44.9 CHRONIC OBSTRUCTIVE PULMONARY DISEASE, UNSPECIFIED COPD TYPE (HCC): ICD-10-CM

## 2025-01-08 DIAGNOSIS — Z79.4 TYPE 2 DIABETES MELLITUS WITH STAGE 3A CHRONIC KIDNEY DISEASE, WITH LONG-TERM CURRENT USE OF INSULIN (HCC): ICD-10-CM

## 2025-01-08 DIAGNOSIS — K21.9 GASTROESOPHAGEAL REFLUX DISEASE WITHOUT ESOPHAGITIS: ICD-10-CM

## 2025-01-08 DIAGNOSIS — E78.2 MIXED HYPERLIPIDEMIA: ICD-10-CM

## 2025-01-08 PROCEDURE — M1299 PR FLU IMMUNIZE ORDER/ADMIN: HCPCS | Performed by: FAMILY MEDICINE

## 2025-01-08 PROCEDURE — 3078F DIAST BP <80 MM HG: CPT | Performed by: FAMILY MEDICINE

## 2025-01-08 PROCEDURE — 1123F ACP DISCUSS/DSCN MKR DOCD: CPT | Performed by: FAMILY MEDICINE

## 2025-01-08 PROCEDURE — 3023F SPIROM DOC REV: CPT | Performed by: FAMILY MEDICINE

## 2025-01-08 PROCEDURE — 4004F PT TOBACCO SCREEN RCVD TLK: CPT | Performed by: FAMILY MEDICINE

## 2025-01-08 PROCEDURE — 99214 OFFICE O/P EST MOD 30 MIN: CPT | Performed by: FAMILY MEDICINE

## 2025-01-08 PROCEDURE — G8420 CALC BMI NORM PARAMETERS: HCPCS | Performed by: FAMILY MEDICINE

## 2025-01-08 PROCEDURE — 3075F SYST BP GE 130 - 139MM HG: CPT | Performed by: FAMILY MEDICINE

## 2025-01-08 PROCEDURE — G8427 DOCREV CUR MEDS BY ELIG CLIN: HCPCS | Performed by: FAMILY MEDICINE

## 2025-01-08 PROCEDURE — 1159F MED LIST DOCD IN RCRD: CPT | Performed by: FAMILY MEDICINE

## 2025-01-08 SDOH — ECONOMIC STABILITY: FOOD INSECURITY: WITHIN THE PAST 12 MONTHS, YOU WORRIED THAT YOUR FOOD WOULD RUN OUT BEFORE YOU GOT MONEY TO BUY MORE.: PATIENT DECLINED

## 2025-01-08 SDOH — ECONOMIC STABILITY: FOOD INSECURITY: WITHIN THE PAST 12 MONTHS, THE FOOD YOU BOUGHT JUST DIDN'T LAST AND YOU DIDN'T HAVE MONEY TO GET MORE.: PATIENT DECLINED

## 2025-01-08 ASSESSMENT — PATIENT HEALTH QUESTIONNAIRE - PHQ9
1. LITTLE INTEREST OR PLEASURE IN DOING THINGS: NOT AT ALL
SUM OF ALL RESPONSES TO PHQ QUESTIONS 1-9: 0
SUM OF ALL RESPONSES TO PHQ9 QUESTIONS 1 & 2: 0
2. FEELING DOWN, DEPRESSED OR HOPELESS: NOT AT ALL
SUM OF ALL RESPONSES TO PHQ QUESTIONS 1-9: 0

## 2025-01-08 NOTE — PROGRESS NOTES
[facility]  [address]  [address]  [place] [address]  Dept: [phone]  Dept Fax: [phone]  Loc: [phone]      Subjective:     Chief Complaint   Patient presents with    3 Month Follow-Up    Medication Check     Medications being refilled issues       HPI:  Armand Astudillo is a 74 y.o. male presents today for routine follow-up and med check  BS at home today is 85. He states that he has not taken Jardiance because of the cost of the medication. His last A1c was 5.9%  BP is well controlled  PMHx and problem, list reviewed and updated  Chronic meds reviewed and reconciled      ROS:   Review of Systems    PMHx:  Past Medical History:   Diagnosis Date    BPH (benign prostatic hyperplasia)     Dizziness     DM (diabetes mellitus) (HCC)     type 2    Dysphagia     HTN (hypertension)     Hyperlipidemia     Hypogonadism male     Peripheral neuropathy     Tobacco dependence      Patient Active Problem List   Diagnosis    Benign prostatic hyperplasia with lower urinary tract symptoms    Tobacco abuse    Mixed hyperlipidemia    Testosterone deficiency    Essential hypertension    Diabetes mellitus (HCC)    GERD (gastroesophageal reflux disease)    Chronic obstructive pulmonary disease (HCC)       PSHx:  Past Surgical History:   Procedure Laterality Date    BACK SURGERY      COLON SURGERY  2014    Done in [place],  of colon polyps with a 5 yr recall    COLONOSCOPY N/A 07/12/2019    Dr Alycia De La Rosa-w/ablation-Internal hemorrhoids-Grade 2, suboptimal prep, diverticulosis-HP-3 yr recall    COLONOSCOPY N/A 08/09/2022    Dr De La Rosa, Int hem Gr 1 wo bleeding, mod diverticulosis, sub prep fair, 5 year recall    EYE SURGERY Right 07/2017    UPPER GASTROINTESTINAL ENDOSCOPY N/A 07/12/2019    Dr Alycia De La Rosa-Mild gastritis and bulbar duodenitis, prominent major duodenal papilla, possible lipoma or GIST        PFHx:  Family History   Problem

## 2025-02-08 DIAGNOSIS — R39.11 BENIGN PROSTATIC HYPERPLASIA WITH URINARY HESITANCY: ICD-10-CM

## 2025-02-08 DIAGNOSIS — N40.1 BENIGN PROSTATIC HYPERPLASIA WITH URINARY HESITANCY: ICD-10-CM

## 2025-02-10 NOTE — TELEPHONE ENCOUNTER
Armand Astudillo called to request a refill on his medication.      Last office visit : 1/8/2025   Next office visit : 4/8/2025     Requested Prescriptions     Pending Prescriptions Disp Refills    tamsulosin (FLOMAX) 0.4 MG capsule [Pharmacy Med Name: Tamsulosin HCl Oral Capsule 0.4 MG] 90 capsule 0     Sig: TAKE 1 CAPSULE EVERY DAY            Lyndsay Hinkle MA

## 2025-02-11 RX ORDER — TAMSULOSIN HYDROCHLORIDE 0.4 MG/1
0.4 CAPSULE ORAL DAILY
Qty: 90 CAPSULE | Refills: 0 | Status: SHIPPED | OUTPATIENT
Start: 2025-02-11

## 2025-03-13 DIAGNOSIS — M54.2 CHRONIC NECK PAIN: ICD-10-CM

## 2025-03-13 DIAGNOSIS — Z76.0 MEDICATION REFILL: ICD-10-CM

## 2025-03-13 DIAGNOSIS — G47.01 INSOMNIA DUE TO MEDICAL CONDITION: ICD-10-CM

## 2025-03-13 DIAGNOSIS — G89.29 CHRONIC NECK PAIN: ICD-10-CM

## 2025-03-14 RX ORDER — TRAZODONE HYDROCHLORIDE 50 MG/1
50 TABLET ORAL NIGHTLY
Qty: 90 TABLET | Refills: 0 | Status: SHIPPED | OUTPATIENT
Start: 2025-03-14

## 2025-03-20 NOTE — TELEPHONE ENCOUNTER
Armand Astudillo called to request a refill on his medication.      Last office visit : 1/8/2025   Next office visit : 4/8/2025     Requested Prescriptions     Pending Prescriptions Disp Refills    valsartan-hydroCHLOROthiazide (DIOVAN-HCT) 320-25 MG per tablet [Pharmacy Med Name: Valsartan-hydroCHLOROthiazide Oral Tablet 320-25 MG] 90 tablet 3     Sig: TAKE 1 TABLET EVERY DAY            Lyndsay Hinkle MA

## 2025-03-23 RX ORDER — VALSARTAN AND HYDROCHLOROTHIAZIDE 320; 25 MG/1; MG/1
1 TABLET, FILM COATED ORAL DAILY
Qty: 90 TABLET | Refills: 0 | Status: SHIPPED | OUTPATIENT
Start: 2025-03-23

## 2025-04-04 DIAGNOSIS — E78.2 MIXED HYPERLIPIDEMIA: ICD-10-CM

## 2025-04-04 DIAGNOSIS — I10 ESSENTIAL HYPERTENSION: ICD-10-CM

## 2025-04-04 DIAGNOSIS — E11.22 TYPE 2 DIABETES MELLITUS WITH STAGE 3A CHRONIC KIDNEY DISEASE, WITH LONG-TERM CURRENT USE OF INSULIN (HCC): ICD-10-CM

## 2025-04-04 DIAGNOSIS — Z79.4 TYPE 2 DIABETES MELLITUS WITH STAGE 3A CHRONIC KIDNEY DISEASE, WITH LONG-TERM CURRENT USE OF INSULIN (HCC): ICD-10-CM

## 2025-04-04 DIAGNOSIS — N18.31 TYPE 2 DIABETES MELLITUS WITH STAGE 3A CHRONIC KIDNEY DISEASE, WITH LONG-TERM CURRENT USE OF INSULIN (HCC): ICD-10-CM

## 2025-04-04 LAB
ALBUMIN SERPL-MCNC: 3.9 G/DL (ref 3.5–5.2)
ALP SERPL-CCNC: 59 U/L (ref 40–129)
ALT SERPL-CCNC: 23 U/L (ref 10–50)
ANION GAP SERPL CALCULATED.3IONS-SCNC: 9 MMOL/L (ref 8–16)
AST SERPL-CCNC: 22 U/L (ref 10–50)
BASOPHILS # BLD: 0.1 K/UL (ref 0–0.2)
BASOPHILS NFR BLD: 0.6 % (ref 0–1)
BILIRUB SERPL-MCNC: 0.2 MG/DL (ref 0.2–1.2)
BUN SERPL-MCNC: 28 MG/DL (ref 8–23)
CALCIUM SERPL-MCNC: 9.5 MG/DL (ref 8.8–10.2)
CHLORIDE SERPL-SCNC: 105 MMOL/L (ref 98–107)
CHOLEST SERPL-MCNC: 182 MG/DL (ref 0–199)
CO2 SERPL-SCNC: 31 MMOL/L (ref 22–29)
CREAT SERPL-MCNC: 1.1 MG/DL (ref 0.7–1.2)
CREAT UR-MCNC: 147 MG/DL (ref 39–259)
EOSINOPHIL # BLD: 0.2 K/UL (ref 0–0.6)
EOSINOPHIL NFR BLD: 2 % (ref 0–5)
ERYTHROCYTE [DISTWIDTH] IN BLOOD BY AUTOMATED COUNT: 12.8 % (ref 11.5–14.5)
GLUCOSE SERPL-MCNC: 102 MG/DL (ref 70–99)
HBA1C MFR BLD: 6.5 % (ref 4–5.6)
HCT VFR BLD AUTO: 42.7 % (ref 42–52)
HDLC SERPL-MCNC: 55 MG/DL (ref 40–60)
HGB BLD-MCNC: 13.3 G/DL (ref 14–18)
IMM GRANULOCYTES # BLD: 0 K/UL
LDLC SERPL CALC-MCNC: 106 MG/DL
LYMPHOCYTES # BLD: 2.9 K/UL (ref 1.1–4.5)
LYMPHOCYTES NFR BLD: 30.4 % (ref 20–40)
MCH RBC QN AUTO: 29.7 PG (ref 27–31)
MCHC RBC AUTO-ENTMCNC: 31.1 G/DL (ref 33–37)
MCV RBC AUTO: 95.3 FL (ref 80–94)
MICROALBUMIN UR-MCNC: 78 MG/DL (ref 0–1.99)
MICROALBUMIN/CREAT UR-RTO: 530.6 MG/G (ref 0–29)
MONOCYTES # BLD: 0.9 K/UL (ref 0–0.9)
MONOCYTES NFR BLD: 9.3 % (ref 0–10)
NEUTROPHILS # BLD: 5.4 K/UL (ref 1.5–7.5)
NEUTS SEG NFR BLD: 57.3 % (ref 50–65)
PLATELET # BLD AUTO: 410 K/UL (ref 130–400)
PMV BLD AUTO: 9.7 FL (ref 9.4–12.4)
POTASSIUM SERPL-SCNC: 4.1 MMOL/L (ref 3.5–5.1)
PROT SERPL-MCNC: 7 G/DL (ref 6.4–8.3)
RBC # BLD AUTO: 4.48 M/UL (ref 4.7–6.1)
SODIUM SERPL-SCNC: 145 MMOL/L (ref 136–145)
TRIGL SERPL-MCNC: 106 MG/DL (ref 0–149)
WBC # BLD AUTO: 9.4 K/UL (ref 4.8–10.8)

## 2025-04-07 ENCOUNTER — RESULTS FOLLOW-UP (OUTPATIENT)
Dept: PRIMARY CARE CLINIC | Age: 75
End: 2025-04-07

## 2025-04-08 ENCOUNTER — OFFICE VISIT (OUTPATIENT)
Dept: PRIMARY CARE CLINIC | Age: 75
End: 2025-04-08
Payer: MEDICARE

## 2025-04-08 VITALS
HEART RATE: 72 BPM | SYSTOLIC BLOOD PRESSURE: 126 MMHG | TEMPERATURE: 96.7 F | BODY MASS INDEX: 23.85 KG/M2 | OXYGEN SATURATION: 95 % | HEIGHT: 71 IN | DIASTOLIC BLOOD PRESSURE: 72 MMHG | WEIGHT: 170.4 LBS

## 2025-04-08 DIAGNOSIS — R80.9 MICROALBUMINURIA: ICD-10-CM

## 2025-04-08 DIAGNOSIS — N18.31 TYPE 2 DIABETES MELLITUS WITH STAGE 3A CHRONIC KIDNEY DISEASE, WITH LONG-TERM CURRENT USE OF INSULIN (HCC): ICD-10-CM

## 2025-04-08 DIAGNOSIS — Z00.00 MEDICARE ANNUAL WELLNESS VISIT, SUBSEQUENT: Primary | ICD-10-CM

## 2025-04-08 DIAGNOSIS — F12.10 CANNABIS ABUSE, EPISODIC: ICD-10-CM

## 2025-04-08 DIAGNOSIS — G62.9 NEUROPATHY: ICD-10-CM

## 2025-04-08 DIAGNOSIS — I10 ESSENTIAL HYPERTENSION: ICD-10-CM

## 2025-04-08 DIAGNOSIS — Z79.4 TYPE 2 DIABETES MELLITUS WITH STAGE 3A CHRONIC KIDNEY DISEASE, WITH LONG-TERM CURRENT USE OF INSULIN (HCC): ICD-10-CM

## 2025-04-08 DIAGNOSIS — E78.2 MIXED HYPERLIPIDEMIA: ICD-10-CM

## 2025-04-08 DIAGNOSIS — J44.9 CHRONIC OBSTRUCTIVE PULMONARY DISEASE, UNSPECIFIED COPD TYPE (HCC): ICD-10-CM

## 2025-04-08 DIAGNOSIS — N40.1 BENIGN PROSTATIC HYPERPLASIA WITH LOWER URINARY TRACT SYMPTOMS, SYMPTOM DETAILS UNSPECIFIED: ICD-10-CM

## 2025-04-08 DIAGNOSIS — E11.22 TYPE 2 DIABETES MELLITUS WITH STAGE 3A CHRONIC KIDNEY DISEASE, WITH LONG-TERM CURRENT USE OF INSULIN (HCC): ICD-10-CM

## 2025-04-08 PROCEDURE — 3078F DIAST BP <80 MM HG: CPT | Performed by: FAMILY MEDICINE

## 2025-04-08 PROCEDURE — 3074F SYST BP LT 130 MM HG: CPT | Performed by: FAMILY MEDICINE

## 2025-04-08 PROCEDURE — 3044F HG A1C LEVEL LT 7.0%: CPT | Performed by: FAMILY MEDICINE

## 2025-04-08 PROCEDURE — G0439 PPPS, SUBSEQ VISIT: HCPCS | Performed by: FAMILY MEDICINE

## 2025-04-08 PROCEDURE — 1123F ACP DISCUSS/DSCN MKR DOCD: CPT | Performed by: FAMILY MEDICINE

## 2025-04-08 PROCEDURE — 1159F MED LIST DOCD IN RCRD: CPT | Performed by: FAMILY MEDICINE

## 2025-04-08 ASSESSMENT — PATIENT HEALTH QUESTIONNAIRE - PHQ9
SUM OF ALL RESPONSES TO PHQ QUESTIONS 1-9: 0
2. FEELING DOWN, DEPRESSED OR HOPELESS: NOT AT ALL
1. LITTLE INTEREST OR PLEASURE IN DOING THINGS: NOT AT ALL
SUM OF ALL RESPONSES TO PHQ QUESTIONS 1-9: 0

## 2025-04-08 ASSESSMENT — LIFESTYLE VARIABLES
HOW MANY STANDARD DRINKS CONTAINING ALCOHOL DO YOU HAVE ON A TYPICAL DAY: 1 OR 2
HOW OFTEN DO YOU HAVE A DRINK CONTAINING ALCOHOL: MONTHLY OR LESS

## 2025-04-08 NOTE — PATIENT INSTRUCTIONS
Narcotics Anonymous or [employer] [facility] or from treatment facilities.  If you relapse, get help as soon as you can. Some people make a plan with another person that outlines what they want that person to do for them if they relapse. The plan usually includes how to handle the relapse and who to notify in case of relapse.  Don't give up. Remember that a relapse doesn't mean that you have failed. Use the experience to learn the triggers that lead you to drink or use drugs. Then quit again. Recovery is a lifelong process. Many people have several relapses before they are able to quit for good.  Follow-up care is a key part of your treatment and safety. Be sure to make and go to all appointments, and call your doctor if you are having problems. It's also a good idea to know your test results and keep a list of the medicines you take.  When should you call for help?   Call 911  anytime you think you may need emergency care. For example, call if you or someone else:    Has overdosed or has withdrawal signs. Be sure to tell the emergency workers that you are or someone else is using or trying to quit using drugs. Overdose or withdrawal signs may include:  Losing consciousness.  Seizure.  Seeing or hearing things that aren't there (hallucinations).     Is thinking or talking about suicide or harming others.   Where to get help 24 hours a day, 7 days a week   If you or someone you know talks about suicide, self-harm, a mental health crisis, a substance use crisis, or any other kind of emotional distress, get help right away. You can:    Call the Suicide and Crisis Lifeline at 988.     Call 2-959-557-TALK (1-943.887.4084).     Text HOME to 111537 to access the Crisis Text Line.   Consider saving these numbers in your phone.  Go to [email or web address] for more information or to chat online.  Call your doctor now or seek immediate medical care if:    You are having withdrawal symptoms. These may include nausea or vomiting, sweating,

## 2025-04-08 NOTE — PROGRESS NOTES
Medicare Annual Wellness Visit    Armand Astudillo is here for Medicare AWV, Numbness (Fingers numb getting worse been going on for a while ), and cramping (In hands and legs )    Assessment & Plan   Medicare annual wellness visit, subsequent  Essential hypertension  Microalbuminuria  Type 2 diabetes mellitus with stage 3a chronic kidney disease, with long-term current use of insulin ([facility])  Mixed hyperlipidemia  Benign prostatic hyperplasia with lower urinary tract symptoms, symptom details unspecified  Chronic obstructive pulmonary disease, unspecified COPD type ([facility])  Cannabis abuse, episodic  Neuropathy      Continue all maintenance medications as prescribed  Continue diabetic diet  Continue attempts at weight loss.  Engaged in regular exercise as tolerated - at least 30 minutes/day  Low fat/low calorie diet  Daily foot care  Recommend annual diabetic eye exam  Encouraged to d/c smoking  D/c cannabis use  Stay well hydrated - 64 oz of water a day  Make a follow-  up appt with kidney doctor - Dr Del Real  Keep scheduled follow-up visit with new PCP and with other specialist  Call with new concerns      Return in 1 year (on 4/8/2026) for Medicare AWV.     Subjective     Pt states his neuropathy (hands) sec to severe cervical DJD is at status quo. He is already taking Gabapentin 300 mg tid  He states that he feels drowsy after he gets up in the am. Pt takes Trazodone at night for sleep. Pt also admits to smoking marijuana 2-3 times a week ~ 1/2 joint each time for chronic pain and anxiety  Recent labs reviewed and discussed with patient.  His microalbumin is high - was referred to nephrology in the past Pt states he saw Dr Del Real once and was Rxed Jardiance but pt is not taking this. He does not have any follow-up appt with him  A1c is at 6.5% from 5.8% Pt admits to eating too much cookies. Pt admits he does not usually strictly follow a diabetic diet.  Chronic meds reviewed and reconciled.He states that he does not

## 2025-04-16 ENCOUNTER — OFFICE VISIT (OUTPATIENT)
Dept: PULMONOLOGY | Facility: CLINIC | Age: 75
End: 2025-04-16
Payer: MEDICARE

## 2025-04-16 VITALS
SYSTOLIC BLOOD PRESSURE: 118 MMHG | WEIGHT: 168.2 LBS | DIASTOLIC BLOOD PRESSURE: 86 MMHG | HEART RATE: 74 BPM | BODY MASS INDEX: 24.08 KG/M2 | OXYGEN SATURATION: 96 % | HEIGHT: 70 IN

## 2025-04-16 DIAGNOSIS — J43.2 CENTRILOBULAR EMPHYSEMA: ICD-10-CM

## 2025-04-16 DIAGNOSIS — J44.89 ASTHMA-COPD OVERLAP SYNDROME: Primary | ICD-10-CM

## 2025-04-16 DIAGNOSIS — Z87.891 PERSONAL HISTORY OF NICOTINE DEPENDENCE: Chronic | ICD-10-CM

## 2025-04-16 DIAGNOSIS — J96.11 CHRONIC RESPIRATORY FAILURE WITH HYPOXIA: ICD-10-CM

## 2025-04-16 DIAGNOSIS — R91.1 LUNG NODULE: ICD-10-CM

## 2025-04-16 NOTE — PROGRESS NOTES
" TAMAR Sorenson  [facility]   Pulmonary and Critical Care  [address]  [place] [address]  Phone: [phone]  Fax: [phone]           Chief Complaint  Asthma-COPD overlap syndrome    Subjective        Naif Nick presents to [facility] PULMONARY & CRITICAL CARE MEDICINE     History of Present Illness  Mr. Nick is a pleasant 74 year old male with known very severe stage 4 COPD, Former smoker, centrolobular emphysema, TB exposure, Lung nodule and Asthma/ copd overlap syndrome.     He reports a positive response to Stiolto, which he continues to use. The frequency of rescue inhaler usage has decreased, with occasional use in the morning. He is currently awaiting a decision from the Department of [employer] regarding his COPD, which was initially denied but is under reconsideration following an occupational physician's evaluation. His diagnosis of COPD dates back to 2014 or 2015. He is scheduled for a CT scan through the [facility] program, although the exact date is yet to be confirmed. He has received his pneumonia vaccine but has not yet received the RSV vaccine. He has successfully reduced his smoking habit from a full pack to half a pack per day.           Objective   Vital Signs:   /86   Pulse 74   Ht 177.8 cm (70\")   Wt 76.3 kg (168 lb 3.2 oz)   SpO2 96% Comment: RA  BMI 24.13 kg/m²     Physical Exam  Vitals reviewed.   Constitutional:       Appearance: Normal appearance.   Cardiovascular:      Rate and Rhythm: Normal rate and regular rhythm.   Pulmonary:      Effort: Pulmonary effort is normal.      Breath sounds: Normal breath sounds.   Neurological:      General: No focal deficit present.      Mental Status: He is alert and oriented to person, place, and time.   Psychiatric:         Mood and Affect: Mood normal.         Behavior: Behavior normal.            Result Review :  The following data was reviewed by: TAMAR Sorenson on " 04/16/2025:    My interpretation of imaging:  no new  My interpretation of labs: no new     PFT Values          10/9/2024    10:15   Pre Drug PFT Results      FEV1 76   FEF 25-75% 34   FEV1/FVC 58   Other Tests PFT Results   DLCO 75   D/VAsb 82     My interpretation of the PFT : no new    Results for orders placed in visit on 10/09/24    Spirometry with Diffusion Capacity    Narrative  Spirometry with Diffusion Capacity    Performed by: Hamida Hartman, JENNA  Authorized by: Raquel Sosa APRN  Pre Drug % Predicted  FVC: 100%  FEV1: 76%  FEF 25-75%: 34%  FEV1/FVC: 58%  DLCO: 75%  D/VAsb: 82%    Interpretation  Spirometry  Spirometry shows moderate obstruction. There is reduced midflow suggesting small airway/airflow obstruction.  Review of FVL curve  There is a flattening of the inspiratory curve, suggesting variable extrathoracic obstruction.  Diffusion Capacity  The patient's diffusion capacity is normal.  Diffusion capacity is normal when corrected for alveolar volume.  Overall comments: Paired to October 2022 he has had improvement in his FEV1 from 65 to 76% predicted, improvement in FVC from 79 to 100% predicted.  Diffusion capacity is normal.  He does have flattening of the inspiratory curve.  This is a change.      Results for orders placed in visit on 10/12/22    Pulmonary Function Test    Narrative  Pulmonary Function Test  Performed by: Hamida Hartman, JENNA  Authorized by: Raquel Sosa APRN    Pre Drug % Predicted  FVC: 79%  FEV1: 65%  FEF 25-75%: 54%  FEV1/FVC: 62%  DLCO: 83%  D/VAsb: 83%    Interpretation  Spirometry  Spirometry shows moderate obstruction. There is reduced midflow suggesting small airway/airflow obstruction.  Diffusion Capacity  The patient's diffusion capacity is normal.  Diffusion capacity is normal when corrected for alveolar volume.      Results for orders placed during the hospital encounter of 08/10/20    Full Pulmonary Function Test With  Bronchodilator    Narrative  [facility] - Pulmonary Function Test    [address]  [place]  [place]  [address]  [phone]    Patient : Naif Nick  MRN : [id number]  YOB: 1950  :  Nacho Vaughan MD  Date : 8/10/2020    ______________________________________________________________________    Interpretation :  1. Spirometry shows very severe airflow obstruction prebronchodilator  2. Following bronchodilator there is minimal not significant improvement in FEV1 and FVC, but postbronchodilator spirometry meet criteria for severe rather than very severe obstruction.  3. Mid flow is reduced.  4. Lung volume measurements show reduction in inspiratory capacity and elevations in expiratory reserve volume residual volume and total lung capacity suggesting hyperinflation and very significant gas trapping.  5. Airway resistance is increased and conductance is decreased.      Nacho Vaughan MD          Assessment and Plan   Diagnoses and all orders for this visit:    1. Asthma-COPD overlap syndrome (Primary)  Overview:  10/2024 Stage II COPD, FEV1 76% predicted      2. Centrilobular emphysema    3. Chronic respiratory failure with hypoxia    4. Lung nodule    5. Personal history of nicotine dependence  Overview:  10/4/24 Some day smoker, 60 pack year history         Continue Stiolto and as needed albuterol HFA.  Continue to get annual CT screening through the [facility] program.  He is awaiting decision from the Department of Labor as well.  He will be due his flu vaccination in the fall and is encouraged to get the RSV vaccination.  He is down to half a pack a day of cigarettes and will work towards complete cessation by his 6-month follow-up.      Naif Nick  reports that he has been smoking cigarettes. He started smoking about 61 years ago. He has a 61.3 pack-year smoking history. He has been exposed to tobacco smoke. He has never used smokeless tobacco. I have educated him on the  risk of diseases from using tobacco products such as cancer, COPD, and heart disease.     I advised him to quit and he is willing to quit. We have discussed the following method/s for tobacco cessation:   He is self weaning .  Together we have set a quit date for  6 months .  He will follow up with me in 6 months or sooner to check on his progress.    I spent 3  minutes counseling the patient.       Alpha 1: Normal, MM  LDCT: Gets annual through [facility] program, last on April 2024  Smoking Cessation: Encouraged  Vaccinations: Flu: due fall 2025 PNA: Completed            Follow Up   No follow-ups on file.  Patient was given instructions and counseling regarding his condition or for health maintenance advice. Please see specific information pulled into the AVS if appropriate.     TAMAR Sorenson  4/16/2025  11:40 CDT    Please note that portions of this note were completed with a voice recognition program.    Patient or patient representative verbalized consent for the use of Ambient Listening during the visit with  TAMAR Sorenson for chart documentation. 4/16/2025  11:45 CDT

## 2025-04-26 DIAGNOSIS — R39.11 BENIGN PROSTATIC HYPERPLASIA WITH URINARY HESITANCY: ICD-10-CM

## 2025-04-26 DIAGNOSIS — N40.1 BENIGN PROSTATIC HYPERPLASIA WITH URINARY HESITANCY: ICD-10-CM

## 2025-04-28 DIAGNOSIS — N40.1 BENIGN PROSTATIC HYPERPLASIA WITH URINARY HESITANCY: ICD-10-CM

## 2025-04-28 DIAGNOSIS — R39.11 BENIGN PROSTATIC HYPERPLASIA WITH URINARY HESITANCY: ICD-10-CM

## 2025-04-28 RX ORDER — TAMSULOSIN HYDROCHLORIDE 0.4 MG/1
0.4 CAPSULE ORAL DAILY
Qty: 90 CAPSULE | Refills: 0 | Status: SHIPPED | OUTPATIENT
Start: 2025-04-28

## 2025-04-28 RX ORDER — TAMSULOSIN HYDROCHLORIDE 0.4 MG/1
0.4 CAPSULE ORAL DAILY
Qty: 90 CAPSULE | Refills: 3 | Status: SHIPPED | OUTPATIENT
Start: 2025-04-28 | End: 2025-04-28 | Stop reason: SDUPTHER

## 2025-04-28 NOTE — TELEPHONE ENCOUNTER
Armand Astudillo called to request a refill on his medication.      Last office visit : 4/8/2025   Next office visit : 7/9/2025     Requested Prescriptions     Pending Prescriptions Disp Refills    tamsulosin (FLOMAX) 0.4 MG capsule [Pharmacy Med Name: Tamsulosin HCl Oral Capsule 0.4 MG] 90 capsule 3     Sig: TAKE 1 CAPSULE EVERY DAY            Anabel Ornelas LPN

## 2025-04-28 NOTE — TELEPHONE ENCOUNTER
Armand Astudillo called to request a refill on his medication.      Last office visit : 4/8/2025   Next office visit : 7/9/2025     Requested Prescriptions     Pending Prescriptions Disp Refills    tamsulosin (FLOMAX) 0.4 MG capsule 90 capsule 0     Sig: Take 1 capsule by mouth daily            Anabel Ornelas LPN

## 2025-04-29 ENCOUNTER — TRANSCRIBE ORDERS (OUTPATIENT)
Dept: ADMINISTRATIVE | Facility: HOSPITAL | Age: 75
End: 2025-04-29

## 2025-04-29 ENCOUNTER — HOSPITAL ENCOUNTER (OUTPATIENT)
Dept: CT IMAGING | Facility: HOSPITAL | Age: 75
Discharge: HOME | End: 2025-04-29

## 2025-04-29 DIAGNOSIS — Z13.9 SCREENING FOR UNSPECIFIED CONDITION: Primary | ICD-10-CM

## 2025-04-29 DIAGNOSIS — Z13.9 SCREENING FOR UNSPECIFIED CONDITION: ICD-10-CM

## 2025-04-29 PROCEDURE — 71271 CT THORAX LUNG CANCER SCR C-: CPT

## 2025-06-05 RX ORDER — VALSARTAN AND HYDROCHLOROTHIAZIDE 320; 25 MG/1; MG/1
1 TABLET, FILM COATED ORAL DAILY
Qty: 90 TABLET | Refills: 0 | Status: SHIPPED | OUTPATIENT
Start: 2025-06-05

## 2025-06-23 NOTE — TELEPHONE ENCOUNTER
Armand Astudillo called to request a refill on his medication.      Last office visit : 4/8/2025   Next office visit : 7/9/2025     Requested Prescriptions     Pending Prescriptions Disp Refills    Insulin Pen Needle (DROPLET PEN NEEDLES) 31G X 5 MM MISC [Pharmacy Med Name: DROPLET PEN NDL 31G 3/16 IN (5MM)] 270 each 0            Kellen Bangura MA

## 2025-06-24 RX ORDER — PEN NEEDLE, DIABETIC 32GX 5/32"
1 NEEDLE, DISPOSABLE MISCELLANEOUS 3 TIMES DAILY
Qty: 270 EACH | Refills: 0 | Status: SHIPPED | OUTPATIENT
Start: 2025-06-24

## 2025-06-28 DIAGNOSIS — G89.29 CHRONIC NECK PAIN: ICD-10-CM

## 2025-06-28 DIAGNOSIS — R91.1 LUNG NODULE: ICD-10-CM

## 2025-06-28 DIAGNOSIS — M54.2 CHRONIC NECK PAIN: ICD-10-CM

## 2025-06-28 DIAGNOSIS — Z87.891 PERSONAL HISTORY OF NICOTINE DEPENDENCE: ICD-10-CM

## 2025-06-28 DIAGNOSIS — J96.11 CHRONIC RESPIRATORY FAILURE WITH HYPOXIA: ICD-10-CM

## 2025-06-28 DIAGNOSIS — G47.01 INSOMNIA DUE TO MEDICAL CONDITION: ICD-10-CM

## 2025-06-28 DIAGNOSIS — R94.2 ABNORMAL PFT: ICD-10-CM

## 2025-06-28 DIAGNOSIS — J43.2 CENTRILOBULAR EMPHYSEMA: ICD-10-CM

## 2025-06-28 DIAGNOSIS — Z76.0 MEDICATION REFILL: ICD-10-CM

## 2025-06-28 DIAGNOSIS — J44.89 ASTHMA-COPD OVERLAP SYNDROME: Primary | ICD-10-CM

## 2025-06-29 DIAGNOSIS — E78.2 MIXED HYPERLIPIDEMIA: Primary | ICD-10-CM

## 2025-06-30 RX ORDER — TIOTROPIUM BROMIDE AND OLODATEROL 3.124; 2.736 UG/1; UG/1
2 SPRAY, METERED RESPIRATORY (INHALATION)
Qty: 12 G | Refills: 0 | Status: SHIPPED | OUTPATIENT
Start: 2025-06-30

## 2025-06-30 RX ORDER — TRAZODONE HYDROCHLORIDE 50 MG/1
50 TABLET ORAL NIGHTLY PRN
Qty: 90 TABLET | Refills: 0 | Status: SHIPPED | OUTPATIENT
Start: 2025-06-30

## 2025-06-30 RX ORDER — ATORVASTATIN CALCIUM 20 MG/1
20 TABLET, FILM COATED ORAL DAILY
Qty: 90 TABLET | Refills: 0 | Status: SHIPPED | OUTPATIENT
Start: 2025-06-30

## 2025-06-30 NOTE — TELEPHONE ENCOUNTER
Armand Astudillo called to request a refill on his medication.      Last office visit : 4/8/2025   Next office visit : 7/9/2025     Requested Prescriptions     Pending Prescriptions Disp Refills    atorvastatin (LIPITOR) 20 MG tablet [Pharmacy Med Name: Atorvastatin Calcium Oral Tablet 20 MG] 90 tablet 3     Sig: TAKE 1 TABLET EVERY DAY            Anabel Ornelas LPN

## 2025-06-30 NOTE — TELEPHONE ENCOUNTER
Rx Refill Note  Requested Prescriptions     Pending Prescriptions Disp Refills    Stiolto Respimat 2.5-2.5 MCG/ACT aerosol solution inhaler [Pharmacy Med Name: Stiolto Respimat Inhalation Aerosol Solution 2.5-2.5 MCG/ACT] 12 g 3     Sig: INHALE 2 PUFFS DAILY.      Last office visit with prescribing clinician: 4/16/2025   Last telemedicine visit with prescribing clinician: Visit date not found   Next office visit with prescribing clinician: 10/16/2025                         Would you like a call back once the refill request has been completed: [] Yes [] No    If the office needs to give you a call back, can they leave a voicemail: [] Yes [] No    Lili Finley MA  06/30/25, 07:51 CDT

## 2025-06-30 NOTE — TELEPHONE ENCOUNTER
Armand Astudillo called to request a refill on his medication.      Last office visit : 4/8/2025   Next office visit : 6/29/2025     Requested Prescriptions     Pending Prescriptions Disp Refills    traZODone (DESYREL) 50 MG tablet [Pharmacy Med Name: traZODone HCl Oral Tablet 50 MG] 90 tablet 3     Sig: TAKE 1 TABLET EVERY NIGHT            Anabel Ornelas, [facility]

## 2025-06-30 NOTE — TELEPHONE ENCOUNTER
Rx Refill Note  Requested Prescriptions     Pending Prescriptions Disp Refills    Stiolto Respimat 2.5-2.5 MCG/ACT aerosol solution inhaler [Pharmacy Med Name: Stiolto Respimat Inhalation Aerosol Solution 2.5-2.5 MCG/ACT] 12 g 0     Sig: INHALE 2 PUFFS DAILY.      Last office visit with prescribing clinician: 4/16/2025   Last telemedicine visit with prescribing clinician: Visit date not found   Next office visit with prescribing clinician: 10/16/2025                         Would you like a call back once the refill request has been completed: [] Yes [] No    If the office needs to give you a call back, can they leave a voicemail: [] Yes [] No    Lili Finley MA  06/30/25, 11:41 CDT

## 2025-07-08 PROBLEM — R56.9 CONVULSIONS, UNSPECIFIED CONVULSION TYPE (HCC): Status: ACTIVE | Noted: 2025-07-08

## 2025-07-08 ASSESSMENT — ENCOUNTER SYMPTOMS
CHEST TIGHTNESS: 0
SORE THROAT: 0
NAUSEA: 0
COUGH: 0
ABDOMINAL PAIN: 0
WHEEZING: 0
DIARRHEA: 0
SHORTNESS OF BREATH: 0

## 2025-07-09 ENCOUNTER — RESULTS FOLLOW-UP (OUTPATIENT)
Dept: PRIMARY CARE CLINIC | Age: 75
End: 2025-07-09

## 2025-07-09 ENCOUNTER — OFFICE VISIT (OUTPATIENT)
Dept: PRIMARY CARE CLINIC | Age: 75
End: 2025-07-09
Payer: MEDICARE

## 2025-07-09 VITALS
OXYGEN SATURATION: 98 % | HEART RATE: 78 BPM | BODY MASS INDEX: 24.78 KG/M2 | DIASTOLIC BLOOD PRESSURE: 70 MMHG | TEMPERATURE: 97.6 F | SYSTOLIC BLOOD PRESSURE: 138 MMHG | WEIGHT: 177 LBS | HEIGHT: 71 IN

## 2025-07-09 DIAGNOSIS — Z51.81 MEDICATION MONITORING ENCOUNTER: ICD-10-CM

## 2025-07-09 DIAGNOSIS — R82.5 POSITIVE URINE DRUG SCREEN: ICD-10-CM

## 2025-07-09 DIAGNOSIS — Z79.4 TYPE 2 DIABETES MELLITUS WITH STAGE 3A CHRONIC KIDNEY DISEASE, WITH LONG-TERM CURRENT USE OF INSULIN (HCC): ICD-10-CM

## 2025-07-09 DIAGNOSIS — N18.31 TYPE 2 DIABETES MELLITUS WITH STAGE 3A CHRONIC KIDNEY DISEASE, WITH LONG-TERM CURRENT USE OF INSULIN (HCC): ICD-10-CM

## 2025-07-09 DIAGNOSIS — E11.22 TYPE 2 DIABETES MELLITUS WITH STAGE 3A CHRONIC KIDNEY DISEASE, WITH LONG-TERM CURRENT USE OF INSULIN (HCC): ICD-10-CM

## 2025-07-09 DIAGNOSIS — I10 ESSENTIAL HYPERTENSION: ICD-10-CM

## 2025-07-09 DIAGNOSIS — G62.9 NEUROPATHY: ICD-10-CM

## 2025-07-09 DIAGNOSIS — Z76.89 ENCOUNTER TO ESTABLISH CARE: ICD-10-CM

## 2025-07-09 DIAGNOSIS — E78.2 MIXED HYPERLIPIDEMIA: ICD-10-CM

## 2025-07-09 DIAGNOSIS — R80.9 MICROALBUMINURIA: ICD-10-CM

## 2025-07-09 DIAGNOSIS — J44.9 CHRONIC OBSTRUCTIVE PULMONARY DISEASE, UNSPECIFIED COPD TYPE (HCC): ICD-10-CM

## 2025-07-09 DIAGNOSIS — G40.909 SEIZURE DISORDER (HCC): ICD-10-CM

## 2025-07-09 LAB
ALCOHOL URINE: NORMAL
AMPHETAMINE SCREEN URINE: NORMAL
BARBITURATE SCREEN URINE: NORMAL
BENZODIAZEPINE SCREEN, URINE: NORMAL
BUPRENORPHINE URINE: NORMAL
COCAINE METABOLITE SCREEN URINE: POSITIVE
CREAT UR-MCNC: 119 MG/DL (ref 39–259)
FENTANYL SCREEN, URINE: NORMAL
GABAPENTIN SCREEN, URINE: NORMAL
Lab: NORMAL
MDMA, URINE: NORMAL
METHADONE SCREEN, URINE: NORMAL
METHAMPHETAMINE, URINE: NORMAL
MICROALBUMIN UR-MCNC: 58.3 MG/DL (ref 0–1.99)
MICROALBUMIN/CREAT UR-RTO: 489.9 MG/G (ref 0–29)
OPIATE SCREEN URINE: NORMAL
OXYCODONE SCREEN URINE: NORMAL
PHENCYCLIDINE SCREEN URINE: NORMAL
PROPOXYPHENE SCREEN, URINE: NORMAL
REPORT: NORMAL
SYNTHETIC CANNABINOIDS(K2) SCREEN, URINE: NORMAL
THC SCREEN, URINE: POSITIVE
THIS TEST SENT TO: NORMAL
TRAMADOL SCREEN URINE: NORMAL
TRICYCLIC ANTIDEPRESSANTS, UR: NORMAL

## 2025-07-09 PROCEDURE — 4004F PT TOBACCO SCREEN RCVD TLK: CPT | Performed by: NURSE PRACTITIONER

## 2025-07-09 PROCEDURE — 99215 OFFICE O/P EST HI 40 MIN: CPT | Performed by: NURSE PRACTITIONER

## 2025-07-09 PROCEDURE — G8427 DOCREV CUR MEDS BY ELIG CLIN: HCPCS | Performed by: NURSE PRACTITIONER

## 2025-07-09 PROCEDURE — 1123F ACP DISCUSS/DSCN MKR DOCD: CPT | Performed by: NURSE PRACTITIONER

## 2025-07-09 PROCEDURE — 3044F HG A1C LEVEL LT 7.0%: CPT | Performed by: NURSE PRACTITIONER

## 2025-07-09 PROCEDURE — 80305 DRUG TEST PRSMV DIR OPT OBS: CPT | Performed by: NURSE PRACTITIONER

## 2025-07-09 PROCEDURE — 1160F RVW MEDS BY RX/DR IN RCRD: CPT | Performed by: NURSE PRACTITIONER

## 2025-07-09 PROCEDURE — G8420 CALC BMI NORM PARAMETERS: HCPCS | Performed by: NURSE PRACTITIONER

## 2025-07-09 PROCEDURE — 3078F DIAST BP <80 MM HG: CPT | Performed by: NURSE PRACTITIONER

## 2025-07-09 PROCEDURE — 1159F MED LIST DOCD IN RCRD: CPT | Performed by: NURSE PRACTITIONER

## 2025-07-09 PROCEDURE — 3023F SPIROM DOC REV: CPT | Performed by: NURSE PRACTITIONER

## 2025-07-09 PROCEDURE — 3075F SYST BP GE 130 - 139MM HG: CPT | Performed by: NURSE PRACTITIONER

## 2025-07-09 RX ORDER — INSULIN GLARGINE AND LIXISENATIDE 100; 33 U/ML; UG/ML
INJECTION, SOLUTION SUBCUTANEOUS
Qty: 10 ADJUSTABLE DOSE PRE-FILLED PEN SYRINGE | Refills: 2 | Status: SHIPPED
Start: 2025-07-09

## 2025-07-09 NOTE — PROGRESS NOTES
medication was prescribed at the time.  - If any recurrent, a referral to neurology will be made.    5. Chronic obstructive pulmonary disease.  - Continue management per [facility], Breo, albuterol as needed.  - Advised to consider smoking cessation but he declines at this time.  - If decides to quit smoking, a refill of Chantix can be provided.    6.  Hypertension  - Stable, continue amlodipine, valsartan-HCTZ    7.  Hyperlipidemia  - Stable, continue atorvastatin 20 mg daily  -Obtain fasting lipid    Health maintenance.  - Had a colonoscopy in 08/2022, which showed no polyps. Due for another colonoscopy in 08/2027.  - Received a pneumonia vaccine in 2021 and typically gets an influenza vaccine annually.  - Advised to get the Prevnar 20 pneumonia vaccine and the influenza vaccine during the next visit.  Can get Prevnar 20 sooner but he defers today.    Addendum:  UDS was positive for cocaine and THC.  These results were obtained after his visit.  We will send to lab for confirmation.  I will be unable to prescribe gabapentin if this is confirmed.  Patient will be notified to discuss further.    Follow-up  - Follow up in 6 months.    [id number] due to new to provider, transition care, extensive review of records, review of history with patient as well as current concerns and exam, coordination of care.    1. Medication monitoring encounter  -     POCT Rapid Drug Screen  2. Type 2 diabetes mellitus with stage 3a chronic kidney disease, with long-term current use of insulin (HCC)  -     Hemoglobin A1C; Future  -     Albumin/Creatinine Ratio, Urine; Future  3. Microalbuminuria  4. Essential hypertension  -     CBC with Auto Differential; Future  5. Mixed hyperlipidemia  -     Comprehensive Metabolic Panel; Future  -     Lipid Panel; Future  -     TSH; Future  -     T4, Free; Future  6. Seizure disorder (HCC)  7. Chronic obstructive pulmonary disease, unspecified COPD type (HCC)  8. Encounter to establish care  9.

## 2025-07-11 DIAGNOSIS — R39.11 BENIGN PROSTATIC HYPERPLASIA WITH URINARY HESITANCY: ICD-10-CM

## 2025-07-11 DIAGNOSIS — N40.1 BENIGN PROSTATIC HYPERPLASIA WITH URINARY HESITANCY: ICD-10-CM

## 2025-07-11 RX ORDER — TAMSULOSIN HYDROCHLORIDE 0.4 MG/1
0.4 CAPSULE ORAL DAILY
Qty: 90 CAPSULE | Refills: 1 | Status: SHIPPED | OUTPATIENT
Start: 2025-07-11

## 2025-07-13 LAB
BZE UR-MCNC: >2000 NG/ML
MISCELLANEOUS LAB TEST ORDER: NORMAL

## 2025-07-15 LAB — CARBOXYTHC UR-MCNC: 56 NG/ML

## 2025-07-16 ENCOUNTER — TELEPHONE (OUTPATIENT)
Dept: PRIMARY CARE CLINIC | Age: 75
End: 2025-07-16

## 2025-07-16 NOTE — TELEPHONE ENCOUNTER
Patient returned the call from [facility] office about the UDS results. I gave his the providers results and recommendations.    Patient verbalized understanding of the results..

## 2025-07-30 ENCOUNTER — TRANSCRIBE ORDERS (OUTPATIENT)
Dept: ADMINISTRATIVE | Facility: HOSPITAL | Age: 75
End: 2025-07-30

## 2025-07-30 ENCOUNTER — HOSPITAL ENCOUNTER (OUTPATIENT)
Dept: CT IMAGING | Facility: HOSPITAL | Age: 75
Discharge: HOME | End: 2025-07-30

## 2025-07-30 DIAGNOSIS — Z13.9 SCREENING FOR UNSPECIFIED CONDITION: Primary | ICD-10-CM

## 2025-07-30 DIAGNOSIS — Z13.9 SCREENING FOR UNSPECIFIED CONDITION: ICD-10-CM

## 2025-07-30 PROCEDURE — 71271 CT THORAX LUNG CANCER SCR C-: CPT

## (undated) DEVICE — SINGLE PORT MANIFOLD: Brand: NEPTUNE 2

## (undated) DEVICE — ENDO KIT,LOURDES HOSPITAL: Brand: MEDLINE INDUSTRIES, INC.

## (undated) DEVICE — FORCEPS BX L240CM DIA2.4MM L NDL RAD JAW 4 133340

## (undated) DEVICE — CANNULA NSL AD L7FT DIV O2 CO2 W/ M LUERLOCK TRMPT CONN